# Patient Record
Sex: MALE | Race: WHITE | Employment: OTHER | URBAN - METROPOLITAN AREA
[De-identification: names, ages, dates, MRNs, and addresses within clinical notes are randomized per-mention and may not be internally consistent; named-entity substitution may affect disease eponyms.]

---

## 2019-04-12 NOTE — PROGRESS NOTES
[facility] / [facility]) [address] [facility], [address]    Acknowledgment of Informed Consent for Surgical or Medical Procedure and Sedation  I agree to allow doctor(s) Boris Moreau and his/her associates or assistants, including residents and/or other qualified medical practitioner to perform the following medical treatment or procedure and to administer or direct the administration of sedation as necessary:  Procedure(s): DECOMPRESSION LAMINECTOMY L3-4 POSSIBLY L5  My doctor has explained the following regarding the proposed procedure:   the explanation of the procedure   the benefits of the procedure   the potential problems that might occur during recuperation   the risks and side effects of the procedure which could include but are not limited to severe blood loss, infection, stroke or death   the benefits, risks and side effect of alternative procedures including the consequences of declining this procedure or any alternative procedures   the likelihood of achieving satisfactory results. I acknowledge no guarantee or assurance has been made to me regarding the results. I understand that during the course of this treatment/procedure, unforeseen conditions can occur which require an additional or different procedure. I agree to allow my physician or assistants to perform such extension of the original procedure as they may find necessary. I understand that sedation will often result in temporary impairment of memory and fine motor skills and that sedation can occasionally progress to a state of deep sedation or general anesthesia. I understand the risks of anesthesia for surgery include, but are not limited to, sore throat, hoarseness, injury to face, mouth, or teeth; nausea; headache; injury to blood vessels or nerves; death, brain damage, or paralysis.     I understand that if I have a Limitation of Treatment order in effect during my hospitalization, the order

## 2019-04-16 RX ORDER — HYDROCODONE BITARTRATE AND ACETAMINOPHEN 5; 325 MG/1; MG/1
1 TABLET ORAL EVERY 6 HOURS PRN
Status: ON HOLD | COMMUNITY
End: 2019-04-20 | Stop reason: HOSPADM

## 2019-04-16 RX ORDER — NAPROXEN 250 MG/1
250 TABLET ORAL 2 TIMES DAILY WITH MEALS
Status: ON HOLD | COMMUNITY
End: 2019-05-09 | Stop reason: CLARIF

## 2019-04-16 RX ORDER — CYCLOBENZAPRINE HCL 5 MG
5 TABLET ORAL 3 TIMES DAILY PRN
Status: ON HOLD | COMMUNITY
End: 2019-05-17 | Stop reason: HOSPADM

## 2019-04-16 RX ORDER — LISINOPRIL 20 MG/1
20 TABLET ORAL DAILY
Status: ON HOLD | COMMUNITY
End: 2019-05-28 | Stop reason: CLARIF

## 2019-04-16 RX ORDER — RANITIDINE 150 MG/1
150 TABLET ORAL 2 TIMES DAILY
Status: ON HOLD | COMMUNITY
End: 2019-05-09 | Stop reason: CLARIF

## 2019-04-16 RX ORDER — LANCETS
EACH MISCELLANEOUS
Status: ON HOLD | COMMUNITY
Start: 2017-08-13 | End: 2019-05-09 | Stop reason: CLARIF

## 2019-04-16 RX ORDER — GLIMEPIRIDE 4 MG/1
4 TABLET ORAL
COMMUNITY

## 2019-04-16 RX ORDER — ONDANSETRON 4 MG/1
4 TABLET, FILM COATED ORAL EVERY 8 HOURS PRN
Status: ON HOLD | COMMUNITY
End: 2019-05-09 | Stop reason: CLARIF

## 2019-04-16 RX ORDER — DOCUSATE SODIUM 100 MG/1
100 CAPSULE, LIQUID FILLED ORAL 2 TIMES DAILY
Status: ON HOLD | COMMUNITY
End: 2019-05-09 | Stop reason: CLARIF

## 2019-04-16 RX ORDER — TAMSULOSIN HYDROCHLORIDE 0.4 MG/1
0.4 CAPSULE ORAL DAILY
COMMUNITY

## 2019-04-16 RX ORDER — POLYETHYLENE GLYCOL 3350 17 G/17G
POWDER, FOR SOLUTION ORAL ONCE
Status: ON HOLD | COMMUNITY
Start: 2014-11-12 | End: 2019-05-09 | Stop reason: CLARIF

## 2019-04-16 RX ORDER — FENOFIBRATE 145 MG/1
145 TABLET, COATED ORAL DAILY
COMMUNITY

## 2019-04-16 RX ORDER — AMLODIPINE BESYLATE 5 MG/1
5 TABLET ORAL DAILY
Status: ON HOLD | COMMUNITY
End: 2019-05-09 | Stop reason: CLARIF

## 2019-04-16 RX ORDER — ACETAMINOPHEN 325 MG/1
650 TABLET ORAL 3 TIMES DAILY
Status: ON HOLD | COMMUNITY
End: 2019-05-09 | Stop reason: CLARIF

## 2019-04-16 RX ORDER — ASPIRIN 81 MG/1
81 TABLET ORAL DAILY
Status: ON HOLD | COMMUNITY
End: 2019-05-09 | Stop reason: CLARIF

## 2019-04-16 RX ORDER — ATORVASTATIN CALCIUM 20 MG/1
20 TABLET, FILM COATED ORAL NIGHTLY
COMMUNITY

## 2019-04-16 NOTE — PROGRESS NOTES
[address]                          Date of Procedure 4/19 Time of Procedure   1100    PRIOR TO PROCEDURE DATE:  1. Please follow any guidelines/instructions prior to your procedure as advised by your surgeon. 2. Arrange for someone to drive you home and be with you for the first 24 hours after discharge for your safety after your procedure for which you received sedation. Ensure it is someone we can share information with regarding your discharge. 3. You must contact your surgeon for instructions IF:   You are taking any blood thinners, aspirin, anti-inflammatory or vitamin E.   There is a change in your physical condition such as a cold, fever, rash, cuts, sores or any other infection, especially near your surgical site. 4. Do not drink alcohol the day before or day of your procedure. 5. A Pre-op History and Physical for surgery MUST be completed by your Physician or Urgent Care within 30 days of your procedure date. Please bring a copy with you on the day of your procedure and along with any other testing performed. THE DAY OF YOUR PROCEDURE:  1. Follow instructions for ARRIVAL TIME as DIRECTED BY YOUR SURGEON. If your surgeon does not give you a specific arrival time, please arrive at 0900.    2. Enter the MAIN entrance from [employer] and follow the signs to the free [email or web address] or [employer] parking (offered free of charge 6am-5pm). 3. Enter the Main Entrance of the hospital (do not enter from the lower level of the parking garage). Upon entrance, check in with the  at the main desk on your left. If no one is available at the desk, proceed into the [facility] Waiting Room and go through the door directly into the [facility]. There is a Check-in desk ACROSS from Room 5 (marked with a sign hanging from the ceiling). The phone number for the surgery center is [phone].     4. Please call [phone] option #2 option #2 if you have not been preregistered yet. On the day of your procedure bring your insurance card and photo ID. You will be registered at your bedside once brought back to your room. 5. DO NOT EAT ANYTHING eight hours prior to surgery. May have 8 ounces of water 4 hours prior to surgery. 6. MEDICATIONS    Take the following medications with a SMALL sip of water: AMLODIPINE PERCOCET VICODIN   Use your usual dose of inhalers the morning of surgery. BRING your rescue inhaler with you to hospital.    Anesthesia does NOT want you to take insulin the morning of surgery. They will control your blood sugar while you are at the hospital. Please contact your ordering physician for instructions regarding your insulin the night before your procedure. If you have an insulin pump, please keep it set on basal rate. 7. Do not swallow water when brushing teeth. No gum, candy, mints or ice chips. Refrain from smoking or at least decrease the amount. 8. Dress in loose, comfortable clothing appropriate for redressing after your procedure. Do not wear jewelry (including body piercings), make-up (especially NO eye make-up), fingernail polish (NO toenail polish if foot/leg surgery), lotion, powders or metal hairclips. 9. Dentures, glasses, or contacts will need to be removed before your procedure. Bring cases for your glasses, contacts, dentures, or hearing aids to protect them while you are in surgery. 10. If you use a CPAP, please bring it with you on the day of your procedure. 11. We recommend that valuable personal  belongings such as cash, cell phones, e-tablets or jewelry, be left at home during your stay. The hospital will not be responsible for valuables that are not secured in the hospital safe. However, if your insurance requires a co-pay, you may want to bring a method of payment, i.e. Check or credit card, if you wish to pay your co-pay the day of surgery.       12. If you are to stay overnight, you may bring a bag with personal items. Please have any large items you may need brought in by your family after your arrival to your hospital room. 15. If you have a Living Will or Durable Power of , please bring a copy on the day of your procedure. 15. With your permission, one family member may accompany you while you are being prepared for surgery. Once you are ready, additional family members may join you. HOW WE KEEP YOU SAFE and WORK TO PREVENT SURGICAL SITE INFECTIONS:  1. Health care workers should always check your ID bracelet to verify your name and birth date. You will be asked many times to state your name, date of birth, and allergies. 2. Health care workers should always clean their hands with soap or alcohol gel before providing care to you. It is okay to ask anyone if they cleaned their hands before they touch you. 3. You will be actively involved in verifying the type of procedure you are having and ensuring the correct surgical site. This will be confirmed multiple times prior to your procedure. Do NOT yosvany your surgery site UNLESS instructed to by your surgeon. 4. Do not shave or wax for 72 hours prior to procedure near your operative site. Shaving with a razor can irritate your skin and make it easier to develop an infection. On the day of your procedure, any hair that needs to be removed near the surgical site will be clipped by a healthcare worker using a special clippers designed to avoid skin irritation. 5. When you are in the operating room, your surgical site will be cleansed with a special soap, and in most cases, you will be given an antibiotic before the surgery begins. What to expect AFTER YOUR PROCEDURE:  1. Immediately following your procedure, your will be taken to the PACU for the first phase of your recovery.   Your nurse will help you recover from any potential side effects of anesthesia, such as extreme drowsiness, changes in your vital signs or breathing patterns. Nausea, headache, muscle aches, or sore throat may also occur after anesthesia. Your nurse will help you manage these potential side effects. 2. For comfort and safety, arrange to have someone at home with you for the first 24 hours after discharge. 3. You and your family will be given written instructions about your diet, activity, dressing care, medications, and return visits. 4. Once at home, should issues with nausea, pain, or bleeding occur, or should you notice any signs of infection, you should call your surgeon. 5. Always clean your hands before and after caring for your wound. Do not let your family touch your surgery site without cleaning their hands. 6. Narcotic pain medications can cause significant constipation. You may want to add a stool softener to your postoperative medication schedule or speak to your surgeon on how best to manage this SIDE EFFECT. SPECIAL INSTRUCTIONS     Thank you for allowing us to care for you. We strive to exceed your expectations in the delivery of care and service provided to you and your family. If you need to contact us for any reason, please call us at [phone]    Instructions reviewed with patient during preadmission testing phone interview. Tawny Martin. 4/16/2019 .11:39 AM      ADDITIONAL EDUCATIONAL INFORMATION REVIEWED PER PHONE WITH YOU AND/OR YOUR FAMILY:  No Bring a urine sample on day of surgery  Yes Pain Goal-Taking Control of Your Pain  Yes FAQs about Surgical Site Infections   NO Hibiclens® Bathing Instructions   Yes Antibacterial Soap  No Piero® Wipes Bathing Instructions (Obtained from: https://www.[employer].com/. pdf )  No Incentive Spirometer Education   Other

## 2019-04-18 ENCOUNTER — ANESTHESIA EVENT (OUTPATIENT)
Dept: OPERATING ROOM | Age: 69
End: 2019-04-18
Payer: MEDICARE

## 2019-04-19 ENCOUNTER — APPOINTMENT (OUTPATIENT)
Dept: GENERAL RADIOLOGY | Age: 69
End: 2019-04-19
Attending: NEUROLOGICAL SURGERY
Payer: MEDICARE

## 2019-04-19 ENCOUNTER — HOSPITAL ENCOUNTER (OUTPATIENT)
Age: 69
Setting detail: OBSERVATION
Discharge: HOME | End: 2019-04-20
Attending: NEUROLOGICAL SURGERY | Admitting: NEUROLOGICAL SURGERY
Payer: MEDICARE

## 2019-04-19 ENCOUNTER — ANESTHESIA (OUTPATIENT)
Dept: OPERATING ROOM | Age: 69
End: 2019-04-19
Payer: MEDICARE

## 2019-04-19 VITALS — SYSTOLIC BLOOD PRESSURE: 102 MMHG | TEMPERATURE: 97.9 F | DIASTOLIC BLOOD PRESSURE: 51 MMHG | OXYGEN SATURATION: 87 %

## 2019-04-19 DIAGNOSIS — M48.062 SPINAL STENOSIS OF LUMBAR REGION WITH NEUROGENIC CLAUDICATION: Primary | ICD-10-CM

## 2019-04-19 PROBLEM — M48.061 DEGENERATIVE LUMBAR SPINAL STENOSIS: Status: ACTIVE | Noted: 2019-04-19

## 2019-04-19 PROBLEM — M48.061 LUMBAR SPINAL STENOSIS: Status: ACTIVE | Noted: 2019-04-19

## 2019-04-19 LAB
ABO/RH: NORMAL
ANTIBODY SCREEN: NORMAL
CREAT SERPL-MCNC: 1.2 MG/DL (ref 0.8–1.3)
GFR AFRICAN AMERICAN: >60
GFR NON-AFRICAN AMERICAN: 60
GLUCOSE BLD-MCNC: 128 MG/DL (ref 70–99)
GLUCOSE BLD-MCNC: 198 MG/DL (ref 70–99)
GLUCOSE BLD-MCNC: 221 MG/DL (ref 70–99)
GLUCOSE BLD-MCNC: 277 MG/DL (ref 70–99)
PERFORMED ON: ABNORMAL

## 2019-04-19 PROCEDURE — 94761 N-INVAS EAR/PLS OXIMETRY MLT: CPT

## 2019-04-19 PROCEDURE — 2700000000 HC OXYGEN THERAPY PER DAY

## 2019-04-19 PROCEDURE — 3600000004 HC SURGERY LEVEL 4 BASE: Performed by: NEUROLOGICAL SURGERY

## 2019-04-19 PROCEDURE — 6360000002 HC RX W HCPCS: Performed by: PHYSICIAN ASSISTANT

## 2019-04-19 PROCEDURE — G0378 HOSPITAL OBSERVATION PER HR: HCPCS

## 2019-04-19 PROCEDURE — 6360000002 HC RX W HCPCS: Performed by: NEUROLOGICAL SURGERY

## 2019-04-19 PROCEDURE — 7100000001 HC PACU RECOVERY - ADDTL 15 MIN: Performed by: NEUROLOGICAL SURGERY

## 2019-04-19 PROCEDURE — 2580000003 HC RX 258: Performed by: PHYSICIAN ASSISTANT

## 2019-04-19 PROCEDURE — 6360000002 HC RX W HCPCS: Performed by: NURSE ANESTHETIST, CERTIFIED REGISTERED

## 2019-04-19 PROCEDURE — 2709999900 HC NON-CHARGEABLE SUPPLY: Performed by: NEUROLOGICAL SURGERY

## 2019-04-19 PROCEDURE — 36415 COLL VENOUS BLD VENIPUNCTURE: CPT

## 2019-04-19 PROCEDURE — 3700000001 HC ADD 15 MINUTES (ANESTHESIA): Performed by: NEUROLOGICAL SURGERY

## 2019-04-19 PROCEDURE — 6370000000 HC RX 637 (ALT 250 FOR IP): Performed by: NEUROLOGICAL SURGERY

## 2019-04-19 PROCEDURE — 72100 X-RAY EXAM L-S SPINE 2/3 VWS: CPT

## 2019-04-19 PROCEDURE — 2720000010 HC SURG SUPPLY STERILE: Performed by: NEUROLOGICAL SURGERY

## 2019-04-19 PROCEDURE — 94664 DEMO&/EVAL PT USE INHALER: CPT

## 2019-04-19 PROCEDURE — 86900 BLOOD TYPING SEROLOGIC ABO: CPT

## 2019-04-19 PROCEDURE — 83036 HEMOGLOBIN GLYCOSYLATED A1C: CPT

## 2019-04-19 PROCEDURE — 82565 ASSAY OF CREATININE: CPT

## 2019-04-19 PROCEDURE — 7100000000 HC PACU RECOVERY - FIRST 15 MIN: Performed by: NEUROLOGICAL SURGERY

## 2019-04-19 PROCEDURE — 2580000003 HC RX 258: Performed by: NURSE ANESTHETIST, CERTIFIED REGISTERED

## 2019-04-19 PROCEDURE — 3600000014 HC SURGERY LEVEL 4 ADDTL 15MIN: Performed by: NEUROLOGICAL SURGERY

## 2019-04-19 PROCEDURE — 2500000003 HC RX 250 WO HCPCS: Performed by: NURSE ANESTHETIST, CERTIFIED REGISTERED

## 2019-04-19 PROCEDURE — 2500000003 HC RX 250 WO HCPCS: Performed by: NEUROLOGICAL SURGERY

## 2019-04-19 PROCEDURE — 2580000003 HC RX 258: Performed by: NEUROLOGICAL SURGERY

## 2019-04-19 PROCEDURE — 2580000003 HC RX 258: Performed by: ANESTHESIOLOGY

## 2019-04-19 PROCEDURE — 6370000000 HC RX 637 (ALT 250 FOR IP): Performed by: PHYSICIAN ASSISTANT

## 2019-04-19 PROCEDURE — 3209999900 FLUORO FOR SURGICAL PROCEDURES

## 2019-04-19 PROCEDURE — 3700000000 HC ANESTHESIA ATTENDED CARE: Performed by: NEUROLOGICAL SURGERY

## 2019-04-19 PROCEDURE — 86901 BLOOD TYPING SEROLOGIC RH(D): CPT

## 2019-04-19 PROCEDURE — P9041 ALBUMIN (HUMAN),5%, 50ML: HCPCS | Performed by: NURSE ANESTHETIST, CERTIFIED REGISTERED

## 2019-04-19 PROCEDURE — 6370000000 HC RX 637 (ALT 250 FOR IP): Performed by: INTERNAL MEDICINE

## 2019-04-19 PROCEDURE — 86850 RBC ANTIBODY SCREEN: CPT

## 2019-04-19 RX ORDER — VASOPRESSIN 20 U/ML
INJECTION PARENTERAL PRN
Status: DISCONTINUED | OUTPATIENT
Start: 2019-04-19 | End: 2019-04-19 | Stop reason: SDUPTHER

## 2019-04-19 RX ORDER — LISINOPRIL 20 MG/1
20 TABLET ORAL DAILY
Status: DISCONTINUED | OUTPATIENT
Start: 2019-04-19 | End: 2019-04-20 | Stop reason: HOSPADM

## 2019-04-19 RX ORDER — TAMSULOSIN HYDROCHLORIDE 0.4 MG/1
0.4 CAPSULE ORAL DAILY
Status: DISCONTINUED | OUTPATIENT
Start: 2019-04-19 | End: 2019-04-20 | Stop reason: HOSPADM

## 2019-04-19 RX ORDER — BUPIVACAINE HYDROCHLORIDE AND EPINEPHRINE 5; 5 MG/ML; UG/ML
INJECTION, SOLUTION EPIDURAL; INTRACAUDAL; PERINEURAL PRN
Status: DISCONTINUED | OUTPATIENT
Start: 2019-04-19 | End: 2019-04-19 | Stop reason: ALTCHOICE

## 2019-04-19 RX ORDER — KETAMINE HYDROCHLORIDE 50 MG/ML
INJECTION, SOLUTION, CONCENTRATE INTRAMUSCULAR; INTRAVENOUS PRN
Status: DISCONTINUED | OUTPATIENT
Start: 2019-04-19 | End: 2019-04-19 | Stop reason: SDUPTHER

## 2019-04-19 RX ORDER — LIDOCAINE HYDROCHLORIDE 20 MG/ML
INJECTION, SOLUTION INTRAVENOUS PRN
Status: DISCONTINUED | OUTPATIENT
Start: 2019-04-19 | End: 2019-04-19 | Stop reason: SDUPTHER

## 2019-04-19 RX ORDER — DEXTROSE MONOHYDRATE 50 MG/ML
100 INJECTION, SOLUTION INTRAVENOUS PRN
Status: DISCONTINUED | OUTPATIENT
Start: 2019-04-19 | End: 2019-04-20 | Stop reason: HOSPADM

## 2019-04-19 RX ORDER — DOCUSATE SODIUM 100 MG/1
100 CAPSULE, LIQUID FILLED ORAL 2 TIMES DAILY
Status: DISCONTINUED | OUTPATIENT
Start: 2019-04-19 | End: 2019-04-20 | Stop reason: HOSPADM

## 2019-04-19 RX ORDER — ONDANSETRON 2 MG/ML
INJECTION INTRAMUSCULAR; INTRAVENOUS PRN
Status: DISCONTINUED | OUTPATIENT
Start: 2019-04-19 | End: 2019-04-19 | Stop reason: SDUPTHER

## 2019-04-19 RX ORDER — SODIUM CHLORIDE, SODIUM LACTATE, POTASSIUM CHLORIDE, CALCIUM CHLORIDE 600; 310; 30; 20 MG/100ML; MG/100ML; MG/100ML; MG/100ML
INJECTION, SOLUTION INTRAVENOUS CONTINUOUS
Status: DISCONTINUED | OUTPATIENT
Start: 2019-04-19 | End: 2019-04-19

## 2019-04-19 RX ORDER — EPHEDRINE SULFATE 50 MG/ML
INJECTION INTRAVENOUS PRN
Status: DISCONTINUED | OUTPATIENT
Start: 2019-04-19 | End: 2019-04-19 | Stop reason: SDUPTHER

## 2019-04-19 RX ORDER — GLIMEPIRIDE 2 MG/1
4 TABLET ORAL
Status: DISCONTINUED | OUTPATIENT
Start: 2019-04-20 | End: 2019-04-20 | Stop reason: HOSPADM

## 2019-04-19 RX ORDER — BACITRACIN ZINC AND POLYMYXIN B SULFATE 500; 1000 [USP'U]/G; [USP'U]/G
OINTMENT TOPICAL PRN
Status: DISCONTINUED | OUTPATIENT
Start: 2019-04-19 | End: 2019-04-19 | Stop reason: ALTCHOICE

## 2019-04-19 RX ORDER — SODIUM CHLORIDE 9 MG/ML
INJECTION, SOLUTION INTRAVENOUS CONTINUOUS
Status: DISCONTINUED | OUTPATIENT
Start: 2019-04-19 | End: 2019-04-20 | Stop reason: HOSPADM

## 2019-04-19 RX ORDER — SODIUM CHLORIDE 0.9 % (FLUSH) 0.9 %
10 SYRINGE (ML) INJECTION PRN
Status: DISCONTINUED | OUTPATIENT
Start: 2019-04-19 | End: 2019-04-19 | Stop reason: HOSPADM

## 2019-04-19 RX ORDER — FENOFIBRATE 145 MG/1
145 TABLET, COATED ORAL DAILY
Status: DISCONTINUED | OUTPATIENT
Start: 2019-04-19 | End: 2019-04-20 | Stop reason: HOSPADM

## 2019-04-19 RX ORDER — ROCURONIUM BROMIDE 10 MG/ML
INJECTION, SOLUTION INTRAVENOUS PRN
Status: DISCONTINUED | OUTPATIENT
Start: 2019-04-19 | End: 2019-04-19 | Stop reason: SDUPTHER

## 2019-04-19 RX ORDER — SODIUM CHLORIDE, SODIUM LACTATE, POTASSIUM CHLORIDE, CALCIUM CHLORIDE 600; 310; 30; 20 MG/100ML; MG/100ML; MG/100ML; MG/100ML
INJECTION, SOLUTION INTRAVENOUS CONTINUOUS
Status: DISCONTINUED | OUTPATIENT
Start: 2019-04-19 | End: 2019-04-20 | Stop reason: HOSPADM

## 2019-04-19 RX ORDER — OXYCODONE HYDROCHLORIDE AND ACETAMINOPHEN 5; 325 MG/1; MG/1
1 TABLET ORAL EVERY 4 HOURS PRN
Status: DISCONTINUED | OUTPATIENT
Start: 2019-04-19 | End: 2019-04-20 | Stop reason: HOSPADM

## 2019-04-19 RX ORDER — SODIUM CHLORIDE 0.9 % (FLUSH) 0.9 %
10 SYRINGE (ML) INJECTION EVERY 12 HOURS SCHEDULED
Status: DISCONTINUED | OUTPATIENT
Start: 2019-04-19 | End: 2019-04-19 | Stop reason: HOSPADM

## 2019-04-19 RX ORDER — ALBUMIN, HUMAN INJ 5% 5 %
SOLUTION INTRAVENOUS PRN
Status: DISCONTINUED | OUTPATIENT
Start: 2019-04-19 | End: 2019-04-19 | Stop reason: SDUPTHER

## 2019-04-19 RX ORDER — AMLODIPINE BESYLATE 5 MG/1
5 TABLET ORAL DAILY
Status: DISCONTINUED | OUTPATIENT
Start: 2019-04-19 | End: 2019-04-20 | Stop reason: HOSPADM

## 2019-04-19 RX ORDER — ATORVASTATIN CALCIUM 20 MG/1
20 TABLET, FILM COATED ORAL NIGHTLY
Status: DISCONTINUED | OUTPATIENT
Start: 2019-04-19 | End: 2019-04-20 | Stop reason: HOSPADM

## 2019-04-19 RX ORDER — SODIUM CHLORIDE 0.9 % (FLUSH) 0.9 %
10 SYRINGE (ML) INJECTION EVERY 12 HOURS SCHEDULED
Status: DISCONTINUED | OUTPATIENT
Start: 2019-04-19 | End: 2019-04-20 | Stop reason: HOSPADM

## 2019-04-19 RX ORDER — METHOCARBAMOL 750 MG/1
750 TABLET, FILM COATED ORAL EVERY 6 HOURS PRN
Status: DISCONTINUED | OUTPATIENT
Start: 2019-04-20 | End: 2019-04-20 | Stop reason: HOSPADM

## 2019-04-19 RX ORDER — RANITIDINE 150 MG/1
150 TABLET ORAL 2 TIMES DAILY
Status: DISCONTINUED | OUTPATIENT
Start: 2019-04-19 | End: 2019-04-19 | Stop reason: SDUPTHER

## 2019-04-19 RX ORDER — HYDROMORPHONE HCL 110MG/55ML
PATIENT CONTROLLED ANALGESIA SYRINGE INTRAVENOUS PRN
Status: DISCONTINUED | OUTPATIENT
Start: 2019-04-19 | End: 2019-04-19 | Stop reason: SDUPTHER

## 2019-04-19 RX ORDER — FAMOTIDINE 20 MG/1
20 TABLET, FILM COATED ORAL 2 TIMES DAILY
Status: DISCONTINUED | OUTPATIENT
Start: 2019-04-19 | End: 2019-04-20 | Stop reason: HOSPADM

## 2019-04-19 RX ORDER — GLYCOPYRROLATE 1 MG/5 ML
SYRINGE (ML) INTRAVENOUS PRN
Status: DISCONTINUED | OUTPATIENT
Start: 2019-04-19 | End: 2019-04-19 | Stop reason: SDUPTHER

## 2019-04-19 RX ORDER — SODIUM CHLORIDE 0.9 % (FLUSH) 0.9 %
10 SYRINGE (ML) INJECTION PRN
Status: DISCONTINUED | OUTPATIENT
Start: 2019-04-19 | End: 2019-04-20 | Stop reason: HOSPADM

## 2019-04-19 RX ORDER — DEXAMETHASONE SODIUM PHOSPHATE 4 MG/ML
INJECTION, SOLUTION INTRA-ARTICULAR; INTRALESIONAL; INTRAMUSCULAR; INTRAVENOUS; SOFT TISSUE PRN
Status: DISCONTINUED | OUTPATIENT
Start: 2019-04-19 | End: 2019-04-19 | Stop reason: SDUPTHER

## 2019-04-19 RX ORDER — ONDANSETRON 2 MG/ML
4 INJECTION INTRAMUSCULAR; INTRAVENOUS EVERY 6 HOURS PRN
Status: DISCONTINUED | OUTPATIENT
Start: 2019-04-19 | End: 2019-04-20 | Stop reason: HOSPADM

## 2019-04-19 RX ORDER — SODIUM CHLORIDE 9 MG/ML
INJECTION, SOLUTION INTRAVENOUS CONTINUOUS PRN
Status: DISCONTINUED | OUTPATIENT
Start: 2019-04-19 | End: 2019-04-19 | Stop reason: SDUPTHER

## 2019-04-19 RX ORDER — OXYCODONE HYDROCHLORIDE AND ACETAMINOPHEN 5; 325 MG/1; MG/1
2 TABLET ORAL EVERY 4 HOURS PRN
Status: DISCONTINUED | OUTPATIENT
Start: 2019-04-19 | End: 2019-04-20 | Stop reason: HOSPADM

## 2019-04-19 RX ORDER — NICOTINE POLACRILEX 4 MG
15 LOZENGE BUCCAL PRN
Status: DISCONTINUED | OUTPATIENT
Start: 2019-04-19 | End: 2019-04-20 | Stop reason: HOSPADM

## 2019-04-19 RX ORDER — LIDOCAINE HYDROCHLORIDE 10 MG/ML
1 INJECTION, SOLUTION EPIDURAL; INFILTRATION; INTRACAUDAL; PERINEURAL
Status: DISCONTINUED | OUTPATIENT
Start: 2019-04-19 | End: 2019-04-19 | Stop reason: HOSPADM

## 2019-04-19 RX ORDER — PROPOFOL 10 MG/ML
INJECTION, EMULSION INTRAVENOUS PRN
Status: DISCONTINUED | OUTPATIENT
Start: 2019-04-19 | End: 2019-04-19 | Stop reason: SDUPTHER

## 2019-04-19 RX ORDER — DEXTROSE MONOHYDRATE 25 G/50ML
12.5 INJECTION, SOLUTION INTRAVENOUS PRN
Status: DISCONTINUED | OUTPATIENT
Start: 2019-04-19 | End: 2019-04-20 | Stop reason: HOSPADM

## 2019-04-19 RX ORDER — SUCCINYLCHOLINE CHLORIDE 20 MG/ML
INJECTION INTRAMUSCULAR; INTRAVENOUS PRN
Status: DISCONTINUED | OUTPATIENT
Start: 2019-04-19 | End: 2019-04-19 | Stop reason: SDUPTHER

## 2019-04-19 RX ADMIN — Medication 3 G: at 11:28

## 2019-04-19 RX ADMIN — KETAMINE HYDROCHLORIDE 25 MG: 50 INJECTION, SOLUTION INTRAMUSCULAR; INTRAVENOUS at 11:30

## 2019-04-19 RX ADMIN — ROCURONIUM BROMIDE 40 MG: 10 INJECTION, SOLUTION INTRAVENOUS at 11:41

## 2019-04-19 RX ADMIN — HYDROMORPHONE HYDROCHLORIDE 1.6 MG: 2 INJECTION, SOLUTION INTRAMUSCULAR; INTRAVENOUS; SUBCUTANEOUS at 14:11

## 2019-04-19 RX ADMIN — ONDANSETRON 8 MG: 2 INJECTION INTRAMUSCULAR; INTRAVENOUS at 11:28

## 2019-04-19 RX ADMIN — ROCURONIUM BROMIDE 30 MG: 10 INJECTION, SOLUTION INTRAVENOUS at 13:47

## 2019-04-19 RX ADMIN — VASOPRESSIN 1 UNITS: 20 INJECTION INTRAVENOUS at 12:46

## 2019-04-19 RX ADMIN — VASOPRESSIN 2 UNITS: 20 INJECTION INTRAVENOUS at 11:59

## 2019-04-19 RX ADMIN — EPHEDRINE SULFATE 15 MG: 50 INJECTION INTRAVENOUS at 12:58

## 2019-04-19 RX ADMIN — ALBUMIN (HUMAN) 250 ML: 12.5 INJECTION, SOLUTION INTRAVENOUS at 13:55

## 2019-04-19 RX ADMIN — EPHEDRINE SULFATE 10 MG: 50 INJECTION INTRAVENOUS at 11:56

## 2019-04-19 RX ADMIN — Medication 10 ML: at 21:44

## 2019-04-19 RX ADMIN — ALBUMIN (HUMAN) 250 ML: 12.5 INJECTION, SOLUTION INTRAVENOUS at 14:04

## 2019-04-19 RX ADMIN — SODIUM CHLORIDE: 900 INJECTION, SOLUTION INTRAVENOUS at 12:39

## 2019-04-19 RX ADMIN — VASOPRESSIN 2 UNITS: 20 INJECTION INTRAVENOUS at 12:53

## 2019-04-19 RX ADMIN — FAMOTIDINE 20 MG: 10 INJECTION, SOLUTION INTRAVENOUS at 11:28

## 2019-04-19 RX ADMIN — LIDOCAINE HYDROCHLORIDE 100 MG: 20 INJECTION, SOLUTION INTRAVENOUS at 14:10

## 2019-04-19 RX ADMIN — ALBUMIN (HUMAN) 250 ML: 12.5 INJECTION, SOLUTION INTRAVENOUS at 11:52

## 2019-04-19 RX ADMIN — OXYCODONE HYDROCHLORIDE AND ACETAMINOPHEN 1 TABLET: 5; 325 TABLET ORAL at 18:16

## 2019-04-19 RX ADMIN — DEXAMETHASONE SODIUM PHOSPHATE 8 MG: 4 INJECTION, SOLUTION INTRAMUSCULAR; INTRAVENOUS at 11:31

## 2019-04-19 RX ADMIN — CEFAZOLIN SODIUM 3 G: 1 INJECTION, POWDER, FOR SOLUTION INTRAMUSCULAR; INTRAVENOUS at 18:16

## 2019-04-19 RX ADMIN — PHENYLEPHRINE HYDROCHLORIDE 80 MCG: 10 INJECTION, SOLUTION INTRAMUSCULAR; INTRAVENOUS; SUBCUTANEOUS at 11:36

## 2019-04-19 RX ADMIN — SUCCINYLCHOLINE CHLORIDE 200 MG: 20 INJECTION, SOLUTION INTRAMUSCULAR; INTRAVENOUS; PARENTERAL at 11:31

## 2019-04-19 RX ADMIN — PHENYLEPHRINE HYDROCHLORIDE 80 MCG: 10 INJECTION, SOLUTION INTRAMUSCULAR; INTRAVENOUS; SUBCUTANEOUS at 11:45

## 2019-04-19 RX ADMIN — SODIUM CHLORIDE, SODIUM LACTATE, POTASSIUM CHLORIDE, AND CALCIUM CHLORIDE: 600; 310; 30; 20 INJECTION, SOLUTION INTRAVENOUS at 09:40

## 2019-04-19 RX ADMIN — FAMOTIDINE 20 MG: 20 TABLET ORAL at 21:26

## 2019-04-19 RX ADMIN — TAMSULOSIN HYDROCHLORIDE 0.4 MG: 0.4 CAPSULE ORAL at 18:17

## 2019-04-19 RX ADMIN — SODIUM CHLORIDE, SODIUM LACTATE, POTASSIUM CHLORIDE, AND CALCIUM CHLORIDE: 600; 310; 30; 20 INJECTION, SOLUTION INTRAVENOUS at 14:16

## 2019-04-19 RX ADMIN — ATORVASTATIN CALCIUM 20 MG: 20 TABLET, FILM COATED ORAL at 21:26

## 2019-04-19 RX ADMIN — PHENYLEPHRINE HYDROCHLORIDE 80 MCG: 10 INJECTION, SOLUTION INTRAMUSCULAR; INTRAVENOUS; SUBCUTANEOUS at 11:40

## 2019-04-19 RX ADMIN — ROCURONIUM BROMIDE 10 MG: 10 INJECTION, SOLUTION INTRAVENOUS at 11:30

## 2019-04-19 RX ADMIN — SODIUM CHLORIDE, POTASSIUM CHLORIDE, SODIUM LACTATE AND CALCIUM CHLORIDE: 600; 310; 30; 20 INJECTION, SOLUTION INTRAVENOUS at 09:38

## 2019-04-19 RX ADMIN — SODIUM CHLORIDE: 900 INJECTION, SOLUTION INTRAVENOUS at 11:52

## 2019-04-19 RX ADMIN — LISINOPRIL 20 MG: 20 TABLET ORAL at 18:17

## 2019-04-19 RX ADMIN — VASOPRESSIN 1 UNITS: 20 INJECTION INTRAVENOUS at 12:25

## 2019-04-19 RX ADMIN — Medication 0.2 MG: at 12:53

## 2019-04-19 RX ADMIN — Medication 0.2 MG: at 11:33

## 2019-04-19 RX ADMIN — INSULIN LISPRO 1 UNITS: 100 INJECTION, SOLUTION INTRAVENOUS; SUBCUTANEOUS at 21:26

## 2019-04-19 RX ADMIN — SUGAMMADEX 400 MG: 100 INJECTION, SOLUTION INTRAVENOUS at 14:14

## 2019-04-19 RX ADMIN — AMLODIPINE BESYLATE 5 MG: 5 TABLET ORAL at 18:17

## 2019-04-19 RX ADMIN — HYDROMORPHONE HYDROCHLORIDE 0.4 MG: 2 INJECTION, SOLUTION INTRAMUSCULAR; INTRAVENOUS; SUBCUTANEOUS at 12:11

## 2019-04-19 RX ADMIN — SODIUM CHLORIDE: 9 INJECTION, SOLUTION INTRAVENOUS at 18:16

## 2019-04-19 RX ADMIN — PHENYLEPHRINE HYDROCHLORIDE 160 MCG: 10 INJECTION, SOLUTION INTRAMUSCULAR; INTRAVENOUS; SUBCUTANEOUS at 11:51

## 2019-04-19 RX ADMIN — PROPOFOL 50 MG: 10 INJECTION, EMULSION INTRAVENOUS at 14:14

## 2019-04-19 RX ADMIN — PROPOFOL 50 MG: 10 INJECTION, EMULSION INTRAVENOUS at 14:17

## 2019-04-19 RX ADMIN — LIDOCAINE HYDROCHLORIDE 200 MG: 20 INJECTION, SOLUTION INTRAVENOUS at 11:30

## 2019-04-19 RX ADMIN — PROPOFOL 200 MG: 10 INJECTION, EMULSION INTRAVENOUS at 11:30

## 2019-04-19 RX ADMIN — PHENYLEPHRINE HYDROCHLORIDE 40 MCG: 10 INJECTION, SOLUTION INTRAMUSCULAR; INTRAVENOUS; SUBCUTANEOUS at 14:00

## 2019-04-19 RX ADMIN — DOCUSATE SODIUM 100 MG: 100 CAPSULE, LIQUID FILLED ORAL at 21:26

## 2019-04-19 RX ADMIN — VASOPRESSIN 1 UNITS: 20 INJECTION INTRAVENOUS at 12:41

## 2019-04-19 RX ADMIN — VASOPRESSIN 2 UNITS: 20 INJECTION INTRAVENOUS at 12:35

## 2019-04-19 ASSESSMENT — PULMONARY FUNCTION TESTS
PIF_VALUE: 31
PIF_VALUE: 36
PIF_VALUE: 32
PIF_VALUE: 32
PIF_VALUE: 34
PIF_VALUE: 32
PIF_VALUE: 30
PIF_VALUE: 21
PIF_VALUE: 32
PIF_VALUE: 33
PIF_VALUE: 32
PIF_VALUE: 31
PIF_VALUE: 31
PIF_VALUE: 32
PIF_VALUE: 36
PIF_VALUE: 31
PIF_VALUE: 17
PIF_VALUE: 32
PIF_VALUE: 22
PIF_VALUE: 32
PIF_VALUE: 1
PIF_VALUE: 35
PIF_VALUE: 31
PIF_VALUE: 31
PIF_VALUE: 32
PIF_VALUE: 30
PIF_VALUE: 31
PIF_VALUE: 21
PIF_VALUE: 30
PIF_VALUE: 33
PIF_VALUE: 33
PIF_VALUE: 29
PIF_VALUE: 33
PIF_VALUE: 33
PIF_VALUE: 28
PIF_VALUE: 29
PIF_VALUE: 33
PIF_VALUE: 18
PIF_VALUE: 31
PIF_VALUE: 34
PIF_VALUE: 33
PIF_VALUE: 30
PIF_VALUE: 32
PIF_VALUE: 6
PIF_VALUE: 32
PIF_VALUE: 30
PIF_VALUE: 15
PIF_VALUE: 32
PIF_VALUE: 32
PIF_VALUE: 23
PIF_VALUE: 33
PIF_VALUE: 32
PIF_VALUE: 22
PIF_VALUE: 32
PIF_VALUE: 33
PIF_VALUE: 32
PIF_VALUE: 33
PIF_VALUE: 31
PIF_VALUE: 23
PIF_VALUE: 33
PIF_VALUE: 28
PIF_VALUE: 32
PIF_VALUE: 5
PIF_VALUE: 32
PIF_VALUE: 16
PIF_VALUE: 32
PIF_VALUE: 33
PIF_VALUE: 31
PIF_VALUE: 29
PIF_VALUE: 21
PIF_VALUE: 33
PIF_VALUE: 32
PIF_VALUE: 34
PIF_VALUE: 32
PIF_VALUE: 33
PIF_VALUE: 74
PIF_VALUE: 34
PIF_VALUE: 33
PIF_VALUE: 34
PIF_VALUE: 33
PIF_VALUE: 23
PIF_VALUE: 23
PIF_VALUE: 30
PIF_VALUE: 30
PIF_VALUE: 33
PIF_VALUE: 33
PIF_VALUE: 19
PIF_VALUE: 32
PIF_VALUE: 30
PIF_VALUE: 23
PIF_VALUE: 32
PIF_VALUE: 32
PIF_VALUE: 31
PIF_VALUE: 23
PIF_VALUE: 34
PIF_VALUE: 29
PIF_VALUE: 33
PIF_VALUE: 35
PIF_VALUE: 35
PIF_VALUE: 32
PIF_VALUE: 31
PIF_VALUE: 33
PIF_VALUE: 1
PIF_VALUE: 32
PIF_VALUE: 26
PIF_VALUE: 32
PIF_VALUE: 29
PIF_VALUE: 34
PIF_VALUE: 34
PIF_VALUE: 1
PIF_VALUE: 32
PIF_VALUE: 30
PIF_VALUE: 31
PIF_VALUE: 35
PIF_VALUE: 33
PIF_VALUE: 32
PIF_VALUE: 33
PIF_VALUE: 28
PIF_VALUE: 30
PIF_VALUE: 21
PIF_VALUE: 35
PIF_VALUE: 5
PIF_VALUE: 30
PIF_VALUE: 16
PIF_VALUE: 32
PIF_VALUE: 33
PIF_VALUE: 33
PIF_VALUE: 34
PIF_VALUE: 32
PIF_VALUE: 34
PIF_VALUE: 34
PIF_VALUE: 32
PIF_VALUE: 1
PIF_VALUE: 18
PIF_VALUE: 30
PIF_VALUE: 29
PIF_VALUE: 30
PIF_VALUE: 33
PIF_VALUE: 34
PIF_VALUE: 33
PIF_VALUE: 34
PIF_VALUE: 24
PIF_VALUE: 32
PIF_VALUE: 31
PIF_VALUE: 22
PIF_VALUE: 23
PIF_VALUE: 33
PIF_VALUE: 19
PIF_VALUE: 23
PIF_VALUE: 32
PIF_VALUE: 35
PIF_VALUE: 15
PIF_VALUE: 32
PIF_VALUE: 31
PIF_VALUE: 29
PIF_VALUE: 30
PIF_VALUE: 34
PIF_VALUE: 33
PIF_VALUE: 34
PIF_VALUE: 34
PIF_VALUE: 30
PIF_VALUE: 30
PIF_VALUE: 33
PIF_VALUE: 34
PIF_VALUE: 35
PIF_VALUE: 32
PIF_VALUE: 19
PIF_VALUE: 31
PIF_VALUE: 24
PIF_VALUE: 30
PIF_VALUE: 34
PIF_VALUE: 34
PIF_VALUE: 32
PIF_VALUE: 18
PIF_VALUE: 34
PIF_VALUE: 30
PIF_VALUE: 34
PIF_VALUE: 34
PIF_VALUE: 30
PIF_VALUE: 32
PIF_VALUE: 30
PIF_VALUE: 34
PIF_VALUE: 33
PIF_VALUE: 15
PIF_VALUE: 19
PIF_VALUE: 34
PIF_VALUE: 31
PIF_VALUE: 33
PIF_VALUE: 35
PIF_VALUE: 33
PIF_VALUE: 33

## 2019-04-19 ASSESSMENT — PAIN SCALES - GENERAL
PAINLEVEL_OUTOF10: 2
PAINLEVEL_OUTOF10: 0

## 2019-04-19 ASSESSMENT — PAIN DESCRIPTION - DESCRIPTORS: DESCRIPTORS: CONSTANT;ACHING;THROBBING

## 2019-04-19 ASSESSMENT — PAIN - FUNCTIONAL ASSESSMENT
PAIN_FUNCTIONAL_ASSESSMENT: 0-10
PAIN_FUNCTIONAL_ASSESSMENT: PREVENTS OR INTERFERES SOME ACTIVE ACTIVITIES AND ADLS

## 2019-04-19 NOTE — BRIEF OP NOTE
Brief Postoperative Note  ______________________________________________________________    Patient: Ambreen Adan  YOB: 1950  MRN: [id number]  Date of Procedure: 4/19/2019    Pre-Op Diagnosis: SPINAL STENOSIS LUMBAR WITH CLAUDICATION M48.062    Post-Op Diagnosis: Same       Procedure(s):  DECOMPRESSION LAMINECTOMY L3-4 POSSIBLY L5    Anesthesia: General    Surgeon(s):   Romana Curtis, MD    Assistant: Micheline ROJO    Estimated Blood Loss (mL): 287    Complications: None    Specimens:   none    Implants:  none      Drains: * No LDAs found *    Findings: severe stenosis    Romana Curtis, MD  Date: 4/19/2019  Time: 2:33 PM

## 2019-04-19 NOTE — H&P
per week: None     Minutes per session: None    Stress: None   Relationships    Social connections:     Talks on phone: None     Gets together: None     Attends Scientologist service: None     Active member of club or organization: None     Attends meetings of clubs or organizations: None     Relationship status: None    Intimate partner violence:     Fear of current or ex partner: None     Emotionally abused: None     Physically abused: None     Forced sexual activity: None   Other Topics Concern    None   Social History Narrative    None         Medications Prior to Admission:      Prior to Admission medications    Medication Sig Start Date End Date Taking? Authorizing Provider   aspirin 81 MG EC tablet Take 81 mg by mouth daily   Yes Historical Provider, MD   B COMPLEX VITAMINS PO Take by mouth   Yes Historical Provider, MD   blood glucose test strips (ASCENSIA AUTODISC VI;ONE TOUCH ULTRA TEST VI) strip Test blood glucose.  TEST THREE TIMES A DAY BEFORE MEALS 2/14/19  Yes Historical Provider, MD   Cetirizine HCl 10 MG CAPS Take 10 mg by mouth   Yes Historical Provider, MD   ONE TOUCH ULTRASOFT LANCETS [address] [facility] TEST THREE TIMES A DAY 8/13/17  Yes Historical Provider, MD   polyethylene glycol (GLYCOLAX) packet Take by mouth once  11/12/14  Yes Historical Provider, MD   amLODIPine (NORVASC) 5 MG tablet Take 5 mg by mouth daily   Yes Historical Provider, MD   atorvastatin (LIPITOR) 20 MG tablet Take 20 mg by mouth daily   Yes Historical Provider, MD   acetaminophen (TYLENOL) 325 MG tablet Take 650 mg by mouth three times daily   Yes Historical Provider, MD   cyclobenzaprine (FLEXERIL) 5 MG tablet Take 5 mg by mouth 3 times daily as needed for Muscle spasms   Yes Historical Provider, MD   docusate sodium (COLACE) 100 MG capsule Take 100 mg by mouth 2 times daily   Yes Historical Provider, MD   empagliflozin (JARDIANCE) 25 MG tablet Take 25 mg by mouth daily   Yes Historical Provider, MD   fenofibrate (TRICOR) 145 MG request of the provider.     KALEB Fields - FELIPA     4/19/2019

## 2019-04-19 NOTE — PLAN OF CARE
Problem: Falls - Risk of:  Goal: Will remain free from falls  Description  Patient in bed with alarm and nonskid socks on, 2/4 side rails up and bed locked in lowest position. Call light, belongings, and bedside table within reach. Patient educated on using call light and instructed to call out for assistance when getting out of bed, patient verbalized understanding. Patient calls out approprietly and remains free of falls.     Outcome: Ongoing

## 2019-04-19 NOTE — PROGRESS NOTES
4 Eyes Admission Assessment     I agree as the admission nurse that 2 RN's have performed a thorough Head to Toe Skin Assessment on the patient. ALL assessment sites listed below have been assessed on admission. Areas assessed by both nurses:   [x]   Head, Face, and Ears   [x]   Shoulders, Back, and Chest  [x]   Arms, Elbows, and Hands   [x]   Coccyx, Sacrum, and Ischum  [x]   Legs, Feet, and Heels        Does the Patient have Skin Breakdown?   No         Simon Prevention initiated:  No   Wound Care Orders initiated:  No      [facility] nurse consulted for Pressure Injury (Stage 3,4, Unstageable, DTI, NWPT, and Complex wounds):  No      Nurse 1 eSignature: Electronically signed by Dipti Hill RN on 4/19/19 at 5:33 PM    **SHARE this note so that the co-signing nurse is able to place an eSignature**    Nurse 2 eSignature: Electronically signed by Sully Agosto RN on 4/19/19 at 5:46 PM

## 2019-04-19 NOTE — PROGRESS NOTES
NEUROSURGERY HANDOFF    Patient Information  Name:SANNA Gaffney LJK:[id number]   :1950 Code Status:No Order   Allergies   Allergen Reactions    Morphine Other (See Comments)     Doesn't work  vicodan and percocet work    No Known Allergies     Extended Emergency Contact Information  Primary Emergency Contact: kishor teague  Home Phone: [phone]  Mobile Phone: [phone]  Relation: Spouse     Admission Information  Date of Admission:2019  9:13 AM Location:OR/NONE   Admission Diagnosis:SPINAL STENOSIS LUMBAR WITH CLAUDICATION M48.062 Attending Nadia Miller MD   Procedure(s) (LRB):  DECOMPRESSION LAMINECTOMY L3-4 POSSIBLY L5 (N/A) Carlos Wakefield MD     Patient Summary  Danielle Kauffman is a 68 y.o. male patient with SPINAL STENOSIS LUMBAR WITH CLAUDICATION M48.062 who under went a Procedure(s) (LRB):  DECOMPRESSION LAMINECTOMY L3-4 POSSIBLY L5 (N/A) today by Iain Nagel MD.    History of Present Illness:  Patient had pre-op complaints of chronic lumbar pain and left LE pain, numbness and tingling that was unresponsive to conservative treatments.      Vital Signs:  BP (!) 114/56   Pulse 79   Temp 98.2 °F (36.8 °C) (Temporal)   Resp 16   Ht 6' (1.829 m)   Wt (!) 320 lb (145.2 kg)   SpO2 94%   BMI 43.40 kg/m²     Situation Awareness  Contingency Planning  If the patient has:  · LOC  · Sudden change in neuro exam that includes:   · Numbness or tingling in extremities  · Weakness in extremities  · Incision begins to separate  · Copious amount of blood or fluid draining from the incision  · Signs of infection, such as:   · Temperature exceeds 101° F  · Increased pain, swelling, warmth, or redness around incision site  · Red streaks leading from the site  · Pus draining from the site    You MUST contact Iain Nagel MD at [phone]    You can also contact the [facility] Neurosurgery NP Monday-Friday 7am-7pm @ [phone]    Electronically signed by Germain Fabry KALEB Lin - CNP on 4/19/2019 at 4:54 PM

## 2019-04-19 NOTE — ANESTHESIA PRE PROCEDURE
Department of Anesthesiology  Preprocedure Note       Name:  Ambreen Adan   Age:  68 y.o.  :  1950                                          MRN:  [id number]         Date:  2019      Surgeon: Jeanne Morrow): Romana Curtis, MD    Procedure: DECOMPRESSION LAMINECTOMY L3-4 POSSIBLY L5 (N/A )    Medications prior to admission:   Prior to Admission medications    Medication Sig Start Date End Date Taking? Authorizing Provider   aspirin 81 MG EC tablet Take 81 mg by mouth daily   Yes Historical Provider, MD   B COMPLEX VITAMINS PO Take by mouth   Yes Historical Provider, MD   blood glucose test strips (ASCENSIA AUTODISC VI;ONE TOUCH ULTRA TEST VI) strip Test blood glucose. TEST THREE TIMES A DAY BEFORE MEALS 19  Yes Historical Provider, MD   ONE TOUCH ULTRASOFT LANCETS MISC TEST THREE TIMES A DAY 17  Yes Historical Provider, MD   polyethylene glycol ([address] [place] [address]) packet Take by mouth once  14  Yes Historical Provider, MD   amLODIPine (NORVASC) 5 MG tablet Take 5 mg by mouth daily   Yes Historical Provider, MD   atorvastatin (LIPITOR) 20 MG tablet Take 20 mg by mouth daily   Yes Historical Provider, MD   acetaminophen (TYLENOL) 325 MG tablet Take 650 mg by mouth three times daily   Yes Historical Provider, MD   cyclobenzaprine (FLEXERIL) 5 MG tablet Take 5 mg by mouth 3 times daily as needed for Muscle spasms   Yes Historical Provider, MD   empagliflozin (JARDIANCE) 25 MG tablet Take 25 mg by mouth daily   Yes Historical Provider, MD   fenofibrate (TRICOR) 145 MG tablet Take 145 mg by mouth daily   Yes Historical Provider, MD   Flaxseed Oil OIL by Does not apply route   Yes Historical Provider, MD   glimepiride (AMARYL) 4 MG tablet Take 4 mg by mouth every morning (before breakfast)   Yes Historical Provider, MD   HYDROcodone-acetaminophen (NORCO) 5-325 MG per tablet Take 1 tablet by mouth every 6 hours as needed for Pain.    Yes Historical Provider, MD   insulin NPH (HUMULIN N;NOVOLIN N) 100 UNIT/ML injection vial Inject 20 Units into the skin 2 times daily (before meals)   Yes Historical Provider, MD   lisinopril (PRINIVIL;ZESTRIL) 20 MG tablet Take 20 mg by mouth daily   Yes Historical Provider, MD   Magnesium Oxide (MAG- PO) Take by mouth   Yes Historical Provider, MD   metFORMIN (GLUCOPHAGE) 1000 MG tablet Take 1,000 mg by mouth 2 times daily (with meals)   Yes Historical Provider, MD   ondansetron (ZOFRAN) 4 MG tablet Take 4 mg by mouth every 8 hours as needed for Nausea or Vomiting   Yes Historical Provider, MD   naproxen (NAPROSYN) 250 MG tablet Take 250 mg by mouth 2 times daily (with meals)   Yes Historical Provider, MD   ranitidine (ZANTAC 150 MAXIMUM STRENGTH) 150 MG tablet Take 150 mg by mouth 2 times daily   Yes Historical Provider, MD   tamsulosin (FLOMAX) 0.4 MG capsule Take 0.4 mg by mouth daily   Yes Historical Provider, MD   Cetirizine HCl 10 MG CAPS Take 10 mg by mouth    Historical Provider, MD   docusate sodium (COLACE) 100 MG capsule Take 100 mg by mouth 2 times daily    Historical Provider, MD       Current medications:    Current Facility-Administered Medications   Medication Dose Route Frequency Provider Last Rate Last Dose    lactated ringers infusion   Intravenous Continuous Sena Parker  mL/hr at 04/19/19 7509      sodium chloride flush 0.9 % injection 10 mL  10 mL Intravenous 2 times per day Sena Parker MD        sodium chloride flush 0.9 % injection 10 mL  10 mL Intravenous PRN Sena Parker MD        lidocaine PF 1 % injection 1 mL  1 mL Intradermal Once PRN Sena Parker MD        lactated ringers infusion   Intravenous Continuous Marcellus Garay MD 50 mL/hr at 04/19/19 0940      ceFAZolin (ANCEF) 3 g in dextrose 5 % 100 mL IVPB  3 g Intravenous Once Danita Schulte MD           Allergies:     Allergies   Allergen Reactions    Morphine Other (See Comments)     Doesn't work  vicodan and percocet work    No Known Allergies        Problem List:  There is no problem list on file for this patient. Past Medical History:        Diagnosis Date    Arthritis      left knee    Back pain     DDD (degenerative disc disease), cervical     Diabetes mellitus (HCC)     Edema of both legs     GERD (gastroesophageal reflux disease)     Hyperlipidemia     Hypertension     Nausea in adult     Neuropathy     idopathtic     Vitamin B 12 deficiency     Walking troubles        Past Surgical History:        Procedure Laterality Date    BACK SURGERY      x 2    CHOLECYSTECTOMY      CYSTOSCOPY      EYE SURGERY      retina repair    HEMORRHOID SURGERY      KNEE SURGERY      PROSTATE SURGERY       retracted penis    SHOULDER SURGERY      TONSILLECTOMY         Social History:    Social History     Tobacco Use    Smoking status: Never Smoker    Smokeless tobacco: Never Used   Substance Use Topics    Alcohol use: Yes     Comment: Rarely about 1 beer in 6 months                                Counseling given: Not Answered      Vital Signs (Current):   Vitals:    04/16/19 1125 04/19/19 0920   BP:  (!) 148/81   Pulse:  68   Resp:  16   Temp:  98 °F (36.7 °C)   TempSrc:  Oral   SpO2:  95%   Weight: (!) 320 lb (145.2 kg) (!) 320 lb (145.2 kg)   Height: 6' (1.829 m) 6' (1.829 m)                                              BP Readings from Last 3 Encounters:   04/19/19 (!) 148/81       NPO Status: Time of last liquid consumption: 0800(sip of H2O)                        Time of last solid consumption: 2000                        Date of last liquid consumption: 04/19/19                        Date of last solid food consumption: 04/18/19    BMI:   Wt Readings from Last 3 Encounters:   04/19/19 (!) 320 lb (145.2 kg)     Body mass index is 43.4 kg/m².     CBC: No results found for: WBC, RBC, HGB, HCT, MCV, RDW, PLT    CMP: No results found for: NA, K, CL, CO2, BUN, CREATININE, GFRAA, AGRATIO, LABGLOM, GLUCOSE, PROT, CALCIUM, BILITOT, ALKPHOS, AST, ALT    POC Tests:   Recent Labs     04/19/19  0953   POCGLU 128*       Coags: No results found for: PROTIME, INR, APTT    HCG (If Applicable): No results found for: PREGTESTUR, PREGSERUM, HCG, HCGQUANT     ABGs: No results found for: PHART, PO2ART, URA3PXT, YPQ5ANQ, BEART, R8UOPZRT     Type & Screen (If Applicable):  No results found for: LABABO, [address]    Anesthesia Evaluation  Patient summary reviewed and Nursing notes reviewed  Airway: Mallampati: II  TM distance: <3 FB   Neck ROM: limited  Mouth opening: > = 3 FB Dental: normal exam         Pulmonary:Negative Pulmonary ROS and normal exam  breath sounds clear to auscultation                             Cardiovascular:          ECG reviewed  Rhythm: regular  Rate: normal    Stress test reviewed                Neuro/Psych:   Negative Neuro/Psych ROS              GI/Hepatic/Renal: Neg GI/Hepatic/Renal ROS           ROS comment: Chronic nausea. Endo/Other: Negative Endo/Other ROS   (+) Diabetesusing insulin, : arthritis:., .                 Abdominal:           Vascular: negative vascular ROS. Anesthesia Plan      general     ASA 3       Induction: intravenous and inhalational.    MIPS: Postoperative opioids intended and Prophylactic antiemetics administered. Anesthetic plan and risks discussed with patient.         Attending anesthesiologist reviewed and agrees with Santa Mccarthy MD   4/19/2019

## 2019-04-19 NOTE — PROGRESS NOTES
PACU Transfer Note    Vitals:    04/19/19 1609   BP: (!) 114/56   Pulse: 79   Resp: 16   Temp: 98.2 °F (36.8 °C)   SpO2: 94%       In: 330 [P.O.:150;  I.V.:180]  Out: 0     Pain assessment:  present - adequately treated  Pain Level: 3    Report given to Receiving unit RN.    4/19/2019 4:11 PM

## 2019-04-19 NOTE — PROGRESS NOTES
Patient arrived to room  from PACU. Patient A&Ox4, VSS. Surgical dressing clean, dry, and intact. Neuro checks at baseline with LLE numbness, pt reports the tingling has improved. Patient currently denies pain. Patient tolerating diet well, denies nausea. Patient and family oriented to room and instructed to call out for needs. Fall precautions in place.

## 2019-04-20 VITALS
TEMPERATURE: 98.3 F | SYSTOLIC BLOOD PRESSURE: 127 MMHG | BODY MASS INDEX: 42.66 KG/M2 | OXYGEN SATURATION: 92 % | HEART RATE: 72 BPM | RESPIRATION RATE: 18 BRPM | WEIGHT: 315 LBS | DIASTOLIC BLOOD PRESSURE: 73 MMHG | HEIGHT: 72 IN

## 2019-04-20 PROBLEM — M48.062 LUMBAR STENOSIS WITH NEUROGENIC CLAUDICATION: Status: ACTIVE | Noted: 2019-04-20

## 2019-04-20 LAB
ANION GAP SERPL CALCULATED.3IONS-SCNC: 14 MMOL/L (ref 3–16)
BUN BLDV-MCNC: 32 MG/DL (ref 7–20)
CALCIUM SERPL-MCNC: 8.7 MG/DL (ref 8.3–10.6)
CHLORIDE BLD-SCNC: 101 MMOL/L (ref 99–110)
CO2: 22 MMOL/L (ref 21–32)
CREAT SERPL-MCNC: 1.2 MG/DL (ref 0.8–1.3)
ESTIMATED AVERAGE GLUCOSE: 162.8 MG/DL
GFR AFRICAN AMERICAN: >60
GFR NON-AFRICAN AMERICAN: 60
GLUCOSE BLD-MCNC: 161 MG/DL (ref 70–99)
GLUCOSE BLD-MCNC: 185 MG/DL (ref 70–99)
GLUCOSE BLD-MCNC: 207 MG/DL (ref 70–99)
HBA1C MFR BLD: 7.3 %
HCT VFR BLD CALC: 35.2 % (ref 40.5–52.5)
HCT VFR BLD CALC: 36.5 % (ref 40.5–52.5)
HEMOGLOBIN: 11.7 G/DL (ref 13.5–17.5)
HEMOGLOBIN: 11.8 G/DL (ref 13.5–17.5)
MCH RBC QN AUTO: 29.3 PG (ref 26–34)
MCHC RBC AUTO-ENTMCNC: 33.3 G/DL (ref 31–36)
MCV RBC AUTO: 87.8 FL (ref 80–100)
PDW BLD-RTO: 14.2 % (ref 12.4–15.4)
PERFORMED ON: ABNORMAL
PERFORMED ON: ABNORMAL
PLATELET # BLD: 311 K/UL (ref 135–450)
PMV BLD AUTO: 7.3 FL (ref 5–10.5)
POTASSIUM SERPL-SCNC: 4.2 MMOL/L (ref 3.5–5.1)
RBC # BLD: 4.01 M/UL (ref 4.2–5.9)
SODIUM BLD-SCNC: 137 MMOL/L (ref 136–145)
WBC # BLD: 10.7 K/UL (ref 4–11)

## 2019-04-20 PROCEDURE — 80048 BASIC METABOLIC PNL TOTAL CA: CPT

## 2019-04-20 PROCEDURE — 6360000002 HC RX W HCPCS: Performed by: PHYSICIAN ASSISTANT

## 2019-04-20 PROCEDURE — G0378 HOSPITAL OBSERVATION PER HR: HCPCS

## 2019-04-20 PROCEDURE — 97116 GAIT TRAINING THERAPY: CPT

## 2019-04-20 PROCEDURE — 6370000000 HC RX 637 (ALT 250 FOR IP): Performed by: INTERNAL MEDICINE

## 2019-04-20 PROCEDURE — 85014 HEMATOCRIT: CPT

## 2019-04-20 PROCEDURE — 85027 COMPLETE CBC AUTOMATED: CPT

## 2019-04-20 PROCEDURE — 97530 THERAPEUTIC ACTIVITIES: CPT

## 2019-04-20 PROCEDURE — 97161 PT EVAL LOW COMPLEX 20 MIN: CPT

## 2019-04-20 PROCEDURE — 6370000000 HC RX 637 (ALT 250 FOR IP): Performed by: PHYSICIAN ASSISTANT

## 2019-04-20 PROCEDURE — 36415 COLL VENOUS BLD VENIPUNCTURE: CPT

## 2019-04-20 PROCEDURE — 96366 THER/PROPH/DIAG IV INF ADDON: CPT

## 2019-04-20 PROCEDURE — 2580000003 HC RX 258: Performed by: PHYSICIAN ASSISTANT

## 2019-04-20 PROCEDURE — 96365 THER/PROPH/DIAG IV INF INIT: CPT

## 2019-04-20 PROCEDURE — 96367 TX/PROPH/DG ADDL SEQ IV INF: CPT

## 2019-04-20 PROCEDURE — 85018 HEMOGLOBIN: CPT

## 2019-04-20 RX ORDER — OXYCODONE HYDROCHLORIDE AND ACETAMINOPHEN 5; 325 MG/1; MG/1
2 TABLET ORAL EVERY 4 HOURS PRN
Qty: 60 TABLET | Refills: 0 | Status: SHIPPED | OUTPATIENT
Start: 2019-04-20 | End: 2019-04-23

## 2019-04-20 RX ADMIN — OXYCODONE HYDROCHLORIDE AND ACETAMINOPHEN 2 TABLET: 5; 325 TABLET ORAL at 01:54

## 2019-04-20 RX ADMIN — INSULIN LISPRO 2 UNITS: 100 INJECTION, SOLUTION INTRAVENOUS; SUBCUTANEOUS at 12:34

## 2019-04-20 RX ADMIN — LISINOPRIL 20 MG: 20 TABLET ORAL at 09:27

## 2019-04-20 RX ADMIN — INSULIN LISPRO 2 UNITS: 100 INJECTION, SOLUTION INTRAVENOUS; SUBCUTANEOUS at 09:37

## 2019-04-20 RX ADMIN — CEFAZOLIN SODIUM 3 G: 1 INJECTION, POWDER, FOR SOLUTION INTRAMUSCULAR; INTRAVENOUS at 02:08

## 2019-04-20 RX ADMIN — METHOCARBAMOL 750 MG: 100 INJECTION, SOLUTION INTRAMUSCULAR; INTRAVENOUS at 03:18

## 2019-04-20 RX ADMIN — METHOCARBAMOL 750 MG: 100 INJECTION, SOLUTION INTRAMUSCULAR; INTRAVENOUS at 09:26

## 2019-04-20 RX ADMIN — GLIMEPIRIDE 4 MG: 2 TABLET ORAL at 06:15

## 2019-04-20 RX ADMIN — AMLODIPINE BESYLATE 5 MG: 5 TABLET ORAL at 09:27

## 2019-04-20 RX ADMIN — TAMSULOSIN HYDROCHLORIDE 0.4 MG: 0.4 CAPSULE ORAL at 09:27

## 2019-04-20 RX ADMIN — DOCUSATE SODIUM 100 MG: 100 CAPSULE, LIQUID FILLED ORAL at 09:27

## 2019-04-20 RX ADMIN — FAMOTIDINE 20 MG: 20 TABLET ORAL at 09:27

## 2019-04-20 ASSESSMENT — PAIN DESCRIPTION - LOCATION: LOCATION: BACK

## 2019-04-20 ASSESSMENT — PAIN DESCRIPTION - PROGRESSION: CLINICAL_PROGRESSION: NOT CHANGED

## 2019-04-20 ASSESSMENT — PAIN SCALES - GENERAL
PAINLEVEL_OUTOF10: 0
PAINLEVEL_OUTOF10: 7

## 2019-04-20 ASSESSMENT — PAIN DESCRIPTION - ORIENTATION: ORIENTATION: LOWER

## 2019-04-20 ASSESSMENT — PAIN DESCRIPTION - ONSET: ONSET: ON-GOING

## 2019-04-20 ASSESSMENT — PAIN DESCRIPTION - DESCRIPTORS: DESCRIPTORS: CONSTANT;ACHING

## 2019-04-20 ASSESSMENT — PAIN DESCRIPTION - PAIN TYPE: TYPE: SURGICAL PAIN

## 2019-04-20 ASSESSMENT — PAIN DESCRIPTION - FREQUENCY: FREQUENCY: CONTINUOUS

## 2019-04-20 NOTE — OP NOTE
[facility] [address], [address]                                OPERATIVE REPORT    PATIENT NAME: Antonette Day                         :        1950  MED REC NO:   [id number]                          ROOM:       [id number]  ACCOUNT NO:   [de-identified]                           ADMIT DATE: 2019  PROVIDER:     Meagan Todd MD    DATE OF PROCEDURE:  2019    PREOPERATIVE DIAGNOSIS:  Spinal stenosis severe L3-L4, L4-L5. POSTOPERATIVE DIAGNOSIS:  Spinal stenosis severe L3-L4, L4-L5. OPERATION PERFORMED:  Total laminectomy L3, total laminectomy L4,  partial bilateral medial facetectomy at L3-L4 and L4-L5, foraminotomies  over the L4 and L5 nerve roots bilaterally with microdissection. SURGEON:  Meagan Todd MD    ASSISTANT:  Chio Santiago PA-C    ANESTHESIA:  General.    ESTIMATED BLOOD LOSS:  400 mL. COMPLICATIONS:  Morbid obesity with the BMI of 43.5. HISTORY:  This patient is a 64-year-old male, he is progressively  incapacitated by neurogenic claudication. He also has diabetes, morbid  obesity, and peripheral neuropathy. The patient's symptoms of  neurogenic claudication were most convincing by his inability to stand  for any period of time having to get off this feet. He has severe  spinal stenosis at L3-L4 and L4-L5. Recommendation for the  above-mentioned surgical procedure was made. Informed consent of the  rationale, the risks, complications, and alternatives were outlined and  discussed in full detail and the patient consented operation. OPERATIVE PROCEDURE:  The patient was taken to surgery and he was placed  on the large open Ry table frame carefully.   The incision was  prepped and draped out in a routine fashion and a time-out was held and  then an incision was made in the mid lumbar area and opened down to the  lower lumbar area sharply through the skin, the layers of subcutaneous  tissue, and the fascia was incised and elevated off the lamina  bilaterally. The extra large deep retractors were placed in order to  provide adequate exposure. Once this was done, soft tissue was removed  as much as possible. The spinous process of L3 and L4 were identified,  these were resected with the large spine biter and then with  microdissection, a laminectomy was performed at L3 and all of L3 was  removed. Then, the L4 lamina was identified and all of the L4 lamina  was removed. This was drilled away with suction irrigation and the  Midas Burke drill and then the lateral gutters were decompressed. I  decompressed the medial facets bilaterally by completing partial medial  facetectomies at L3-L4 and L4-L5 bilaterally. Then, I performed  foraminotomies over the L4 and L5 nerve roots. There was excessive  overgrowth of ligament and bone in the lateral recesses and this was  exposed in gradual piecemeal fashion. The decompression was carried out  to the lateral gutters. There was marked amount of adhesive scarring  all over this dura, particularly on the left side at the L3-L4 level, I  left a lot of this scarred tissue and I did not try to dissect it away  from the dura because of the risk of CSF leak. Hemostasis was obtained  with FloSeal and bone putty and the wound was tested with a Valsalva to  40 and there was no spinal fluid leakage and hemostasis was perfect. The retractors were removed and bleeding points were cauterized and then  the layered closure was carried with 0 Vicryl for the muscle, 0 Vicryl  for the fascia, 3-0 for the subcu, and running 4-0 Monocryl for the  skin. In accordance with CMS guidelines, I performed the entire  procedure. Gerardo Hathaway MD    D: 04/19/2019 14:43:37       T: 04/19/2019 18:28:25     [email or web address]  Job#: [id number]     Doc#: [id number]    CC:   Rafael Kelly MD

## 2019-04-20 NOTE — PROGRESS NOTES
4/20/19    POD#1    Wound dry  Legs better  Voiding with no issue    Ready for DC today      Neelam Tamayo MD

## 2019-04-20 NOTE — PLAN OF CARE
Problem: Falls - Risk of:  Goal: Will remain free from falls  Description  Patient in bed with alarm and nonskid socks on, 2/4 side rails up and bed locked in lowest position. Call light, belongings, and bedside table within reach. Patient educated on using call light and instructed to call out for assistance when getting out of bed, patient verbalized understanding. Patient calls out approprietly and remains free of falls. 4/19/2019 2237 by Teresa Shahid RN  Outcome: Ongoing  4/19/2019 1847 by Megan Saucedo RN  Outcome: Ongoing   Patient remains free from falls during this shift. Patient is up x2 person assist with walker. Bed is in the lowest position and the bed and chair alarm is activated. Anti-slip socks are on. Call light is within reach. Will continue to monitor and reassess. Problem: Pain:  Goal: Pain level will decrease  Description  Pain level will decrease  Outcome: Ongoing   RN assesses pain using 0-10 scale. Patient understands how to rate pain using 0-10 scale. Pain is controled with medication per MAR. RN encourages patient to call out for breakthrough pain. Will continue to monitor and reassess. Problem: Activity:  Goal: Ability to avoid complications of mobility impairment will improve  Description  Ability to avoid complications of mobility impairment will improve  Outcome: Ongoing   Patient ambulated x2 with a walker and gait belt to the bathroom. Patient tolerated ambulation well. Patient states that he feels his legs are stronger now than they have been in a while. Will continue to monitor and reassess. Problem: Urinary Elimination:  Goal: Ability to achieve and maintain adequate urine output will improve  Description  Ability to achieve and maintain adequate urine output will improve  Outcome: Ongoing   Patient is able to void bladder without complications. Will continue to monitor and reassess.

## 2019-04-20 NOTE — PROGRESS NOTES
Physical Therapy    Facility/Department: [facility] 5T ORTHO/NEURO  Initial Assessment and Treatment    NAME: Annemarie Aviles  : 1950  MRN: [id number]    Date of Service: 2019    Discharge Recommendations:      PT Equipment Recommendations  Equipment Needed: No    Assessment   Assessment: Pt is 69 yo M with decreased functional mobility following back surgery, although pt states his mobility now is improved as compared to pre-surgery. Pt hopes to go home at d/c with assist.  No DME needs. Will follow. Treatment Diagnosis: Decreased functional mobility related to degenerative lumbar spinal stenosis  Prognosis: Good  Decision Making: Low Complexity  Patient Education: Role of PT- pt verbalized understanding  REQUIRES PT FOLLOW UP: Yes  Activity Tolerance  Activity Tolerance: Patient Tolerated treatment well       Patient Diagnosis(es): There were no encounter diagnoses. has a past medical history of Arthritis, Back pain, DDD (degenerative disc disease), cervical, Diabetes mellitus ([address] Edema of both legs, GERD (gastroesophageal reflux disease), Hyperlipidemia, Hypertension, Nausea in adult, Neuropathy, Vitamin B 12 deficiency, and Walking troubles. has a past surgical history that includes back surgery; Prostate surgery; shoulder surgery; Cystoscopy; knee surgery; Hemorrhoid surgery; eye surgery; Tonsillectomy; Cholecystectomy; and Lumbar spine surgery (N/A, 2019). Restrictions  Position Activity Restriction  Other position/activity restrictions: Ambulate pt     Vision/Hearing  Vision: Impaired  Vision Exceptions: Wears glasses for reading  Hearing: Within functional limits       Subjective  General  Chart Reviewed: Yes  Additional Pertinent Hx: Pt underwent lami decompression L3-L4, L5 for spinal stenosis. H/o OA, back pain, DDD cervical, DM, B LE edema, GERD, HTN, neuropathy, vit B12 def, back surgery x 2.   Family / Caregiver Present: No  Referring Practitioner: Vanda ROJO  Diagnosis: Degenerative lumbar spinal stenosis  Subjective  Subjective: Pt supine in bed and agreeable to PT. Pt states he has been up 2x. Pain Screening  Patient Currently in Pain: Denies     Orientation  Orientation  Overall Orientation Status: Within Normal Limits     Social/Functional History  Social/Functional History  Lives With: Spouse  Type of Home: House  Home Layout: One level  Home Access: Stairs to enter with rails  Entrance Stairs - Number of Steps: 2  Bathroom Shower/Tub: Walk-in shower  Bathroom Toilet: Handicap height  Bathroom Equipment: Shower chair, Grab bars in shower, Grab bars around toilet  Home Equipment: 4 wheeled walker, Cane(Walking stick)  ADL Assistance: Needs assistance(Wife bathes his back)  Homemaking Assistance: Needs assistance(Pt cooks and does yardwork)  Ambulation Assistance: Independent(With 4 wheeled walker)  Active : Yes  Occupation: On disability  Type of occupation:   Leisure & Hobbies: Enjoys fishing, gardening, spending time with grandkids    Objective  AROM RLE (degrees)  RLE AROM: WFL  AROM LLE (degrees)  LLE AROM : WFL     Strength B LE  Comment: B LE >3/5, NT formally due to back surgery     Bed mobility  Supine to Sit: Modified independent(Using rail and log roll)  Scooting: Independent     Transfers  Sit to Stand: Stand by assistance  Stand to sit: Stand by assistance  Comment: Slow and guarded     Ambulation  Ambulation?: Yes  Ambulation 1  Surface: level tile  Device: Rolling Walker  Assistance: Stand by assistance  Quality of Gait: Flexed trunk, slow and steady  Distance: 30 feet and 90 feet  Stairs/Curb  Stairs?: No(Pt declining, but states he feels he will do fine on stairs at home)     Exercises  Comments:  Instructed pt in proper body mechanics following surgery     Plan   Plan  Times per week: 7  Current Treatment Recommendations: Functional Mobility Training, Transfer Training, Gait Training, Stair training, Safety Education & Training  Safety Devices  Type of

## 2019-04-20 NOTE — PROGRESS NOTES
Discharge instructions reviewed with pt and spouse, all questions addressed at time, IV removed and dressing applied, copies of discharge instructions provided with follow up instructions, pt was transported to private vehicle by transport in a wheelchair to return to private residence.

## 2019-04-24 NOTE — DISCHARGE SUMMARY
Discharge Summary    Date of Admission: 4/19/2019  9:13 AM  Date of Discharge: 4/20/2019  Admission Diagnosis: Lumbar spinal stenosis  Patient Active Problem List   Diagnosis    Lumbar spinal stenosis    Degenerative lumbar spinal stenosis    Lumbar stenosis with neurogenic claudication     Discharge Diagnosis: Same   Condition on Discharge: good  Attending for Admission: Lonny Doing  Procedures: Lumbar decompression  Consults: None    Reason for Admission: Yoni Mcmillan is a 68 y.o. male patient who was admitted to the hospital for complaints of back pain. He underwent the procedure listed above on 4/19/2019. Hospital Course: After surgery, His pre-operative back pain was Absent . He complained of incisional. The pain was well-controlled on oral medications. His brace was in place. The dressing was clean, dry and intact. There was no erythema or edema around the surgical site. Prior to discharge He was eating well, urinating and ambulating with a steady gait with PT/OT.          Discharge Medications:      Medication List      CONTINUE taking these medications    amLODIPine 5 MG tablet  Commonly known as:  NORVASC     atorvastatin 20 MG tablet  Commonly known as:  LIPITOR     B COMPLEX VITAMINS PO     blood glucose test strips strip  Commonly known as:  ASCENSIA AUTODISC VI;ONE TOUCH ULTRA TEST VI     cyclobenzaprine 5 MG tablet  Commonly known as:  FLEXERIL     docusate sodium 100 MG capsule  Commonly known as:  COLACE     fenofibrate 145 MG tablet  Commonly known as:  TRICOR     Flaxseed Oil Oil     lisinopril 20 MG tablet  Commonly known as:  PRINIVIL;ZESTRIL     metFORMIN 1000 MG tablet  Commonly known as:  GLUCOPHAGE     ondansetron 4 MG tablet  Commonly known as:  ZOFRAN     ONE TOUCH ULTRASOFT LANCETS Misc     polyethylene glycol packet  Commonly known as:  GLYCOLAX     tamsulosin 0.4 MG capsule  Commonly known as:  FLOMAX     ZANTAC 150 MAXIMUM STRENGTH 150 MG tablet  Generic drug:  ranitidine STOP taking these medications    HYDROcodone-acetaminophen 5-325 MG per tablet  Commonly known as:  [address]        ASK your doctor about these medications    acetaminophen 325 MG tablet  Commonly known as:  TYLENOL     aspirin 81 MG EC tablet     Cetirizine HCl 10 MG Caps     empagliflozin 25 MG tablet  Commonly known as:  JARDIANCE     glimepiride 4 MG tablet  Commonly known as:  AMARYL     insulin  UNIT/ML injection vial  Commonly known as:  HUMULIN N;NOVOLIN N     MAG- PO     naproxen 250 MG tablet  Commonly known as:  NAPROSYN     oxyCODONE-acetaminophen 5-325 MG per tablet  Commonly known as:  PERCOCET  Take 2 tablets by mouth every 4 hours as needed (post op surgery) for up to 3 days. Ask about: Should I take this medication? Where to Get Your Medications      You can get these medications from any pharmacy    Bring a paper prescription for each of these medications  · oxyCODONE-acetaminophen 5-325 MG per tablet       Discharge Destination: The patient was discharged to Home. Follow-up: The patient is to follow-up with Torsten Azul in the office in 2 weeks without xrays. Discharge Instructions:  Verbal and written discharge instructions as well as dressing change instructions were given to the patient at the time of consent. No NSAIDS or anticoagulation for 2 weeks post-operatively. No driving or riding in a motor vehicle. No lifting or bending.       Sukhjinder Magana PA-C

## 2019-05-08 ENCOUNTER — HOSPITAL ENCOUNTER (INPATIENT)
Age: 69
LOS: 9 days | Discharge: NURSING, REHAB OR LONG-TERM CARE | DRG: 856 | End: 2019-05-17
Attending: FAMILY MEDICINE | Admitting: FAMILY MEDICINE
Payer: MEDICARE

## 2019-05-08 DIAGNOSIS — A41.9 SEPSIS, DUE TO UNSPECIFIED ORGANISM: Primary | ICD-10-CM

## 2019-05-08 DIAGNOSIS — L02.212 BACK ABSCESS: ICD-10-CM

## 2019-05-08 PROCEDURE — 87040 BLOOD CULTURE FOR BACTERIA: CPT

## 2019-05-08 PROCEDURE — 87186 SC STD MICRODIL/AGAR DIL: CPT

## 2019-05-08 PROCEDURE — 2580000003 HC RX 258: Performed by: STUDENT IN AN ORGANIZED HEALTH CARE EDUCATION/TRAINING PROGRAM

## 2019-05-08 PROCEDURE — 36415 COLL VENOUS BLD VENIPUNCTURE: CPT

## 2019-05-08 PROCEDURE — 2000000000 HC ICU R&B

## 2019-05-08 PROCEDURE — 1200000000 HC SEMI PRIVATE

## 2019-05-08 PROCEDURE — 6360000002 HC RX W HCPCS: Performed by: STUDENT IN AN ORGANIZED HEALTH CARE EDUCATION/TRAINING PROGRAM

## 2019-05-08 PROCEDURE — 2500000003 HC RX 250 WO HCPCS

## 2019-05-08 PROCEDURE — 87801 DETECT AGNT MULT DNA AMPLI: CPT

## 2019-05-08 RX ORDER — SODIUM CHLORIDE 9 MG/ML
INJECTION, SOLUTION INTRAVENOUS CONTINUOUS
Status: DISCONTINUED | OUTPATIENT
Start: 2019-05-08 | End: 2019-05-09

## 2019-05-08 RX ORDER — DEXTROSE MONOHYDRATE 50 MG/ML
INJECTION, SOLUTION INTRAVENOUS
Status: COMPLETED
Start: 2019-05-08 | End: 2019-05-09

## 2019-05-08 RX ORDER — ACETAMINOPHEN 325 MG/1
650 TABLET ORAL EVERY 4 HOURS PRN
Status: DISCONTINUED | OUTPATIENT
Start: 2019-05-08 | End: 2019-05-17 | Stop reason: HOSPADM

## 2019-05-08 RX ORDER — NOREPINEPHRINE BITARTRATE 1 MG/ML
INJECTION, SOLUTION INTRAVENOUS
Status: COMPLETED
Start: 2019-05-08 | End: 2019-05-08

## 2019-05-08 RX ORDER — SODIUM CHLORIDE 0.9 % (FLUSH) 0.9 %
10 SYRINGE (ML) INJECTION PRN
Status: DISCONTINUED | OUTPATIENT
Start: 2019-05-08 | End: 2019-05-12 | Stop reason: SDUPTHER

## 2019-05-08 RX ORDER — SODIUM CHLORIDE 0.9 % (FLUSH) 0.9 %
10 SYRINGE (ML) INJECTION EVERY 12 HOURS SCHEDULED
Status: DISCONTINUED | OUTPATIENT
Start: 2019-05-08 | End: 2019-05-12 | Stop reason: SDUPTHER

## 2019-05-08 RX ORDER — ONDANSETRON 2 MG/ML
4 INJECTION INTRAMUSCULAR; INTRAVENOUS EVERY 6 HOURS PRN
Status: DISCONTINUED | OUTPATIENT
Start: 2019-05-08 | End: 2019-05-12 | Stop reason: SDUPTHER

## 2019-05-08 RX ADMIN — PIPERACILLIN SODIUM,TAZOBACTAM SODIUM 3.38 G: 3; .375 INJECTION, POWDER, FOR SOLUTION INTRAVENOUS at 22:28

## 2019-05-08 RX ADMIN — NOREPINEPHRINE BITARTRATE 4000 MCG: 1 INJECTION INTRAVENOUS at 22:57

## 2019-05-08 RX ADMIN — Medication 10 ML: at 22:29

## 2019-05-08 RX ADMIN — SODIUM CHLORIDE: 9 INJECTION, SOLUTION INTRAVENOUS at 22:23

## 2019-05-08 ASSESSMENT — ENCOUNTER SYMPTOMS
BACK PAIN: 1
EYE ITCHING: 0
ABDOMINAL PAIN: 0
WHEEZING: 0
CONSTIPATION: 0
ANAL BLEEDING: 0
ABDOMINAL DISTENTION: 0
CHOKING: 0
SHORTNESS OF BREATH: 0
COLOR CHANGE: 0
CHEST TIGHTNESS: 0
STRIDOR: 0
EYE PAIN: 0
APNEA: 0
BLOOD IN STOOL: 0
DIARRHEA: 0
EYE DISCHARGE: 0
COUGH: 0
EYE REDNESS: 0
FACIAL SWELLING: 0

## 2019-05-08 ASSESSMENT — PAIN - FUNCTIONAL ASSESSMENT: PAIN_FUNCTIONAL_ASSESSMENT: PREVENTS OR INTERFERES WITH ALL ACTIVE AND SOME PASSIVE ACTIVITIES

## 2019-05-08 ASSESSMENT — PAIN SCALES - GENERAL: PAINLEVEL_OUTOF10: 8

## 2019-05-08 ASSESSMENT — PAIN DESCRIPTION - ORIENTATION: ORIENTATION: MID;LOWER

## 2019-05-08 ASSESSMENT — PAIN DESCRIPTION - FREQUENCY: FREQUENCY: CONTINUOUS

## 2019-05-08 ASSESSMENT — PAIN DESCRIPTION - LOCATION: LOCATION: BACK

## 2019-05-08 ASSESSMENT — PAIN DESCRIPTION - ONSET: ONSET: ON-GOING

## 2019-05-08 ASSESSMENT — PAIN DESCRIPTION - DESCRIPTORS: DESCRIPTORS: CONSTANT;SHARP

## 2019-05-08 ASSESSMENT — PAIN DESCRIPTION - PAIN TYPE: TYPE: ACUTE PAIN

## 2019-05-09 ENCOUNTER — APPOINTMENT (OUTPATIENT)
Dept: MRI IMAGING | Age: 69
DRG: 856 | End: 2019-05-09
Attending: FAMILY MEDICINE
Payer: MEDICARE

## 2019-05-09 ENCOUNTER — APPOINTMENT (OUTPATIENT)
Dept: GENERAL RADIOLOGY | Age: 69
DRG: 856 | End: 2019-05-09
Attending: FAMILY MEDICINE
Payer: MEDICARE

## 2019-05-09 LAB
A/G RATIO: 1 (ref 1.1–2.2)
ALBUMIN SERPL-MCNC: 3 G/DL (ref 3.4–5)
ALP BLD-CCNC: 53 U/L (ref 40–129)
ALT SERPL-CCNC: 40 U/L (ref 10–40)
ANION GAP SERPL CALCULATED.3IONS-SCNC: 12 MMOL/L (ref 3–16)
AST SERPL-CCNC: 48 U/L (ref 15–37)
BACTERIA: ABNORMAL /HPF
BASOPHILS ABSOLUTE: 0 K/UL (ref 0–0.2)
BASOPHILS RELATIVE PERCENT: 0.1 %
BILIRUB SERPL-MCNC: 0.9 MG/DL (ref 0–1)
BILIRUBIN URINE: NEGATIVE
BLOOD, URINE: ABNORMAL
BUN BLDV-MCNC: 25 MG/DL (ref 7–20)
CALCIUM SERPL-MCNC: 8.7 MG/DL (ref 8.3–10.6)
CHLORIDE BLD-SCNC: 100 MMOL/L (ref 99–110)
CLARITY: CLEAR
CO2: 23 MMOL/L (ref 21–32)
COLOR: YELLOW
CREAT SERPL-MCNC: 1.4 MG/DL (ref 0.8–1.3)
EOSINOPHILS ABSOLUTE: 0 K/UL (ref 0–0.6)
EOSINOPHILS RELATIVE PERCENT: 0 %
EPITHELIAL CELLS, UA: ABNORMAL /HPF
GFR AFRICAN AMERICAN: >60
GFR NON-AFRICAN AMERICAN: 50
GLOBULIN: 3 G/DL
GLUCOSE BLD-MCNC: 149 MG/DL (ref 70–99)
GLUCOSE BLD-MCNC: 161 MG/DL (ref 70–99)
GLUCOSE BLD-MCNC: 163 MG/DL (ref 70–99)
GLUCOSE BLD-MCNC: 170 MG/DL (ref 70–99)
GLUCOSE BLD-MCNC: 176 MG/DL (ref 70–99)
GLUCOSE URINE: >=1000 MG/DL
HCT VFR BLD CALC: 31.9 % (ref 40.5–52.5)
HEMOGLOBIN: 10.4 G/DL (ref 13.5–17.5)
INR BLD: 1.35 (ref 0.86–1.14)
KETONES, URINE: 15 MG/DL
LACTIC ACID: 1.2 MMOL/L (ref 0.4–2)
LACTIC ACID: 1.6 MMOL/L (ref 0.4–2)
LEUKOCYTE ESTERASE, URINE: NEGATIVE
LYMPHOCYTES ABSOLUTE: 0.2 K/UL (ref 1–5.1)
LYMPHOCYTES RELATIVE PERCENT: 1.5 %
MCH RBC QN AUTO: 28.5 PG (ref 26–34)
MCHC RBC AUTO-ENTMCNC: 32.6 G/DL (ref 31–36)
MCV RBC AUTO: 87.4 FL (ref 80–100)
MICROSCOPIC EXAMINATION: YES
MONOCYTES ABSOLUTE: 0.3 K/UL (ref 0–1.3)
MONOCYTES RELATIVE PERCENT: 2.5 %
NEUTROPHILS ABSOLUTE: 10.9 K/UL (ref 1.7–7.7)
NEUTROPHILS RELATIVE PERCENT: 95.9 %
NITRITE, URINE: NEGATIVE
PDW BLD-RTO: 14.2 % (ref 12.4–15.4)
PERFORMED ON: ABNORMAL
PH UA: 6 (ref 5–8)
PLATELET # BLD: 276 K/UL (ref 135–450)
PMV BLD AUTO: 7.5 FL (ref 5–10.5)
POTASSIUM REFLEX MAGNESIUM: 4.4 MMOL/L (ref 3.5–5.1)
PRO-BNP: 2919 PG/ML (ref 0–124)
PROTEIN UA: 30 MG/DL
PROTHROMBIN TIME: 15.4 SEC (ref 9.8–13)
RBC # BLD: 3.65 M/UL (ref 4.2–5.9)
RBC UA: ABNORMAL /HPF (ref 0–2)
SODIUM BLD-SCNC: 135 MMOL/L (ref 136–145)
SPECIFIC GRAVITY UA: 1.02 (ref 1–1.03)
TOTAL PROTEIN: 6 G/DL (ref 6.4–8.2)
URINE REFLEX TO CULTURE: ABNORMAL
URINE TYPE: ABNORMAL
UROBILINOGEN, URINE: 0.2 E.U./DL
WBC # BLD: 11.4 K/UL (ref 4–11)
WBC UA: ABNORMAL /HPF (ref 0–5)
YEAST: PRESENT /HPF

## 2019-05-09 PROCEDURE — 72158 MRI LUMBAR SPINE W/O & W/DYE: CPT

## 2019-05-09 PROCEDURE — 87205 SMEAR GRAM STAIN: CPT

## 2019-05-09 PROCEDURE — 2500000003 HC RX 250 WO HCPCS: Performed by: INTERNAL MEDICINE

## 2019-05-09 PROCEDURE — 6360000004 HC RX CONTRAST MEDICATION: Performed by: FAMILY MEDICINE

## 2019-05-09 PROCEDURE — 36415 COLL VENOUS BLD VENIPUNCTURE: CPT

## 2019-05-09 PROCEDURE — 6360000002 HC RX W HCPCS: Performed by: INTERNAL MEDICINE

## 2019-05-09 PROCEDURE — 71045 X-RAY EXAM CHEST 1 VIEW: CPT

## 2019-05-09 PROCEDURE — 80053 COMPREHEN METABOLIC PANEL: CPT

## 2019-05-09 PROCEDURE — 81001 URINALYSIS AUTO W/SCOPE: CPT

## 2019-05-09 PROCEDURE — 85610 PROTHROMBIN TIME: CPT

## 2019-05-09 PROCEDURE — 70030 X-RAY EYE FOR FOREIGN BODY: CPT

## 2019-05-09 PROCEDURE — 83605 ASSAY OF LACTIC ACID: CPT

## 2019-05-09 PROCEDURE — 6360000002 HC RX W HCPCS: Performed by: NURSE PRACTITIONER

## 2019-05-09 PROCEDURE — 2580000003 HC RX 258: Performed by: INTERNAL MEDICINE

## 2019-05-09 PROCEDURE — 2000000000 HC ICU R&B

## 2019-05-09 PROCEDURE — 36592 COLLECT BLOOD FROM PICC: CPT

## 2019-05-09 PROCEDURE — 87077 CULTURE AEROBIC IDENTIFY: CPT

## 2019-05-09 PROCEDURE — 87186 SC STD MICRODIL/AGAR DIL: CPT

## 2019-05-09 PROCEDURE — 86403 PARTICLE AGGLUT ANTBDY SCRN: CPT

## 2019-05-09 PROCEDURE — 82570 ASSAY OF URINE CREATININE: CPT

## 2019-05-09 PROCEDURE — 2580000003 HC RX 258: Performed by: STUDENT IN AN ORGANIZED HEALTH CARE EDUCATION/TRAINING PROGRAM

## 2019-05-09 PROCEDURE — 36556 INSERT NON-TUNNEL CV CATH: CPT

## 2019-05-09 PROCEDURE — 87070 CULTURE OTHR SPECIMN AEROBIC: CPT

## 2019-05-09 PROCEDURE — 83880 ASSAY OF NATRIURETIC PEPTIDE: CPT

## 2019-05-09 PROCEDURE — 2700000000 HC OXYGEN THERAPY PER DAY

## 2019-05-09 PROCEDURE — 85025 COMPLETE CBC W/AUTO DIFF WBC: CPT

## 2019-05-09 PROCEDURE — 02HV33Z INSERTION OF INFUSION DEVICE INTO SUPERIOR VENA CAVA, PERCUTANEOUS APPROACH: ICD-10-PCS | Performed by: INTERNAL MEDICINE

## 2019-05-09 PROCEDURE — 82043 UR ALBUMIN QUANTITATIVE: CPT

## 2019-05-09 PROCEDURE — 2580000003 HC RX 258: Performed by: NURSE PRACTITIONER

## 2019-05-09 PROCEDURE — 99291 CRITICAL CARE FIRST HOUR: CPT | Performed by: INTERNAL MEDICINE

## 2019-05-09 PROCEDURE — 6360000002 HC RX W HCPCS: Performed by: STUDENT IN AN ORGANIZED HEALTH CARE EDUCATION/TRAINING PROGRAM

## 2019-05-09 PROCEDURE — 94761 N-INVAS EAR/PLS OXIMETRY MLT: CPT

## 2019-05-09 PROCEDURE — 94664 DEMO&/EVAL PT USE INHALER: CPT

## 2019-05-09 PROCEDURE — 2580000003 HC RX 258

## 2019-05-09 PROCEDURE — A9579 GAD-BASE MR CONTRAST NOS,1ML: HCPCS | Performed by: FAMILY MEDICINE

## 2019-05-09 RX ORDER — NALOXONE HYDROCHLORIDE 0.4 MG/ML
0.4 INJECTION, SOLUTION INTRAMUSCULAR; INTRAVENOUS; SUBCUTANEOUS PRN
Status: DISCONTINUED | OUTPATIENT
Start: 2019-05-09 | End: 2019-05-17 | Stop reason: HOSPADM

## 2019-05-09 RX ORDER — LANOLIN ALCOHOL/MO/W.PET/CERES
1000 CREAM (GRAM) TOPICAL DAILY
Status: ON HOLD | COMMUNITY
End: 2019-05-17 | Stop reason: HOSPADM

## 2019-05-09 RX ORDER — HYDROCHLOROTHIAZIDE 12.5 MG/1
12.5 TABLET ORAL DAILY
Status: ON HOLD | COMMUNITY
End: 2019-05-13

## 2019-05-09 RX ORDER — NICOTINE POLACRILEX 4 MG
15 LOZENGE BUCCAL PRN
Status: DISCONTINUED | OUTPATIENT
Start: 2019-05-09 | End: 2019-05-17 | Stop reason: HOSPADM

## 2019-05-09 RX ORDER — AMLODIPINE BESYLATE 2.5 MG/1
2.5 TABLET ORAL DAILY
COMMUNITY

## 2019-05-09 RX ORDER — DEXTROSE MONOHYDRATE 25 G/50ML
12.5 INJECTION, SOLUTION INTRAVENOUS PRN
Status: DISCONTINUED | OUTPATIENT
Start: 2019-05-09 | End: 2019-05-17 | Stop reason: HOSPADM

## 2019-05-09 RX ORDER — DEXTROSE MONOHYDRATE 50 MG/ML
100 INJECTION, SOLUTION INTRAVENOUS PRN
Status: DISCONTINUED | OUTPATIENT
Start: 2019-05-09 | End: 2019-05-17 | Stop reason: HOSPADM

## 2019-05-09 RX ORDER — SODIUM CHLORIDE 9 MG/ML
INJECTION, SOLUTION INTRAVENOUS
Status: DISPENSED
Start: 2019-05-09 | End: 2019-05-10

## 2019-05-09 RX ADMIN — Medication 10 ML: at 19:33

## 2019-05-09 RX ADMIN — PIPERACILLIN SODIUM,TAZOBACTAM SODIUM 3.38 G: 3; .375 INJECTION, POWDER, FOR SOLUTION INTRAVENOUS at 06:37

## 2019-05-09 RX ADMIN — PIPERACILLIN SODIUM,TAZOBACTAM SODIUM 3.38 G: 3; .375 INJECTION, POWDER, FOR SOLUTION INTRAVENOUS at 13:53

## 2019-05-09 RX ADMIN — DEXTROSE MONOHYDRATE: 5 INJECTION, SOLUTION INTRAVENOUS at 00:31

## 2019-05-09 RX ADMIN — VANCOMYCIN HYDROCHLORIDE 1750 MG: 10 INJECTION, POWDER, LYOPHILIZED, FOR SOLUTION INTRAVENOUS at 15:05

## 2019-05-09 RX ADMIN — HYDROMORPHONE HYDROCHLORIDE 1 MG: 1 INJECTION, SOLUTION INTRAMUSCULAR; INTRAVENOUS; SUBCUTANEOUS at 15:48

## 2019-05-09 RX ADMIN — HYDROMORPHONE HYDROCHLORIDE 1 MG: 1 INJECTION, SOLUTION INTRAMUSCULAR; INTRAVENOUS; SUBCUTANEOUS at 08:57

## 2019-05-09 RX ADMIN — ENOXAPARIN SODIUM 40 MG: 40 INJECTION SUBCUTANEOUS at 08:48

## 2019-05-09 RX ADMIN — HYDROMORPHONE HYDROCHLORIDE 1 MG: 1 INJECTION, SOLUTION INTRAMUSCULAR; INTRAVENOUS; SUBCUTANEOUS at 00:30

## 2019-05-09 RX ADMIN — VANCOMYCIN HYDROCHLORIDE 1750 MG: 10 INJECTION, POWDER, LYOPHILIZED, FOR SOLUTION INTRAVENOUS at 03:08

## 2019-05-09 RX ADMIN — HYDROMORPHONE HYDROCHLORIDE 1 MG: 1 INJECTION, SOLUTION INTRAMUSCULAR; INTRAVENOUS; SUBCUTANEOUS at 18:16

## 2019-05-09 RX ADMIN — PIPERACILLIN SODIUM,TAZOBACTAM SODIUM 3.38 G: 3; .375 INJECTION, POWDER, FOR SOLUTION INTRAVENOUS at 22:37

## 2019-05-09 RX ADMIN — METHOCARBAMOL 1000 MG: 100 INJECTION, SOLUTION INTRAMUSCULAR; INTRAVENOUS at 18:25

## 2019-05-09 RX ADMIN — ONDANSETRON 4 MG: 2 INJECTION INTRAMUSCULAR; INTRAVENOUS at 18:21

## 2019-05-09 RX ADMIN — METHOCARBAMOL 1000 MG: 100 INJECTION, SOLUTION INTRAMUSCULAR; INTRAVENOUS at 12:28

## 2019-05-09 RX ADMIN — GADOTERIDOL 20 ML: 279.3 INJECTION, SOLUTION INTRAVENOUS at 18:04

## 2019-05-09 RX ADMIN — NOREPINEPHRINE BITARTRATE 2 MCG/MIN: 1 INJECTION INTRAVENOUS at 01:30

## 2019-05-09 RX ADMIN — HYDROMORPHONE HYDROCHLORIDE 1 MG: 1 INJECTION, SOLUTION INTRAMUSCULAR; INTRAVENOUS; SUBCUTANEOUS at 04:36

## 2019-05-09 RX ADMIN — HYDROMORPHONE HYDROCHLORIDE 1 MG: 1 INJECTION, SOLUTION INTRAMUSCULAR; INTRAVENOUS; SUBCUTANEOUS at 22:48

## 2019-05-09 RX ADMIN — HYDROMORPHONE HYDROCHLORIDE 1 MG: 1 INJECTION, SOLUTION INTRAMUSCULAR; INTRAVENOUS; SUBCUTANEOUS at 19:33

## 2019-05-09 RX ADMIN — ONDANSETRON 4 MG: 2 INJECTION INTRAMUSCULAR; INTRAVENOUS at 00:30

## 2019-05-09 ASSESSMENT — PAIN DESCRIPTION - DESCRIPTORS
DESCRIPTORS: SHARP;SHOOTING

## 2019-05-09 ASSESSMENT — PAIN SCALES - GENERAL
PAINLEVEL_OUTOF10: 2
PAINLEVEL_OUTOF10: 0
PAINLEVEL_OUTOF10: 9
PAINLEVEL_OUTOF10: 8
PAINLEVEL_OUTOF10: 9
PAINLEVEL_OUTOF10: 7
PAINLEVEL_OUTOF10: 4
PAINLEVEL_OUTOF10: 5
PAINLEVEL_OUTOF10: 5
PAINLEVEL_OUTOF10: 7
PAINLEVEL_OUTOF10: 7

## 2019-05-09 ASSESSMENT — PAIN DESCRIPTION - FREQUENCY
FREQUENCY: INTERMITTENT

## 2019-05-09 ASSESSMENT — PAIN DESCRIPTION - ONSET
ONSET: ON-GOING

## 2019-05-09 ASSESSMENT — PAIN DESCRIPTION - ORIENTATION
ORIENTATION: LOWER;POSTERIOR
ORIENTATION: LOWER;POSTERIOR
ORIENTATION: LOWER
ORIENTATION: LOWER;POSTERIOR
ORIENTATION: LOWER

## 2019-05-09 ASSESSMENT — PAIN DESCRIPTION - LOCATION
LOCATION: BACK

## 2019-05-09 ASSESSMENT — PAIN DESCRIPTION - PAIN TYPE
TYPE: ACUTE PAIN

## 2019-05-09 ASSESSMENT — PAIN DESCRIPTION - PROGRESSION
CLINICAL_PROGRESSION: NOT CHANGED

## 2019-05-09 ASSESSMENT — PAIN - FUNCTIONAL ASSESSMENT
PAIN_FUNCTIONAL_ASSESSMENT: PREVENTS OR INTERFERES SOME ACTIVE ACTIVITIES AND ADLS
PAIN_FUNCTIONAL_ASSESSMENT: PREVENTS OR INTERFERES SOME ACTIVE ACTIVITIES AND ADLS

## 2019-05-09 NOTE — CONSULTS
Critical Care Consult Note  PGY1    PCP: EMIGDIO MESSER PAGE    Code:Full Code  Admit Date: 5/8/2019  IV Access:  R IJ, PIV x2 Central Line: yes  Duration:  Day1   IV Fluids:  NS @ 100  Vasopressors:  Levophed  Antibiotics: Vanc/zosyn   Indication: Sepsis   Duration:  Day1    Diet: Diet NPO, After Midnight    CC: Back Pain    HISTORY OF PRESENT ILLNESS:    Patient is a 68 y.o. male with a history of chronic neurogenic claudication, peripheral neuropathy s/p laminectomy of L3 and L4 on 4/19/19 presenting with back pain for 2 days. Pain is in his lower back radiating to left hip with associated fevers/chills. Went to OSH where he was treated for severe sepsis. Found to have a lactate 3.0 at the time. CT lumbar spine showed air-fluid level in dorsal paraspinal soft tissues at laminectomy site. Was given 7L of fluids,  started on vanc and zosyn and transferred to [facility]. Upon arrival, BP was stable in 130s initially, on 4L NC with leukocytosis. His blood pressure decreased overnight, not responsive to fluids and was started on pressors. Following transfer, abscess opened and drained purulent fluid initially, now serosanguionus. Continued on broad-spectrum antibiotics and remains afebrile. Back pain is improved, but he has a new O2 requirement of 8L, briefly higher following central line placement. Not on oxygen at home. Denies shortness of breath. Chart review shows that he has a hx of diastolic heart failure and is managed with hydrochlorothiazide at home per cardiology note. CXR was limited but did not show overt signs of fluid overload. Patient also presented with DEMETRIO which is improving with fluids.      Past Medical / Surgical History:    Past Medical History:   Diagnosis Date    Arthritis      left knee    Back pain     DDD (degenerative disc disease), cervical     Diabetes mellitus (HCC)     Edema of both legs     GERD (gastroesophageal reflux disease)     Hyperlipidemia     Hypertension     Nausea in adult wife    Family History:       Problem Relation Age of Onset    Arthritis Mother     Stroke Father     High Blood Pressure Father     Heart Disease Father     Arthritis Father     Diabetes Father        ROS: Review of Systems - History obtained from the patient. All other systems reviewed and are negative. PHYSICAL EXAM:  /80   Pulse 94   Temp (!) 37.4 °F (3 °C)   Resp 21   Ht 6' 0.05\" (1.83 m)   Wt (!) 336 lb 13.8 oz (152.8 kg)   SpO2 94%   BMI 45.63 kg/m²   Recent Labs     05/09/19  0932   POCGLU 161*     · General appearance: Appears comfortable at rest, fully alert and orientated    · Head: Atraumatic, normocephalic  · Eyes: PERRL. EOMI bilaterally. No scleral icterus. · ENT: Oral mucosa is moist and free of lesions. Neck is large. No tracheal deviation. No JVD. · Respiratory: Normal respiratory effort. Sating low 90s on 8L NC. Good air flow in lung bases, mild bibasilar crackles. No wheezes. · Cardiovascular: Regular rate and rhythm with normal S1/S2. No murmers, rubs or gallops. · Abdomen: Normoactive bowel sounds. Abdomen soft, non-tender, non-distended. No guarding or rebound tenderness  · Musculoskeletal: Limited range of motion 2/2 pain in back. Tenderness to palpation over lumbar spinous process and paraspinal regions. No clubbing, cyanosis, no lower extremity edema, peripheral pulses present. 5/5 strength in upper and lower extremities bilaterally. · Skin: Skin overlying lumbar spinous processes is erythematous. Serosanguinous drainage noted from left L2 paraspinous region. · Neurologic: Alert and oriented x4. No focal motor or sensory deficits noted.  CN II-XII in tact bilaterally      LABS:  Recent Labs     05/09/19  0128   WBC 11.4*   HGB 10.4*   HCT 31.9*                                                                     Recent Labs     05/09/19  0128   *   K 4.4      CO2 23   BUN 25*   CREATININE 1.4*   GLUCOSE 170*     Recent Labs 05/09/19  0128   AST 48*   ALT 40   BILITOT 0.9   ALKPHOS 53     No results for input(s): TROPONINI in the last 72 hours. No results for input(s): BNP in the last 72 hours. No results found for: PHART, KDU0KLQ, PO2ART  No results for input(s): INR in the last 72 hours. Recent Labs     05/09/19  0522   COLORU Yellow   PHUR 6.0   WBCUA 3-5   RBCUA 0-2   YEAST Present*   BACTERIA 1+*   CLARITYU Clear   SPECGRAV 1.025   LEUKOCYTESUR Negative   UROBILINOGEN 0.2   BILIRUBINUR Negative   BLOODU SMALL*   GLUCOSEU >=1000*        IMAGING:  XR CHEST PORTABLE   Final Result       Right jugular central venous catheter appears to be in place. No pneumothorax. Airspace opacity over right upper lung zone, may represent pneumonia,    artifact versus underlying mass lesion. Followup to resolution is    recommended. Comparison with prior studies, especially recent chest CT    may help to clarify          MRI LUMBAR SPINE [address]    (Results Pending)   XR CHEST PORTABLE    (Results Pending)       Assessment & Plan:    Becky Kimball is a 68 y.o. male with a history of chronic neurogenic claudication, peripheral neuropathy s/p laminectomy 4/19/19 presenting with back pain for 2 days. Admitted for sepsis 2/2 abscess in lumbar spine    Septic Shock 2/2 lumbar spine paraspinal abscess in the setting of recent laminectomy  Laminectomy on 4/19/19. Developed back pain, fevers/chills, tachycardia, tachypnea and hypoxia. CT scan showed air-fluid level at dorsal paraspinal soft tissue at prior laminectomy site. Initially fluid responsive at OSH  and on presentation to [facility] (s/p 7L), but now requiring pressor support. Lactate 3.0. WBC 13.  - Neurosurgery following  - MRI today  - Wound cultures from paraspinous abscess  - Vanc/zosyn  - Levophed  - stopped fluids  - Blood cultures pending    Back pain 2/2 above  - Dilaudid prn while NPO  - holding home flexeril    DEMETRIO  Cr 1.7 at OSH (baseline 1.2). Improved with fluids. Likely pre-renal in the setting of septic shock, concern for underlying CKD. Does not have a nephrologist  - UA showed proteinuria; no evidence of infection  - Ordered urine microalbumin/Cr    Chronic Diastolic Heart Failure; possible acute exacerbation  Could not access prior echo results, but cardiology note from 3/2019 said EF 60% w/ LVH and diastolic dysfunction in 4143. Managed with hydrochlorothiazide. Received approx 7 L fluids at OSH and placed on maintenance here. Has new O2 requirement. CXR was of poor quality, but showed no overt signs of fluid overload. - Pro BNP 2900  - Ordered repeat CXR  - Stopped fluids  - Daily weights and strict I/Os  - NPO at this time    DM  Patient on glimepiride 4 mg + humulin 20 units BID  - Accuchecks QID  - Low-dose SSI  - Hypoglycemia protocol  - NPO    Possible RUL lung mass  CXR shows concern for RUL mass, but was limited study. Has no smoking history and no hx of cancer.  - Repeat CXR today    HTN   - holding home antihypertensives in the setting of hypotension and shock    HLD   - holding home lipitor    Code Status:  Full  FEN: NPO / NS @ 100 / levophed  PPX: lovenox    DISPO: Admitted to ICU for septic shock 2/2 paraspinal abscess. Neurosurgery consulted - MRI today, wound cultures. On levophed and broad spectrum antibiotics. New O2 requirement, possible CHF exacerbation in the setting of fluid resuscitation for sepsis. Have stopped fluids. Pro-BNP elevated. Repeat CXR today  -----------------------------  Hugo Ansari, PGY-1  [employer]  8/7/2593  09:07 AM     Pulmonary/Critical Care    Patient seen and examined. I agree with Dr. Brian Arellano history, physical, lab findings, assessment and plan. Assessment  1. Septic shock - s/p 7 L   2. Possible Lumbar abscess  3. Recent L3-L4 laminectomy April 19  4. Hypertension  5. DM  6. Acute hypoxemic respiratory failure - requiring 8 L high flow nasal cannula  7. DEMETRIO    Plan    1.   Follow blood cultures  2. Continue Zosyn and vancomycin  3. Continue Levophed and wean for goal MAP > 65  4. Stop IVF given increasing oxygen requirement and vigorous initial volume resuscitation  5. CXR  6. Neurosurgical consultation  7. Follow creatinine and urine output closely  8. Check INR  9. Lovenox for DVT prophylaxis  10. Full Code    Pt has a high probability of imminent or life-threatening deterioration requiring close monitoring, and highly complex decision-making and/or interventions of high intensity to assess, manipulate, and support his critical organ systems to prevent a likely inevitable decline which could occur if left untreated. A total critical care time 32 minutes was used. This includes but not limited to examining patient, collaborating with other physicians, monitoring vital signs, telemetry, continuous pulse oximetry, and clinical response to IV medications, documentation time, review and interpretation of laboratory and radiological data, review of nursing notes and old record review. This time excludes any time that may have been spent performing procedures for life threatening organ failure.     Ramirez Bustillo MD

## 2019-05-09 NOTE — PROGRESS NOTES
Patient arrived by EMS @ 2100. Fluids stopped during transfer per EMS report. 's over 80's upon arrival. Resident at bedside and gave okay to stop levo at this time. Patient sheets saturated with yellow tinged fluid appearing to be draining from surgical incision.

## 2019-05-09 NOTE — H&P
Internal Medicine PGY-1 Resident History & Physical      PCP: EMIGDIO HARRELL    Date of Admission: 5/8/2019    Date of Service: Pt seen/examined on 05/08/2019  and Admitted to Inpatient with expected LOS greaterthan two midnights due to medical therapy. Chief Complaint:  Back pain       History Of Present Illness:      68 y.o. male with PMHx of HTN, HLD, DM, Chronic neurogenic claudication, peripheral neuropathy, S/P laminectomy 04/19./2019 P/W Hx back pain of 2 days duration, patient had surgery to his back 04/19/2019 he was doing good till 2 days ago, when he started having lower back pain, constant, radiated to left hip, getting worse associated with Fever, sweating, chills, he went to the ED his blood pressure was low, tachycardia, his lactate was elevated to 3.00, IVF was given more than 6 litters, but blood pressure was low, Levophed started for few hours blood pressure was good, pt endorse the is fluid coming from his back.  ESR, CRP were elevated, Ct shwoed Air and fluid containing collection seen in the dorsal paraspinal soft tissues of the laminectomy site measuring approximately 12 cm in craniocaudal dimension, 9.6 cm in AP dimension and 5.6 cm in transverse dimension, Pt received Vanc and zosyn  NS called by ED and he recommend to tranfer [facility] to [facility]  In ICU patient was alert oriented, /87, HR 97, SPO2 92 on 4 litters oxygen    Past Medical History:          Diagnosis Date    Arthritis      left knee    Back pain     DDD (degenerative disc disease), cervical     Diabetes mellitus ([address]     Edema of both legs     GERD (gastroesophageal reflux disease)     Hyperlipidemia     Hypertension     Nausea in adult     Neuropathy     idopathtic     Vitamin B 12 deficiency     Walking troubles        Past Surgical History:          Procedure Laterality Date    BACK SURGERY      x 2    CHOLECYSTECTOMY      CYSTOSCOPY      EYE SURGERY      retina repair    HEMORRHOID SURGERY      KNEE SURGERY      LUMBAR SPINE SURGERY N/A 4/19/2019    DECOMPRESSION LAMINECTOMY L3-4 POSSIBLY L5 performed by Jagdish Hickey MD at [place] [address]       retracted penis    SHOULDER SURGERY      TONSILLECTOMY         Medications Prior to Admission:      Prior to Admission medications    Medication Sig Start Date End Date Taking? Authorizing Provider   aspirin 81 MG EC tablet Take 81 mg by mouth daily    Historical Provider, MD   B COMPLEX VITAMINS PO Take by mouth    Historical Provider, MD   blood glucose test strips (ASCENSIA AUTODISC VI;ONE TOUCH ULTRA TEST VI) strip Test blood glucose.  TEST THREE TIMES A DAY BEFORE MEALS 2/14/19   Historical Provider, MD   Cetirizine HCl 10 MG CAPS Take 10 mg by mouth    Historical Provider, MD   ONE TOUCH ULTRASOFT LANCETS MISC TEST THREE TIMES A DAY 8/13/17   Historical Provider, MD   polyethylene glycol ([employer]) packet Take by mouth once  11/12/14   Historical Provider, MD   amLODIPine (NORVASC) 5 MG tablet Take 5 mg by mouth daily    Historical Provider, MD   atorvastatin (LIPITOR) 20 MG tablet Take 20 mg by mouth daily    Historical Provider, MD   acetaminophen (TYLENOL) 325 MG tablet Take 650 mg by mouth three times daily    Historical Provider, MD   cyclobenzaprine (FLEXERIL) 5 MG tablet Take 5 mg by mouth 3 times daily as needed for Muscle spasms    Historical Provider, MD   docusate sodium (COLACE) 100 MG capsule Take 100 mg by mouth 2 times daily    Historical Provider, MD   empagliflozin (JARDIANCE) 25 MG tablet Take 25 mg by mouth daily    Historical Provider, MD   fenofibrate (TRICOR) 145 MG tablet Take 145 mg by mouth daily    Historical Provider, MD   Flaxseed Oil OIL by Does not apply route    Historical Provider, MD   glimepiride (AMARYL) 4 MG tablet Take 4 mg by mouth every morning (before breakfast)    Historical Provider, MD   insulin NPH (HUMULIN N;NOVOLIN N) 100 UNIT/ML injection vial Inject 20 Units into the skin 2 times daily (before meals)    Historical Provider, MD   lisinopril (PRINIVIL;ZESTRIL) 20 MG tablet Take 20 mg by mouth daily    Historical Provider, MD   Magnesium Oxide (MAG- PO) Take by mouth    Historical Provider, MD   metFORMIN (GLUCOPHAGE) 1000 MG tablet Take 1,000 mg by mouth 2 times daily (with meals)    Historical Provider, MD   ondansetron (ZOFRAN) 4 MG tablet Take 4 mg by mouth every 8 hours as needed for Nausea or Vomiting    Historical Provider, MD   naproxen (NAPROSYN) 250 MG tablet Take 250 mg by mouth 2 times daily (with meals)    Historical Provider, MD   ranitidine (ZANTAC 150 MAXIMUM STRENGTH) 150 MG tablet Take 150 mg by mouth 2 times daily    Historical Provider, MD   tamsulosin (FLOMAX) 0.4 MG capsule Take 0.4 mg by mouth daily    Historical Provider, MD       Allergies:  Morphine and No known allergies    Social History:      The patient currently lives home    TOBACCO:   reports that he has never smoked. He has never used smokeless tobacco.  ETOH:  reports that he drinks alcohol. Family History:   Reviewed in detail and negative for DM, CAD, Cancer, CVA. Positive as follows:        Problem Relation Age of Onset    Arthritis Mother     Stroke Father     High Blood Pressure Father     Heart Disease Father     Arthritis Father     Diabetes Father        REVIEW OF SYSTEMS: Pertinent positives as noted in the HPI. All other systems reviewed and negative. ROS: Review of Systems   Constitutional: Positive for activity change, chills and fever. Negative for appetite change, diaphoresis and fatigue. HENT: Negative for congestion, dental problem, drooling, ear discharge, ear pain, facial swelling, hearing loss, mouth sores, nosebleeds and postnasal drip. Eyes: Negative for pain, discharge, redness and itching. Respiratory: Negative for apnea, cough, choking, chest tightness, shortness of breath, wheezing and stridor. Cardiovascular: Negative for chest pain, palpitations and leg swelling. Gastrointestinal: Negative for abdominal distention, abdominal pain, anal bleeding, blood in stool, constipation and diarrhea. Endocrine: Negative for cold intolerance, heat intolerance, polydipsia, polyphagia and polyuria. Genitourinary: Negative for difficulty urinating, dysuria, enuresis, flank pain, frequency, genital sores and hematuria. Musculoskeletal: Positive for back pain and gait problem. Negative for arthralgias, joint swelling, myalgias, neck pain and neck stiffness. Skin: Negative for color change, pallor, rash and wound. Neurological: Negative for dizziness, seizures, syncope, facial asymmetry, speech difficulty, light-headedness, numbness and headaches. Psychiatric/Behavioral: Negative for agitation, behavioral problems, confusion, decreased concentration, dysphoric mood and hallucinations. The patient is not hyperactive. PHYSICAL EXAM PERFORMED:    /82   Pulse 101   Temp 98.3 °F (36.8 °C) (Oral)   Wt (!) 336 lb 13.8 oz (152.8 kg)   SpO2 93%   BMI 45.69 kg/m²     General appearance:  No apparent distress, appears stated age and cooperative. HEENT:  Normal cephalic,atraumatic without obvious deformity. Pupils equal, round, and reactive to light. Extra ocular muscles intact. Conjunctivae/corneas clear. Neck: Supple, with full range of motion. No jugular venous distention. Trachea midline. Respiratory:  Normal respiratory effort. Clear to auscultation, bilaterally without Rales/Wheezes/Rhonchi. Cardiovascular:  Regular rate and rhythm with normal S1/S2 without murmurs, rubs or gallops. Abdomen: Soft, non-tender, non-distended with normal bowel sounds. Musculoskeletal:  No clubbing, cyanosis oredema bilaterally. Full range of motion without deformity. Skin: Skin color, texture, turgor normal.  Lower back there is redness, pus drainage from lower back  Neurologic:  Neurovascularly intact without any focal sensory/motor deficits.  Cranialnerves: II-XII intact, grossly non-focal.  Psychiatric:  Alert and oriented, thought content appropriate,normal insight  Capillary Refill: Brisk,< 3 seconds   Peripheral Pulses: +2 palpable, equal bilaterally       Labs:     No results for input(s): WBC, HGB, HCT, PLT in the last 72 hours. No results for input(s): NA, K, CL, CO2, BUN, CREATININE, CALCIUM, PHOS in the last 72 hours. Invalid input(s): MAGNES  No results for input(s): AST, ALT, BILIDIR, BILITOT, ALKPHOS in the last 72 hours. No results for input(s): INR in the last 72 hours. No results for input(s): Terryann Haste in the last 72 hours.     Urinalysis:    No results found for: Caitlyn Hooper, BACTERIA, [address] [place] [address], [facility], [facility], [facility]    Radiology:     CXR: I have reviewed the CXR with the following interpretation:   EKG:  I have reviewed the EKG with the following interpretation:     No orders to display       ASSESSMENT & PLAN:  Yolette Starks is a 68 y.o. male w/ PMHx of HTN, HLD, DM, Chronic neurogenic claudication, peripheral neuropathy, S/P laminectomy 04/19./2019 P/W Hx back pain of 2 days duration, patient had surgery to his back 04/19/2019  Active Hospital Problems    Diagnosis Date Noted    Back abscess [L02.212] 05/08/2019    Sepsis ([facility] [address] [A41.9] 05/08/2019     Back pain possibly 2/2 spinal abscess  Pt had hx of spinal surgery 2 weeks ago, came with back pain, fever, sweating, pus from skin, Ct showed  Air and fluid containing collection seen in the dorsal paraspinal soft tissues of the laminectomy site measuring approximately 12 cm in craniocaudal dimension, 9.6 cm in AP dimension and 5.6 cm in transverse dimension showed   -Blood culture  -cbc, bmp  -Lactate trending  - cc/hr  -Vancomycin+zosyn  -NS consult  -NPO after midnight    Severe Sepsis possibly 2/2 spinal abscess:  S/P spinal surgery 04/19, came with back apin, fever, chills, pus from back, Pt needed LEVOPHED In ED, responded to IVF bolus, Hr 96, RR was high, elevated lactate  -BCx, Urine Cx  -IVF  -Vanc , Zosyn       Acute kidney injury possibly 2/2 Medication vs Hypotension  Cr 1.7, BUN 31,   -BMP trending  - IVF    Hx of HTN:  Will hold BP medicine due to soft blood pressure      DVT Prophylaxis: Lovenox  Diet: Diet NPO, After Midnight  Code Status: Full Code    Dispo - ICU  I will discuss the patient with the senior resident and Dr. Cody MD  Internal Medicine Resident,PGY-1

## 2019-05-09 NOTE — FLOWSHEET NOTE
05/09/19 1422   Encounter Summary   Services provided to: Patient and family together   Referral/Consult From: Family   Support System Family members   Continue Visiting   (Seen 5/9/19, GUSTAVO. )   Complexity of Encounter Moderate   Length of Encounter 15 minutes   Spiritual/Adventist   Type Spiritual support   Assessment Approachable   Intervention Active listening;Nurtured hope;Prayer   Outcome Expressed gratitude

## 2019-05-09 NOTE — PROGRESS NOTES
Pt back from MRI at 1800, pt pain is uncontrolled with left leg pain. Pt received 1 Mg dilaudid, zofran for nausea and hung IV robaxin. Spoke with Dr. Dax Manzo in the OR and informed him of the patient, stated to call Dr. Real Persons, office called and left message for Clarence Mitchell in regards to plan of care with this patient. Awaiting return call. Will continue to monitor.

## 2019-05-09 NOTE — PROGRESS NOTES
Clinical Pharmacy  Critical Care Progress Note    Interval update:   Remains on levo @ 10 mc/min, afebrile overnight. Awaiting neurosurg recs for possible OR    REASON FOR EVALUATION:  Pharmacy to dose vancomycin  REQUESTING PHYSICIAN/CNP: Dr. Mireille Gardner    ADMIT DATE:   5/8/2019   ICU DAY: 2  HPI:  68 YOM with PMHx of T2DM, HTN, peripheral neuropathy s/p laminectomy 4/2019 presents with a 2 day history of back pain. In the ED at OSH, patient was hypotensive despite multiple fluid boluses, thus started on levophed. CT showed air and fluid collection in dorsal paraspinal soft tissues near laminectomy site. CXR at [place] [address] revealed RUL opacity, either PNA vs mass lesion. Patient was started on empiric antibiotics and admitted to the ICU for pressor support for septic shock 2/2 possible spinal abscess.     ALLERGIES:  Morphine and No known allergies    PAST MEDICAL HISTORY  Past Medical History:   Diagnosis Date    Arthritis      left knee    Back pain     DDD (degenerative disc disease), cervical     Diabetes mellitus (HCC)     Edema of both legs     GERD (gastroesophageal reflux disease)     Hyperlipidemia     Hypertension     Nausea in adult     Neuropathy     idopathtic     Vitamin B 12 deficiency     Walking troubles         PAST SURGICAL HISTORY  Past Surgical History:   Procedure Laterality Date    BACK SURGERY      x 2    CHOLECYSTECTOMY      CYSTOSCOPY      EYE SURGERY      retina repair    HEMORRHOID SURGERY      KNEE SURGERY      LUMBAR SPINE SURGERY N/A 4/19/2019    DECOMPRESSION LAMINECTOMY L3-4 POSSIBLY L5 performed by Lio Williamson MD at [facility]       retracted penis    SHOULDER SURGERY      TONSILLECTOMY          VITALS  TEMPERATURE:  99.8 °F (37.7 °C)  HEIGHT:  6' 0.05\" (183 cm)  WEIGHT:  Weight: (!) 336 lb 13.8 oz (152.8 kg)  BLOOD PRESSURE:  100/80    PULSE:  94  RESPIRATION:  17    I/0  Intake/Output:      Intake/Output Summary (Last 24 hours) at 5/9/2019 Magnesium Oxide (MAG- PO) Take by mouth    Historical Provider, MD   metFORMIN (GLUCOPHAGE) 1000 MG tablet Take 1,000 mg by mouth 2 times daily (with meals)    Historical Provider, MD   ondansetron (ZOFRAN) 4 MG tablet Take 4 mg by mouth every 8 hours as needed for Nausea or Vomiting    Historical Provider, MD   naproxen (NAPROSYN) 250 MG tablet Take 250 mg by mouth 2 times daily (with meals)    Historical Provider, MD   ranitidine (ZANTAC 150 MAXIMUM STRENGTH) 150 MG tablet Take 150 mg by mouth 2 times daily    Historical Provider, MD   tamsulosin (FLOMAX) 0.4 MG capsule Take 0.4 mg by mouth daily    Historical Provider, MD       CURRENT INPATIENT MEDICATIONS:  Scheduled Meds:   piperacillin-tazobactam  3.375 g Intravenous Q8H    vancomycin  1,750 mg Intravenous Q12H    sodium chloride flush  10 mL Intravenous 2 times per day    enoxaparin  40 mg Subcutaneous Daily     Continuous Infusions:   dextrose      sodium chloride 100 mL/hr at 05/08/19 2223    norepinephrine 10 mcg/min (05/09/19 0643)     PRN Meds:HYDROmorphone, glucose, dextrose, glucagon (rDNA), dextrose, sodium chloride flush, magnesium hydroxide, ondansetron, acetaminophen    PERTINENT LABS:  CBC   Recent Labs     05/09/19 0128   WBC 11.4*   HGB 10.4*   HCT 31.9*   MCV 87.4        Renal   Recent Labs     05/09/19 0128   *   K 4.4      CO2 23   BUN 25*   CREATININE 1.4*     Hepatic   Recent Labs     05/09/19 0128   AST 48*   ALT 40   BILITOT 0.9   ALKPHOS 53       INR/ANTI-Xa  No results found for: INR  No results found for: ANTIXA    CALCULATED  Serum creatinine: 1.4 mg/dL (H) 05/09/19 0128  Estimated creatinine clearance: 74 mL/min (A)     DIET  Diet NPO, After Midnight     GLUCOSES/INSULIN REQUIREMENTS LAST 24 HOURS  170.  None    CULTURES/ANTIGENS  Date Culture/Site Gram Stain Prelim/Final ID Sensitivities   5/8 Blood x2 Sent                            ANTIBIOTICS  Antibiotic Date Started Date Stop Day(s) Therapy Vancomycin pharmacy to dose 5/9     Zosyn 3.375g IV Q8H 5/9             VANCOMYCIN LEVELS  Date Time Type of Level Result   5/10 14:00 Trough pending             ASSESSMENT AND PLAN: 68 YOM admitted to the ICU with septic shock 2/2 suspected spinal abscess who was started on vancomycin. Pharmacy is consulted to dose and manage vancomycin. His other medication-related problems include DEMETRIO, HTN, T2DM, ICU standard care (MRSA decolonization) and core measures (vaccinations and prophylaxis). (1) Septic shock 2/2 lumbar paraspinal abscess   Vancomycin - day #1  · Continue 1.75g IV Q12H. Patient does not have any evidence of historic vanc dosing - 1.75g is ~16 mg/kg based on adjBW of 107.7 kg.  · Patient has a slight DEMETRIO this AM at 1.4 (1.7 at OSH). Baseline appears to be 1.1-1.2  · If Scr does not improve, will consider changing to intermittent dosing based on random levels  · Trough ordered for 5/10 @ 14:00. Target trough 15-20 mcg/mL   · Renal function will be monitored closely and dosing adjusted as appropriate    Zosyn - day #1  · 3.375g IV Q8H appropriate. Will follow-up on cultures    (2) T2DM  · POC glucoses ordered Q6H  · Low dose SS ordered. Will continue to monitor BG    (3) Prophylaxis  · DVT:  Lovenox  · SUP:  Not indicated    (4) Vaccinations  · Pneumonia: None     · Influenza: Not indicated    (5) Medication Reconciliation   In process    Please call with any questions.     Savannah Collado, PharmD, MPH  PGY-1 Pharmacy Resident  Office: [phone]  Wireless: [phone]  5/9/2019 7:39 AM

## 2019-05-09 NOTE — CONSULTS
NEUROSURGERY CONSULT NOTE    Claudia Cox  [id number]   1950 5/9/2019    Requesting physician: Robibn Gregorio MD    Reason for consultation: Post-op Fluid Collection vs Infection    History of present illness: Patient is a 68 y.o. male with a history of chronic neurogenic claudication, peripheral neuropathy s/p laminectomy of L3 & L4 on 4/19/19 presenting with back pain for 2 days. Pain is in his lower back radiating to left hip with associated fevers/chills. Went to OSH where he was treated for severe sepsis. CT lumbar spine showed air-fluid level in dorsal paraspinal soft tissues at laminectomy site. Following transfer, abscess opened and drained purulent fluid initially, now serosanguionus. Continued on broad-spectrum antibiotics and remains afebrile. Back pain is improved, but he has a new O2 requirement of 8L, briefly higher following central line placement. Not on oxygen at home. Denies shortness of breath. Chart review shows that he has a hx of diastolic heart failure and is managed with hydrochlorothiazide at home per cardiology note. CXR was limited but did not show overt signs of fluid overload. Patient also presented with DEMETRIO which is improving with fluids. ROS:   GENERAL:  Denies fever or recent illness.  Denies weight changes   EYES:  Denies vision change or diplopia  EARS:  Denies hearing loss  CARDIAC:  Denies chest pain  RESPIRATORY:  Denies shortness of breath  SKIN:  Endorses incisional drainage since arriving, but Denies incisional drainage prior to arrival  HEM:  Denies excessive bruising  PSYCH:  Denies anxiety or depression  NEURO:  Endorses weakness in BLE 2/2 pain; Denies headache, numbness or tingling or lateralizing weakness elsewhere   :  Denies urinary difficulty  GI: Denies nausea, vomiting, diarrhea or constipation  MUSCULOSKELETAL:  Low back pain radiating to left hip    Allergies   Allergen Reactions    Morphine Other (See Comments)     Doesn't work  vicodan and percocet work    No Known Allergies        Past Medical History:   Diagnosis Date    Arthritis      left knee    Back pain     DDD (degenerative disc disease), cervical     Diabetes mellitus (HCC)     Edema of both legs     GERD (gastroesophageal reflux disease)     Hyperlipidemia     Hypertension     Nausea in adult     Neuropathy     idopathtic     Vitamin B 12 deficiency     Walking troubles         Past Surgical History:   Procedure Laterality Date    BACK SURGERY      x 2    CHOLECYSTECTOMY      CYSTOSCOPY      EYE SURGERY      retina repair    HEMORRHOID SURGERY      KNEE SURGERY      LUMBAR SPINE SURGERY N/A 4/19/2019    DECOMPRESSION LAMINECTOMY L3-4 POSSIBLY L5 performed by Lio Williamson MD at [facility]       retracted penis    SHOULDER SURGERY      TONSILLECTOMY         Social History     Occupational History    Not on file   Tobacco Use    Smoking status: Never Smoker    Smokeless tobacco: Never Used   Substance and Sexual Activity    Alcohol use: Yes     Comment: Rarely about 1 beer in 6 months    Drug use: Not Currently    Sexual activity: Not on file        Family History   Problem Relation Age of Onset    Arthritis Mother     Stroke Father     High Blood Pressure Father     Heart Disease Father     Arthritis Father     Diabetes Father         No outpatient medications have been marked as taking for the 5/8/19 encounter [facility] Encounter).         Current Facility-Administered Medications   Medication Dose Route Frequency Provider Last Rate Last Dose    HYDROmorphone (DILAUDID) injection 1 mg  1 mg Intravenous Q4H PRN Abimael Paredes MD   1 mg at 05/09/19 0857    piperacillin-tazobactam (ZOSYN) 3.375 g in dextrose 5 % 100 mL IVPB extended infusion (mini-bag)  3.375 g Intravenous Q8H Esther Parker MD 25 mL/hr at 05/09/19 0637 3.375 g at 05/09/19 0637    vancomycin (VANCOCIN) 1,750 mg in dextrose 5 % 250 mL IVPB  1,750 mg Intravenous Q12H infection. Plan:  1. No emergent neurosurgical intervention indicated  2. Neurologic exams frequency: Q4H  3. For change in exam MUST contact neurosurgery team along with critical care or primary team  4. Post-op Fluid Collection:  - Fluid leaking from both ends of incision  - CT Lumbar revealed large fluid collection in the dorsal paraspinal soft tissues  - MRI Lumbar to r/o dural involvement  - Sepsis managed by primary team  5. Muscle spasms: Robaxin  6. Pain control: Managed by medical team  7. PT/OT consulted, appreciate recs  8. Advance diet / activity per primary team  9. Thank you for consult. Will follow inpatient. Please call with any questions or decline in neurological status    DISPO: Remain inpatient from neurosurgery standpoint. Dispo timing to be determined by primary team once patient is medically stable for discharge. Patient was discussed with Dr. Loli Johnson who agrees with above assessment and plan.      Electronically signed by: ALYSIA Preston, 5/9/2019 10:02 AM  [phone]

## 2019-05-09 NOTE — PROGRESS NOTES
has some hypoxia related to the aggressive fluids  We'll continue with supplemental oxygen and wean that as tolerated  He is currently on pressors but that is being weaned as his blood pressure has stabilized  Cultures have been obtained  MRI today and possibly surgery    Diet: Diet NPO, After Midnight  Code:Full Code  DVT 19 Patito Haynes MD   5/9/2019 2:03 PM

## 2019-05-10 PROBLEM — T81.49XA SURGICAL SITE INFECTION: Status: ACTIVE | Noted: 2019-05-10

## 2019-05-10 PROBLEM — A49.01 STAPHYLOCOCCUS AUREUS INFECTION: Status: ACTIVE | Noted: 2019-05-10

## 2019-05-10 LAB
A/G RATIO: 0.8 (ref 1.1–2.2)
A/G RATIO: 0.9 (ref 1.1–2.2)
ALBUMIN SERPL-MCNC: 2.6 G/DL (ref 3.4–5)
ALBUMIN SERPL-MCNC: 2.8 G/DL (ref 3.4–5)
ALP BLD-CCNC: 75 U/L (ref 40–129)
ALP BLD-CCNC: 98 U/L (ref 40–129)
ALT SERPL-CCNC: 38 U/L (ref 10–40)
ALT SERPL-CCNC: 39 U/L (ref 10–40)
ANION GAP SERPL CALCULATED.3IONS-SCNC: 12 MMOL/L (ref 3–16)
ANION GAP SERPL CALCULATED.3IONS-SCNC: 15 MMOL/L (ref 3–16)
AST SERPL-CCNC: 39 U/L (ref 15–37)
AST SERPL-CCNC: 40 U/L (ref 15–37)
BASOPHILS ABSOLUTE: 0 K/UL (ref 0–0.2)
BASOPHILS ABSOLUTE: 0 K/UL (ref 0–0.2)
BASOPHILS RELATIVE PERCENT: 0.1 %
BASOPHILS RELATIVE PERCENT: 0.1 %
BILIRUB SERPL-MCNC: 0.7 MG/DL (ref 0–1)
BILIRUB SERPL-MCNC: 0.7 MG/DL (ref 0–1)
BUN BLDV-MCNC: 27 MG/DL (ref 7–20)
BUN BLDV-MCNC: 28 MG/DL (ref 7–20)
C-REACTIVE PROTEIN: 308.7 MG/L (ref 0–5.1)
CALCIUM SERPL-MCNC: 8.4 MG/DL (ref 8.3–10.6)
CALCIUM SERPL-MCNC: 8.5 MG/DL (ref 8.3–10.6)
CHLORIDE BLD-SCNC: 97 MMOL/L (ref 99–110)
CHLORIDE BLD-SCNC: 99 MMOL/L (ref 99–110)
CO2: 20 MMOL/L (ref 21–32)
CO2: 22 MMOL/L (ref 21–32)
CREAT SERPL-MCNC: 0.9 MG/DL (ref 0.8–1.3)
CREAT SERPL-MCNC: 1.2 MG/DL (ref 0.8–1.3)
CREATININE URINE: 114.8 MG/DL (ref 39–259)
EOSINOPHILS ABSOLUTE: 0 K/UL (ref 0–0.6)
EOSINOPHILS ABSOLUTE: 0 K/UL (ref 0–0.6)
EOSINOPHILS RELATIVE PERCENT: 0.2 %
EOSINOPHILS RELATIVE PERCENT: 0.3 %
GFR AFRICAN AMERICAN: >60
GFR AFRICAN AMERICAN: >60
GFR NON-AFRICAN AMERICAN: 60
GFR NON-AFRICAN AMERICAN: >60
GLOBULIN: 3 G/DL
GLOBULIN: 3.4 G/DL
GLUCOSE BLD-MCNC: 154 MG/DL (ref 70–99)
GLUCOSE BLD-MCNC: 173 MG/DL (ref 70–99)
GLUCOSE BLD-MCNC: 183 MG/DL (ref 70–99)
GLUCOSE BLD-MCNC: 195 MG/DL (ref 70–99)
GLUCOSE BLD-MCNC: 219 MG/DL (ref 70–99)
HCT VFR BLD CALC: 32 % (ref 40.5–52.5)
HCT VFR BLD CALC: 33.5 % (ref 40.5–52.5)
HEMOGLOBIN: 10.6 G/DL (ref 13.5–17.5)
HEMOGLOBIN: 11 G/DL (ref 13.5–17.5)
LYMPHOCYTES ABSOLUTE: 0.5 K/UL (ref 1–5.1)
LYMPHOCYTES ABSOLUTE: 0.6 K/UL (ref 1–5.1)
LYMPHOCYTES RELATIVE PERCENT: 6 %
LYMPHOCYTES RELATIVE PERCENT: 6.1 %
MCH RBC QN AUTO: 28.5 PG (ref 26–34)
MCH RBC QN AUTO: 28.9 PG (ref 26–34)
MCHC RBC AUTO-ENTMCNC: 32.8 G/DL (ref 31–36)
MCHC RBC AUTO-ENTMCNC: 33 G/DL (ref 31–36)
MCV RBC AUTO: 87 FL (ref 80–100)
MCV RBC AUTO: 87.3 FL (ref 80–100)
MICROALBUMIN UR-MCNC: 5 MG/DL
MICROALBUMIN/CREAT UR-RTO: 43.6 MG/G (ref 0–30)
MONOCYTES ABSOLUTE: 0.7 K/UL (ref 0–1.3)
MONOCYTES ABSOLUTE: 0.9 K/UL (ref 0–1.3)
MONOCYTES RELATIVE PERCENT: 8.1 %
MONOCYTES RELATIVE PERCENT: 9.6 %
NEUTROPHILS ABSOLUTE: 7.3 K/UL (ref 1.7–7.7)
NEUTROPHILS ABSOLUTE: 8.1 K/UL (ref 1.7–7.7)
NEUTROPHILS RELATIVE PERCENT: 84.1 %
NEUTROPHILS RELATIVE PERCENT: 85.4 %
PDW BLD-RTO: 14.8 % (ref 12.4–15.4)
PDW BLD-RTO: 14.8 % (ref 12.4–15.4)
PERFORMED ON: ABNORMAL
PLATELET # BLD: 249 K/UL (ref 135–450)
PLATELET # BLD: 260 K/UL (ref 135–450)
PMV BLD AUTO: 7.5 FL (ref 5–10.5)
PMV BLD AUTO: 7.7 FL (ref 5–10.5)
POTASSIUM REFLEX MAGNESIUM: 4.1 MMOL/L (ref 3.5–5.1)
POTASSIUM REFLEX MAGNESIUM: 4.3 MMOL/L (ref 3.5–5.1)
RBC # BLD: 3.66 M/UL (ref 4.2–5.9)
RBC # BLD: 3.85 M/UL (ref 4.2–5.9)
SEDIMENTATION RATE, ERYTHROCYTE: 111 MM/HR (ref 0–20)
SODIUM BLD-SCNC: 132 MMOL/L (ref 136–145)
SODIUM BLD-SCNC: 133 MMOL/L (ref 136–145)
TOTAL PROTEIN: 5.8 G/DL (ref 6.4–8.2)
TOTAL PROTEIN: 6 G/DL (ref 6.4–8.2)
VANCOMYCIN TROUGH: 30.4 UG/ML (ref 10–20)
WBC # BLD: 8.6 K/UL (ref 4–11)
WBC # BLD: 9.6 K/UL (ref 4–11)

## 2019-05-10 PROCEDURE — 6370000000 HC RX 637 (ALT 250 FOR IP): Performed by: STUDENT IN AN ORGANIZED HEALTH CARE EDUCATION/TRAINING PROGRAM

## 2019-05-10 PROCEDURE — 2580000003 HC RX 258: Performed by: STUDENT IN AN ORGANIZED HEALTH CARE EDUCATION/TRAINING PROGRAM

## 2019-05-10 PROCEDURE — 80202 ASSAY OF VANCOMYCIN: CPT

## 2019-05-10 PROCEDURE — 6360000002 HC RX W HCPCS: Performed by: STUDENT IN AN ORGANIZED HEALTH CARE EDUCATION/TRAINING PROGRAM

## 2019-05-10 PROCEDURE — 99223 1ST HOSP IP/OBS HIGH 75: CPT | Performed by: INTERNAL MEDICINE

## 2019-05-10 PROCEDURE — 1200000000 HC SEMI PRIVATE

## 2019-05-10 PROCEDURE — 86140 C-REACTIVE PROTEIN: CPT

## 2019-05-10 PROCEDURE — 36415 COLL VENOUS BLD VENIPUNCTURE: CPT

## 2019-05-10 PROCEDURE — 85652 RBC SED RATE AUTOMATED: CPT

## 2019-05-10 PROCEDURE — 36592 COLLECT BLOOD FROM PICC: CPT

## 2019-05-10 PROCEDURE — 6360000002 HC RX W HCPCS: Performed by: NURSE PRACTITIONER

## 2019-05-10 PROCEDURE — 6360000002 HC RX W HCPCS: Performed by: INTERNAL MEDICINE

## 2019-05-10 PROCEDURE — 80053 COMPREHEN METABOLIC PANEL: CPT

## 2019-05-10 PROCEDURE — 6370000000 HC RX 637 (ALT 250 FOR IP): Performed by: NURSE PRACTITIONER

## 2019-05-10 PROCEDURE — 2580000003 HC RX 258: Performed by: NURSE PRACTITIONER

## 2019-05-10 PROCEDURE — 85025 COMPLETE CBC W/AUTO DIFF WBC: CPT

## 2019-05-10 PROCEDURE — 99232 SBSQ HOSP IP/OBS MODERATE 35: CPT | Performed by: INTERNAL MEDICINE

## 2019-05-10 RX ORDER — CALCIUM CARBONATE 200(500)MG
500 TABLET,CHEWABLE ORAL 3 TIMES DAILY PRN
Status: DISCONTINUED | OUTPATIENT
Start: 2019-05-10 | End: 2019-05-17 | Stop reason: HOSPADM

## 2019-05-10 RX ORDER — FAMOTIDINE 20 MG/1
20 TABLET, FILM COATED ORAL 2 TIMES DAILY
Status: DISCONTINUED | OUTPATIENT
Start: 2019-05-10 | End: 2019-05-17 | Stop reason: HOSPADM

## 2019-05-10 RX ORDER — ATORVASTATIN CALCIUM 20 MG/1
20 TABLET, FILM COATED ORAL NIGHTLY
Status: DISCONTINUED | OUTPATIENT
Start: 2019-05-10 | End: 2019-05-17 | Stop reason: HOSPADM

## 2019-05-10 RX ORDER — FENOFIBRATE 160 MG/1
160 TABLET ORAL DAILY
Status: DISCONTINUED | OUTPATIENT
Start: 2019-05-10 | End: 2019-05-17 | Stop reason: HOSPADM

## 2019-05-10 RX ORDER — TAMSULOSIN HYDROCHLORIDE 0.4 MG/1
0.4 CAPSULE ORAL DAILY
Status: DISCONTINUED | OUTPATIENT
Start: 2019-05-10 | End: 2019-05-17 | Stop reason: HOSPADM

## 2019-05-10 RX ORDER — OXYCODONE HYDROCHLORIDE AND ACETAMINOPHEN 5; 325 MG/1; MG/1
1 TABLET ORAL EVERY 4 HOURS PRN
Status: DISCONTINUED | OUTPATIENT
Start: 2019-05-10 | End: 2019-05-17 | Stop reason: HOSPADM

## 2019-05-10 RX ORDER — METHOCARBAMOL 500 MG/1
1000 TABLET, FILM COATED ORAL 4 TIMES DAILY
Status: DISCONTINUED | OUTPATIENT
Start: 2019-05-10 | End: 2019-05-12

## 2019-05-10 RX ORDER — OXYCODONE HYDROCHLORIDE AND ACETAMINOPHEN 5; 325 MG/1; MG/1
2 TABLET ORAL EVERY 4 HOURS PRN
Status: DISCONTINUED | OUTPATIENT
Start: 2019-05-10 | End: 2019-05-17 | Stop reason: HOSPADM

## 2019-05-10 RX ADMIN — INSULIN LISPRO 1 UNITS: 100 INJECTION, SOLUTION INTRAVENOUS; SUBCUTANEOUS at 21:26

## 2019-05-10 RX ADMIN — Medication 10 ML: at 21:24

## 2019-05-10 RX ADMIN — OXYCODONE HYDROCHLORIDE AND ACETAMINOPHEN 2 TABLET: 5; 325 TABLET ORAL at 15:53

## 2019-05-10 RX ADMIN — HYDROMORPHONE HYDROCHLORIDE 1 MG: 1 INJECTION, SOLUTION INTRAMUSCULAR; INTRAVENOUS; SUBCUTANEOUS at 09:22

## 2019-05-10 RX ADMIN — CALCIUM CARBONATE 500 MG: 500 TABLET, CHEWABLE ORAL at 17:11

## 2019-05-10 RX ADMIN — HYDROMORPHONE HYDROCHLORIDE 1 MG: 1 INJECTION, SOLUTION INTRAMUSCULAR; INTRAVENOUS; SUBCUTANEOUS at 13:25

## 2019-05-10 RX ADMIN — INSULIN LISPRO 2 UNITS: 100 INJECTION, SOLUTION INTRAVENOUS; SUBCUTANEOUS at 12:59

## 2019-05-10 RX ADMIN — ONDANSETRON 4 MG: 2 INJECTION INTRAMUSCULAR; INTRAVENOUS at 13:26

## 2019-05-10 RX ADMIN — FENOFIBRATE 160 MG: 160 TABLET ORAL at 12:59

## 2019-05-10 RX ADMIN — HYDROMORPHONE HYDROCHLORIDE 1 MG: 1 INJECTION, SOLUTION INTRAMUSCULAR; INTRAVENOUS; SUBCUTANEOUS at 04:25

## 2019-05-10 RX ADMIN — PIPERACILLIN SODIUM,TAZOBACTAM SODIUM 3.38 G: 3; .375 INJECTION, POWDER, FOR SOLUTION INTRAVENOUS at 23:33

## 2019-05-10 RX ADMIN — CALCIUM CARBONATE 500 MG: 500 TABLET, CHEWABLE ORAL at 21:24

## 2019-05-10 RX ADMIN — ATORVASTATIN CALCIUM 20 MG: 20 TABLET, FILM COATED ORAL at 21:24

## 2019-05-10 RX ADMIN — METHOCARBAMOL TABLETS 1000 MG: 500 TABLET, COATED ORAL at 18:42

## 2019-05-10 RX ADMIN — PIPERACILLIN SODIUM,TAZOBACTAM SODIUM 3.38 G: 3; .375 INJECTION, POWDER, FOR SOLUTION INTRAVENOUS at 13:26

## 2019-05-10 RX ADMIN — METHOCARBAMOL TABLETS 1000 MG: 500 TABLET, COATED ORAL at 21:24

## 2019-05-10 RX ADMIN — OXYCODONE HYDROCHLORIDE AND ACETAMINOPHEN 2 TABLET: 5; 325 TABLET ORAL at 23:33

## 2019-05-10 RX ADMIN — TAMSULOSIN HYDROCHLORIDE 0.4 MG: 0.4 CAPSULE ORAL at 09:22

## 2019-05-10 RX ADMIN — PIPERACILLIN SODIUM,TAZOBACTAM SODIUM 3.38 G: 3; .375 INJECTION, POWDER, FOR SOLUTION INTRAVENOUS at 06:41

## 2019-05-10 RX ADMIN — VANCOMYCIN HYDROCHLORIDE 1750 MG: 10 INJECTION, POWDER, LYOPHILIZED, FOR SOLUTION INTRAVENOUS at 15:02

## 2019-05-10 RX ADMIN — ENOXAPARIN SODIUM 40 MG: 40 INJECTION SUBCUTANEOUS at 09:22

## 2019-05-10 RX ADMIN — Medication 10 ML: at 10:26

## 2019-05-10 RX ADMIN — VANCOMYCIN HYDROCHLORIDE 1750 MG: 10 INJECTION, POWDER, LYOPHILIZED, FOR SOLUTION INTRAVENOUS at 02:23

## 2019-05-10 RX ADMIN — FAMOTIDINE 20 MG: 20 TABLET ORAL at 21:24

## 2019-05-10 RX ADMIN — METHOCARBAMOL 1000 MG: 100 INJECTION, SOLUTION INTRAMUSCULAR; INTRAVENOUS at 02:24

## 2019-05-10 ASSESSMENT — PAIN DESCRIPTION - ONSET
ONSET: ON-GOING

## 2019-05-10 ASSESSMENT — PAIN DESCRIPTION - ORIENTATION
ORIENTATION: LOWER;POSTERIOR

## 2019-05-10 ASSESSMENT — PAIN DESCRIPTION - DESCRIPTORS
DESCRIPTORS: SHARP;SHOOTING
DESCRIPTORS: SHOOTING;SHARP
DESCRIPTORS: SHARP;SHOOTING
DESCRIPTORS: SHOOTING;SHARP
DESCRIPTORS: SHOOTING;SHARP

## 2019-05-10 ASSESSMENT — PAIN SCALES - GENERAL
PAINLEVEL_OUTOF10: 6
PAINLEVEL_OUTOF10: 7
PAINLEVEL_OUTOF10: 7
PAINLEVEL_OUTOF10: 8
PAINLEVEL_OUTOF10: 0
PAINLEVEL_OUTOF10: 7
PAINLEVEL_OUTOF10: 9
PAINLEVEL_OUTOF10: 7

## 2019-05-10 ASSESSMENT — PAIN DESCRIPTION - FREQUENCY
FREQUENCY: CONTINUOUS

## 2019-05-10 ASSESSMENT — PAIN DESCRIPTION - PROGRESSION
CLINICAL_PROGRESSION: GRADUALLY WORSENING
CLINICAL_PROGRESSION: NOT CHANGED

## 2019-05-10 ASSESSMENT — PAIN DESCRIPTION - PAIN TYPE
TYPE: ACUTE PAIN

## 2019-05-10 ASSESSMENT — PAIN DESCRIPTION - LOCATION
LOCATION: BACK

## 2019-05-10 NOTE — PROGRESS NOTES
ICU Progress Note  PGY-1    Admit Date: 5/8/2019  Hospital Day: 3    ICU DAY 3  Code:Full Code  PCP: EMIGDIO HARRELL       SUBJECTIVE:   Interval Hx: Patient doing well this morning. No acute events overnight. Still endorses back pain requiring prn dilaudid. Has been weaned off levophed, pressures stable in the 120s. Still having significant drainage from back wound. Labs stable. Afebrile, no leukocytosis. O2 weaned to 5L NC      Access:   -Central Access Day #: 2                                  -Peripheral Access Day#:2                                MEDICATIONS:   Scheduled Meds:   tamsulosin  0.4 mg Oral Daily    piperacillin-tazobactam  3.375 g Intravenous Q8H    vancomycin  1,750 mg Intravenous Q12H    insulin lispro  0-3 Units Subcutaneous Nightly    insulin lispro  0-6 Units Subcutaneous TID WC    sodium chloride flush  10 mL Intravenous 2 times per day    enoxaparin  40 mg Subcutaneous Daily      Continuous Infusions:   dextrose      norepinephrine 3 mcg/min (05/09/19 1227)     PRN Meds:HYDROmorphone, glucose, dextrose, glucagon (rDNA), dextrose, naloxone, sodium chloride flush, magnesium hydroxide, ondansetron, acetaminophen  Allergies: Allergies   Allergen Reactions    Morphine Other (See Comments)     Doesn't work  vicodan and percocet work    No Known Allergies        PHYSICAL EXAM:       Vitals: /65   Pulse 88   Temp 98.6 °F (37 °C) (Oral)   Resp 23   Ht 6' 1\" (1.854 m)   Wt (!) 338 lb 10 oz (153.6 kg)   SpO2 95%   BMI 44.68 kg/m²   I/O:      Intake/Output Summary (Last 24 hours) at 5/10/2019 0702  Last data filed at 5/10/2019 0415  Gross per 24 hour   Intake 250 ml   Output 1730 ml   Net -1480 ml     No intake/output data recorded. I/O last 3 completed shifts: In: 250 [I.V.:250]  Out: 1730 [Urine:1730]    Physical Examination:   · General appearance: Appears comfortable at rest, fully alert and orientated    · Head: Atraumatic, normocephalic  · Eyes: PERRL.  EOMI bilaterally. No scleral icterus. · ENT: Limited movement to neck extension, flexion and rotation, but overall improved. Oral mucosa is moist and free of lesions. Neck is large. No tracheal deviation. No JVD. · Respiratory: Normal respiratory effort. Sating low 90s on 8L NC. Good air flow in lung bases, mild bibasilar crackles. No wheezes. · Cardiovascular: Regular rate and rhythm with normal S1/S2. No murmers, rubs or gallops. · Abdomen: Normoactive bowel sounds. Abdomen soft, non-tender, non-distended. No guarding or rebound tenderness  · Musculoskeletal: Limited range of motion 2/2 pain in back; but improved. No clubbing, cyanosis, trace pedal edema, peripheral pulses present. 5/5 strength in upper and lower extremities bilaterally. · Skin: Skin overlying lumbar spinous processes is erythematous. Serosanguinous drainage noted from left L2 paraspinous region; unchanged. · Neurologic: Alert and oriented x4. Weakness in lower extremities 2/2 pain, but no sensory deficits noted. CN II-XII in tact bilaterally      DATA:       Labs:  CBC:   Recent Labs     05/09/19  0128 05/10/19  0416   WBC 11.4* 8.6   HGB 10.4* 10.6*   HCT 31.9* 32.0*    260       BMP:   Recent Labs     05/09/19  0128 05/10/19  0416   * 133*   K 4.4 4.1    99   CO2 23 22   BUN 25* 28*   CREATININE 1.4* 1.2   GLUCOSE 170* 173*     ABGs: No results for input(s): PHART, BNH4VXK, PO2ART in the last 72 hours. Rad:   EKG:     ASSESSMENT AND PLAN:   En Roca is a 68 y.o. male with a history of chronic neurogenic claudication, peripheral neuropathy s/p laminectomy 4/19/19 presenting with back pain for 2 days. Admitted for sepsis 2/2 abscess in lumbar spine     Septic Shock 2/2 lumbar spine paraspinal abscess in the setting of recent laminectomy; resolved  Laminectomy on 4/19/19. Developed back pain, fevers/chills, tachycardia, tachypnea and hypoxia.  CT scan showed air-fluid level at dorsal paraspinal soft tissue at prior laminectomy site. Initially fluid responsive at OSH  and on presentation to [facility] (s/p 7L) with pressor requirement. Lactate 3.0. WBC 13.  Weaned off pressors, no longer in shock.  - No leukocytosis today  - Neurosurgery following   - MRI showed paraspinal fluid and air collection with circumferential epidural enhancement without discrete epidural collection extending throughout the laminectomy site - possibly reactive vs infection   - Discussed with NSGY: ok with transfer. ID consulted and Dr. Real Persons will see tomorrow regarding continued medical management vs surgical washout  - ID consulted  - Wound cultures from paraspinous abscess  - Vanc/zosyn  - stopped fluids  - Blood cultures pending     Back pain 2/2 above  - Worse after MRI, better now  - Robaxin 1g x3 given  - Dilaudid prn     DEMETRIO; resolved  Cr 1.7 at OSH (baseline 1.2). Improved with fluids. Likely pre-renal in the setting of septic shock, concern for underlying CKD. Does not have a nephrologist  - Cr now at baseline  - UA showed proteinuria; no evidence of infection  - Ordered urine microalbumin/Cr     Chronic Diastolic Heart Failure; possible acute exacerbation  Could not access prior echo results, but cardiology note from 3/2019 said EF 60% w/ LVH and diastolic dysfunction in 6086. Managed with hydrochlorothiazide. Received approx 7 L fluids at OSH and placed on maintenance here. Has new O2 requirement. CXR was of poor quality, but showed no overt signs of fluid overload. - Pro BNP 2900  - Repeat CXR showed no acute abnormalities; no evidence of fluid overload  - Off fluids  - Daily weights and strict I/Os     DM  Patient on glimepiride 4 mg + humulin 20 units BID  - Accuchecks QID  - Low-dose SSI  - Hypoglycemia protocol  - Carb controlled diet     Concern for RUL lung mass  CXR shows concern for RUL mass, but was limited study.  Has no smoking history and no hx of cancer.  - Repeat CXR showed no acute abnormalities; improved aeration with resolution of area in question     HTN   - holding home antihypertensives in the setting of hypotension and shock     HLD   - restarted home lipitor 20 and fenofibrate 145     Code Status:  Full   FEN: Carb controlled diet / off fluids  PPX: lovenox    DISPO: Transfer to [address] [place] [address]    Discussed with attending   -----------------------------  Ricky Parkinson M.D.   PGY-1  Perfect Serve  5/10/2019 7:02 AM     Pulmonary/Critical Care     Patient seen and examined. I agree with Dr. Annemarie Leon history, physical, lab findings, assessment and plan.     Assessment  1. Septic shock - resolved. Blood cultures no growth. There is moderate growth of Staphylococcus aureus on wound culture  2. Possible Lumbar abscess  3. Recent L3-L4 laminectomy April 19  4. Hypertension  5. DM  6. Acute hypoxemic respiratory failure -  improving. Oxygen requirement now 4 L down from high of 8 L yesterday  7. DEMETRIO - mild improvement. Creatinine 1.2 down from 1.4 yesterday. Urine output is 1.7 L     Plan     1. Follow cultures for sensitivities of staph aureus  2. Continue Zosyn and vancomycin  3. Start diet  4. Neurosurgery following. Dr. Erendira Coronado will assess him tomorrow for need of operative washout of surgical site  5.   Okay to transfer to [address] [place] [address]  Kelly Buitrago MD

## 2019-05-10 NOTE — PLAN OF CARE
Problem: Pain:  Goal: Pain level will decrease  Description  Pain level will decrease  5/10/2019 0326 by Nicanor Medina RN  Note:   Decreasing pts pain Robaxin and PRN Dilaudid (see Mar). Will continue to monitor. 5/9/2019 1600 by Kurtis Subramanian RN  Note:   No complaints of pain t/o shift. Instructed to call for discomfort. Will cont to monitor comfort level. Problem: Risk for Impaired Skin Integrity  Goal: Tissue integrity - skin and mucous membranes  Description  Structural intactness and normal physiological function of skin and  mucous membranes. 5/10/2019 0326 by Nicanor Medina RN  Note:   Pt is absent of new skin breakdown. Pt has areas of skin breakdown and redness (see flowsheet). Sacral heart on buttocks. Pt is getting repositioned with pillow support q2hrs and heels are elevated off the bed with pillows. Monitoring for incontinences and soiled lines. Changing linens when needed so they remain clean and dry. Educated pt on importance of repositioning. Pt verbalized understanding. Will continue to monitor hourly. 5/9/2019 1600 by Kurtis Subramanian RN  Note:   No skin breakdown noted to buttocks. Incision BERTIN, draining serosanguinous drainage. Pt on low air loss pressure alternating mattress. Will cont to monitor skin integrity. Problem: Falls - Risk of:  Goal: Will remain free from falls  Description  Will remain free from falls  5/10/2019 0326 by Nicanor Medina RN  Note:   Bed locked in lowest position, alarm on, and 3/4 rails up. Nonskid socks on. Call light and bedside table within reach. No falls this shift. Will continue to monitor with hourly rounding. 5/9/2019 1600 by Kurtis Subramanian RN  Note:   No attempts to get out of bed without assistance. Nonskid socks on. Bed locked and in lowest position. Side rails up x2. Exit alarm in use. Call light in reach. Remains free from injury. Will cont to monitor safety.

## 2019-05-10 NOTE — PROGRESS NOTES
Patient arrived on unit from ICU. Report taken from [facility]. Patient alert and oriented x4, VSS, laminectomy incision Open to air and draining serosanginous drainage. Neurochecks WNL. Patient on 6L high flow nasal canula. Patient complained of right leg numbness, heat applied and repositioned. Will continue to monitor.

## 2019-05-10 NOTE — CONSULTS
Infectious Diseases Inpatient Consult Note    Medical Student note - reviewed and modified, see Attending addendum at bottom    Reason for Consult:   Lumbar Surgical Site Infection  Requesting Physician:   Dr. Jamin Ang MD  Primary Care Physician:  EMIGDIO HARRELL  History Obtained From:   Pt, EPIC    Admit Date: 5/8/2019  Hospital Day: 3    CHIEF COMPLAINT:     Lumbar infection    HISTORY OF PRESENT ILLNESS:      69 yo w/ DM, HTN, HLD, GERD and DDD .     4/19/2019 - pt underwent lumbar decompression w/ Dr. Hanna Cardenas that he tolerated well without any surgical site issues. 5/6/2019 - pt started to experience lower back pain; had some chills    5/8/2019 - pt went to [facility] with hypotension, tachycardia, lactate of 3.00 - requested more than 6 L of fluids; started on Levophed. WBC was 8, ESR was 61, CRP was 190. CT showed air/fluid containing collection in the dorsal paraspinal soft tissues of the laminectomy site measuring approximately 12 cm in the craniocaudal dimension, 9.6 cm in AP dimension and 5.6 cm in transverse dimension. He was started on Vancomycin and Zosyn and was transferred to [facility] to the ICU. BC X 2 were collected. 5/9/2019 - pt became unstable overnight, with decreasing blood pressure, not responsive to fluids and he was transferred to the ICU. Abscess opened and drained purulent fluids then serosanguionus. He remained afebrile, WBC 11.4. Vancomycin and Zosyn were continued. MRI showed posterior fluid collected of unclear significane with complex layering signal w/ gas bubbles and peripheral enhancement - seroma vs. Hematoma vs. infection vs. Psuedomeningocele. Wound Cultures were collected. Today, pt is stable, off pressors. He denies fevers at home but reports chills and reports that he started to experience lower back pain on 5/6 initially.     Past Medical History:    Past Medical History:   Diagnosis Date    Arthritis      left knee    Back pain     DDD (degenerative disc disease), EXAM:      Vitals:    BP (!) 141/79   Pulse 94   Temp 98.4 °F (36.9 °C) (Oral)   Resp 17   Ht 6' 1\" (1.854 m)   Wt (!) 338 lb 10 oz (153.6 kg)   SpO2 91%   BMI 44.68 kg/m²     GENERAL: No apparent distress. Obese. HEENT: Membranes moist, no oral lesion  NECK:  Supple  LUNGS: Clear b/l, no rales, no dullness  CARDIAC: RRR, no murmur appreciated  ABD:  + BS, soft / NT  EXT:  No rash, no edema, no lesions  Wound:           surgical site with mild erythema,serous drainage and wound dehiscence, no Purulent drainage    NEURO: moving all extremities, however pain in lower back with left lower extremity flexion, difficulty doing a full lower extremity exam due to pt having a ligamental tear on his right knee and body habitus  PSYCH: Orientation, sensorium, mood normal  LINES:  Peripheral iv    DATA:    Lab Results   Component Value Date    WBC 8.6 05/10/2019    HGB 10.6 (L) 05/10/2019    HCT 32.0 (L) 05/10/2019    MCV 87.3 05/10/2019     05/10/2019     Lab Results   Component Value Date    CREATININE 1.2 05/10/2019    BUN 28 (H) 05/10/2019     (L) 05/10/2019    K 4.1 05/10/2019    CL 99 05/10/2019    CO2 22 05/10/2019     Hepatic Function Panel:   Lab Results   Component Value Date    ALKPHOS 75 05/10/2019    ALT 38 05/10/2019    AST 40 05/10/2019    PROT 6.0 05/10/2019    BILITOT 0.7 05/10/2019    LABALBU 2.6 05/10/2019     Micro:  5/8/2019 - BC X 2 - NGTD    5/9/2019 - Wound Culture - 3+ GPC on GS; moderate Staph aureus growth    Imaging:   CT Lumbar (5/8/2019)  FINDINGS:    CT LUMBAR SPINE:     Normal lumbar lordosis. Vertebral body heights are maintained. Multilevel intervertebral disc height loss with subchondral sclerosis and vacuum gas in the lumbar spine most prominent at L2-L3. Anterolisthesis of L5 on S1 by 4 mm. Laminectomy defects are seen throughout the lumbar spine. Multilevel severe narrowing of the central canal and neural foramina.  Air and fluid containing collection seen in the dorsal paraspinal soft tissues of the laminectomy site measuring approximately 12 cm in craniocaudal dimension, 9.6 cm in AP dimension and 5.6 cm in transverse dimension. Subtle rim enhancement seen. There is associated fat stranding and subcutaneous edema.        MRI Lumbar (5/9/2019)  Impression   1. Posterior fluid collection along the laminectomy bed of unclear significance. The fluid collection exerts complex layering signal with gas bubbles and peripheral enhancement. This may or present a seroma or postoperative hematoma. An infected fluid    collection be difficult to exclude. A pseudomeningocele is not excluded. The fluid collection extensively laminectomy bed without significant mass effect. 2. Multifactorial multilevel mild canal narrowing as described in part related to extensive facet arthropathy resulting in a narrowing of the canal from a lateral dimension. 3. Tortuous bunched nerve roots in the upper cauda equina surrounding the conus presumably related to chronic stenosis. 4. Multifactorial multilevel foraminal narrowing with moderate or severe foraminal narrowing from L1-L2 through L5-S1.   5. Diffuse circumferential epidural enhancement without discrete epidural collection extending throughout the laminectomy site which may be reactive in nature. Infection would be difficult to exclude. 6. No convincing evidence of osteomyelitis or discitis. 7. Large posterior central disc protrusion at L5-S1 resulting in a moderate canal narrowing and mild/moderate impingement upon the exiting S1 nerve roots bilaterally. IMPRESSION:      67 yo w/ DM, HTN, HLD, GERD and DDD. Pt underwent lumbar decompression w/ Dr. Bailey Patience that he tolerated well without any surgical site issues on 4/10/2019. He presented with septic shock on 5/8/2019, requiring resuscitation with fluids and levophed for blood pressure control. He has remained afebrile, with leukocytosis of 8.6 today. ESR of 61 and CRP of 190.  CT showed fluid collection that self-drained with purulent to now serous drainage at the surgical site with wound dishesence. MRI shows fluid collection. NSY is following. He is on Vancomycin and Zosyn. Wound Cultures are growing staph aureus. RECOMMENDATIONS:    - NSY to decide about washout tomorrow  - continue vancomycin and zosyn  - follow wound cultures and blood cultures  - will review imaging with radiology    Discussed with Dr. Miguel Oneal MD.  Mikki Gamez     Addendum to Medical Student Consult note:  Pt seen,examined and evaluated. I have independently performed history, physical exam, lab and data review. I have determined assessment and plan as documented by student Juan Thomas). 69 yo man with obesity, DM, HTN, lumbar stenosis    4/19/19 - L 3 & 4 lumbar laminectomy, L3/4 & L4/5bilateral facetectomy , foraminotomies over L4 &5 nerve roots. Pt had fall and injured R knee   Pt developed LBP over few days, increased over time. Associated chills, nausea. Presented to [facility] ED on 5/8 - afebrile, low BP, [place] 3. CT with air and fluid in ST / laminectomy site  ESR 61, Cr 1.7    Transferred and admitted to [facility] 5/8 - afebrile, WBC 11.4. Started on vancomycin and zosyn. MRI with fluid collection with gas and peripheral enhancement, see report  Seen by Neurosurg, may take to OR 5/11    Wound dehiscence on 5/9.   Wound cult with 3+GPC on GS and mod growth S aureus  Wound with purulent drainage    IMP/  Lumbar SSI    REC/  Empiric vancomycin + cefepime (prefer over vanco / zosyn due to nephrotoxicity)  Will check BC, wound cult results   OR debridement per Neurosurg    Discussed with pt, wife, daughter-in-law, Danielito Castrejon MD

## 2019-05-10 NOTE — PROGRESS NOTES
abscess  Acute kidney injury  Hypertension--currently been hypotensive  Morbid obesity    Patient continues on IV vancomycin and IV Zosyn. He has been able to be weaned off the Levophed. MRI shows fluid collection at the laminectomy site. There is also some gas bubbles. This is concerning for infection. Awaiting neurosurgery's final plans.   Family is somewhat upset that they still have not seen Dr. Bonnie Garcia: DIET CARB CONTROL; Carb Control: 4 carb choices (60 gms)/meal  Code:Full Code  DVT PPXlovenox    Rubén Ibarra MD   5/10/2019 1:53 PM

## 2019-05-10 NOTE — PROGRESS NOTES
NEUROSURGERY PROGRESS NOTE    5/10/2019 11:10 AM                               Dom Wu                      LOS: 2 days     Subjective:  No acute events overnight. Patient has no specific complaints this am.         Physical Exam:  Patient seen and examined    Vitals:    05/10/19 1000   BP: 123/61   Pulse: 78   Resp: 17   Temp:    SpO2: 94%     GCS:  4 - Opens eyes on own  5 - Alert and oriented  6 - Follows simple motor commands  General: Morbidly obese gentleman. Alert and cooperative in no acute distress.     HENT: atraumatic, neck supple  Eyes: Optic discs: Not tested  Pulmonary: unlabored respiratory effort  Cardiovascular:  Warm well perfused. No peripheral edema  Gastrointestinal: abdomen soft, NT, ND     Neurological:  Mental Status: Awake, alert, oriented x 4, speech clear and appropriate  Attention: Intact  Language: No aphasia or dysarthria noted  Sensation: Intact to all extremities to light touch  Coordination: Intact     Musculoskeletal:   Gait: Not tested   Assist devices: None   Tone: Normal  Motor strength: Hip flexor limited 2/2 pain    Right  Left      Right  Left    Deltoid  5 5   Hip Flex  4 4   Biceps  5 5   Knee Extensors  5 5   Triceps  5 5   Knee Flexors  5 5   Wrist Ext  5 5   Ankle Dorsiflex. 5 5   Wrist Flex  5 5   Ankle Plantarflex. 5 5   Handgrip  5 5   Ext Nixon Longus  5 5   Thumb Ext  5 5             Incision: Small areas of breakdown 2/2 serosanguinous drainage continuing to drain from known fluid collection    Radiological Findings:  CT Lumbar w contrast  Result Date: 5/8/2019  1.  Laminectomy defects are seen throughout the lumbar spine. 2. Air and fluid containing collection seen in the dorsal paraspinal soft tissues of the laminectomy site measuring approximately 12 cm in craniocaudal dimension, 9.6 cm in AP dimension and 5.6 cm in transverse dimension. Subtle rim enhancement seen worrisome for an abscess.   3. There is associated fat stranding and subcutaneous edema. Labs:  Recent Labs     05/10/19  0416   WBC 8.6   HGB 10.6*   HCT 32.0*          Recent Labs     05/10/19  0416   *   K 4.1   CL 99   CO2 22   BUN 28*   CREATININE 1.2   GLUCOSE 173*   CALCIUM 8.5       Recent Labs     05/09/19  1251   PROTIME 15.4*   INR 1.35*       Patient Active Problem List    Diagnosis Date Noted    Back abscess 05/08/2019    Sepsis ([address] 05/08/2019    Lumbar stenosis with neurogenic claudication 04/20/2019    Lumbar spinal stenosis 04/19/2019    Degenerative lumbar spinal stenosis 04/19/2019     Assessment:  Patient is a 68 y.o. male w/large post-op fluid collection in paraspinal soft tissue showing s/s of infection.     Plan:  1. Neurologically stable  2. Neurologic exams frequency: Q4H  3. For change in exam MUST contact neurosurgery team along with critical care or primary team  4. Post-op Fluid Collection:  - Fluid leaking from both ends of incision  - CT Lumbar revealed large fluid collection in the dorsal paraspinal soft tissues  - MRI Lumbar revealed decreased size in fluid collection, but cannot rule out infection  - Sepsis managed by primary team  5. Muscle spasms: Robaxin  6. Pain control: Managed by medical team  7. PT/OT consulted, appreciate recs  8. Advance diet / activity per primary team  9. Dr. Deena Cortes will talk with patient tomorrow regarding any surgical interventions  10. Will follow inpatient.  Please call with any questions or decline in neurological status     DISPO: OK to transfer to , but to remain inpatient from neurosurgery standpoint. Dispo timing to be determined by primary team once patient is medically stable for discharge.     Patient was discussed with Dr. Deena Cortes who agrees with above assessment and plan.      Electronically signed by: ALYSIA Ascencio, 5/10/2019 11:10 AM  [phone]

## 2019-05-10 NOTE — PROGRESS NOTES
Clinical Pharmacy  Critical Care Progress Note    Interval update:   Weaned off levophed overnight. MRI lumbar yesterday revealed decreased fluid collection, but could not r/o infection. Plan to transfer to  today    REASON FOR EVALUATION:  Pharmacy to dose vancomycin  REQUESTING PHYSICIAN/CNP: Dr. Magdiel Aceves    ADMIT DATE:   5/8/2019   ICU DAY: 3  HPI:  68 YOM with PMHx of T2DM, HTN, peripheral neuropathy s/p laminectomy 4/2019 presents with a 2 day history of back pain. In the ED at OSH, patient was hypotensive despite multiple fluid boluses, thus started on levophed. CT showed air and fluid collection in dorsal paraspinal soft tissues near laminectomy site. CXR at [facility] revealed RUL opacity, either PNA vs mass lesion. Patient was started on empiric antibiotics and admitted to the ICU for pressor support for septic shock 2/2 possible spinal abscess.     ALLERGIES:  Morphine and No known allergies    PAST MEDICAL HISTORY  Past Medical History:   Diagnosis Date    Arthritis      left knee    Back pain     DDD (degenerative disc disease), cervical     Diabetes mellitus (HCC)     Edema of both legs     GERD (gastroesophageal reflux disease)     Hyperlipidemia     Hypertension     Nausea in adult     Neuropathy     idopathtic     Vitamin B 12 deficiency     Walking troubles         PAST SURGICAL HISTORY  Past Surgical History:   Procedure Laterality Date    BACK SURGERY      x 2    CHOLECYSTECTOMY      CYSTOSCOPY      EYE SURGERY      retina repair    HEMORRHOID SURGERY      KNEE SURGERY      LUMBAR SPINE SURGERY N/A 4/19/2019    DECOMPRESSION LAMINECTOMY L3-4 POSSIBLY L5 performed by Rasheed Lange MD at \[facility]\"" 34       retracted penis    SHOULDER SURGERY      TONSILLECTOMY          VITALS  TEMPERATURE:  98.4 °F (36.9 °C)  HEIGHT:  6' 1\" (185.4 cm)  WEIGHT:  Weight: (!) 338 lb 10 oz (153.6 kg)  BLOOD PRESSURE:  BP: (!) 147/86    PULSE:  95  RESPIRATION:  20    I/0  Intake/Output: Intake/Output Summary (Last 24 hours) at 5/10/2019 1304  Last data filed at 5/10/2019 1023  Gross per 24 hour   Intake 250 ml   Output 1525 ml   Net -1275 ml       MEDICATIONS PRIOR TO ADMISSION  Prior to Admission medications    Medication Sig Start Date End Date Taking?  Authorizing Provider   vitamin B-12 (CYANOCOBALAMIN) 1000 MCG tablet Take 1,000 mcg by mouth daily   Yes Historical Provider, MD   hydrochlorothiazide (HYDRODIURIL) 12.5 MG tablet Take 12.5 mg by mouth daily   Yes Historical Provider, MD   amLODIPine (NORVASC) 2.5 MG tablet Take 2.5 mg by mouth daily   Yes Historical Provider, MD   atorvastatin (LIPITOR) 20 MG tablet Take 20 mg by mouth daily   Yes Historical Provider, MD   cyclobenzaprine (FLEXERIL) 5 MG tablet Take 5 mg by mouth 3 times daily as needed for Muscle spasms   Yes Historical Provider, MD   empagliflozin (JARDIANCE) 25 MG tablet Take 12.5 mg by mouth daily    Yes Historical Provider, MD   fenofibrate (TRICOR) 145 MG tablet Take 145 mg by mouth daily   Yes Historical Provider, MD   glimepiride (AMARYL) 4 MG tablet Take 8 mg by mouth every morning (before breakfast)    Yes Historical Provider, MD   insulin NPH (HUMULIN N;NOVOLIN N) 100 UNIT/ML injection vial Inject 20 Units into the skin 2 times daily (before meals)   Yes Historical Provider, MD   lisinopril (PRINIVIL;ZESTRIL) 20 MG tablet Take 20 mg by mouth daily   Yes Historical Provider, MD   metFORMIN (GLUCOPHAGE) 1000 MG tablet Take 1,000 mg by mouth 2 times daily (with meals)   Yes Historical Provider, MD   tamsulosin (FLOMAX) 0.4 MG capsule Take 0.4 mg by mouth daily   Yes Historical Provider, MD       CURRENT INPATIENT MEDICATIONS:  Scheduled Meds:   tamsulosin  0.4 mg Oral Daily    atorvastatin  20 mg Oral Nightly    fenofibrate  160 mg Oral Daily    piperacillin-tazobactam  3.375 g Intravenous Q8H    vancomycin  1,750 mg Intravenous Q12H    insulin lispro  0-3 Units Subcutaneous Nightly    insulin lispro  0-6 Units Subcutaneous TID WC    sodium chloride flush  10 mL Intravenous 2 times per day    enoxaparin  40 mg Subcutaneous Daily     Continuous Infusions:   dextrose      norepinephrine 3 mcg/min (05/09/19 1227)     PRN Meds:HYDROmorphone, glucose, dextrose, glucagon (rDNA), dextrose, naloxone, sodium chloride flush, magnesium hydroxide, ondansetron, acetaminophen    PERTINENT LABS:  CBC   Recent Labs     05/09/19 0128 05/10/19  0416   WBC 11.4* 8.6   HGB 10.4* 10.6*   HCT 31.9* 32.0*   MCV 87.4 87.3    260     Renal   Recent Labs     05/09/19 0128 05/10/19  0416   * 133*   K 4.4 4.1    99   CO2 23 22   BUN 25* 28*   CREATININE 1.4* 1.2     Hepatic   Recent Labs     05/09/19  0128 05/10/19  0416   AST 48* 40*   ALT 40 38   BILITOT 0.9 0.7   ALKPHOS 53 75       INR/ANTI-Xa  Lab Results   Component Value Date    INR 1.35 (H) 05/09/2019     No results found for: ANTIXA    CALCULATED  Serum creatinine: 1.2 mg/dL 05/10/19 0416  Estimated creatinine clearance: 87 mL/min     DIET  DIET CARB CONTROL; Carb Control: 4 carb choices (60 gms)/meal     GLUCOSES/INSULIN REQUIREMENTS LAST 24 HOURS  163 - 149 - 176 - 173 - 154 - 219. 2U SS    CULTURES/ANTIGENS  Date Culture/Site Gram Stain Prelim/Final ID Sensitivities   5/8 Blood x2 NGTD     5/9 Wound (abscess) 3+ GPC                     ANTIBIOTICS  Antibiotic Date Started Date Stop Day(s) Therapy   Vancomycin pharmacy to dose 5/9     Zosyn 3.375g IV Q8H 5/9             VANCOMYCIN LEVELS  Date Time Type of Level Result   5/10 14:00 Trough Not drawn   5/11 02:00 Trough pending       ASSESSMENT AND PLAN: 68 YOM admitted to the ICU with septic shock 2/2 suspected spinal abscess who was started on vancomycin. Pharmacy is consulted to dose and manage vancomycin. His other medication-related problems include DEMETRIO, HTN, T2DM, ICU standard care (MRSA decolonization) and core measures (vaccinations and prophylaxis).      (1) Lumbar spine paraspinal abscess   Vancomycin - day

## 2019-05-10 NOTE — PROGRESS NOTES
Joe from Neuro surgery here and talking with patient and family. Questions answered. Will continue to monitor.

## 2019-05-11 LAB
A/G RATIO: 0.9 (ref 1.1–2.2)
ALBUMIN SERPL-MCNC: 3.1 G/DL (ref 3.4–5)
ALP BLD-CCNC: 108 U/L (ref 40–129)
ALT SERPL-CCNC: 36 U/L (ref 10–40)
ANION GAP SERPL CALCULATED.3IONS-SCNC: 13 MMOL/L (ref 3–16)
AST SERPL-CCNC: 34 U/L (ref 15–37)
BASOPHILS ABSOLUTE: 0 K/UL (ref 0–0.2)
BASOPHILS RELATIVE PERCENT: 0.4 %
BILIRUB SERPL-MCNC: 0.8 MG/DL (ref 0–1)
BUN BLDV-MCNC: 26 MG/DL (ref 7–20)
CALCIUM SERPL-MCNC: 8.9 MG/DL (ref 8.3–10.6)
CHLORIDE BLD-SCNC: 97 MMOL/L (ref 99–110)
CO2: 24 MMOL/L (ref 21–32)
CREAT SERPL-MCNC: 0.8 MG/DL (ref 0.8–1.3)
EOSINOPHILS ABSOLUTE: 0 K/UL (ref 0–0.6)
EOSINOPHILS RELATIVE PERCENT: 0.4 %
GFR AFRICAN AMERICAN: >60
GFR NON-AFRICAN AMERICAN: >60
GLOBULIN: 3.4 G/DL
GLUCOSE BLD-MCNC: 151 MG/DL (ref 70–99)
GLUCOSE BLD-MCNC: 153 MG/DL (ref 70–99)
GLUCOSE BLD-MCNC: 160 MG/DL (ref 70–99)
GLUCOSE BLD-MCNC: 200 MG/DL (ref 70–99)
GLUCOSE BLD-MCNC: 212 MG/DL (ref 70–99)
HCT VFR BLD CALC: 33.3 % (ref 40.5–52.5)
HEMOGLOBIN: 11.1 G/DL (ref 13.5–17.5)
LYMPHOCYTES ABSOLUTE: 0.9 K/UL (ref 1–5.1)
LYMPHOCYTES RELATIVE PERCENT: 10.3 %
MCH RBC QN AUTO: 29 PG (ref 26–34)
MCHC RBC AUTO-ENTMCNC: 33.3 G/DL (ref 31–36)
MCV RBC AUTO: 87.1 FL (ref 80–100)
MONOCYTES ABSOLUTE: 0.9 K/UL (ref 0–1.3)
MONOCYTES RELATIVE PERCENT: 10.4 %
NEUTROPHILS ABSOLUTE: 6.9 K/UL (ref 1.7–7.7)
NEUTROPHILS RELATIVE PERCENT: 78.5 %
PDW BLD-RTO: 14.2 % (ref 12.4–15.4)
PERFORMED ON: ABNORMAL
PLATELET # BLD: 238 K/UL (ref 135–450)
PMV BLD AUTO: 7.7 FL (ref 5–10.5)
POTASSIUM REFLEX MAGNESIUM: 4.1 MMOL/L (ref 3.5–5.1)
RBC # BLD: 3.82 M/UL (ref 4.2–5.9)
REPORT: NORMAL
SODIUM BLD-SCNC: 134 MMOL/L (ref 136–145)
TOTAL PROTEIN: 6.5 G/DL (ref 6.4–8.2)
VANCOMYCIN TROUGH: 16 UG/ML (ref 10–20)
WBC # BLD: 8.8 K/UL (ref 4–11)

## 2019-05-11 PROCEDURE — 6370000000 HC RX 637 (ALT 250 FOR IP): Performed by: NURSE PRACTITIONER

## 2019-05-11 PROCEDURE — 6370000000 HC RX 637 (ALT 250 FOR IP): Performed by: STUDENT IN AN ORGANIZED HEALTH CARE EDUCATION/TRAINING PROGRAM

## 2019-05-11 PROCEDURE — 80202 ASSAY OF VANCOMYCIN: CPT

## 2019-05-11 PROCEDURE — 1200000000 HC SEMI PRIVATE

## 2019-05-11 PROCEDURE — 36415 COLL VENOUS BLD VENIPUNCTURE: CPT

## 2019-05-11 PROCEDURE — 85025 COMPLETE CBC W/AUTO DIFF WBC: CPT

## 2019-05-11 PROCEDURE — 6360000002 HC RX W HCPCS: Performed by: NURSE PRACTITIONER

## 2019-05-11 PROCEDURE — 6360000002 HC RX W HCPCS: Performed by: STUDENT IN AN ORGANIZED HEALTH CARE EDUCATION/TRAINING PROGRAM

## 2019-05-11 PROCEDURE — 80053 COMPREHEN METABOLIC PANEL: CPT

## 2019-05-11 PROCEDURE — 2580000003 HC RX 258: Performed by: STUDENT IN AN ORGANIZED HEALTH CARE EDUCATION/TRAINING PROGRAM

## 2019-05-11 RX ADMIN — VANCOMYCIN HYDROCHLORIDE 1750 MG: 10 INJECTION, POWDER, LYOPHILIZED, FOR SOLUTION INTRAVENOUS at 14:36

## 2019-05-11 RX ADMIN — FAMOTIDINE 20 MG: 20 TABLET ORAL at 20:58

## 2019-05-11 RX ADMIN — FENOFIBRATE 160 MG: 160 TABLET ORAL at 08:44

## 2019-05-11 RX ADMIN — VANCOMYCIN HYDROCHLORIDE 1750 MG: 10 INJECTION, POWDER, LYOPHILIZED, FOR SOLUTION INTRAVENOUS at 03:24

## 2019-05-11 RX ADMIN — METHOCARBAMOL TABLETS 1000 MG: 500 TABLET, COATED ORAL at 20:58

## 2019-05-11 RX ADMIN — Medication 10 ML: at 20:59

## 2019-05-11 RX ADMIN — INSULIN LISPRO 1 UNITS: 100 INJECTION, SOLUTION INTRAVENOUS; SUBCUTANEOUS at 20:59

## 2019-05-11 RX ADMIN — INSULIN LISPRO 2 UNITS: 100 INJECTION, SOLUTION INTRAVENOUS; SUBCUTANEOUS at 16:56

## 2019-05-11 RX ADMIN — INSULIN LISPRO 1 UNITS: 100 INJECTION, SOLUTION INTRAVENOUS; SUBCUTANEOUS at 11:51

## 2019-05-11 RX ADMIN — OXYCODONE HYDROCHLORIDE AND ACETAMINOPHEN 2 TABLET: 5; 325 TABLET ORAL at 11:55

## 2019-05-11 RX ADMIN — Medication 10 ML: at 08:45

## 2019-05-11 RX ADMIN — METHOCARBAMOL TABLETS 1000 MG: 500 TABLET, COATED ORAL at 08:44

## 2019-05-11 RX ADMIN — ATORVASTATIN CALCIUM 20 MG: 20 TABLET, FILM COATED ORAL at 20:58

## 2019-05-11 RX ADMIN — FAMOTIDINE 20 MG: 20 TABLET ORAL at 08:45

## 2019-05-11 RX ADMIN — OXYCODONE HYDROCHLORIDE AND ACETAMINOPHEN 2 TABLET: 5; 325 TABLET ORAL at 16:19

## 2019-05-11 RX ADMIN — TAMSULOSIN HYDROCHLORIDE 0.4 MG: 0.4 CAPSULE ORAL at 08:44

## 2019-05-11 RX ADMIN — PIPERACILLIN SODIUM,TAZOBACTAM SODIUM 3.38 G: 3; .375 INJECTION, POWDER, FOR SOLUTION INTRAVENOUS at 05:40

## 2019-05-11 RX ADMIN — METHOCARBAMOL TABLETS 1000 MG: 500 TABLET, COATED ORAL at 16:19

## 2019-05-11 RX ADMIN — HYDROMORPHONE HYDROCHLORIDE 1 MG: 1 INJECTION, SOLUTION INTRAMUSCULAR; INTRAVENOUS; SUBCUTANEOUS at 00:29

## 2019-05-11 RX ADMIN — INSULIN LISPRO 1 UNITS: 100 INJECTION, SOLUTION INTRAVENOUS; SUBCUTANEOUS at 08:46

## 2019-05-11 RX ADMIN — OXYCODONE HYDROCHLORIDE AND ACETAMINOPHEN 2 TABLET: 5; 325 TABLET ORAL at 20:58

## 2019-05-11 RX ADMIN — METHOCARBAMOL TABLETS 1000 MG: 500 TABLET, COATED ORAL at 13:39

## 2019-05-11 RX ADMIN — PIPERACILLIN SODIUM,TAZOBACTAM SODIUM 3.38 G: 3; .375 INJECTION, POWDER, FOR SOLUTION INTRAVENOUS at 14:49

## 2019-05-11 RX ADMIN — ENOXAPARIN SODIUM 40 MG: 40 INJECTION SUBCUTANEOUS at 08:45

## 2019-05-11 RX ADMIN — OXYCODONE HYDROCHLORIDE AND ACETAMINOPHEN 2 TABLET: 5; 325 TABLET ORAL at 05:40

## 2019-05-11 RX ADMIN — PIPERACILLIN SODIUM,TAZOBACTAM SODIUM 3.38 G: 3; .375 INJECTION, POWDER, FOR SOLUTION INTRAVENOUS at 21:59

## 2019-05-11 ASSESSMENT — PAIN DESCRIPTION - DESCRIPTORS
DESCRIPTORS: SHARP;SHOOTING
DESCRIPTORS: SHOOTING;SHARP
DESCRIPTORS: SHARP;SHOOTING
DESCRIPTORS: SHOOTING;SHARP

## 2019-05-11 ASSESSMENT — PAIN SCALES - GENERAL
PAINLEVEL_OUTOF10: 7
PAINLEVEL_OUTOF10: 7
PAINLEVEL_OUTOF10: 4
PAINLEVEL_OUTOF10: 3
PAINLEVEL_OUTOF10: 8
PAINLEVEL_OUTOF10: 0
PAINLEVEL_OUTOF10: 7
PAINLEVEL_OUTOF10: 10
PAINLEVEL_OUTOF10: 8
PAINLEVEL_OUTOF10: 4
PAINLEVEL_OUTOF10: 0

## 2019-05-11 ASSESSMENT — PAIN DESCRIPTION - ONSET
ONSET: ON-GOING

## 2019-05-11 ASSESSMENT — PAIN - FUNCTIONAL ASSESSMENT
PAIN_FUNCTIONAL_ASSESSMENT: PREVENTS OR INTERFERES SOME ACTIVE ACTIVITIES AND ADLS

## 2019-05-11 ASSESSMENT — PAIN DESCRIPTION - PROGRESSION
CLINICAL_PROGRESSION: NOT CHANGED
CLINICAL_PROGRESSION: GRADUALLY WORSENING
CLINICAL_PROGRESSION: NOT CHANGED
CLINICAL_PROGRESSION: NOT CHANGED
CLINICAL_PROGRESSION: GRADUALLY WORSENING

## 2019-05-11 ASSESSMENT — PAIN DESCRIPTION - FREQUENCY
FREQUENCY: CONTINUOUS
FREQUENCY: INTERMITTENT

## 2019-05-11 ASSESSMENT — PAIN DESCRIPTION - PAIN TYPE
TYPE: ACUTE PAIN

## 2019-05-11 ASSESSMENT — PAIN DESCRIPTION - ORIENTATION
ORIENTATION: RIGHT;LOWER
ORIENTATION: POSTERIOR;LOWER
ORIENTATION: LOWER;RIGHT;LEFT
ORIENTATION: LOWER;LEFT;RIGHT

## 2019-05-11 ASSESSMENT — PAIN DESCRIPTION - LOCATION
LOCATION: BACK;GROIN
LOCATION: BACK

## 2019-05-11 NOTE — PROGRESS NOTES
#3  · Currently on 1.75g IV q12h. · Renal function stable this visit. · Level drawn last evening = 30.4 mcg/mL. Please note this was drawn only ~3 hrs posts-dose, so it is inaccurate. · Trough drawn correctly this AM = 16 mcg/mL. Drawn ~11 hrs post-dose. Within goal range of 15-20 mcg/mL for paraspinal abscess. Will continue this same dose for now. · Will continue to monitor renal function closely. With the KASSY = 2 to vancomycin - recommend to change to alternate therapy. As the patient also has staph in blood culture, would recommend Daptomycn. (2) Prophylaxis  · DVT:  Lovenox  · SUP:  Not indicated      Thank you. Please call with any questions.   Breanna SuazoD., [facility]   5/11/2019 8:44 AM  Wireless: [phone]

## 2019-05-11 NOTE — PROGRESS NOTES
Patient alert and oriented. VSS, on 6L high flow. Incision covered with dry dressing by Dr. Mono Pearl prior to this shift, dressing remains CDI. Neuro assessment remains stable. Pain controlled per MAR. Vanco through drawn prior to 0230 dose, lab WDL and vanco given per [facility]. Voiding well. Repositioning patient and speciality mattress ordered. Will continue to monitor.

## 2019-05-11 NOTE — PROGRESS NOTES
Assessment complete, see flow sheet. Pt's back dressing in intact, and is draining yellowish tan drainage that's small to moderate in amount. Will continue to follow throughout the day, will come back later to re-dress. The pt does better with two staff members to turn him.   Sher Carranza

## 2019-05-11 NOTE — PLAN OF CARE
Patient alert and oriented x4, fall precautions in place, bed in lowest position and locked, bed alarm on for safety, call light and belongings with in reach. Will continue to monitor. Vanessa Mancia

## 2019-05-11 NOTE — PLAN OF CARE
Problem: Pain:  Goal: Pain level will decrease  Description  Pain level will decrease  5/11/2019 1105 by Yue Izquierdo RN  Outcome: Ongoing  5/11/2019 0422 by Sher Ames RN  Outcome: Ongoing  Note:   Patient's pain controlled with prn medications per [facility]. Patient verbalizes a decrease in pain level upon reassessment of medications. Will continue to monitor. Goal: Control of acute pain  Description  Control of acute pain  Outcome: Ongoing  Goal: Control of chronic pain  Description  Control of chronic pain  Outcome: Ongoing     Problem: Risk for Impaired Skin Integrity  Goal: Tissue integrity - skin and mucous membranes  Description  Structural intactness and normal physiological function of skin and  mucous membranes. Outcome: Ongoing     Problem: Falls - Risk of:  Goal: Will remain free from falls  Description  Will remain free from falls  5/11/2019 1105 by Yue Izquierdo RN  Outcome: Ongoing  5/11/2019 0422 by Sher Ames RN  Outcome: Ongoing  Note:   Patient free from falls. Using call light appropriately when in need of assistance, making no attempts to get out of bed. In bed with alarm on and call light within reach.    Goal: Absence of physical injury  Description  Absence of physical injury  Outcome: Ongoing

## 2019-05-11 NOTE — PROGRESS NOTES
Pt states he is uncomfortable, offered to get him some pain medication but at this time the pt just wanted repositioned. Throughout the shift the pt mainly wanted to lay flat or only slightly up, and on his back, pt pt has been refusing to turn and lay side to side,  This nurse stated that he may feel better if he lays on one of his sides so the pt agreed to try is and was repositioned on his left side with a pillow, pt states that feels good for now, call light was given to the pt and remains in reach. The pt has no other needs at this time.   Milena Durán

## 2019-05-11 NOTE — PROGRESS NOTES
[facility]    5/11/2019 3:28 PM        Name: Vasu Talley . Admitted: 5/8/2019  Primary Care Provider: [place] (Tel: [phone])      Subjective:      Patient is resting in bed. Patient will probably go to the OR tomorrow for washout of the wound. Patient reports he has some pain. No other new complaints    Reviewed interval ancillary notes    Objective:  BP (!) 159/74   Pulse 92   Temp 99.2 °F (37.3 °C) (Oral)   Resp 17   Ht 6' 1\" (1.854 m)   Wt (!) 350 lb 1.5 oz (158.8 kg)   SpO2 91%   BMI 46.19 kg/m²     Intake/Output Summary (Last 24 hours) at 5/11/2019 1528  Last data filed at 5/11/2019 1438  Gross per 24 hour   Intake 850 ml   Output 2050 ml   Net -1200 ml      Wt Readings from Last 3 Encounters:   05/11/19 (!) 350 lb 1.5 oz (158.8 kg)   04/19/19 (!) 320 lb (145.2 kg)       General appearance:  Appears comfortable, no distress  Head:  Atraumatic normocephalic  Neck: supple  Cardiovascular: Regular rhythm, normal S1, S2. No murmur. Trace edema in lower extremities  Respiratory: Not using accessory muscles. Good inspiratory effort. Clear to auscultation bilaterally, no wheeze or crackles. GI: Abdomen soft, no tenderness, not distended, normal bowel sounds; morbidly obese  Musculoskeletal: strength symmetric  Neurology: awake and alert, cooperative      Labs and Tests:  CBC:   Recent Labs     05/10/19  0416 05/10/19  1743 05/11/19  0551   WBC 8.6 9.6 8.8   HGB 10.6* 11.0* 11.1*    249 238     BMP:    Recent Labs     05/10/19  0416 05/10/19  1742 05/11/19  0551   * 132* 134*   K 4.1 4.3 4.1   CL 99 97* 97*   CO2 22 20* 24   BUN 28* 27* 26*   CREATININE 1.2 0.9 0.8   GLUCOSE 173* 195* 151*     Hepatic:   Recent Labs     05/10/19  0416 05/10/19  1742 05/11/19  0551   AST 40* 39* 34   ALT 38 39 36   BILITOT 0.7 0.7 0.8   ALKPHOS 75 98 108       Assessment & Plan:    Active Problems:  Septic shock secondary to lumbar abscess  Lumbar abscess  Acute kidney injury  Hypertension--currently been hypotensive  Morbid obesity    Patient continues on IV vancomycin and IV Zosyn. He has been able to be weaned off the Levophed. MRI shows fluid collection at the laminectomy site. There is also some gas bubbles. This is concerning for infection. Wound culture has grown MRSA. Patient will continue with vancomycin and Zosyn pending final culture data. Patient is scheduled to go to the OR tomorrow.     Diet: DIET CARB CONTROL; Carb Control: 4 carb choices (60 gms)/meal  Diet NPO, After Midnight  Code:Full Code  DVT PPXlovenox    Adam Crabtree MD   5/11/2019 3:28 PM

## 2019-05-11 NOTE — PROGRESS NOTES
5/11/19    4 weeks post op draining wound. Culture pos for Staph    MRI rviewed    Rec Wound washout           Scheduling for Sunday at noon.     Tunde Perez MD

## 2019-05-11 NOTE — PLAN OF CARE
Problem: Pain:  Goal: Pain level will decrease  Description  Pain level will decrease  Outcome: Ongoing  Note:   Patient's pain controlled with prn medications per MAR. Patient verbalizes a decrease in pain level upon reassessment of medications. Will continue to monitor. Problem: Falls - Risk of:  Goal: Will remain free from falls  Description  Will remain free from falls  5/11/2019 0422 by Joo Reyes RN  Outcome: Ongoing  Note:   Patient free from falls. Using call light appropriately when in need of assistance, making no attempts to get out of bed. In bed with alarm on and call light within reach.

## 2019-05-11 NOTE — PROGRESS NOTES
Dressing change done on the pt's back at this time, dressing had moderate to large amount of yellowsih tan drainage. New bed pad changed as well and pt repositioned up in bed, pt has no other needs at this time, call light inr each, bed alarm on.   Natalia Priest

## 2019-05-12 ENCOUNTER — ANESTHESIA (OUTPATIENT)
Dept: OPERATING ROOM | Age: 69
DRG: 856 | End: 2019-05-12
Payer: MEDICARE

## 2019-05-12 ENCOUNTER — ANESTHESIA EVENT (OUTPATIENT)
Dept: OPERATING ROOM | Age: 69
DRG: 856 | End: 2019-05-12
Payer: MEDICARE

## 2019-05-12 VITALS — SYSTOLIC BLOOD PRESSURE: 105 MMHG | TEMPERATURE: 99.3 F | OXYGEN SATURATION: 94 % | DIASTOLIC BLOOD PRESSURE: 60 MMHG

## 2019-05-12 LAB
A/G RATIO: 0.8 (ref 1.1–2.2)
ALBUMIN SERPL-MCNC: 2.7 G/DL (ref 3.4–5)
ALP BLD-CCNC: 121 U/L (ref 40–129)
ALT SERPL-CCNC: 34 U/L (ref 10–40)
ANION GAP SERPL CALCULATED.3IONS-SCNC: 12 MMOL/L (ref 3–16)
AST SERPL-CCNC: 30 U/L (ref 15–37)
BASOPHILS ABSOLUTE: 0 K/UL (ref 0–0.2)
BASOPHILS RELATIVE PERCENT: 0 %
BILIRUB SERPL-MCNC: 0.7 MG/DL (ref 0–1)
BUN BLDV-MCNC: 22 MG/DL (ref 7–20)
CALCIUM SERPL-MCNC: 8.4 MG/DL (ref 8.3–10.6)
CHLORIDE BLD-SCNC: 95 MMOL/L (ref 99–110)
CO2: 26 MMOL/L (ref 21–32)
CREAT SERPL-MCNC: 0.8 MG/DL (ref 0.8–1.3)
EOSINOPHILS ABSOLUTE: 0.1 K/UL (ref 0–0.6)
EOSINOPHILS RELATIVE PERCENT: 1 %
GFR AFRICAN AMERICAN: >60
GFR NON-AFRICAN AMERICAN: >60
GLOBULIN: 3.5 G/DL
GLUCOSE BLD-MCNC: 153 MG/DL (ref 70–99)
GLUCOSE BLD-MCNC: 160 MG/DL (ref 70–99)
GLUCOSE BLD-MCNC: 171 MG/DL (ref 70–99)
GLUCOSE BLD-MCNC: 189 MG/DL (ref 70–99)
GLUCOSE BLD-MCNC: 216 MG/DL (ref 70–99)
HCT VFR BLD CALC: 32.8 % (ref 40.5–52.5)
HEMOGLOBIN: 10.7 G/DL (ref 13.5–17.5)
LYMPHOCYTES ABSOLUTE: 0.7 K/UL (ref 1–5.1)
LYMPHOCYTES RELATIVE PERCENT: 6 %
MCH RBC QN AUTO: 28.3 PG (ref 26–34)
MCHC RBC AUTO-ENTMCNC: 32.5 G/DL (ref 31–36)
MCV RBC AUTO: 87.1 FL (ref 80–100)
MONOCYTES ABSOLUTE: 1.1 K/UL (ref 0–1.3)
MONOCYTES RELATIVE PERCENT: 10 %
NEUTROPHILS ABSOLUTE: 9.5 K/UL (ref 1.7–7.7)
NEUTROPHILS RELATIVE PERCENT: 83 %
PDW BLD-RTO: 14.6 % (ref 12.4–15.4)
PERFORMED ON: ABNORMAL
PLATELET # BLD: 217 K/UL (ref 135–450)
PLATELET SLIDE REVIEW: ADEQUATE
PMV BLD AUTO: 7.9 FL (ref 5–10.5)
POTASSIUM REFLEX MAGNESIUM: 4.1 MMOL/L (ref 3.5–5.1)
RBC # BLD: 3.76 M/UL (ref 4.2–5.9)
RBC # BLD: NORMAL 10*6/UL
SLIDE REVIEW: ABNORMAL
SODIUM BLD-SCNC: 133 MMOL/L (ref 136–145)
TOTAL PROTEIN: 6.2 G/DL (ref 6.4–8.2)
WBC # BLD: 11.4 K/UL (ref 4–11)

## 2019-05-12 PROCEDURE — 87015 SPECIMEN INFECT AGNT CONCNTJ: CPT

## 2019-05-12 PROCEDURE — 3700000000 HC ANESTHESIA ATTENDED CARE: Performed by: NEUROLOGICAL SURGERY

## 2019-05-12 PROCEDURE — 7100000000 HC PACU RECOVERY - FIRST 15 MIN: Performed by: NEUROLOGICAL SURGERY

## 2019-05-12 PROCEDURE — 2500000003 HC RX 250 WO HCPCS: Performed by: ANESTHESIOLOGY

## 2019-05-12 PROCEDURE — 94761 N-INVAS EAR/PLS OXIMETRY MLT: CPT

## 2019-05-12 PROCEDURE — 87102 FUNGUS ISOLATION CULTURE: CPT

## 2019-05-12 PROCEDURE — 2580000003 HC RX 258: Performed by: NEUROLOGICAL SURGERY

## 2019-05-12 PROCEDURE — 6360000002 HC RX W HCPCS: Performed by: NEUROLOGICAL SURGERY

## 2019-05-12 PROCEDURE — 1200000000 HC SEMI PRIVATE

## 2019-05-12 PROCEDURE — 87205 SMEAR GRAM STAIN: CPT

## 2019-05-12 PROCEDURE — 6360000002 HC RX W HCPCS: Performed by: STUDENT IN AN ORGANIZED HEALTH CARE EDUCATION/TRAINING PROGRAM

## 2019-05-12 PROCEDURE — 00B20ZZ EXCISION OF DURA MATER, OPEN APPROACH: ICD-10-PCS | Performed by: NEUROLOGICAL SURGERY

## 2019-05-12 PROCEDURE — 2580000003 HC RX 258: Performed by: ANESTHESIOLOGY

## 2019-05-12 PROCEDURE — 87075 CULTR BACTERIA EXCEPT BLOOD: CPT

## 2019-05-12 PROCEDURE — 80053 COMPREHEN METABOLIC PANEL: CPT

## 2019-05-12 PROCEDURE — 2700000000 HC OXYGEN THERAPY PER DAY

## 2019-05-12 PROCEDURE — 87070 CULTURE OTHR SPECIMN AEROBIC: CPT

## 2019-05-12 PROCEDURE — 2580000003 HC RX 258: Performed by: INTERNAL MEDICINE

## 2019-05-12 PROCEDURE — 2709999900 HC NON-CHARGEABLE SUPPLY: Performed by: NEUROLOGICAL SURGERY

## 2019-05-12 PROCEDURE — 85025 COMPLETE CBC W/AUTO DIFF WBC: CPT

## 2019-05-12 PROCEDURE — 87077 CULTURE AEROBIC IDENTIFY: CPT

## 2019-05-12 PROCEDURE — 6370000000 HC RX 637 (ALT 250 FOR IP)

## 2019-05-12 PROCEDURE — 87040 BLOOD CULTURE FOR BACTERIA: CPT

## 2019-05-12 PROCEDURE — 6360000002 HC RX W HCPCS: Performed by: ANESTHESIOLOGY

## 2019-05-12 PROCEDURE — 86403 PARTICLE AGGLUT ANTBDY SCRN: CPT

## 2019-05-12 PROCEDURE — 6360000002 HC RX W HCPCS: Performed by: INTERNAL MEDICINE

## 2019-05-12 PROCEDURE — 009300Z DRAINAGE OF INTRACRANIAL EPIDURAL SPACE WITH DRAINAGE DEVICE, OPEN APPROACH: ICD-10-PCS | Performed by: NEUROLOGICAL SURGERY

## 2019-05-12 PROCEDURE — 6370000000 HC RX 637 (ALT 250 FOR IP): Performed by: NEUROLOGICAL SURGERY

## 2019-05-12 PROCEDURE — 2580000003 HC RX 258: Performed by: STUDENT IN AN ORGANIZED HEALTH CARE EDUCATION/TRAINING PROGRAM

## 2019-05-12 PROCEDURE — 6360000002 HC RX W HCPCS: Performed by: NURSE PRACTITIONER

## 2019-05-12 PROCEDURE — 7100000001 HC PACU RECOVERY - ADDTL 15 MIN: Performed by: NEUROLOGICAL SURGERY

## 2019-05-12 PROCEDURE — 6370000000 HC RX 637 (ALT 250 FOR IP): Performed by: ANESTHESIOLOGY

## 2019-05-12 PROCEDURE — 3700000001 HC ADD 15 MINUTES (ANESTHESIA): Performed by: NEUROLOGICAL SURGERY

## 2019-05-12 PROCEDURE — 87206 SMEAR FLUORESCENT/ACID STAI: CPT

## 2019-05-12 PROCEDURE — 6370000000 HC RX 637 (ALT 250 FOR IP): Performed by: NURSE PRACTITIONER

## 2019-05-12 PROCEDURE — 6370000000 HC RX 637 (ALT 250 FOR IP): Performed by: STUDENT IN AN ORGANIZED HEALTH CARE EDUCATION/TRAINING PROGRAM

## 2019-05-12 PROCEDURE — 3600000004 HC SURGERY LEVEL 4 BASE: Performed by: NEUROLOGICAL SURGERY

## 2019-05-12 PROCEDURE — 3600000014 HC SURGERY LEVEL 4 ADDTL 15MIN: Performed by: NEUROLOGICAL SURGERY

## 2019-05-12 PROCEDURE — 87116 MYCOBACTERIA CULTURE: CPT

## 2019-05-12 PROCEDURE — 87186 SC STD MICRODIL/AGAR DIL: CPT

## 2019-05-12 RX ORDER — DOCUSATE SODIUM 100 MG/1
100 CAPSULE, LIQUID FILLED ORAL 2 TIMES DAILY
Status: DISCONTINUED | OUTPATIENT
Start: 2019-05-12 | End: 2019-05-15

## 2019-05-12 RX ORDER — PROPOFOL 10 MG/ML
INJECTION, EMULSION INTRAVENOUS PRN
Status: DISCONTINUED | OUTPATIENT
Start: 2019-05-12 | End: 2019-05-12 | Stop reason: SDUPTHER

## 2019-05-12 RX ORDER — SUCCINYLCHOLINE/SOD CL,ISO/PF 200MG/10ML
SYRINGE (ML) INTRAVENOUS PRN
Status: DISCONTINUED | OUTPATIENT
Start: 2019-05-12 | End: 2019-05-12 | Stop reason: SDUPTHER

## 2019-05-12 RX ORDER — OXYCODONE HYDROCHLORIDE AND ACETAMINOPHEN 5; 325 MG/1; MG/1
2 TABLET ORAL PRN
Status: DISCONTINUED | OUTPATIENT
Start: 2019-05-12 | End: 2019-05-12 | Stop reason: HOSPADM

## 2019-05-12 RX ORDER — SODIUM CHLORIDE 0.9 % (FLUSH) 0.9 %
10 SYRINGE (ML) INJECTION PRN
Status: DISCONTINUED | OUTPATIENT
Start: 2019-05-12 | End: 2019-05-15 | Stop reason: SDUPTHER

## 2019-05-12 RX ORDER — SODIUM CHLORIDE 0.9 % (FLUSH) 0.9 %
10 SYRINGE (ML) INJECTION EVERY 12 HOURS SCHEDULED
Status: DISCONTINUED | OUTPATIENT
Start: 2019-05-12 | End: 2019-05-15 | Stop reason: SDUPTHER

## 2019-05-12 RX ORDER — FENTANYL CITRATE 50 UG/ML
25 INJECTION, SOLUTION INTRAMUSCULAR; INTRAVENOUS EVERY 5 MIN PRN
Status: DISCONTINUED | OUTPATIENT
Start: 2019-05-12 | End: 2019-05-12 | Stop reason: HOSPADM

## 2019-05-12 RX ORDER — SODIUM CHLORIDE 9 MG/ML
INJECTION, SOLUTION INTRAVENOUS CONTINUOUS PRN
Status: DISCONTINUED | OUTPATIENT
Start: 2019-05-12 | End: 2019-05-12 | Stop reason: SDUPTHER

## 2019-05-12 RX ORDER — MAGNESIUM HYDROXIDE 1200 MG/15ML
LIQUID ORAL CONTINUOUS PRN
Status: COMPLETED | OUTPATIENT
Start: 2019-05-12 | End: 2019-05-12

## 2019-05-12 RX ORDER — FENTANYL CITRATE 50 UG/ML
50 INJECTION, SOLUTION INTRAMUSCULAR; INTRAVENOUS EVERY 5 MIN PRN
Status: DISCONTINUED | OUTPATIENT
Start: 2019-05-12 | End: 2019-05-12 | Stop reason: HOSPADM

## 2019-05-12 RX ORDER — NEOSTIGMINE METHYLSULFATE 5 MG/5 ML
SYRINGE (ML) INTRAVENOUS PRN
Status: DISCONTINUED | OUTPATIENT
Start: 2019-05-12 | End: 2019-05-12 | Stop reason: SDUPTHER

## 2019-05-12 RX ORDER — ROCURONIUM BROMIDE 10 MG/ML
INJECTION, SOLUTION INTRAVENOUS PRN
Status: DISCONTINUED | OUTPATIENT
Start: 2019-05-12 | End: 2019-05-12 | Stop reason: SDUPTHER

## 2019-05-12 RX ORDER — ONDANSETRON 2 MG/ML
4 INJECTION INTRAMUSCULAR; INTRAVENOUS EVERY 6 HOURS PRN
Status: DISCONTINUED | OUTPATIENT
Start: 2019-05-12 | End: 2019-05-17 | Stop reason: HOSPADM

## 2019-05-12 RX ORDER — LIDOCAINE HYDROCHLORIDE 20 MG/ML
INJECTION, SOLUTION INTRAVENOUS PRN
Status: DISCONTINUED | OUTPATIENT
Start: 2019-05-12 | End: 2019-05-12 | Stop reason: SDUPTHER

## 2019-05-12 RX ORDER — SODIUM CHLORIDE, SODIUM LACTATE, POTASSIUM CHLORIDE, CALCIUM CHLORIDE 600; 310; 30; 20 MG/100ML; MG/100ML; MG/100ML; MG/100ML
INJECTION, SOLUTION INTRAVENOUS CONTINUOUS
Status: DISCONTINUED | OUTPATIENT
Start: 2019-05-12 | End: 2019-05-13

## 2019-05-12 RX ORDER — HYDRALAZINE HYDROCHLORIDE 20 MG/ML
5 INJECTION INTRAMUSCULAR; INTRAVENOUS EVERY 10 MIN PRN
Status: DISCONTINUED | OUTPATIENT
Start: 2019-05-12 | End: 2019-05-12 | Stop reason: HOSPADM

## 2019-05-12 RX ORDER — METHOCARBAMOL 500 MG/1
1000 TABLET, FILM COATED ORAL 4 TIMES DAILY
Status: DISCONTINUED | OUTPATIENT
Start: 2019-05-13 | End: 2019-05-14

## 2019-05-12 RX ORDER — FENTANYL CITRATE 50 UG/ML
INJECTION, SOLUTION INTRAMUSCULAR; INTRAVENOUS PRN
Status: DISCONTINUED | OUTPATIENT
Start: 2019-05-12 | End: 2019-05-12 | Stop reason: SDUPTHER

## 2019-05-12 RX ORDER — ALBUTEROL SULFATE 90 UG/1
AEROSOL, METERED RESPIRATORY (INHALATION) PRN
Status: DISCONTINUED | OUTPATIENT
Start: 2019-05-12 | End: 2019-05-12 | Stop reason: SDUPTHER

## 2019-05-12 RX ORDER — ONDANSETRON 2 MG/ML
INJECTION INTRAMUSCULAR; INTRAVENOUS PRN
Status: DISCONTINUED | OUTPATIENT
Start: 2019-05-12 | End: 2019-05-12 | Stop reason: SDUPTHER

## 2019-05-12 RX ORDER — GLYCOPYRROLATE 1 MG/5 ML
SYRINGE (ML) INTRAVENOUS PRN
Status: DISCONTINUED | OUTPATIENT
Start: 2019-05-12 | End: 2019-05-12 | Stop reason: SDUPTHER

## 2019-05-12 RX ORDER — ONDANSETRON 2 MG/ML
4 INJECTION INTRAMUSCULAR; INTRAVENOUS
Status: DISCONTINUED | OUTPATIENT
Start: 2019-05-12 | End: 2019-05-12 | Stop reason: HOSPADM

## 2019-05-12 RX ORDER — LABETALOL HYDROCHLORIDE 5 MG/ML
5 INJECTION, SOLUTION INTRAVENOUS EVERY 10 MIN PRN
Status: DISCONTINUED | OUTPATIENT
Start: 2019-05-12 | End: 2019-05-12 | Stop reason: HOSPADM

## 2019-05-12 RX ORDER — OXYCODONE HYDROCHLORIDE AND ACETAMINOPHEN 5; 325 MG/1; MG/1
1 TABLET ORAL PRN
Status: DISCONTINUED | OUTPATIENT
Start: 2019-05-12 | End: 2019-05-12 | Stop reason: HOSPADM

## 2019-05-12 RX ADMIN — CEFEPIME 2 G: 2 INJECTION, POWDER, FOR SOLUTION INTRAMUSCULAR; INTRAVENOUS at 12:19

## 2019-05-12 RX ADMIN — METHOCARBAMOL 1000 MG: 100 INJECTION, SOLUTION INTRAMUSCULAR; INTRAVENOUS at 23:05

## 2019-05-12 RX ADMIN — Medication 1 MG: at 14:07

## 2019-05-12 RX ADMIN — VANCOMYCIN HYDROCHLORIDE 1750 MG: 10 INJECTION, POWDER, LYOPHILIZED, FOR SOLUTION INTRAVENOUS at 02:20

## 2019-05-12 RX ADMIN — SODIUM CHLORIDE: 900 INJECTION, SOLUTION INTRAVENOUS at 13:13

## 2019-05-12 RX ADMIN — Medication 5 MG: at 14:07

## 2019-05-12 RX ADMIN — OXYCODONE HYDROCHLORIDE AND ACETAMINOPHEN 2 TABLET: 5; 325 TABLET ORAL at 17:28

## 2019-05-12 RX ADMIN — METHOCARBAMOL 1000 MG: 100 INJECTION, SOLUTION INTRAMUSCULAR; INTRAVENOUS at 15:27

## 2019-05-12 RX ADMIN — Medication 2 PUFF: at 13:40

## 2019-05-12 RX ADMIN — ATORVASTATIN CALCIUM 20 MG: 20 TABLET, FILM COATED ORAL at 20:13

## 2019-05-12 RX ADMIN — PIPERACILLIN SODIUM,TAZOBACTAM SODIUM 3.38 G: 3; .375 INJECTION, POWDER, FOR SOLUTION INTRAVENOUS at 05:54

## 2019-05-12 RX ADMIN — HYDROMORPHONE HYDROCHLORIDE 0.5 MG: 1 INJECTION, SOLUTION INTRAMUSCULAR; INTRAVENOUS; SUBCUTANEOUS at 15:07

## 2019-05-12 RX ADMIN — HYDROMORPHONE HYDROCHLORIDE 0.5 MG: 1 INJECTION, SOLUTION INTRAMUSCULAR; INTRAVENOUS; SUBCUTANEOUS at 14:51

## 2019-05-12 RX ADMIN — VANCOMYCIN HYDROCHLORIDE 1750 MG: 10 INJECTION, POWDER, LYOPHILIZED, FOR SOLUTION INTRAVENOUS at 15:00

## 2019-05-12 RX ADMIN — DOCUSATE SODIUM 100 MG: 100 CAPSULE, LIQUID FILLED ORAL at 20:13

## 2019-05-12 RX ADMIN — LIDOCAINE HYDROCHLORIDE 50 MG: 20 INJECTION, SOLUTION INTRAVENOUS at 13:20

## 2019-05-12 RX ADMIN — OXYCODONE HYDROCHLORIDE AND ACETAMINOPHEN 2 TABLET: 5; 325 TABLET ORAL at 01:24

## 2019-05-12 RX ADMIN — FENTANYL CITRATE 150 MCG: 50 INJECTION INTRAMUSCULAR; INTRAVENOUS at 13:20

## 2019-05-12 RX ADMIN — Medication 10 ML: at 10:25

## 2019-05-12 RX ADMIN — ONDANSETRON 4 MG: 2 INJECTION INTRAMUSCULAR; INTRAVENOUS at 14:08

## 2019-05-12 RX ADMIN — Medication 2 PUFF: at 13:41

## 2019-05-12 RX ADMIN — Medication 10 ML: at 20:13

## 2019-05-12 RX ADMIN — Medication 120 MG: at 13:21

## 2019-05-12 RX ADMIN — INSULIN LISPRO 1 UNITS: 100 INJECTION, SOLUTION INTRAVENOUS; SUBCUTANEOUS at 20:15

## 2019-05-12 RX ADMIN — Medication 2 PUFF: at 13:43

## 2019-05-12 RX ADMIN — FAMOTIDINE 20 MG: 20 TABLET ORAL at 20:13

## 2019-05-12 RX ADMIN — INSULIN LISPRO 2 UNITS: 100 INJECTION, SOLUTION INTRAVENOUS; SUBCUTANEOUS at 17:19

## 2019-05-12 RX ADMIN — HYDROMORPHONE HYDROCHLORIDE 1 MG: 1 INJECTION, SOLUTION INTRAMUSCULAR; INTRAVENOUS; SUBCUTANEOUS at 20:18

## 2019-05-12 RX ADMIN — PROPOFOL 200 MG: 10 INJECTION, EMULSION INTRAVENOUS at 13:20

## 2019-05-12 RX ADMIN — OXYCODONE HYDROCHLORIDE AND ACETAMINOPHEN 1 TABLET: 5; 325 TABLET ORAL at 05:53

## 2019-05-12 RX ADMIN — Medication 2 PUFF: at 13:42

## 2019-05-12 RX ADMIN — Medication 2 PUFF: at 13:44

## 2019-05-12 RX ADMIN — OXYCODONE HYDROCHLORIDE AND ACETAMINOPHEN 2 TABLET: 5; 325 TABLET ORAL at 23:06

## 2019-05-12 RX ADMIN — TAMSULOSIN HYDROCHLORIDE 0.4 MG: 0.4 CAPSULE ORAL at 20:19

## 2019-05-12 RX ADMIN — HYDROMORPHONE HYDROCHLORIDE 1 MG: 1 INJECTION, SOLUTION INTRAMUSCULAR; INTRAVENOUS; SUBCUTANEOUS at 10:23

## 2019-05-12 RX ADMIN — ROCURONIUM BROMIDE 30 MG: 10 INJECTION, SOLUTION INTRAVENOUS at 13:36

## 2019-05-12 RX ADMIN — HYDROMORPHONE HYDROCHLORIDE 0.5 MG: 1 INJECTION, SOLUTION INTRAMUSCULAR; INTRAVENOUS; SUBCUTANEOUS at 14:45

## 2019-05-12 RX ADMIN — Medication 0.6 MG: at 13:44

## 2019-05-12 RX ADMIN — CEFEPIME 2 G: 2 INJECTION, POWDER, FOR SOLUTION INTRAMUSCULAR; INTRAVENOUS at 23:05

## 2019-05-12 ASSESSMENT — PULMONARY FUNCTION TESTS
PIF_VALUE: 40
PIF_VALUE: 33
PIF_VALUE: 35
PIF_VALUE: 35
PIF_VALUE: 33
PIF_VALUE: 35
PIF_VALUE: 27
PIF_VALUE: 34
PIF_VALUE: 34
PIF_VALUE: 6
PIF_VALUE: 40
PIF_VALUE: 2
PIF_VALUE: 33
PIF_VALUE: 35
PIF_VALUE: 41
PIF_VALUE: 1
PIF_VALUE: 36
PIF_VALUE: 32
PIF_VALUE: 42
PIF_VALUE: 35
PIF_VALUE: 34
PIF_VALUE: 42
PIF_VALUE: 32
PIF_VALUE: 2
PIF_VALUE: 2
PIF_VALUE: 1
PIF_VALUE: 37
PIF_VALUE: 34
PIF_VALUE: 33
PIF_VALUE: 1
PIF_VALUE: 34
PIF_VALUE: 36
PIF_VALUE: 38
PIF_VALUE: 30
PIF_VALUE: 34
PIF_VALUE: 1
PIF_VALUE: 34
PIF_VALUE: 34
PIF_VALUE: 8
PIF_VALUE: 33
PIF_VALUE: 41
PIF_VALUE: 35
PIF_VALUE: 34
PIF_VALUE: 34
PIF_VALUE: 39
PIF_VALUE: 40
PIF_VALUE: 36
PIF_VALUE: 34
PIF_VALUE: 49
PIF_VALUE: 34
PIF_VALUE: 36
PIF_VALUE: 37
PIF_VALUE: 34
PIF_VALUE: 35
PIF_VALUE: 1
PIF_VALUE: 35
PIF_VALUE: 34
PIF_VALUE: 42
PIF_VALUE: 39
PIF_VALUE: 35
PIF_VALUE: 30
PIF_VALUE: 34
PIF_VALUE: 17
PIF_VALUE: 34
PIF_VALUE: 39
PIF_VALUE: 34
PIF_VALUE: 34

## 2019-05-12 ASSESSMENT — PAIN - FUNCTIONAL ASSESSMENT
PAIN_FUNCTIONAL_ASSESSMENT: PREVENTS OR INTERFERES SOME ACTIVE ACTIVITIES AND ADLS
PAIN_FUNCTIONAL_ASSESSMENT: PREVENTS OR INTERFERES WITH MANY ACTIVE NOT PASSIVE ACTIVITIES
PAIN_FUNCTIONAL_ASSESSMENT: PREVENTS OR INTERFERES SOME ACTIVE ACTIVITIES AND ADLS
PAIN_FUNCTIONAL_ASSESSMENT: PREVENTS OR INTERFERES WITH MANY ACTIVE NOT PASSIVE ACTIVITIES

## 2019-05-12 ASSESSMENT — PAIN SCALES - GENERAL
PAINLEVEL_OUTOF10: 0
PAINLEVEL_OUTOF10: 8
PAINLEVEL_OUTOF10: 0
PAINLEVEL_OUTOF10: 8
PAINLEVEL_OUTOF10: 7
PAINLEVEL_OUTOF10: 10
PAINLEVEL_OUTOF10: 7
PAINLEVEL_OUTOF10: 3
PAINLEVEL_OUTOF10: 0
PAINLEVEL_OUTOF10: 4
PAINLEVEL_OUTOF10: 0
PAINLEVEL_OUTOF10: 7
PAINLEVEL_OUTOF10: 3

## 2019-05-12 ASSESSMENT — PAIN DESCRIPTION - ONSET
ONSET: ON-GOING

## 2019-05-12 ASSESSMENT — PAIN DESCRIPTION - PROGRESSION
CLINICAL_PROGRESSION: OTHER (COMMENT)
CLINICAL_PROGRESSION: GRADUALLY WORSENING
CLINICAL_PROGRESSION: GRADUALLY IMPROVING
CLINICAL_PROGRESSION: GRADUALLY WORSENING
CLINICAL_PROGRESSION: NOT CHANGED
CLINICAL_PROGRESSION: GRADUALLY IMPROVING
CLINICAL_PROGRESSION: NOT CHANGED
CLINICAL_PROGRESSION: GRADUALLY IMPROVING

## 2019-05-12 ASSESSMENT — PAIN DESCRIPTION - DESCRIPTORS
DESCRIPTORS: ACHING
DESCRIPTORS: PATIENT UNABLE TO DESCRIBE
DESCRIPTORS: ACHING;SHARP
DESCRIPTORS: SHOOTING;SHARP
DESCRIPTORS: ACHING;SHARP
DESCRIPTORS: SHOOTING;SHARP

## 2019-05-12 ASSESSMENT — PAIN DESCRIPTION - PAIN TYPE
TYPE: SURGICAL PAIN
TYPE: SURGICAL PAIN
TYPE: ACUTE PAIN
TYPE: SURGICAL PAIN
TYPE: ACUTE PAIN

## 2019-05-12 ASSESSMENT — PAIN DESCRIPTION - FREQUENCY
FREQUENCY: CONTINUOUS

## 2019-05-12 ASSESSMENT — PAIN DESCRIPTION - LOCATION
LOCATION: BACK
LOCATION: BACK;GROIN

## 2019-05-12 ASSESSMENT — PAIN DESCRIPTION - ORIENTATION
ORIENTATION: MID
ORIENTATION: LOWER;LEFT;RIGHT
ORIENTATION: MID
ORIENTATION: LOWER
ORIENTATION: MID

## 2019-05-12 NOTE — PROGRESS NOTES
Pt is going down to OR at this time, ok to travel without nurse and off tele per MD, pharmacy called to have the pt's 230 pm Vanco dose sent down to OR so the pt isn't late on his antibiotic.   Eduardo Horton

## 2019-05-12 NOTE — PROGRESS NOTES
Patient's daughter now in to visit at bedside. Update given to her about patient's present condition. She also verbalized her understanding. Patient seems calmer when family in at bedside. Now talking in a normal tone and appears to be much more oriented. Now placed on High flow oxygen cannula as he was using on nursing unit prior to admission to PACU.

## 2019-05-12 NOTE — PROGRESS NOTES
Warm pacs placed on bilateral groins and cool wash cloth applied to forehead pt expressed  Some relief

## 2019-05-12 NOTE — PROGRESS NOTES
Blood cultures done on the pt's TLC, cap was changed and alcohol caps all changed. Tubes were labled at the bedside, TLC dressing also changed at this time using sterile technique.   Wilson Nettles

## 2019-05-12 NOTE — BRIEF OP NOTE
Brief Postoperative Note  ______________________________________________________________    Patient: Rafi Holloway  YOB: 1950  MRN: [id number]  Date of Procedure: 5/12/2019    Pre-Op Diagnosis: EPIDURAL ABSCESS    Post-Op Diagnosis: Same       Procedure(s):  LUMBAR WOUND WASHOUT    Anesthesia: General    Surgeon(s):   Danita Schulte MD    Assistant: \[facility]\""    Estimated Blood Loss (mL): 50    Complications: None    Specimens:   * No specimens in log *    Implants:  * No implants in log *      Drains:   Closed/Suction Drain Left;Posterior;Medial Back Accordion [address] [place] (Active)       Findings: abscess    Danita Schulte MD  Date: 5/12/2019  Time: 2:15 PM

## 2019-05-12 NOTE — ANESTHESIA POSTPROCEDURE EVALUATION
Department of Anesthesiology  Postprocedure Note    Patient: Kassandra Livingston  MRN: [id number]  YOB: 1950  Date of evaluation: 5/12/2019  Time:  3:06 PM     Procedure Summary     Date:  05/12/19 Room / Location:  [facility] OR [address] [place] [address] OR    Anesthesia Start:  9376 Anesthesia Stop:  0601    Procedure:  LUMBAR WOUND WASHOUT (N/A Spine Lumbar) Diagnosis:  (EPIDURAL ABSCESS)    Surgeon:  Jagdish Hickey MD Responsible Provider:  Stephanie Doran MD    Anesthesia Type:  general ASA Status:  3          Anesthesia Type: general    Clary Phase I: Clary Score: 7    Clary Phase II:      Last vitals: Reviewed and per EMR flowsheets.        Anesthesia Post Evaluation    Patient location during evaluation: PACU  Level of consciousness: awake and alert  Airway patency: patent  Nausea & Vomiting: no vomiting  Complications: no  Cardiovascular status: blood pressure returned to baseline  Respiratory status: acceptable  Hydration status: euvolemic

## 2019-05-12 NOTE — PROGRESS NOTES
Patient alert and oriented with periods of confusion. VSS, on 6L high flow. Incision covered with dry dressing, remains CDI. Neuro assessment remains stable. Pain controlled per MAR. Voiding well per urinal. Repositioning patient and on speciality mattress. NPO since midnight. Will continue to monitor.

## 2019-05-12 NOTE — PROGRESS NOTES
[facility]    5/12/2019 1:24 PM        Name: Janell Robb . Admitted: 5/8/2019  Primary Care Provider: [place] (Tel: [phone])      Subjective:      Patient is resting in bed. Patient will probably go to the OR tomorrow for washout of the wound. Patient reports he has some pain. No other new complaints    Reviewed interval ancillary notes    Objective:  /73   Pulse 105   Temp 98.4 °F (36.9 °C) (Oral)   Resp 15   Ht 6' 1\" (1.854 m)   Wt (!) 350 lb 1.5 oz (158.8 kg)   SpO2 92%   BMI 46.19 kg/m²     Intake/Output Summary (Last 24 hours) at 5/12/2019 1324  Last data filed at 5/12/2019 1214  Gross per 24 hour   Intake 850 ml   Output 2400 ml   Net -1550 ml      Wt Readings from Last 3 Encounters:   05/11/19 (!) 350 lb 1.5 oz (158.8 kg)   04/19/19 (!) 320 lb (145.2 kg)       General appearance:  Appears comfortable, no distress  Head:  Atraumatic normocephalic  Neck: supple  Cardiovascular: Regular rhythm, normal S1, S2. No murmur. Trace edema in lower extremities  Respiratory: Not using accessory muscles. Good inspiratory effort. Clear to auscultation bilaterally, no wheeze or crackles. GI: Abdomen soft, no tenderness, not distended, normal bowel sounds; morbidly obese  Musculoskeletal: strength symmetric  Neurology: awake and alert, cooperative      Labs and Tests:  CBC:   Recent Labs     05/10/19  1743 05/11/19  0551 05/12/19  0604   WBC 9.6 8.8 11.4*   HGB 11.0* 11.1* 10.7*    238 217     BMP:    Recent Labs     05/10/19  1742 05/11/19  0551 05/12/19  0604   * 134* 133*   K 4.3 4.1 4.1   CL 97* 97* 95*   CO2 20* 24 26   BUN 27* 26* 22*   CREATININE 0.9 0.8 0.8   GLUCOSE 195* 151* 171*     Hepatic:   Recent Labs     05/10/19  1742 05/11/19  0551 05/12/19  0604   AST 39* 34 30   ALT 39 36 34   BILITOT 0.7 0.8 0.7   ALKPHOS 98 108 121       Assessment & Plan:    Active

## 2019-05-12 NOTE — PROGRESS NOTES
Patient's wife in at bedside at this time. Update given to her about patient's present condition. She verbalized her understanding. Stated that she was waiting to see physician but never connected with physician after patient's surgery.

## 2019-05-12 NOTE — PROGRESS NOTES
Lab will need to draw the pt's other blood culture set, called lab and sent the labels down.   Maricruz Shows

## 2019-05-12 NOTE — ANESTHESIA PRE PROCEDURE
Department of Anesthesiology  Preprocedure Note       Name:  Anne Luis   Age:  68 y.o.  :  1950                                          MRN:  [id number]         Date:  2019      Surgeon: Beto Elizabeth): Tanja Welsh MD    Procedure: LUMBAR WOUND WASHOUT (N/A )    Medications prior to admission:   Prior to Admission medications    Medication Sig Start Date End Date Taking? Authorizing Provider   vitamin B-12 (CYANOCOBALAMIN) 1000 MCG tablet Take 1,000 mcg by mouth daily    Historical Provider, MD   hydrochlorothiazide (HYDRODIURIL) 12.5 MG tablet Take 12.5 mg by mouth daily    Historical Provider, MD   amLODIPine (NORVASC) 2.5 MG tablet Take 2.5 mg by mouth daily    Historical Provider, MD   atorvastatin (LIPITOR) 20 MG tablet Take 20 mg by mouth daily    Historical Provider, MD   cyclobenzaprine (FLEXERIL) 5 MG tablet Take 5 mg by mouth 3 times daily as needed for Muscle spasms    Historical Provider, MD   empagliflozin (JARDIANCE) 25 MG tablet Take 12.5 mg by mouth daily     Historical Provider, MD   fenofibrate (TRICOR) 145 MG tablet Take 145 mg by mouth daily    Historical Provider, MD   glimepiride (AMARYL) 4 MG tablet Take 8 mg by mouth every morning (before breakfast)     Historical Provider, MD   insulin NPH (HUMULIN N;NOVOLIN N) 100 UNIT/ML injection vial Inject 20 Units into the skin 2 times daily (before meals)    Historical Provider, MD   lisinopril (PRINIVIL;ZESTRIL) 20 MG tablet Take 20 mg by mouth daily    Historical Provider, MD   metFORMIN (GLUCOPHAGE) 1000 MG tablet Take 1,000 mg by mouth 2 times daily (with meals)    Historical Provider, MD   tamsulosin (FLOMAX) 0.4 MG capsule Take 0.4 mg by mouth daily    Historical Provider, MD       Current medications:    No current facility-administered medications for this visit. No current outpatient medications on file.      Facility-Administered Medications Ordered in Other Visits   Medication Dose Route Frequency Provider Last Rate Subcutaneous Nightly Juan Scott MD   1 Units at 05/11/19 2059    insulin lispro (HUMALOG) injection pen 0-6 Units  0-6 Units Subcutaneous TID WC Juan Scott MD   2 Units at 05/11/19 1656    naloxone (NARCAN) injection 0.4 mg  0.4 mg Intravenous PRN Radha Jacobson MD        sodium chloride flush 0.9 % injection 10 mL  10 mL Intravenous 2 times per day Esther Parker MD   10 mL at 05/11/19 2059    sodium chloride flush 0.9 % injection 10 mL  10 mL Intravenous PRN Esther Ricci MD        magnesium hydroxide (MILK OF MAGNESIA) 400 MG/5ML suspension 30 mL  30 mL Oral Daily PRN Esther Parker MD        ondansetron (ZOFRAN) injection 4 mg  4 mg Intravenous Q6H PRN Esther Parker MD   4 mg at 05/10/19 1326    enoxaparin (LOVENOX) injection 40 mg  40 mg Subcutaneous Daily Esther Alvarado MD   Stopped at 05/12/19 0749    acetaminophen (TYLENOL) tablet 650 mg  650 mg Oral Q4H PRN Esther Alvarado MD           Allergies:     Allergies   Allergen Reactions    Morphine Other (See Comments)     Doesn't work  vicodan and percocet work    No Known Allergies        Problem List:    Patient Active Problem List   Diagnosis Code    Lumbar spinal stenosis M48.061    Degenerative lumbar spinal stenosis M48.061    Lumbar stenosis with neurogenic claudication M48.062    Back abscess L02.212    Sepsis ([address] A41.9    Surgical site infection T81.49XA    Staphylococcus aureus infection A49.01       Past Medical History:        Diagnosis Date    Arthritis      left knee    Back pain     DDD (degenerative disc disease), cervical     Diabetes mellitus ([address]     Edema of both legs     GERD (gastroesophageal reflux disease)     Hyperlipidemia     Hypertension     Nausea in adult     Neuropathy     idopathtic     Vitamin B 12 deficiency     Walking troubles        Past Surgical History:        Procedure Laterality Date    BACK SURGERY      x 2    CHOLECYSTECTOMY      CYSTOSCOPY      EYE SURGERY      retina repair    HEMORRHOID SURGERY      KNEE SURGERY      LUMBAR SPINE SURGERY N/A 4/19/2019    DECOMPRESSION LAMINECTOMY L3-4 POSSIBLY L5 performed by Sydnie Jack MD at [facility]       retracted penis    SHOULDER SURGERY      TONSILLECTOMY         Social History:    Social History     Tobacco Use    Smoking status: Never Smoker    Smokeless tobacco: Never Used   Substance Use Topics    Alcohol use: Yes     Comment: Rarely about 1 beer in 6 months                                Counseling given: Not Answered      Vital Signs (Current): There were no vitals filed for this visit.                                            BP Readings from Last 3 Encounters:   05/12/19 137/68   04/20/19 127/73   04/19/19 (!) 102/51       NPO Status:                                                                                 BMI:   Wt Readings from Last 3 Encounters:   05/11/19 (!) 350 lb 1.5 oz (158.8 kg)   04/19/19 (!) 320 lb (145.2 kg)     There is no height or weight on file to calculate BMI.    CBC:   Lab Results   Component Value Date    WBC 11.4 05/12/2019    RBC 3.76 05/12/2019    HGB 10.7 05/12/2019    HCT 32.8 05/12/2019    MCV 87.1 05/12/2019    RDW 14.6 05/12/2019     05/12/2019       CMP:   Lab Results   Component Value Date     05/12/2019    K 4.1 05/12/2019    CL 95 05/12/2019    CO2 26 05/12/2019    BUN 22 05/12/2019    CREATININE 0.8 05/12/2019    GFRAA >60 05/12/2019    AGRATIO 0.8 05/12/2019    LABGLOM >60 05/12/2019    GLUCOSE 171 05/12/2019    PROT 6.2 05/12/2019    CALCIUM 8.4 05/12/2019    BILITOT 0.7 05/12/2019    ALKPHOS 121 05/12/2019    AST 30 05/12/2019    ALT 34 05/12/2019       POC Tests:   Recent Labs     05/11/19 2029   POCGLU 212*       Coags:   Lab Results   Component Value Date    PROTIME 15.4 05/09/2019    INR 1.35 05/09/2019       HCG (If Applicable): No results found for: PREGTESTUR, PREGSERUM, HCG, HCGQUANT     ABGs: No results found for: PHART, PO2ART, OYK4HDZ, PPM6NBN, BEART, I3XZUVCI     Type & Screen (If Applicable):  No results found for: LABABO, [address]    Anesthesia Evaluation  Patient summary reviewed and Nursing notes reviewed  Airway: Mallampati: II  TM distance: <3 FB   Neck ROM: limited  Mouth opening: > = 3 FB Dental: normal exam         Pulmonary:Negative Pulmonary ROS and normal exam  breath sounds clear to auscultation                             Cardiovascular:    (+) hypertension:, hyperlipidemia      ECG reviewed  Rhythm: regular  Rate: normal           Beta Blocker:  Not on Beta Blocker         Neuro/Psych:   Negative Neuro/Psych ROS              GI/Hepatic/Renal:   (+) GERD:, morbid obesity         ROS comment: Chronic nausea. Endo/Other:    (+) DiabetesType II DM, using insulin, : arthritis:., .                 Abdominal:           Vascular: negative vascular ROS. Anesthesia Plan      general     ASA 3       Induction: intravenous and inhalational.    MIPS: Postoperative opioids intended and Prophylactic antiemetics administered. Anesthetic plan and risks discussed with patient.                       Courtney Whalen MD   5/12/2019

## 2019-05-13 PROBLEM — R78.81 BACTEREMIA DUE TO METHICILLIN SUSCEPTIBLE STAPHYLOCOCCUS AUREUS (MSSA): Status: ACTIVE | Noted: 2019-05-13

## 2019-05-13 PROBLEM — B95.61 BACTEREMIA DUE TO METHICILLIN SUSCEPTIBLE STAPHYLOCOCCUS AUREUS (MSSA): Status: ACTIVE | Noted: 2019-05-13

## 2019-05-13 LAB
A/G RATIO: 0.8 (ref 1.1–2.2)
A/G RATIO: 0.9 (ref 1.1–2.2)
ALBUMIN SERPL-MCNC: 2.8 G/DL (ref 3.4–5)
ALBUMIN SERPL-MCNC: 3 G/DL (ref 3.4–5)
ALP BLD-CCNC: 113 U/L (ref 40–129)
ALP BLD-CCNC: 165 U/L (ref 40–129)
ALT SERPL-CCNC: 26 U/L (ref 10–40)
ALT SERPL-CCNC: 29 U/L (ref 10–40)
ANION GAP SERPL CALCULATED.3IONS-SCNC: 13 MMOL/L (ref 3–16)
ANION GAP SERPL CALCULATED.3IONS-SCNC: 15 MMOL/L (ref 3–16)
AST SERPL-CCNC: 22 U/L (ref 15–37)
AST SERPL-CCNC: 25 U/L (ref 15–37)
BANDED NEUTROPHILS RELATIVE PERCENT: 2 % (ref 0–7)
BANDED NEUTROPHILS RELATIVE PERCENT: 2 % (ref 0–7)
BASOPHILS ABSOLUTE: 0 K/UL (ref 0–0.2)
BASOPHILS ABSOLUTE: 0 K/UL (ref 0–0.2)
BASOPHILS RELATIVE PERCENT: 0 %
BASOPHILS RELATIVE PERCENT: 0 %
BILIRUB SERPL-MCNC: 0.7 MG/DL (ref 0–1)
BILIRUB SERPL-MCNC: 0.7 MG/DL (ref 0–1)
BLOOD CULTURE, ROUTINE: ABNORMAL
BLOOD CULTURE, ROUTINE: ABNORMAL
BUN BLDV-MCNC: 21 MG/DL (ref 7–20)
BUN BLDV-MCNC: 22 MG/DL (ref 7–20)
CALCIUM SERPL-MCNC: 8.6 MG/DL (ref 8.3–10.6)
CALCIUM SERPL-MCNC: 8.6 MG/DL (ref 8.3–10.6)
CHLORIDE BLD-SCNC: 95 MMOL/L (ref 99–110)
CHLORIDE BLD-SCNC: 97 MMOL/L (ref 99–110)
CO2: 23 MMOL/L (ref 21–32)
CO2: 25 MMOL/L (ref 21–32)
CREAT SERPL-MCNC: 0.7 MG/DL (ref 0.8–1.3)
CREAT SERPL-MCNC: 0.7 MG/DL (ref 0.8–1.3)
CULTURE, BLOOD 2: ABNORMAL
EOSINOPHILS ABSOLUTE: 0.2 K/UL (ref 0–0.6)
EOSINOPHILS ABSOLUTE: 0.4 K/UL (ref 0–0.6)
EOSINOPHILS RELATIVE PERCENT: 2 %
EOSINOPHILS RELATIVE PERCENT: 3 %
GFR AFRICAN AMERICAN: >60
GFR AFRICAN AMERICAN: >60
GFR NON-AFRICAN AMERICAN: >60
GFR NON-AFRICAN AMERICAN: >60
GLOBULIN: 3.3 G/DL
GLOBULIN: 3.5 G/DL
GLUCOSE BLD-MCNC: 162 MG/DL (ref 70–99)
GLUCOSE BLD-MCNC: 165 MG/DL (ref 70–99)
GLUCOSE BLD-MCNC: 167 MG/DL (ref 70–99)
GLUCOSE BLD-MCNC: 182 MG/DL (ref 70–99)
GLUCOSE BLD-MCNC: 210 MG/DL (ref 70–99)
GLUCOSE BLD-MCNC: 226 MG/DL (ref 70–99)
HCT VFR BLD CALC: 31 % (ref 40.5–52.5)
HCT VFR BLD CALC: 32.4 % (ref 40.5–52.5)
HEMOGLOBIN: 10 G/DL (ref 13.5–17.5)
HEMOGLOBIN: 10.5 G/DL (ref 13.5–17.5)
LYMPHOCYTES ABSOLUTE: 1.2 K/UL (ref 1–5.1)
LYMPHOCYTES ABSOLUTE: 1.5 K/UL (ref 1–5.1)
LYMPHOCYTES RELATIVE PERCENT: 10 %
LYMPHOCYTES RELATIVE PERCENT: 11 %
MCH RBC QN AUTO: 28.1 PG (ref 26–34)
MCH RBC QN AUTO: 28.3 PG (ref 26–34)
MCHC RBC AUTO-ENTMCNC: 32.3 G/DL (ref 31–36)
MCHC RBC AUTO-ENTMCNC: 32.5 G/DL (ref 31–36)
MCV RBC AUTO: 86.9 FL (ref 80–100)
MCV RBC AUTO: 87.2 FL (ref 80–100)
METAMYELOCYTES RELATIVE PERCENT: 1 %
METAMYELOCYTES RELATIVE PERCENT: 3 %
MONOCYTES ABSOLUTE: 1.2 K/UL (ref 0–1.3)
MONOCYTES ABSOLUTE: 1.2 K/UL (ref 0–1.3)
MONOCYTES RELATIVE PERCENT: 10 %
MONOCYTES RELATIVE PERCENT: 9 %
MYELOCYTE PERCENT: 2 %
MYELOCYTE PERCENT: 2 %
NEUTROPHILS ABSOLUTE: 10.2 K/UL (ref 1.7–7.7)
NEUTROPHILS ABSOLUTE: 9.5 K/UL (ref 1.7–7.7)
NEUTROPHILS RELATIVE PERCENT: 70 %
NEUTROPHILS RELATIVE PERCENT: 73 %
ORGANISM: ABNORMAL
OVALOCYTES: ABNORMAL
PDW BLD-RTO: 14.5 % (ref 12.4–15.4)
PDW BLD-RTO: 14.6 % (ref 12.4–15.4)
PERFORMED ON: ABNORMAL
PLATELET # BLD: 247 K/UL (ref 135–450)
PLATELET # BLD: 273 K/UL (ref 135–450)
PMV BLD AUTO: 7.6 FL (ref 5–10.5)
PMV BLD AUTO: 7.8 FL (ref 5–10.5)
POTASSIUM REFLEX MAGNESIUM: 4.2 MMOL/L (ref 3.5–5.1)
POTASSIUM SERPL-SCNC: 4 MMOL/L (ref 3.5–5.1)
RBC # BLD: 3.57 M/UL (ref 4.2–5.9)
RBC # BLD: 3.72 M/UL (ref 4.2–5.9)
SODIUM BLD-SCNC: 133 MMOL/L (ref 136–145)
SODIUM BLD-SCNC: 135 MMOL/L (ref 136–145)
TOTAL PROTEIN: 6.1 G/DL (ref 6.4–8.2)
TOTAL PROTEIN: 6.5 G/DL (ref 6.4–8.2)
VANCOMYCIN TROUGH: 19.4 UG/ML (ref 10–20)
WBC # BLD: 12.2 K/UL (ref 4–11)
WBC # BLD: 13.2 K/UL (ref 4–11)

## 2019-05-13 PROCEDURE — 2580000003 HC RX 258: Performed by: STUDENT IN AN ORGANIZED HEALTH CARE EDUCATION/TRAINING PROGRAM

## 2019-05-13 PROCEDURE — 6360000002 HC RX W HCPCS: Performed by: STUDENT IN AN ORGANIZED HEALTH CARE EDUCATION/TRAINING PROGRAM

## 2019-05-13 PROCEDURE — 2580000003 HC RX 258: Performed by: INTERNAL MEDICINE

## 2019-05-13 PROCEDURE — 6370000000 HC RX 637 (ALT 250 FOR IP): Performed by: STUDENT IN AN ORGANIZED HEALTH CARE EDUCATION/TRAINING PROGRAM

## 2019-05-13 PROCEDURE — 97530 THERAPEUTIC ACTIVITIES: CPT

## 2019-05-13 PROCEDURE — 36415 COLL VENOUS BLD VENIPUNCTURE: CPT

## 2019-05-13 PROCEDURE — 85025 COMPLETE CBC W/AUTO DIFF WBC: CPT

## 2019-05-13 PROCEDURE — 97535 SELF CARE MNGMENT TRAINING: CPT

## 2019-05-13 PROCEDURE — 2580000003 HC RX 258: Performed by: NEUROLOGICAL SURGERY

## 2019-05-13 PROCEDURE — 6370000000 HC RX 637 (ALT 250 FOR IP): Performed by: NEUROLOGICAL SURGERY

## 2019-05-13 PROCEDURE — 80202 ASSAY OF VANCOMYCIN: CPT

## 2019-05-13 PROCEDURE — 6360000002 HC RX W HCPCS: Performed by: INTERNAL MEDICINE

## 2019-05-13 PROCEDURE — 6370000000 HC RX 637 (ALT 250 FOR IP): Performed by: NURSE PRACTITIONER

## 2019-05-13 PROCEDURE — 99233 SBSQ HOSP IP/OBS HIGH 50: CPT | Performed by: INTERNAL MEDICINE

## 2019-05-13 PROCEDURE — 6360000002 HC RX W HCPCS: Performed by: NURSE PRACTITIONER

## 2019-05-13 PROCEDURE — 97166 OT EVAL MOD COMPLEX 45 MIN: CPT

## 2019-05-13 PROCEDURE — 80053 COMPREHEN METABOLIC PANEL: CPT

## 2019-05-13 PROCEDURE — 1200000000 HC SEMI PRIVATE

## 2019-05-13 PROCEDURE — 97162 PT EVAL MOD COMPLEX 30 MIN: CPT

## 2019-05-13 PROCEDURE — 6360000002 HC RX W HCPCS: Performed by: NEUROLOGICAL SURGERY

## 2019-05-13 PROCEDURE — 6370000000 HC RX 637 (ALT 250 FOR IP): Performed by: INTERNAL MEDICINE

## 2019-05-13 RX ORDER — AMLODIPINE BESYLATE 2.5 MG/1
2.5 TABLET ORAL DAILY
Status: DISCONTINUED | OUTPATIENT
Start: 2019-05-13 | End: 2019-05-17 | Stop reason: HOSPADM

## 2019-05-13 RX ORDER — THIAMINE MONONITRATE (VIT B1) 100 MG
100 TABLET ORAL DAILY
Status: DISCONTINUED | OUTPATIENT
Start: 2019-05-13 | End: 2019-05-17 | Stop reason: HOSPADM

## 2019-05-13 RX ORDER — SODIUM CHLORIDE 9 MG/ML
INJECTION, SOLUTION INTRAVENOUS CONTINUOUS
Status: DISCONTINUED | OUTPATIENT
Start: 2019-05-13 | End: 2019-05-13

## 2019-05-13 RX ORDER — LISINOPRIL 20 MG/1
20 TABLET ORAL DAILY
Status: DISCONTINUED | OUTPATIENT
Start: 2019-05-13 | End: 2019-05-17 | Stop reason: HOSPADM

## 2019-05-13 RX ADMIN — LISINOPRIL 20 MG: 20 TABLET ORAL at 17:00

## 2019-05-13 RX ADMIN — CALCIUM CARBONATE 500 MG: 500 TABLET, CHEWABLE ORAL at 23:45

## 2019-05-13 RX ADMIN — INSULIN LISPRO 1 UNITS: 100 INJECTION, SOLUTION INTRAVENOUS; SUBCUTANEOUS at 12:38

## 2019-05-13 RX ADMIN — ATORVASTATIN CALCIUM 20 MG: 20 TABLET, FILM COATED ORAL at 20:49

## 2019-05-13 RX ADMIN — CEFTRIAXONE SODIUM 2 G: 2 INJECTION, POWDER, FOR SOLUTION INTRAMUSCULAR; INTRAVENOUS at 17:37

## 2019-05-13 RX ADMIN — FAMOTIDINE 20 MG: 20 TABLET ORAL at 20:50

## 2019-05-13 RX ADMIN — FAMOTIDINE 20 MG: 20 TABLET ORAL at 07:54

## 2019-05-13 RX ADMIN — VANCOMYCIN HYDROCHLORIDE 1750 MG: 10 INJECTION, POWDER, LYOPHILIZED, FOR SOLUTION INTRAVENOUS at 14:38

## 2019-05-13 RX ADMIN — METHOCARBAMOL 1000 MG: 500 TABLET ORAL at 20:50

## 2019-05-13 RX ADMIN — ENOXAPARIN SODIUM 40 MG: 40 INJECTION SUBCUTANEOUS at 07:59

## 2019-05-13 RX ADMIN — CALCIUM CARBONATE 500 MG: 500 TABLET, CHEWABLE ORAL at 00:24

## 2019-05-13 RX ADMIN — AMLODIPINE BESYLATE 2.5 MG: 2.5 TABLET ORAL at 16:59

## 2019-05-13 RX ADMIN — TAMSULOSIN HYDROCHLORIDE 0.4 MG: 0.4 CAPSULE ORAL at 07:58

## 2019-05-13 RX ADMIN — FENOFIBRATE 160 MG: 160 TABLET ORAL at 07:59

## 2019-05-13 RX ADMIN — CALCIUM CARBONATE 500 MG: 500 TABLET, CHEWABLE ORAL at 06:05

## 2019-05-13 RX ADMIN — OXYCODONE HYDROCHLORIDE AND ACETAMINOPHEN 2 TABLET: 5; 325 TABLET ORAL at 06:05

## 2019-05-13 RX ADMIN — DOCUSATE SODIUM 100 MG: 100 CAPSULE, LIQUID FILLED ORAL at 20:49

## 2019-05-13 RX ADMIN — INSULIN LISPRO 1 UNITS: 100 INJECTION, SOLUTION INTRAVENOUS; SUBCUTANEOUS at 07:47

## 2019-05-13 RX ADMIN — Medication 100 MG: at 14:50

## 2019-05-13 RX ADMIN — HYDROMORPHONE HYDROCHLORIDE 1 MG: 1 INJECTION, SOLUTION INTRAMUSCULAR; INTRAVENOUS; SUBCUTANEOUS at 11:52

## 2019-05-13 RX ADMIN — METHOCARBAMOL 1000 MG: 100 INJECTION, SOLUTION INTRAMUSCULAR; INTRAVENOUS at 06:05

## 2019-05-13 RX ADMIN — Medication 10 ML: at 09:00

## 2019-05-13 RX ADMIN — VANCOMYCIN HYDROCHLORIDE 1750 MG: 10 INJECTION, POWDER, LYOPHILIZED, FOR SOLUTION INTRAVENOUS at 02:29

## 2019-05-13 RX ADMIN — Medication 10 ML: at 20:51

## 2019-05-13 RX ADMIN — HYDROMORPHONE HYDROCHLORIDE 1 MG: 1 INJECTION, SOLUTION INTRAMUSCULAR; INTRAVENOUS; SUBCUTANEOUS at 02:01

## 2019-05-13 RX ADMIN — OXYCODONE HYDROCHLORIDE AND ACETAMINOPHEN 2 TABLET: 5; 325 TABLET ORAL at 23:45

## 2019-05-13 RX ADMIN — DOCUSATE SODIUM 100 MG: 100 CAPSULE, LIQUID FILLED ORAL at 07:59

## 2019-05-13 RX ADMIN — ACETAMINOPHEN 650 MG: 325 TABLET ORAL at 17:38

## 2019-05-13 RX ADMIN — INSULIN LISPRO 1 UNITS: 100 INJECTION, SOLUTION INTRAVENOUS; SUBCUTANEOUS at 23:45

## 2019-05-13 RX ADMIN — ONDANSETRON 4 MG: 2 INJECTION INTRAMUSCULAR; INTRAVENOUS at 20:51

## 2019-05-13 RX ADMIN — CEFEPIME 2 G: 2 INJECTION, POWDER, FOR SOLUTION INTRAMUSCULAR; INTRAVENOUS at 11:05

## 2019-05-13 RX ADMIN — INSULIN LISPRO 2 UNITS: 100 INJECTION, SOLUTION INTRAVENOUS; SUBCUTANEOUS at 17:38

## 2019-05-13 RX ADMIN — ONDANSETRON 4 MG: 2 INJECTION INTRAMUSCULAR; INTRAVENOUS at 07:54

## 2019-05-13 ASSESSMENT — PAIN DESCRIPTION - PAIN TYPE
TYPE: SURGICAL PAIN
TYPE: ACUTE PAIN

## 2019-05-13 ASSESSMENT — PAIN DESCRIPTION - LOCATION
LOCATION: BACK
LOCATION: BACK;LEG
LOCATION: BACK
LOCATION: GENERALIZED

## 2019-05-13 ASSESSMENT — PAIN SCALES - GENERAL
PAINLEVEL_OUTOF10: 8
PAINLEVEL_OUTOF10: 0
PAINLEVEL_OUTOF10: 8
PAINLEVEL_OUTOF10: 0
PAINLEVEL_OUTOF10: 7
PAINLEVEL_OUTOF10: 9
PAINLEVEL_OUTOF10: 8
PAINLEVEL_OUTOF10: 5

## 2019-05-13 ASSESSMENT — PAIN DESCRIPTION - FREQUENCY
FREQUENCY: CONTINUOUS

## 2019-05-13 ASSESSMENT — PAIN DESCRIPTION - ONSET
ONSET: ON-GOING

## 2019-05-13 ASSESSMENT — PAIN DESCRIPTION - PROGRESSION
CLINICAL_PROGRESSION: NOT CHANGED
CLINICAL_PROGRESSION: NOT CHANGED
CLINICAL_PROGRESSION: GRADUALLY WORSENING
CLINICAL_PROGRESSION: GRADUALLY WORSENING

## 2019-05-13 ASSESSMENT — PAIN DESCRIPTION - ORIENTATION
ORIENTATION: MID
ORIENTATION: MID;RIGHT
ORIENTATION: MID
ORIENTATION: OTHER (COMMENT)

## 2019-05-13 ASSESSMENT — PAIN DESCRIPTION - DESCRIPTORS
DESCRIPTORS: SHARP;BURNING
DESCRIPTORS: ACHING
DESCRIPTORS: ACHING;SHARP
DESCRIPTORS: ACHING;CONSTANT

## 2019-05-13 ASSESSMENT — PAIN - FUNCTIONAL ASSESSMENT
PAIN_FUNCTIONAL_ASSESSMENT: PREVENTS OR INTERFERES WITH MANY ACTIVE NOT PASSIVE ACTIVITIES
PAIN_FUNCTIONAL_ASSESSMENT: PREVENTS OR INTERFERES WITH MANY ACTIVE NOT PASSIVE ACTIVITIES
PAIN_FUNCTIONAL_ASSESSMENT: PREVENTS OR INTERFERES SOME ACTIVE ACTIVITIES AND ADLS
PAIN_FUNCTIONAL_ASSESSMENT: PREVENTS OR INTERFERES SOME ACTIVE ACTIVITIES AND ADLS

## 2019-05-13 NOTE — PROGRESS NOTES
NEUROSURGERY PROGRESS NOTE    5/13/2019 11:21 AM                               Dom Wu                      LOS: 5 days     Subjective:  Patient continues to c/o incisional pain and pain in RLE (thigh)        Physical Exam:  Patient seen and examined    Vitals:    05/13/19 1056   BP: (!) 167/98   Pulse: 89   Resp: 17   Temp: 98 °F (36.7 °C)   SpO2: 94%     GCS:  4 - Opens eyes on own  5 - Alert and oriented  6 - Follows simple motor commands  General: Morbidly obese gentleman. Alert and cooperative in no acute distress.     HENT: atraumatic, neck supple  Eyes: Optic discs: Not tested  Pulmonary: unlabored respiratory effort  Cardiovascular:  Warm well perfused. No peripheral edema  Gastrointestinal: abdomen soft, NT, ND     Neurological:  Mental Status: Awake, alert, oriented x 4, speech clear and appropriate  Attention: Intact  Language: No aphasia or dysarthria noted  Sensation: Intact to all extremities to light touch  Coordination: Intact     Musculoskeletal:   Gait: Not tested   Assist devices: None   Tone: Normal  Motor strength: Hip flexor limited 2/2 pain    Right  Left      Right  Left    Deltoid  5 5   Hip Flex  4 4   Biceps  5 5   Knee Extensors  5 5   Triceps  5 5   Knee Flexors  5 5   Wrist Ext  5 5   Ankle Dorsiflex. 5 5   Wrist Flex  5 5   Ankle Plantarflex. 5 5   Handgrip  5 5   Ext Nixon Longus  5 5   Thumb Ext  5 5             Incision: Covered with dressing, which is CDI    Drain: 150/50= 200 mL    Radiological Findings:  CT Lumbar w contrast  Result Date: 5/8/2019  1.  Laminectomy defects are seen throughout the lumbar spine. 2. Air and fluid containing collection seen in the dorsal paraspinal soft tissues of the laminectomy site measuring approximately 12 cm in craniocaudal dimension, 9.6 cm in AP dimension and 5.6 cm in transverse dimension. Subtle rim enhancement seen worrisome for an abscess. 3. There is associated fat stranding and subcutaneous edema.     Labs:  Recent Labs 05/13/19  0550   WBC 13.2*   HGB 10.5*   HCT 32.4*          Recent Labs     05/13/19  0550   *   K 4.2   CL 97*   CO2 25   BUN 22*   CREATININE 0.7*   GLUCOSE 167*   CALCIUM 8.6       No results for input(s): PROTIME, INR, APTT in the last 72 hours. Patient Active Problem List    Diagnosis Date Noted    Surgical site infection 05/10/2019    Staphylococcus aureus infection 05/10/2019    Back abscess 05/08/2019    Sepsis ([address] 05/08/2019    Lumbar stenosis with neurogenic claudication 04/20/2019    Lumbar spinal stenosis 04/19/2019    Degenerative lumbar spinal stenosis 04/19/2019     Assessment:  Patient is a 68 y.o. male w/large post-op fluid collection in paraspinal soft tissue showing s/s of infection.     Plan:  1. Neurologically stable  2. Neurologic exams frequency: Q4H  3. For change in exam MUST contact neurosurgery team along with critical care or primary team  4. Post-op Fluid Collection:  - Fluid leaking from both ends of incision  - CT Lumbar revealed large fluid collection in the dorsal paraspinal soft tissues  - MRI Lumbar revealed decreased size in fluid collection, but cannot rule out infection  - Sepsis managed by primary team  - s/p washout 5/12/2019  - Drain remains in place  5. Muscle spasms: Robaxin  6. Pain control: PRN Dilaudid and Percocet  7. PT/OT consulted, appreciate recs  8. Advance diet / activity per primary team  9. Will follow inpatient.  Please call with any questions or decline in neurological status     DISPO: Remain inpatient from neurosurgery standpoint. Dispo timing to be determined by primary team once patient is medically stable for discharge.     Patient was discussed with Dr. Nery Ansari who agrees with above assessment and plan.      Electronically signed by: ALYSIA Griffin, 5/13/2019 11:21 AM  [phone]

## 2019-05-13 NOTE — PROGRESS NOTES
Patient alert and oriented. VSS, on 5L high flow. Dressing to mid back changed per order, remains CDI. Hemovac with bloody drainage. Tolerating diet, denies n/v. Complaints of heartburn, medicated per [facility] with some relief. Pain controlled with medication per MAR. Denies numbness or tingling. Moving legs while in bed, improved from preop. In bed with alarm on and call light within reach.

## 2019-05-13 NOTE — PROGRESS NOTES
ID Follow-up NOTE  Medical Student note - reviewed and modified, see Attending addendum at bottom    CC:   Lumbar SSI / infection, S aureus bacteremia  Antibiotics: vancomycin & cefepime    Admit Date: 5/8/2019  Hospital Day: 6    Subjective:     POD#1 I&D    Patient denies fevers and chills. However, reports he is nauseous and is not eating as much. He also reports of numbness/tingingling in his right LE. Objective:     Patient Vitals for the past 8 hrs:   BP Temp Temp src Pulse Resp SpO2   05/13/19 1056 (!) 167/98 98 °F (36.7 °C) Oral 89 17 94 %     I/O last 3 completed shifts: In: 336 [P.O.:120; I.V.:876]  Out: 1350 [Urine:1100; Drains:200; Blood:50]  I/O this shift:  In: 240 [P.O.:240]  Out: 300 [Urine:300]    EXAM:  GENERAL:      No apparent distress. Obese.    HEENT:           Membranes moist, no oral lesion  NECK:             Supple  LUNGS:           Clear b/l, no rales, no dullness  CARDIAC:       RRR, no murmur appreciated  ABD:                + BS, soft / NT  EXT:                No rash, no edema, no lesions  Wound:           dressing in C/D/I with bloody serous drainage in the drain  NEURO:          moving all extremities without difficulty  PSYCH:           Orientation, sensorium, mood normal  LINES:             Peripheral iv      Data Review:  Lab Results   Component Value Date    WBC 13.2 (H) 05/13/2019    HGB 10.5 (L) 05/13/2019    HCT 32.4 (L) 05/13/2019    MCV 87.2 05/13/2019     05/13/2019     Lab Results   Component Value Date    CREATININE 0.7 (L) 05/13/2019    BUN 22 (H) 05/13/2019     (L) 05/13/2019    K 4.2 05/13/2019    CL 97 (L) 05/13/2019    CO2 25 05/13/2019       Hepatic Function Panel:   Lab Results   Component Value Date    ALKPHOS 113 05/13/2019    ALT 29 05/13/2019    AST 22 05/13/2019    PROT 6.5 05/13/2019    BILITOT 0.7 05/13/2019    LABALBU 3.0 05/13/2019       MICRO:  5/8/2019 - BC X 2 - PCR +Staph aureus, Staph Aureus    Staphylococcus aureus (2)     Antibiotic Interpretation KASSY Status    ceFAZolin Sensitive <=2 mcg/mL     clindamycin Resistant <=0.5 mcg/mL     erythromycin Resistant >4 mcg/mL     oxacillin Sensitive 0.5 mcg/mL     tetracycline Sensitive <=0.5 mcg/mL     trimethoprim-sulfamethoxazole Sensitive <=0.5/9.5 mcg/mL           5/9/2019 - Wound Culture - 3+ GPC on GS; moderate MRSA growth    Staph aureus mrsa (1)     Antibiotic Interpretation KASSY Status    ceFAZolin Resistant <=4 mcg/mL     ciprofloxacin Resistant >2 mcg/mL     clindamycin Resistant <=0.5 mcg/mL     erythromycin Resistant >4 mcg/mL     oxacillin Resistant >2 mcg/mL     tetracycline Resistant >8 mcg/mL     trimethoprim-sulfamethoxazole Sensitive <=0.5/9.5 mcg/mL     vancomycin Sensitive 2 mcg/mL       5/12/19 - Surgical Cultures: 1+ WBC, 1+ GPC on GS   5/12/19 - BC X2: NGTD    IMAGING:    CT Lumbar (5/8/2019)  FINDINGS:    CT LUMBAR SPINE:     Normal lumbar lordosis. Vertebral body heights are maintained. Multilevel intervertebral disc height loss with subchondral sclerosis and vacuum gas in the lumbar spine most prominent at L2-L3. Anterolisthesis of L5 on S1 by 4 mm. Laminectomy defects are seen throughout the lumbar spine. Multilevel severe narrowing of the central canal and neural foramina. Air and fluid containing collection seen in the dorsal paraspinal soft tissues of the laminectomy site measuring approximately 12 cm in craniocaudal dimension, 9.6 cm in AP dimension and 5.6 cm in transverse dimension. Subtle rim enhancement seen. There is associated fat stranding and subcutaneous edema.        MRI Lumbar (5/9/2019)  Impression   1. Posterior fluid collection along the laminectomy bed of unclear significance. The fluid collection exerts complex layering signal with gas bubbles and peripheral enhancement. This may or present a seroma or postoperative hematoma. An infected fluid    collection be difficult to exclude. A pseudomeningocele is not excluded.  The fluid collection extensively laminectomy bed without significant mass effect. 2. Multifactorial multilevel mild canal narrowing as described in part related to extensive facet arthropathy resulting in a narrowing of the canal from a lateral dimension. 3. Tortuous bunched nerve roots in the upper cauda equina surrounding the conus presumably related to chronic stenosis. 4. Multifactorial multilevel foraminal narrowing with moderate or severe foraminal narrowing from L1-L2 through L5-S1.   5. Diffuse circumferential epidural enhancement without discrete epidural collection extending throughout the laminectomy site which may be reactive in nature. Infection would be difficult to exclude. 6. No convincing evidence of osteomyelitis or discitis. 7. Large posterior central disc protrusion at L5-S1 resulting in a moderate canal narrowing and mild/moderate impingement upon the exiting S1 nerve roots bilaterally. Scheduled Meds:   cefepime  2 g Intravenous Q12H    sodium chloride flush  10 mL Intravenous 2 times per day    docusate sodium  100 mg Oral BID    methocarbamol  1,000 mg Oral 4x Daily    tamsulosin  0.4 mg Oral Daily    atorvastatin  20 mg Oral Nightly    fenofibrate  160 mg Oral Daily    famotidine  20 mg Oral BID    vancomycin  1,750 mg Intravenous Q12H    insulin lispro  0-3 Units Subcutaneous Nightly    insulin lispro  0-6 Units Subcutaneous TID WC    enoxaparin  40 mg Subcutaneous Daily       Continuous Infusions:   dextrose         PRN Meds:  sodium chloride flush, ondansetron, oxyCODONE-acetaminophen **OR** oxyCODONE-acetaminophen, HYDROmorphone **OR** HYDROmorphone, calcium carbonate, glucose, dextrose, glucagon (rDNA), dextrose, naloxone, magnesium hydroxide, acetaminophen      Assessment:   69 yo w/ DM, HTN, HLD, GERD and DDD. Pt underwent lumbar decompression w/ Dr. Nancy Brown that he tolerated well without any surgical site issues on 4/10/2019.  He presented with septic shock on 5/8/2019, requiring resuscitation with fluids and levophed for blood pressure control. He has remained afebrile, with leukocytosis of 11.4 today. ESR of 61 and CRP of 190 on admission. CT showed fluid collection that self-drained with purulent to now serous drainage at the surgical site with wound dishesence. MRI shows fluid collection. He is on Vancomycin and Cefepime. Wound Cultures/Blood Cultures are growing MRSA and MSSA. Patient is s/p wound washout w/ drain by NSY with purulent drainage per OP note. Plan:     - post surgical care per NSY  - continue vancomycin, cefepime  - continue to follow wound cultures and blood cultures  - will review imaging with radiology     Discussed with Dr. Alex Noriega MD.  Jayjay Pickard     Addendum to Medical Student Progress note:  Pt seen,examined and evaluated. I have independently performed history, physical exam, lab and data review. I have determined assessment and plan as documented by student Cynthia Wood).    69 yo man with obesity, DM, HTN, lumbar stenosis     4/19/19 - L 3 & 4 lumbar laminectomy, L3/4 & L4/5bilateral facetectomy , foraminotomies over L4 &5 nerve roots.       Pt had fall and injured R knee   Pt developed LBP over few days, increased over time. Associated chills, nausea. Presented to [facility] ED on 5/8 - afebrile, low BP, LA 3. CT with air and fluid in ST / laminectomy site  ESR 61, Cr 1.7     Transferred and admitted to [facility] 5/8 - afebrile, WBC 11.4. Started on vancomycin and zosyn. MRI with fluid collection with gas and peripheral enhancement, see report  Seen by Neurosurg, may take to OR 5/11     Wound dehiscence on 5/9. Wound cult with 3+GPC on GS and mod growth S aureus / MRSA  Wound with purulent drainage    OR 5/12 - epidural abscess. Per OR note, \"large amount of liquid pus', extended to deep surg space, epidural space.   GS 1+GPC.       IMP/  Lumbar SSI - POD#1 I&D to deep space, cult MRSA  S aureus / MSSA bacteremia     REC/  Cont vancomycin  Start ceftriaxone   D/c cefepime   Will check BC, wound cult results -  called Micro to review cultures (BC MSSA / wound MRSA)   Postop care per Neurosurg     Discussed with pt, family  Yen Koenig MD

## 2019-05-13 NOTE — PROGRESS NOTES
Clinical Pharmacy Progress Note    Interval update:  S/p evacuation of epidural abscess with debridement and washout (completed 5/12). Cultures with MRSA. REASON FOR EVALUATION:  Pharmacy to dose vancomycin  REQUESTING PHYSICIAN/CNP: Dr. Giovana Rodriguez    ADMIT DATE:   5/8/2019     HPI:  68 YOM with PMHx of T2DM, HTN, peripheral neuropathy s/p laminectomy 4/2019 presents with a 2 day history of back pain. In the ED at OSH, patient was hypotensive despite multiple fluid boluses, thus started on levophed. CT showed air and fluid collection in dorsal paraspinal soft tissues near laminectomy site. CXR at [facility] revealed RUL opacity, either PNA vs mass lesion. Patient was started on empiric antibiotics and admitted to the ICU for pressor support for septic shock 2/2 possible spinal abscess.     Pertinent medications:    (5/9-5/12)  Cefepime 2g IV q12h -- day #2 (5/12-current)  Vancomycin -- pharmacy to dose -- day #5   1.75g IV q12h (5/9-now)    PERTINENT LABS:  CBC   Recent Labs     05/11/19  0551 05/12/19  0604 05/13/19  0550   WBC 8.8 11.4* 13.2*   HGB 11.1* 10.7* 10.5*   HCT 33.3* 32.8* 32.4*   MCV 87.1 87.1 87.2    217 247     Renal   Recent Labs     05/11/19  0551 05/12/19  0604 05/13/19  0550   * 133* 135*   K 4.1 4.1 4.2   CL 97* 95* 97*   CO2 24 26 25   BUN 26* 22* 22*   CREATININE 0.8 0.8 0.7*       CALCULATED  Serum creatinine: 0.7 mg/dL (L) 05/13/19 0550  Estimated creatinine clearance: 153 mL/min (A)       CULTURES/ANTIGENS  Date Culture/Site Prelim/Final ID Sensitivities   5/8 Blood #1 MSSA    5/8 Blood #2 MSSA    5/9 Wound -back incision Staph aureus   Moderate growth MRSA (S = vanc with KASSY =2)   5/12 Blood #1 Sent    5/12 Blood #2 Sent    5/12 Surgical culture (back) 1+ GPC        VANCOMYCIN LEVELS  Date Time Type of Level Result   5/10 17:42 Trough 30.4 (drawn inappropriately, only ~3 hrs post-dose)   5/11 01:42 Trough 16 mcg/mL (drawn appropriately on 1.75g IV q12h)       ASSESSMENT AND PLAN: (1) Lumbar spine paraspinal abscess   Vancomycin - day #5  · Currently on 1.75g IV q12h with therapeutic trough of 16 mcg/mL on 5/11. · Will continue this same regimen for now as renal function is stable. Will plan to repeat trough tomorrow prior to dose due @ 14:30 to ensure vancomycin levels remain therapeutic. · Will continue to monitor renal function closely. As MRSA KASSY = 2 to vancomycin, may consider alternate therapy (ie. Daptomycin), as there is a risk of treatment failure with vancomycin. ID is following -- would defer antibiotic decisions to them. (2) Prophylaxis  · DVT:  Lovenox  · SUP:  Famotidine     Thank you. Please call with any questions.   Albina Rangel PharmD, BCPS  5/13/2019 11:22 AM  Wireless: [id number]

## 2019-05-13 NOTE — PROGRESS NOTES
lift   Vision - Basic Assessment  Prior Vision: Wears glasses all the time(pt kept eyes closed > 50% ' I cant even kept my eyes open \")  Cognition  Overall Cognitive Status: Exceptions  Arousal/Alertness: Delayed responses to stimuli  Following Commands: Follows one step commands with repetition; Follows one step commands with increased time  Attention Span: Attends with cues to redirect  Memory: Decreased recall of recent events;Decreased short term memory  Insights: Decreased awareness of deficits  Initiation: Requires cues for all  Sequencing: Requires cues for all  Cognition Comment: delayed processing -- pt becomes very upset with ongoing encouragement to increase activity / try to perform task. Type of ROM/Therapeutic Exercise  Comment: pt edu / encouraged to perform freq BUE exercises while in bed. Pt reports \" I can't - I'm too stiff \"       LUE AROM (degrees)  LUE AROM : Exceptions  LUE General AROM: limited shoulder flexion/ abduction to  0-100 2/2 RTC issues. Pt [facility] with elbow/ wrist and hands   Left Hand AROM (degrees)  Left Hand AROM: WFL  RUE AROM (degrees)  RUE AROM : WFL  Right Hand AROM (degrees)  Right Hand AROM: WFL        Hand Dominance  Hand Dominance: Right     Plan  This note will serve as a discharge summary if patient is discharged from hospital before next treatment session.       Plan  Times per week: 2-5x  Times per day: Daily  Current Treatment Recommendations: Functional Mobility Training, Endurance Training, Equipment Evaluation, Education, & procurement, Safety Education & Training, Patient/Caregiver Education & Training, Self-Care / ADL    AM-PAC Score  AM-[facility] Inpatient Daily Activity Raw Score: 12  AM-PAC Inpatient ADL T-Scale Score : 30.6  ADL Inpatient CMS 0-100% Score: 66.57  ADL Inpatient CMS G-Code Modifier : CL    Goals  Short term goals  Time Frame for Short term goals: at [employer]   Short term goal 1: Sit at EOB x 7 mins with Supervision for ADLs   Short term goal 2: Grooming with setup   Short term goal 3: Sit to stand with Mod A x 2 in prep for functional tranfers.     Short term goal 4: Oriented to environment 3/4 attempts    Patient Goals   Patient goals : Go home      Therapy Time   Individual Concurrent Group Co-treatment   Time In 1127         Time Out 1207         Minutes 40              Timed Code Treatment Minutes:  23 mins     Total Treatment Minutes:  40 mins       Gris Gordon, OT

## 2019-05-13 NOTE — PROGRESS NOTES
Hospitalist Progress Note      PCP: EMIGDIO MESSER PAGE    Date of Admission: 5/8/2019    Chief Complaint: Back pain    Hospital Course: Had surgery yesterday for lumbar epidural abscess. Pain is controlled, feeling slightly better today. Blood culture growing MSSA. Hemoglobin is stable. Vitals are also stable. Subjective: Confused from time to time. No chest pain, no shortness of breath, no nausea, no vomiting. Medications:  Reviewed    Infusion Medications    dextrose       Scheduled Medications    cefepime  2 g Intravenous Q12H    sodium chloride flush  10 mL Intravenous 2 times per day    docusate sodium  100 mg Oral BID    methocarbamol  1,000 mg Oral 4x Daily    tamsulosin  0.4 mg Oral Daily    atorvastatin  20 mg Oral Nightly    fenofibrate  160 mg Oral Daily    famotidine  20 mg Oral BID    vancomycin  1,750 mg Intravenous Q12H    insulin lispro  0-3 Units Subcutaneous Nightly    insulin lispro  0-6 Units Subcutaneous TID WC    enoxaparin  40 mg Subcutaneous Daily     PRN Meds: sodium chloride flush, ondansetron, oxyCODONE-acetaminophen **OR** oxyCODONE-acetaminophen, HYDROmorphone **OR** HYDROmorphone, calcium carbonate, glucose, dextrose, glucagon (rDNA), dextrose, naloxone, magnesium hydroxide, acetaminophen      Intake/Output Summary (Last 24 hours) at 5/13/2019 1321  Last data filed at 5/13/2019 1036  Gross per 24 hour   Intake 1226 ml   Output 1350 ml   Net -124 ml       Physical Exam Performed:    BP (!) 167/98   Pulse 89   Temp 98 °F (36.7 °C) (Oral)   Resp 17   Ht 6' 1\" (1.854 m)   Wt (!) 350 lb 1.5 oz (158.8 kg)   SpO2 94%   BMI 46.19 kg/m²     General appearance: No apparent distress, appears stated age and cooperative. HEENT: Pupils equal, round, and reactive to light. Conjunctivae/corneas clear. Neck: Supple, with full range of motion. No jugular venous distention. Trachea midline. Respiratory:  Normal respiratory effort.  Clear to auscultation, bilaterally without Rales/Wheezes/Rhonchi. Cardiovascular: Regular rate and rhythm with normal S1/S2 without murmurs, rubs or gallops. Abdomen: Soft, non-tender, non-distended with normal bowel sounds. Musculoskeletal: No clubbing, cyanosis or edema bilaterally. Legs are slightly weak, but no focal changes  Skin: Skin color, texture, turgor normal.  No rashes or lesions. Neurologic:  Neurovascularly intact without any focal sensory/motor deficits. Cranial nerves: II-XII intact, grossly non-focal.  Psychiatric: Alert and oriented, thought content appropriate, normal insight  Capillary Refill: Brisk,< 3 seconds   Peripheral Pulses: +2 palpable, equal bilaterally       Labs:   Recent Labs     05/11/19  0551 05/12/19  0604 05/13/19  0550   WBC 8.8 11.4* 13.2*   HGB 11.1* 10.7* 10.5*   HCT 33.3* 32.8* 32.4*    217 247     Recent Labs     05/11/19 0551 05/12/19  0604 05/13/19  0550   * 133* 135*   K 4.1 4.1 4.2   CL 97* 95* 97*   CO2 24 26 25   BUN 26* 22* 22*   CREATININE 0.8 0.8 0.7*   CALCIUM 8.9 8.4 8.6     Recent Labs     05/11/19  0551 05/12/19  0604 05/13/19  0550   AST 34 30 22   ALT 36 34 29   BILITOT 0.8 0.7 0.7   ALKPHOS 108 121 113     No results for input(s): INR in the last 72 hours. No results for input(s): [place] in the last 72 hours. Urinalysis:      Lab Results   Component Value Date    NITRU Negative 05/09/2019    WBCUA 3-5 05/09/2019    BACTERIA 1+ 05/09/2019    RBCUA 0-2 05/09/2019    BLOODU SMALL 05/09/2019    SPECGRAV 1.025 05/09/2019    GLUCOSEU >=1000 05/09/2019       Radiology:  MRI LUMBAR SPINE W WO CONTRAST   Final Result   1. Posterior fluid collection along the laminectomy bed of unclear significance. The fluid collection exerts complex layering signal with gas bubbles and peripheral enhancement. This may or present a seroma or postoperative hematoma. An infected fluid    collection be difficult to exclude. A pseudomeningocele is not excluded.  The fluid collection extensively laminectomy bed without significant mass effect. 2. Multifactorial multilevel mild canal narrowing as described in part related to extensive facet arthropathy resulting in a narrowing of the canal from a lateral dimension. 3. Tortuous bunched nerve roots in the upper cauda equina surrounding the conus presumably related to chronic stenosis. 4. Multifactorial multilevel foraminal narrowing with moderate or severe foraminal narrowing from L1-L2 through L5-S1.   5. Diffuse circumferential epidural enhancement without discrete epidural collection extending throughout the laminectomy site which may be reactive in nature. Infection would be difficult to exclude. 6. No convincing evidence of osteomyelitis or discitis. 7. Large posterior central disc protrusion at L5-S1 resulting in a moderate canal narrowing and mild/moderate impingement upon the exiting S1 nerve roots bilaterally. XR Eye Foreign Body   Final Result      No foreign body large or opaque enough to be identified radiographically. XR CHEST PORTABLE   Final Result   Impression: No acute abnormality or significant change. XR CHEST PORTABLE   Final Result       Right jugular central venous catheter appears to be in place. No pneumothorax. Airspace opacity over right upper lung zone, may represent pneumonia,    artifact versus underlying mass lesion. Followup to resolution is    recommended.   Comparison with prior studies, especially recent chest CT    may help to clarify                  Assessment/Plan:    Active Hospital Problems    Diagnosis Date Noted    Bacteremia due to methicillin susceptible Staphylococcus aureus (MSSA) [R78.81] 05/13/2019    Surgical site infection [T81.49XA] 05/10/2019    Staphylococcus aureus infection [A49.01] 05/10/2019    Back abscess [L02.212] 05/08/2019    Sepsis ([address] [A41.9] 05/08/2019     PLAN:    MSSA bacteremia  Wound and blood cultures are discordant  Continue IV antibiotics  We are waiting for infectious diseases input    Lumbar epidural abscess  Had surgical intervention yesterday. Doing fine. Postoperative care per neurosurgery    Diabetes mellitus type II  Acceptable blood sugars and sliding scale insulin and carb controlled diet    Anemia  Chronic illness most likely  Monitor and transfuse as indicated  This stable and does not require transfusion    Sepsis present on arrival  This issue is resolved. Patient has stabilized with antibiotic treatment and surgical intervention    Hyponatremia  Mild. Continue monitoring basic metabolic panel  Was on hydrochlorothiazide at home. This medication should be discontinued. Discussed with patient's son at bedside in the patient's presence. Questions answered in detail    DVT Prophylaxis: Lovenox  Diet: DIET CARB CONTROL; Carb Control: 4 carb choices (60 gms)/meal  Code Status: Full Code    PT/OT Eval Status: Pending    Dispo - inpatient for now. Most likely skilled nursing facility when stable enough to be discharged.     Tanja Borges MD

## 2019-05-13 NOTE — PLAN OF CARE
Problem: Pain:  Goal: Pain level will decrease  Description  Pain level will decrease  Outcome: Ongoing  Note:    Pain well controlled with oral pain medication per MAR. Verbalizes a decrease in pain level upon reassessment. Will continue to monitor. Problem: Risk for Impaired Skin Integrity  Goal: Tissue integrity - skin and mucous membranes  Description  Structural intactness and normal physiological function of skin and  mucous membranes. Outcome: Ongoing     Problem: Falls - Risk of:  Goal: Will remain free from falls  Description  Will remain free from falls  Outcome: Ongoing  Note:   Patient remains free from falls this shift. In bed with alarm on, call light within reach. Will continue to monitor.

## 2019-05-13 NOTE — PLAN OF CARE
Goal: Pain level will decrease  Description  Pain level will decrease  Outcome: Ongoing  Note:    Pain well controlled with oral pain medication per MAR. Verbalizes a decrease in pain level upon reassessment. Will continue to monitor. Problem: Falls - Risk of:  Goal: Will remain free from falls  Description  Will remain free from falls  Outcome: Ongoing  Note:   Patient remains free from falls this shift. In bed with alarm on, call light within reach. Will continue to monitor.

## 2019-05-13 NOTE — PROGRESS NOTES
4/19/2019); and REPAIR DURAL / CSF LEAK (N/A, 5/12/2019). Restrictions  Position Activity Restriction  Other position/activity restrictions: activity as tolerated  Vision/Hearing  Vision: Impaired  Vision Exceptions: Wears glasses at all times  Hearing: Within functional limits     Subjective  General  Chart Reviewed: Yes  Additional Pertinent Hx: Admit 5/8 with worsening LBP with fever and chills; admit to ICU with severe sepsis; to OR 5/12 for lumbar wound washout; PMHx: arthritis, back pain, DDD, DM, HLD, HTN, walking trouble, back surgery x 2, knee surgery, lumbar laminectomy 4/19/19, shoulder surgery  Referring Practitioner: Rabia Ewing MD  Diagnosis: back abscess  Subjective  Subjective: Pt found supine in bed. Agreeable to PT. \"My mind is just not working right. \"  \"My leg is numb. \"    Pain Screening  Patient Currently in Pain: Yes(back pain with activity, pt was premedicated per RN)       Orientation  Orientation  Overall Orientation Status: Within Functional Limits(oriented to self, situation, place, month and year; states \"my mind is just not right\")  Social/Functional History  Social/Functional History  Lives With: Spouse  Type of Home: House  Home Layout: One level  Home Access: Stairs to enter with rails(2 [place])  Bathroom Shower/Tub: Walk-in shower  Bathroom Toilet: Handicap height  Bathroom Equipment: Shower chair, Grab bars in shower, Grab bars around toilet  Home Equipment: 4 wheeled walker(walking stick)  ADL Assistance: Needs assistance(assist from wife for bathing)  [address]: (shares with wife; pt cooks and does yard work)  Ambulation Assistance: Independent(with rollator)  Transfer Assistance: Independent  Active : Yes  Type of occupation: on disability -   Leisure & Hobbies: gardening, fishing, spending time with grandchildren  Cognition        Objective          PROM RLE (degrees)  RLE PROM: WFL  PROM LLE (degrees)  LLE PROM: WFL  Strength RLE  Comment: grossly decreased throughout  Strength LLE  Comment: grossly decreased throughout  Strength Other  Other: Pt demonstrates difficulty following commands and staying on task for formal LE MMT. LEs grossly weak per observation during functional mobility        Bed mobility  Rolling to Left: Dependent/Total(mod to max A x 2)  Rolling to Right: Dependent/Total(mod to max A x 2)  Supine to Sit: Dependent/Total(max A x 2)  Sit to Supine: Dependent/Total(max A x 2)  Scooting: Dependent/Total(max A x 2 to scoot towards HOB; pt able to reposition self in bed with min A and cues but needs assist x 2 for completion of task)  Comment: log performed during bed mobility  Transfers  Comment: attempted sit <--> stand from EOB and raised EOB with max A x 2; pt unable to lift hips off bed        Balance  Sitting - Static: Fair  Sitting - Dynamic: Fair  Comments: pt sat EOB x 12 min total with mostly SBA, CGA at times; pt fatigues quickly and requests to lean UEs on walker for trunk support  Exercises  Comments: attempted to educate pt on gluteal sets, pt states \"I tried it 30 years ago, couldnt do it. I'm not doing it now. \"   Treatment included bed mobility, balance training, pt education.   Plan   Plan  Times per week: 2-5  Current Treatment Recommendations: Strengthening, Gait Training, Patient/Caregiver Education & Training, Functional Mobility Training, Balance Training, Transfer Training, Equipment Evaluation, Education, & procurement, Home Exercise Program  Safety Devices  Type of devices: Nurse notified, Left in bed, Gait belt, Bed alarm in place, Call light within reach    G-Code       OutComes Score                                                  AM-PAC Score  AM-PAC Inpatient Mobility Raw Score : 6  AM-PAC Inpatient T-Scale Score : 23.55  Mobility Inpatient CMS 0-100% Score: 100  Mobility Inpatient CMS G-Code Modifier : CN          Goals  Short term goals  Time Frame for Short term goals: discharge  Short term goal 1: Pt will transfer supine <--> sit with mod A x 2  Short term goal 2: Pt will participate in sit <--> stand transfer and achieve full or near full stance  Short term goal 3: Pt will participate in LE HEP  Patient Goals   Patient goals : eventually return home       Therapy Time   Individual Concurrent Group Co-treatment   Time In 1125         Time Out 1205         Minutes 40                 Timed Code Treatment Minutes:  25    Total Treatment Minutes:  40    If patient is discharged prior to next treatment, this note will serve as the discharge summary.   Blank Crawford, PT, DPT  [id number]

## 2019-05-14 LAB
A/G RATIO: 0.9 (ref 1.1–2.2)
ALBUMIN SERPL-MCNC: 2.8 G/DL (ref 3.4–5)
ALP BLD-CCNC: 110 U/L (ref 40–129)
ALT SERPL-CCNC: 23 U/L (ref 10–40)
ANION GAP SERPL CALCULATED.3IONS-SCNC: 13 MMOL/L (ref 3–16)
AST SERPL-CCNC: 22 U/L (ref 15–37)
BANDED NEUTROPHILS RELATIVE PERCENT: 3 % (ref 0–7)
BASOPHILS ABSOLUTE: 0 K/UL (ref 0–0.2)
BASOPHILS RELATIVE PERCENT: 0 %
BILIRUB SERPL-MCNC: 0.5 MG/DL (ref 0–1)
BUN BLDV-MCNC: 22 MG/DL (ref 7–20)
CALCIUM SERPL-MCNC: 8.7 MG/DL (ref 8.3–10.6)
CHLORIDE BLD-SCNC: 95 MMOL/L (ref 99–110)
CO2: 26 MMOL/L (ref 21–32)
CREAT SERPL-MCNC: 0.6 MG/DL (ref 0.8–1.3)
EOSINOPHILS ABSOLUTE: 0.1 K/UL (ref 0–0.6)
EOSINOPHILS RELATIVE PERCENT: 1 %
GFR AFRICAN AMERICAN: >60
GFR NON-AFRICAN AMERICAN: >60
GLOBULIN: 3.2 G/DL
GLUCOSE BLD-MCNC: 194 MG/DL (ref 70–99)
GLUCOSE BLD-MCNC: 205 MG/DL (ref 70–99)
GLUCOSE BLD-MCNC: 210 MG/DL (ref 70–99)
GLUCOSE BLD-MCNC: 227 MG/DL (ref 70–99)
GLUCOSE BLD-MCNC: 246 MG/DL (ref 70–99)
HCT VFR BLD CALC: 30.3 % (ref 40.5–52.5)
HEMOGLOBIN: 9.7 G/DL (ref 13.5–17.5)
LYMPHOCYTES ABSOLUTE: 1.5 K/UL (ref 1–5.1)
LYMPHOCYTES RELATIVE PERCENT: 11 %
MCH RBC QN AUTO: 27.9 PG (ref 26–34)
MCHC RBC AUTO-ENTMCNC: 32.2 G/DL (ref 31–36)
MCV RBC AUTO: 86.7 FL (ref 80–100)
METAMYELOCYTES RELATIVE PERCENT: 6 %
MONOCYTES ABSOLUTE: 0.9 K/UL (ref 0–1.3)
MONOCYTES RELATIVE PERCENT: 7 %
MYELOCYTE PERCENT: 2 %
NEUTROPHILS ABSOLUTE: 10.7 K/UL (ref 1.7–7.7)
NEUTROPHILS RELATIVE PERCENT: 70 %
PDW BLD-RTO: 14.5 % (ref 12.4–15.4)
PERFORMED ON: ABNORMAL
PLATELET # BLD: 301 K/UL (ref 135–450)
PMV BLD AUTO: 7.7 FL (ref 5–10.5)
POTASSIUM REFLEX MAGNESIUM: 3.9 MMOL/L (ref 3.5–5.1)
RBC # BLD: 3.49 M/UL (ref 4.2–5.9)
SMUDGE CELLS: PRESENT
SODIUM BLD-SCNC: 134 MMOL/L (ref 136–145)
TOTAL PROTEIN: 6 G/DL (ref 6.4–8.2)
VANCOMYCIN TROUGH: 13.1 UG/ML (ref 10–20)
WBC # BLD: 13.2 K/UL (ref 4–11)

## 2019-05-14 PROCEDURE — 6370000000 HC RX 637 (ALT 250 FOR IP): Performed by: NEUROLOGICAL SURGERY

## 2019-05-14 PROCEDURE — 6370000000 HC RX 637 (ALT 250 FOR IP): Performed by: STUDENT IN AN ORGANIZED HEALTH CARE EDUCATION/TRAINING PROGRAM

## 2019-05-14 PROCEDURE — 94761 N-INVAS EAR/PLS OXIMETRY MLT: CPT

## 2019-05-14 PROCEDURE — 80053 COMPREHEN METABOLIC PANEL: CPT

## 2019-05-14 PROCEDURE — 85025 COMPLETE CBC W/AUTO DIFF WBC: CPT

## 2019-05-14 PROCEDURE — 1200000000 HC SEMI PRIVATE

## 2019-05-14 PROCEDURE — 2700000000 HC OXYGEN THERAPY PER DAY

## 2019-05-14 PROCEDURE — 2580000003 HC RX 258: Performed by: STUDENT IN AN ORGANIZED HEALTH CARE EDUCATION/TRAINING PROGRAM

## 2019-05-14 PROCEDURE — 6360000002 HC RX W HCPCS: Performed by: INTERNAL MEDICINE

## 2019-05-14 PROCEDURE — 2580000003 HC RX 258: Performed by: NEUROLOGICAL SURGERY

## 2019-05-14 PROCEDURE — 97535 SELF CARE MNGMENT TRAINING: CPT

## 2019-05-14 PROCEDURE — 6360000002 HC RX W HCPCS: Performed by: NURSE PRACTITIONER

## 2019-05-14 PROCEDURE — 97530 THERAPEUTIC ACTIVITIES: CPT

## 2019-05-14 PROCEDURE — 6370000000 HC RX 637 (ALT 250 FOR IP): Performed by: NURSE PRACTITIONER

## 2019-05-14 PROCEDURE — 2580000003 HC RX 258: Performed by: INTERNAL MEDICINE

## 2019-05-14 PROCEDURE — 99232 SBSQ HOSP IP/OBS MODERATE 35: CPT | Performed by: INTERNAL MEDICINE

## 2019-05-14 PROCEDURE — 6370000000 HC RX 637 (ALT 250 FOR IP): Performed by: INTERNAL MEDICINE

## 2019-05-14 PROCEDURE — 80202 ASSAY OF VANCOMYCIN: CPT

## 2019-05-14 PROCEDURE — 6360000002 HC RX W HCPCS: Performed by: STUDENT IN AN ORGANIZED HEALTH CARE EDUCATION/TRAINING PROGRAM

## 2019-05-14 RX ORDER — METHOCARBAMOL 750 MG/1
1500 TABLET, FILM COATED ORAL 3 TIMES DAILY
Status: DISCONTINUED | OUTPATIENT
Start: 2019-05-14 | End: 2019-05-17 | Stop reason: HOSPADM

## 2019-05-14 RX ORDER — DIAZEPAM 5 MG/1
5 TABLET ORAL EVERY 6 HOURS PRN
Status: DISCONTINUED | OUTPATIENT
Start: 2019-05-14 | End: 2019-05-17 | Stop reason: HOSPADM

## 2019-05-14 RX ADMIN — DIAZEPAM 5 MG: 5 TABLET ORAL at 23:13

## 2019-05-14 RX ADMIN — INSULIN LISPRO 1 UNITS: 100 INJECTION, SOLUTION INTRAVENOUS; SUBCUTANEOUS at 21:24

## 2019-05-14 RX ADMIN — AMLODIPINE BESYLATE 2.5 MG: 2.5 TABLET ORAL at 09:16

## 2019-05-14 RX ADMIN — HYDROMORPHONE HYDROCHLORIDE 0.5 MG: 1 INJECTION, SOLUTION INTRAMUSCULAR; INTRAVENOUS; SUBCUTANEOUS at 02:11

## 2019-05-14 RX ADMIN — ENOXAPARIN SODIUM 40 MG: 40 INJECTION SUBCUTANEOUS at 09:15

## 2019-05-14 RX ADMIN — METHOCARBAMOL TABLETS 1500 MG: 750 TABLET, COATED ORAL at 21:23

## 2019-05-14 RX ADMIN — CEFTRIAXONE SODIUM 2 G: 2 INJECTION, POWDER, FOR SOLUTION INTRAMUSCULAR; INTRAVENOUS at 05:25

## 2019-05-14 RX ADMIN — INSULIN LISPRO 2 UNITS: 100 INJECTION, SOLUTION INTRAVENOUS; SUBCUTANEOUS at 13:40

## 2019-05-14 RX ADMIN — INSULIN LISPRO 2 UNITS: 100 INJECTION, SOLUTION INTRAVENOUS; SUBCUTANEOUS at 17:13

## 2019-05-14 RX ADMIN — INSULIN LISPRO 2 UNITS: 100 INJECTION, SOLUTION INTRAVENOUS; SUBCUTANEOUS at 09:17

## 2019-05-14 RX ADMIN — OXYCODONE HYDROCHLORIDE AND ACETAMINOPHEN 2 TABLET: 5; 325 TABLET ORAL at 05:26

## 2019-05-14 RX ADMIN — VANCOMYCIN HYDROCHLORIDE 1750 MG: 10 INJECTION, POWDER, LYOPHILIZED, FOR SOLUTION INTRAVENOUS at 14:33

## 2019-05-14 RX ADMIN — OXYCODONE HYDROCHLORIDE AND ACETAMINOPHEN 2 TABLET: 5; 325 TABLET ORAL at 17:58

## 2019-05-14 RX ADMIN — ATORVASTATIN CALCIUM 20 MG: 20 TABLET, FILM COATED ORAL at 21:23

## 2019-05-14 RX ADMIN — HYDROMORPHONE HYDROCHLORIDE 1 MG: 1 INJECTION, SOLUTION INTRAMUSCULAR; INTRAVENOUS; SUBCUTANEOUS at 23:14

## 2019-05-14 RX ADMIN — FAMOTIDINE 20 MG: 20 TABLET ORAL at 09:16

## 2019-05-14 RX ADMIN — METHOCARBAMOL TABLETS 1500 MG: 750 TABLET, COATED ORAL at 13:39

## 2019-05-14 RX ADMIN — Medication 10 ML: at 09:21

## 2019-05-14 RX ADMIN — LISINOPRIL 20 MG: 20 TABLET ORAL at 09:16

## 2019-05-14 RX ADMIN — Medication 10 ML: at 21:23

## 2019-05-14 RX ADMIN — Medication 100 MG: at 09:16

## 2019-05-14 RX ADMIN — DOCUSATE SODIUM 100 MG: 100 CAPSULE, LIQUID FILLED ORAL at 09:16

## 2019-05-14 RX ADMIN — METHOCARBAMOL TABLETS 1500 MG: 750 TABLET, COATED ORAL at 09:15

## 2019-05-14 RX ADMIN — TAMSULOSIN HYDROCHLORIDE 0.4 MG: 0.4 CAPSULE ORAL at 09:16

## 2019-05-14 RX ADMIN — FENOFIBRATE 160 MG: 160 TABLET ORAL at 09:15

## 2019-05-14 RX ADMIN — OXYCODONE HYDROCHLORIDE AND ACETAMINOPHEN 2 TABLET: 5; 325 TABLET ORAL at 09:21

## 2019-05-14 RX ADMIN — VANCOMYCIN HYDROCHLORIDE 1750 MG: 10 INJECTION, POWDER, LYOPHILIZED, FOR SOLUTION INTRAVENOUS at 03:02

## 2019-05-14 RX ADMIN — CEFTRIAXONE SODIUM 2 G: 2 INJECTION, POWDER, FOR SOLUTION INTRAMUSCULAR; INTRAVENOUS at 16:40

## 2019-05-14 RX ADMIN — FAMOTIDINE 20 MG: 20 TABLET ORAL at 21:23

## 2019-05-14 RX ADMIN — OXYCODONE HYDROCHLORIDE AND ACETAMINOPHEN 2 TABLET: 5; 325 TABLET ORAL at 13:39

## 2019-05-14 RX ADMIN — DOCUSATE SODIUM 100 MG: 100 CAPSULE, LIQUID FILLED ORAL at 21:23

## 2019-05-14 ASSESSMENT — PAIN SCALES - GENERAL
PAINLEVEL_OUTOF10: 0
PAINLEVEL_OUTOF10: 7
PAINLEVEL_OUTOF10: 0
PAINLEVEL_OUTOF10: 7
PAINLEVEL_OUTOF10: 6
PAINLEVEL_OUTOF10: 0
PAINLEVEL_OUTOF10: 0
PAINLEVEL_OUTOF10: 8
PAINLEVEL_OUTOF10: 10
PAINLEVEL_OUTOF10: 0
PAINLEVEL_OUTOF10: 7
PAINLEVEL_OUTOF10: 0
PAINLEVEL_OUTOF10: 0

## 2019-05-14 ASSESSMENT — PAIN DESCRIPTION - DIRECTION: RADIATING_TOWARDS: LEG

## 2019-05-14 ASSESSMENT — PAIN DESCRIPTION - PAIN TYPE
TYPE: SURGICAL PAIN
TYPE: ACUTE PAIN;SURGICAL PAIN
TYPE: SURGICAL PAIN
TYPE: ACUTE PAIN

## 2019-05-14 ASSESSMENT — PAIN DESCRIPTION - FREQUENCY
FREQUENCY: CONTINUOUS

## 2019-05-14 ASSESSMENT — PAIN DESCRIPTION - LOCATION
LOCATION: BACK;LEG
LOCATION: BACK
LOCATION: BACK
LOCATION: GENERALIZED

## 2019-05-14 ASSESSMENT — PAIN DESCRIPTION - PROGRESSION
CLINICAL_PROGRESSION: NOT CHANGED
CLINICAL_PROGRESSION: GRADUALLY WORSENING

## 2019-05-14 ASSESSMENT — PAIN DESCRIPTION - ORIENTATION
ORIENTATION: OTHER (COMMENT)
ORIENTATION: LOWER
ORIENTATION: RIGHT
ORIENTATION: LOWER

## 2019-05-14 ASSESSMENT — PAIN DESCRIPTION - ONSET
ONSET: ON-GOING

## 2019-05-14 ASSESSMENT — PAIN DESCRIPTION - DESCRIPTORS
DESCRIPTORS: ACHING
DESCRIPTORS: ACHING;CONSTANT
DESCRIPTORS: ACHING;CONSTANT;RADIATING;THROBBING;TINGLING
DESCRIPTORS: ACHING

## 2019-05-14 ASSESSMENT — PAIN - FUNCTIONAL ASSESSMENT
PAIN_FUNCTIONAL_ASSESSMENT: PREVENTS OR INTERFERES SOME ACTIVE ACTIVITIES AND ADLS

## 2019-05-14 NOTE — PROGRESS NOTES
NEUROSURGERY PROGRESS NOTE    5/14/2019 10:26 AM                               Dom Nguyen                      LOS: 6 days   POD#2 s/p Washout    Subjective:  Patient continues to c/o incisional pain and pain in RLE (thigh)        Physical Exam:  Patient seen and examined    Vitals:    05/14/19 0745   BP: 135/66   Pulse: 83   Resp: 16   Temp: 99 °F (37.2 °C)   SpO2: 92%     GCS:  4 - Opens eyes on own  5 - Alert and oriented  6 - Follows simple motor commands  General: Morbidly obese gentleman. Alert and cooperative in no acute distress.     HENT: atraumatic, neck supple  Eyes: Optic discs: Not tested  Pulmonary: unlabored respiratory effort  Cardiovascular:  Warm well perfused. No peripheral edema  Gastrointestinal: abdomen soft, NT, ND     Neurological:  Mental Status: Awake, alert, oriented x 4, speech clear and appropriate  Attention: Intact  Language: No aphasia or dysarthria noted  Sensation: Intact to all extremities to light touch  Coordination: Intact     Musculoskeletal:   Gait: Not tested   Assist devices: None   Tone: Normal  Motor strength: Hip flexor limited 2/2 pain    Right  Left      Right  Left    Deltoid  5 5   Hip Flex  4 4   Biceps  5 5   Knee Extensors  5 5   Triceps  5 5   Knee Flexors  5 5   Wrist Ext  5 5   Ankle Dorsiflex. 5 5   Wrist Flex  5 5   Ankle Plantarflex. 5 5   Handgrip  5 5   Ext Nixon Longus  5 5   Thumb Ext  5 5             Incision: Covered with dressing, which is CDI    Drain: 150/50= 200 mL    Radiological Findings:  CT Lumbar w contrast  Result Date: 5/8/2019  1.  Laminectomy defects are seen throughout the lumbar spine. 2. Air and fluid containing collection seen in the dorsal paraspinal soft tissues of the laminectomy site measuring approximately 12 cm in craniocaudal dimension, 9.6 cm in AP dimension and 5.6 cm in transverse dimension. Subtle rim enhancement seen worrisome for an abscess. 3. There is associated fat stranding and subcutaneous edema.     Labs:  Recent AM  [phone]

## 2019-05-14 NOTE — PLAN OF CARE
Problem: Pain:  Goal: Pain level will decrease  Description  Pain level will decrease  5/14/2019 0958 by Yasmin Rios RN  Outcome: Ongoing  Note:   Patient complaining of back pain. Patient medicated per MAR. Patient resting with RR greater than 10 at reassessment. Will continue to monitor. Problem: Falls - Risk of:  Goal: Will remain free from falls  Description  Will remain free from falls  5/14/2019 0958 by Yasmin Rios RN  Outcome: Ongoing  Note:   Patient has remained free from falls. Bed is low and locked, bed alarm on, side rails up 2/4, non skid socks on patient. Call light and bedside table are within reach. Patient calls out appropriately. Will continue to monitor.

## 2019-05-14 NOTE — PROGRESS NOTES
Patient alert and oriented, VSS. Neuro WNL with exception to some pain and numbness to the RLE. Dry dressing taken off back, incision cleaned and left open to air. Hemovac intact with small amount of output. Patient up to chair. Patient tolerating diet and voiding adequately. Will continue to monitor.

## 2019-05-14 NOTE — PROGRESS NOTES
Patient VSS, neuro at patient's baseline. Patient complained of pain and discomfort all night and was medicated per the [facility]. Patient also is unable to hold his urinal to void. Will continue to monitor.

## 2019-05-14 NOTE — PROGRESS NOTES
Hospitalist Progress Note      PCP: EMIGDIO MESSER PAGE    Date of Admission: 5/8/2019    Chief Complaint: Back pain    Hospital Course: Had surgery 5/12 for lumbar epidural abscess. Pain is uncontrolled. Now with RLE pain althea knee-had a rcent fall and had a negative knee xray. Wife a former RN at bedside      Medications:  Reviewed    Infusion Medications    dextrose       Scheduled Medications    methocarbamol  1,500 mg Oral TID    thiamine  100 mg Oral Daily    amLODIPine  2.5 mg Oral Daily    lisinopril  20 mg Oral Daily    cefTRIAXone (ROCEPHIN) IV  2 g Intravenous Q12H    sodium chloride flush  10 mL Intravenous 2 times per day    docusate sodium  100 mg Oral BID    tamsulosin  0.4 mg Oral Daily    atorvastatin  20 mg Oral Nightly    fenofibrate  160 mg Oral Daily    famotidine  20 mg Oral BID    vancomycin  1,750 mg Intravenous Q12H    insulin lispro  0-3 Units Subcutaneous Nightly    insulin lispro  0-6 Units Subcutaneous TID WC    enoxaparin  40 mg Subcutaneous Daily     PRN Meds: diazepam, sodium chloride flush, ondansetron, oxyCODONE-acetaminophen **OR** oxyCODONE-acetaminophen, HYDROmorphone **OR** HYDROmorphone, calcium carbonate, glucose, dextrose, glucagon (rDNA), dextrose, naloxone, magnesium hydroxide, acetaminophen      Intake/Output Summary (Last 24 hours) at 5/14/2019 1241  Last data filed at 5/14/2019 1142  Gross per 24 hour   Intake 900 ml   Output 1790 ml   Net -890 ml       Physical Exam Performed:    /71   Pulse 88   Temp 98.5 °F (36.9 °C) (Oral)   Resp 16   Ht 6' 1\" (1.854 m)   Wt (!) 350 lb 1.5 oz (158.8 kg)   SpO2 92%   BMI 46.19 kg/m²     General appearance: No apparent distress, appears stated age and cooperative. HEENT: Pupils equal, round, and reactive to light. Conjunctivae/corneas clear. Neck: Supple, with full range of motion. No jugular venous distention. Trachea midline. Respiratory:  Normal respiratory effort.  Clear to auscultation, bilaterally without Rales/Wheezes/Rhonchi. Cardiovascular: Regular rate and rhythm with normal S1/S2 without murmurs, rubs or gallops. Abdomen: Soft, non-tender, non-distended with normal bowel sounds. Musculoskeletal: No clubbing, cyanosis or edema bilaterally. Legs are slightly weak, but no focal changes  Skin: Skin color, texture, turgor normal.  No rashes or lesions. Neurologic:  Neurovascularly intact without any focal sensory/motor deficits. Cranial nerves: II-XII intact, grossly non-focal.  Psychiatric: Alert and oriented, thought content appropriate, normal insight  Capillary Refill: Brisk,< 3 seconds   Peripheral Pulses: +2 palpable, equal bilaterally       Labs:   Recent Labs     05/13/19  0550 05/13/19  1334 05/14/19  0635   WBC 13.2* 12.2* 13.2*   HGB 10.5* 10.0* 9.7*   HCT 32.4* 31.0* 30.3*    273 301     Recent Labs     05/13/19  0550 05/13/19  1400 05/14/19  0635   * 133* 134*   K 4.2 4.0 3.9   CL 97* 95* 95*   CO2 25 23 26   BUN 22* 21* 22*   CREATININE 0.7* 0.7* 0.6*   CALCIUM 8.6 8.6 8.7     Recent Labs     05/13/19  0550 05/13/19  1400 05/14/19  0635   AST 22 25 22   ALT 29 26 23   BILITOT 0.7 0.7 0.5   ALKPHOS 113 165* 110      Lab Results   Component Value Date    NITRU Negative 05/09/2019    WBCUA 3-5 05/09/2019    BACTERIA 1+ 05/09/2019    RBCUA 0-2 05/09/2019    BLOODU SMALL 05/09/2019    SPECGRAV 1.025 05/09/2019    GLUCOSEU >=1000 05/09/2019       Radiology:          Assessment/Plan:    MSSA bacteremia-mild elev in WBC today  Wound cx with MRSA and blood cultures with MSSA  ID eval appreciated-awaiting final cx result  Cont vancomycin +ceftriaxone   Follow up  [facility] - 5/12 with NGTD.   Plan on  PICC line in am if cx still neg per ID    Lumbar epidural abscess   Postoperative care per neurosurgery    Diabetes mellitus type II  sliding scale insulin and carb controlled diet    Anemia  Chronic illness most likely  Monitor and transfuse as indicated      Sepsis present on

## 2019-05-14 NOTE — PROGRESS NOTES
cues)     Transfers  Sit to stand: Dependent/Total(max A x2 with Chantelle Astudillo from elevated bed on 3rd attempt; max A x2 to stand from [address] as well; unable to come to full erect stand and only able to come to partial stand for ~4s prior to fatigue)  Stand to sit: Dependent/Total(max A x2)                          Cognition  Overall Cognitive Status: Exceptions  Arousal/Alertness: Delayed responses to stimuli  Following Commands: Follows one step commands with repetition; Follows one step commands with increased time  Attention Span: Attends with cues to redirect; Difficulty dividing attention  Memory: Decreased recall of recent events;Decreased short term memory  Safety Judgement: Decreased awareness of need for assistance;Decreased awareness of need for safety  Problem Solving: Decreased awareness of errors;Assistance required to generate solutions;Assistance required to implement solutions;Assistance required to identify errors made;Assistance required to correct errors made  Insights: Decreased awareness of deficits  Initiation: Requires cues for all  Sequencing: Requires cues for all                                         Education: Role of OT, safe t/f training, safe use of DME, awareness of deficits, discharge planning, ADL as therapeutic exercise, importance of OOB, cognitive orientation     Plan   Plan  Times per week: 2-5x  Times per day: Daily  Current Treatment Recommendations: Functional Mobility Training, Endurance Training, Equipment Evaluation, Education, & procurement, Safety Education & Training, Patient/Caregiver Education & Training, Self-Care / ADL    AM-PAC Score        AM-PAC Inpatient Daily Activity Raw Score: 12  AM-PAC Inpatient ADL T-Scale Score : 30.6  ADL Inpatient CMS 0-100% Score: 66.57  ADL Inpatient CMS G-Code Modifier : CL    Goals  Short term goals  Time Frame for Short term goals: at [facility]   Short term goal 1: Sit at EOB x 7 mins with Supervision for ADLs   Short term goal 2: Grooming with setup   Short term goal 3: Sit to stand with Mod A x 2 in prep for functional tranfers. Short term goal 4: Oriented to environment 3/4 attempts    Patient Goals   Patient goals : Go home, return to PLOF       Therapy Time   Individual Concurrent Group Co-treatment   Time In 1400         Time Out 1427         Minutes 27         Timed Code Treatment Minutes: 23 Minutes       If patient is discharged prior to next treatment session, this note will serve as the discharge summary.   Jacquelyn Chan, OTR/L #[id number]

## 2019-05-14 NOTE — PROGRESS NOTES
Physical Therapy  Facility/Department: [facility] 5T ORTHO/NEURO  Daily Treatment Note  NAME: Crystal Mcintosh  : 1950  MRN: [id number]    Date of Service: 2019    Discharge Recommendations: Crystal Mcintosh scored a 8/24 on the AM-PAC short mobility form. Current research shows that an AM-PAC score of 17 or less is typically not associated with a discharge to the patient's home setting. Based on the patients AM-PAC score and their current functional mobility deficits, it is recommended that the patient have 3-5 sessions per week of Physical Therapy at d/c to increase the patients independence. PT Equipment Recommendations  Equipment Needed: No  Other: defer to next level of care    Patient Diagnosis(es): The encounter diagnosis was Sepsis, due to unspecified organism [facility]). has a past medical history of Arthritis, Back pain, DDD (degenerative disc disease), cervical, Diabetes mellitus ([address] Edema of both legs, GERD (gastroesophageal reflux disease), Hyperlipidemia, Hypertension, Nausea in adult, Neuropathy, Vitamin B 12 deficiency, and Walking troubles. has a past surgical history that includes back surgery; Prostate surgery; shoulder surgery; Cystoscopy; knee surgery; Hemorrhoid surgery; eye surgery; Tonsillectomy; Cholecystectomy; Lumbar spine surgery (N/A, 2019); and REPAIR DURAL / CSF LEAK (N/A, 2019). Restrictions  Position Activity Restriction  Other position/activity restrictions: activity as tolerated  Subjective   General  Chart Reviewed: Yes  Additional Pertinent Hx: Admit  with worsening LBP with fever and chills; admit to ICU with severe sepsis; to OR  for lumbar wound washout; PMHx: arthritis, back pain, DDD, DM, HLD, HTN, walking trouble, back surgery x 2, knee surgery, lumbar laminectomy 19, shoulder surgery  Referring Practitioner: Caron Saldana MD  Subjective  Subjective: Pt found sitting EOB. Agreeable to PT.   Pt does not recall working with therapy yesterday. Pain Screening  Patient Currently in Pain: Yes(back pain and R knee pain, not rated, RN and MD aware)       Orientation  Orientation  Overall Orientation Status: (oriented to self and situation; min confusion per conversation)  Cognition      Objective   Bed mobility  Supine to Sit: Moderate assistance(HOB up 30 deg with use of rail)  Sit to Supine: Moderate assistance  Scooting: Moderate assistance  Transfers  Sit to Stand: Dependent/Total(max A x 2 from raised EOB and cielo stedy seat x 2 trials)  Stand to sit: Dependent/Total(max A x 2)  Bed to Chair: Dependent/Total(via cielo stedy)        Balance  Sitting - Static: Fair  Sitting - Dynamic: Fair  Standing - Static: Poor  Comments: sat EOB x 10 min total with SBA to CGA; stood at cielo stedy <5 sec with max A x 2 and poor posture                           Assessment   Body structures, Functions, Activity limitations: Decreased functional mobility   Assessment: Mobility remains limited by weakness and decreased motor planning for transfers. Able to complete sit <--> stand transfer today with max A x 2. Rec continued IP PT to maximize independence with mobility. Treatment Diagnosis: impaired functional mobility  Patient Education: role of PT, use of call light, d/c planning; pt demonstrates good understanding.   REQUIRES PT FOLLOW UP: Yes     G-Code     OutComes Score                                                  AM-PAC Score  AM-PAC Inpatient Mobility Raw Score : 8  AM-PAC Inpatient T-Scale Score : 28.52  Mobility Inpatient CMS 0-100% Score: 86.62  Mobility Inpatient CMS G-Code Modifier : CM          Goals  Short term goals  Time Frame for Short term goals: discharge  Short term goal 1: Pt will transfer supine <--> sit with mod A x 2  MET 5/14  New goal: Pt will transfer supine <--> sit with CGA  Short term goal 2: Pt will participate in sit <--> stand transfer and achieve full or near full stance  MET 5/14  New goal: Pt will transfer sit <--> stand with mod A x 2  Short term goal 3: Pt will participate in LE HEP  Short term goal 4: Pt will transfer bed <--> chair with mod A x 2  Patient Goals   Patient goals : eventually return home    Plan    Plan  Times per week: 2-5  Current Treatment Recommendations: Strengthening, Gait Training, Patient/Caregiver Education & Training, Functional Mobility Training, Balance Training, Transfer Training, Equipment Evaluation, Education, & procurement, Home Exercise Program  Safety Devices  Type of devices: Nurse notified, Gait belt, Call light within reach, Left in chair, Chair alarm in place     Therapy Time   Individual Concurrent Group Co-treatment   Time In 1400         Time Out 1438         Minutes 38                 Timed Code Treatment Minutes:  38    Total Treatment Minutes:  38    If patient is discharged prior to next treatment, this note will serve as the discharge summary.   Chetna Mcdonough, PT, DPT  [phone]

## 2019-05-14 NOTE — PROGRESS NOTES
Clinical Pharmacy Progress Note    Interval update:  S/p evacuation of epidural abscess with debridement and washout (completed 5/12). Wound culture with MRSA; surgical culture growing S. Aureus (sensitivity pending). Plan for PICC line placement on 5/15 if blood cultures from 5/12 remain negative. REASON FOR EVALUATION:  Pharmacy to dose vancomycin  REQUESTING PHYSICIAN/CNP: Dr. She Luis    ADMIT DATE:   5/8/2019     HPI:  68 YOM with PMHx of T2DM, HTN, peripheral neuropathy s/p laminectomy 4/2019 presents with a 2 day history of back pain. In the ED at OSH, patient was hypotensive despite multiple fluid boluses, thus started on levophed. CT showed air and fluid collection in dorsal paraspinal soft tissues near laminectomy site. CXR at [place] [id number] revealed RUL opacity, either PNA vs mass lesion. Patient was started on empiric antibiotics and admitted to the ICU for pressor support for septic shock 2/2 possible spinal abscess. Pertinent medications:    (5/9-5/12)   -- (5/12-5/13)  Ceftriaxone 2g IV q12h (5/13 - current)   Vancomycin -- pharmacy to dose -- day #6   1.75g IV q12h (5/9-now)    PERTINENT LABS:  CBC   Recent Labs     05/13/19  0550 05/13/19  1334 05/14/19  0635   WBC 13.2* 12.2* 13.2*   HGB 10.5* 10.0* 9.7*   HCT 32.4* 31.0* 30.3*   MCV 87.2 86.9 86.7    273 301     Renal   Recent Labs     05/13/19  0550 05/13/19  1400 05/14/19  0635   * 133* 134*   K 4.2 4.0 3.9   CL 97* 95* 95*   CO2 25 23 26   BUN 22* 21* 22*   CREATININE 0.7* 0.7* 0.6*       CALCULATED  Serum creatinine: 0.6 mg/dL (L) 05/14/19 0635  Estimated creatinine clearance: 178 mL/min (A)     CULTURES/ANTIGENS  Date Culture/Site Prelim/Final ID Sensitivities   5/8 Blood #1 MSSA    5/8 Blood #2 MSSA    5/9 Wound -back incision Staph aureus   Moderate growth MRSA (S = vanc with KASSY =2)   5/12 Blood #1 NGTD    5/12 Blood #2 NGTD    5/12 Surgical culture (back) S.  Aureus   Heavy growth Pending       VANCOMYCIN LEVELS  Date Time Type of Level Result   5/10 17:42 Trough 30.4 (drawn inappropriately, only ~3 hrs post-dose)   5/11 01:42 Trough 16 mcg/mL (drawn appropriately on 1.75g IV q12h)   5/14 13:52 Trough 13.1 mcg/mL (drawn appropriately on 1.75g IV q12h)     ASSESSMENT AND PLAN:   (1) Lumbar spine paraspinal abscess   Vancomycin - day #6  · On 1.75g IV q12h with therapeutic trough of 16 mcg/mL on 5/11. Repeat trough this afternoon was subtherapeutic at 13.1 mcg/mL. · As trough level slightly subtherapeutic and wound culture is growing MRSA with vancomycin KASSY of 2, will increase dose to 2g IV q12h to target trough level of 15-20 mcg/mL. · Repeat trough level will be ordered as appropriate. · Renal function is stable. Will continue to monitor closely. As MRSA KASSY = 2 to vancomycin, may consider alternate therapy (ie. Daptomycin), as there is a risk of treatment failure with vancomycin. ID is following -- would defer antibiotic decisions to them. (2) Prophylaxis  · DVT:  Lovenox  · SUP:  Famotidine     Thank you. Please call with any questions.   Lakeisha RosalesD, BCPS  5/14/2019 11:24 AM  Wireless: [id number]

## 2019-05-15 LAB
A/G RATIO: 0.9 (ref 1.1–2.2)
ALBUMIN SERPL-MCNC: 2.9 G/DL (ref 3.4–5)
ALP BLD-CCNC: 100 U/L (ref 40–129)
ALT SERPL-CCNC: 20 U/L (ref 10–40)
ANION GAP SERPL CALCULATED.3IONS-SCNC: 10 MMOL/L (ref 3–16)
AST SERPL-CCNC: 18 U/L (ref 15–37)
BANDED NEUTROPHILS RELATIVE PERCENT: 3 % (ref 0–7)
BASOPHILS ABSOLUTE: 0 K/UL (ref 0–0.2)
BASOPHILS RELATIVE PERCENT: 0 %
BILIRUB SERPL-MCNC: 0.5 MG/DL (ref 0–1)
BUN BLDV-MCNC: 20 MG/DL (ref 7–20)
CALCIUM SERPL-MCNC: 8.4 MG/DL (ref 8.3–10.6)
CHLORIDE BLD-SCNC: 94 MMOL/L (ref 99–110)
CO2: 28 MMOL/L (ref 21–32)
CREAT SERPL-MCNC: 0.6 MG/DL (ref 0.8–1.3)
EOSINOPHILS ABSOLUTE: 0.8 K/UL (ref 0–0.6)
EOSINOPHILS RELATIVE PERCENT: 6 %
GFR AFRICAN AMERICAN: >60
GFR NON-AFRICAN AMERICAN: >60
GLOBULIN: 3.1 G/DL
GLUCOSE BLD-MCNC: 207 MG/DL (ref 70–99)
GLUCOSE BLD-MCNC: 215 MG/DL (ref 70–99)
GLUCOSE BLD-MCNC: 221 MG/DL (ref 70–99)
GLUCOSE BLD-MCNC: 228 MG/DL (ref 70–99)
GLUCOSE BLD-MCNC: 235 MG/DL (ref 70–99)
HCT VFR BLD CALC: 30.5 % (ref 40.5–52.5)
HEMOGLOBIN: 10 G/DL (ref 13.5–17.5)
LYMPHOCYTES ABSOLUTE: 0.8 K/UL (ref 1–5.1)
LYMPHOCYTES RELATIVE PERCENT: 6 %
MCH RBC QN AUTO: 28.6 PG (ref 26–34)
MCHC RBC AUTO-ENTMCNC: 32.8 G/DL (ref 31–36)
MCV RBC AUTO: 87.1 FL (ref 80–100)
METAMYELOCYTES RELATIVE PERCENT: 2 %
MONOCYTES ABSOLUTE: 0.8 K/UL (ref 0–1.3)
MONOCYTES RELATIVE PERCENT: 6 %
NEUTROPHILS ABSOLUTE: 10.5 K/UL (ref 1.7–7.7)
NEUTROPHILS RELATIVE PERCENT: 77 %
PDW BLD-RTO: 14.6 % (ref 12.4–15.4)
PERFORMED ON: ABNORMAL
PLATELET # BLD: 339 K/UL (ref 135–450)
PMV BLD AUTO: 7.7 FL (ref 5–10.5)
POLYCHROMASIA: ABNORMAL
POTASSIUM REFLEX MAGNESIUM: 3.9 MMOL/L (ref 3.5–5.1)
RBC # BLD: 3.5 M/UL (ref 4.2–5.9)
SODIUM BLD-SCNC: 132 MMOL/L (ref 136–145)
TOTAL PROTEIN: 6 G/DL (ref 6.4–8.2)
WBC # BLD: 12.8 K/UL (ref 4–11)

## 2019-05-15 PROCEDURE — 2580000003 HC RX 258: Performed by: INTERNAL MEDICINE

## 2019-05-15 PROCEDURE — 1200000000 HC SEMI PRIVATE

## 2019-05-15 PROCEDURE — 6370000000 HC RX 637 (ALT 250 FOR IP): Performed by: STUDENT IN AN ORGANIZED HEALTH CARE EDUCATION/TRAINING PROGRAM

## 2019-05-15 PROCEDURE — 36569 INSJ PICC 5 YR+ W/O IMAGING: CPT

## 2019-05-15 PROCEDURE — 80053 COMPREHEN METABOLIC PANEL: CPT

## 2019-05-15 PROCEDURE — 02HV33Z INSERTION OF INFUSION DEVICE INTO SUPERIOR VENA CAVA, PERCUTANEOUS APPROACH: ICD-10-PCS | Performed by: INTERNAL MEDICINE

## 2019-05-15 PROCEDURE — 85025 COMPLETE CBC W/AUTO DIFF WBC: CPT

## 2019-05-15 PROCEDURE — C1751 CATH, INF, PER/CENT/MIDLINE: HCPCS

## 2019-05-15 PROCEDURE — 6370000000 HC RX 637 (ALT 250 FOR IP): Performed by: NURSE PRACTITIONER

## 2019-05-15 PROCEDURE — 6360000002 HC RX W HCPCS: Performed by: INTERNAL MEDICINE

## 2019-05-15 PROCEDURE — 2580000003 HC RX 258: Performed by: NEUROLOGICAL SURGERY

## 2019-05-15 PROCEDURE — 6360000002 HC RX W HCPCS: Performed by: STUDENT IN AN ORGANIZED HEALTH CARE EDUCATION/TRAINING PROGRAM

## 2019-05-15 PROCEDURE — 6360000002 HC RX W HCPCS: Performed by: NURSE PRACTITIONER

## 2019-05-15 PROCEDURE — 6360000002 HC RX W HCPCS: Performed by: NEUROLOGICAL SURGERY

## 2019-05-15 PROCEDURE — 6370000000 HC RX 637 (ALT 250 FOR IP): Performed by: NEUROLOGICAL SURGERY

## 2019-05-15 PROCEDURE — 2500000003 HC RX 250 WO HCPCS: Performed by: INTERNAL MEDICINE

## 2019-05-15 PROCEDURE — 6370000000 HC RX 637 (ALT 250 FOR IP): Performed by: INTERNAL MEDICINE

## 2019-05-15 RX ORDER — BISACODYL 10 MG
10 SUPPOSITORY, RECTAL RECTAL DAILY PRN
Status: DISCONTINUED | OUTPATIENT
Start: 2019-05-15 | End: 2019-05-17 | Stop reason: HOSPADM

## 2019-05-15 RX ORDER — SENNA AND DOCUSATE SODIUM 50; 8.6 MG/1; MG/1
2 TABLET, FILM COATED ORAL 2 TIMES DAILY
Status: DISCONTINUED | OUTPATIENT
Start: 2019-05-15 | End: 2019-05-17 | Stop reason: HOSPADM

## 2019-05-15 RX ORDER — SODIUM CHLORIDE 0.9 % (FLUSH) 0.9 %
10 SYRINGE (ML) INJECTION PRN
Status: DISCONTINUED | OUTPATIENT
Start: 2019-05-15 | End: 2019-05-15 | Stop reason: SDUPTHER

## 2019-05-15 RX ORDER — SODIUM CHLORIDE 0.9 % (FLUSH) 0.9 %
10 SYRINGE (ML) INJECTION EVERY 12 HOURS SCHEDULED
Status: DISCONTINUED | OUTPATIENT
Start: 2019-05-15 | End: 2019-05-17 | Stop reason: HOSPADM

## 2019-05-15 RX ORDER — LIDOCAINE HYDROCHLORIDE 10 MG/ML
5 INJECTION, SOLUTION EPIDURAL; INFILTRATION; INTRACAUDAL; PERINEURAL ONCE
Status: COMPLETED | OUTPATIENT
Start: 2019-05-15 | End: 2019-05-15

## 2019-05-15 RX ORDER — POLYETHYLENE GLYCOL 3350 17 G/17G
17 POWDER, FOR SOLUTION ORAL DAILY
Status: DISCONTINUED | OUTPATIENT
Start: 2019-05-15 | End: 2019-05-17 | Stop reason: HOSPADM

## 2019-05-15 RX ORDER — SODIUM CHLORIDE 0.9 % (FLUSH) 0.9 %
10 SYRINGE (ML) INJECTION PRN
Status: DISCONTINUED | OUTPATIENT
Start: 2019-05-15 | End: 2019-05-17 | Stop reason: HOSPADM

## 2019-05-15 RX ORDER — LIDOCAINE HYDROCHLORIDE 10 MG/ML
5 INJECTION, SOLUTION EPIDURAL; INFILTRATION; INTRACAUDAL; PERINEURAL ONCE
Status: DISCONTINUED | OUTPATIENT
Start: 2019-05-15 | End: 2019-05-17 | Stop reason: HOSPADM

## 2019-05-15 RX ORDER — SODIUM CHLORIDE 0.9 % (FLUSH) 0.9 %
10 SYRINGE (ML) INJECTION EVERY 12 HOURS SCHEDULED
Status: DISCONTINUED | OUTPATIENT
Start: 2019-05-15 | End: 2019-05-15 | Stop reason: SDUPTHER

## 2019-05-15 RX ADMIN — ENOXAPARIN SODIUM 40 MG: 40 INJECTION SUBCUTANEOUS at 08:14

## 2019-05-15 RX ADMIN — FENOFIBRATE 160 MG: 160 TABLET ORAL at 08:15

## 2019-05-15 RX ADMIN — METHOCARBAMOL TABLETS 1500 MG: 750 TABLET, COATED ORAL at 08:15

## 2019-05-15 RX ADMIN — ONDANSETRON 4 MG: 2 INJECTION INTRAMUSCULAR; INTRAVENOUS at 20:10

## 2019-05-15 RX ADMIN — HYDROMORPHONE HYDROCHLORIDE 1 MG: 1 INJECTION, SOLUTION INTRAMUSCULAR; INTRAVENOUS; SUBCUTANEOUS at 21:55

## 2019-05-15 RX ADMIN — METHOCARBAMOL TABLETS 1500 MG: 750 TABLET, COATED ORAL at 14:45

## 2019-05-15 RX ADMIN — CEFTRIAXONE SODIUM 2 G: 2 INJECTION, POWDER, FOR SOLUTION INTRAMUSCULAR; INTRAVENOUS at 16:53

## 2019-05-15 RX ADMIN — OXYCODONE HYDROCHLORIDE AND ACETAMINOPHEN 2 TABLET: 5; 325 TABLET ORAL at 14:45

## 2019-05-15 RX ADMIN — INSULIN LISPRO 2 UNITS: 100 INJECTION, SOLUTION INTRAVENOUS; SUBCUTANEOUS at 12:02

## 2019-05-15 RX ADMIN — TAMSULOSIN HYDROCHLORIDE 0.4 MG: 0.4 CAPSULE ORAL at 08:15

## 2019-05-15 RX ADMIN — OXYCODONE HYDROCHLORIDE AND ACETAMINOPHEN 2 TABLET: 5; 325 TABLET ORAL at 02:01

## 2019-05-15 RX ADMIN — SENNOSIDES, DOCUSATE SODIUM 2 TABLET: 50; 8.6 TABLET, FILM COATED ORAL at 21:55

## 2019-05-15 RX ADMIN — DIAZEPAM 5 MG: 5 TABLET ORAL at 06:48

## 2019-05-15 RX ADMIN — OXYCODONE HYDROCHLORIDE AND ACETAMINOPHEN 2 TABLET: 5; 325 TABLET ORAL at 23:28

## 2019-05-15 RX ADMIN — CEFTRIAXONE SODIUM 2 G: 2 INJECTION, POWDER, FOR SOLUTION INTRAMUSCULAR; INTRAVENOUS at 05:44

## 2019-05-15 RX ADMIN — POLYETHYLENE GLYCOL (3350) 17 G: 17 POWDER, FOR SOLUTION ORAL at 11:18

## 2019-05-15 RX ADMIN — Medication 100 MG: at 08:15

## 2019-05-15 RX ADMIN — SENNOSIDES, DOCUSATE SODIUM 2 TABLET: 50; 8.6 TABLET, FILM COATED ORAL at 11:18

## 2019-05-15 RX ADMIN — INSULIN LISPRO 2 UNITS: 100 INJECTION, SOLUTION INTRAVENOUS; SUBCUTANEOUS at 08:14

## 2019-05-15 RX ADMIN — LIDOCAINE HYDROCHLORIDE 5 ML: 10 INJECTION, SOLUTION EPIDURAL; INFILTRATION; INTRACAUDAL; PERINEURAL at 15:20

## 2019-05-15 RX ADMIN — FAMOTIDINE 20 MG: 20 TABLET ORAL at 08:15

## 2019-05-15 RX ADMIN — OXYCODONE HYDROCHLORIDE AND ACETAMINOPHEN 2 TABLET: 5; 325 TABLET ORAL at 19:28

## 2019-05-15 RX ADMIN — LISINOPRIL 20 MG: 20 TABLET ORAL at 08:15

## 2019-05-15 RX ADMIN — OXYCODONE HYDROCHLORIDE AND ACETAMINOPHEN 2 TABLET: 5; 325 TABLET ORAL at 10:26

## 2019-05-15 RX ADMIN — INSULIN LISPRO 2 UNITS: 100 INJECTION, SOLUTION INTRAVENOUS; SUBCUTANEOUS at 17:17

## 2019-05-15 RX ADMIN — Medication 10 ML: at 08:15

## 2019-05-15 RX ADMIN — FAMOTIDINE 20 MG: 20 TABLET ORAL at 21:55

## 2019-05-15 RX ADMIN — DOCUSATE SODIUM 100 MG: 100 CAPSULE, LIQUID FILLED ORAL at 08:15

## 2019-05-15 RX ADMIN — VANCOMYCIN HYDROCHLORIDE 2000 MG: 10 INJECTION, POWDER, LYOPHILIZED, FOR SOLUTION INTRAVENOUS at 02:01

## 2019-05-15 RX ADMIN — AMLODIPINE BESYLATE 2.5 MG: 2.5 TABLET ORAL at 08:15

## 2019-05-15 RX ADMIN — DIAZEPAM 5 MG: 5 TABLET ORAL at 16:57

## 2019-05-15 RX ADMIN — METHOCARBAMOL TABLETS 1500 MG: 750 TABLET, COATED ORAL at 21:55

## 2019-05-15 RX ADMIN — OXYCODONE HYDROCHLORIDE AND ACETAMINOPHEN 2 TABLET: 5; 325 TABLET ORAL at 06:48

## 2019-05-15 RX ADMIN — ATORVASTATIN CALCIUM 20 MG: 20 TABLET, FILM COATED ORAL at 21:55

## 2019-05-15 ASSESSMENT — PAIN DESCRIPTION - PAIN TYPE
TYPE: SURGICAL PAIN
TYPE: ACUTE PAIN;SURGICAL PAIN
TYPE: SURGICAL PAIN
TYPE: ACUTE PAIN;SURGICAL PAIN
TYPE: SURGICAL PAIN

## 2019-05-15 ASSESSMENT — PAIN DESCRIPTION - DESCRIPTORS
DESCRIPTORS: ACHING;CONSTANT;RADIATING;THROBBING;TINGLING
DESCRIPTORS: ACHING;CONSTANT
DESCRIPTORS: ACHING

## 2019-05-15 ASSESSMENT — PAIN DESCRIPTION - LOCATION
LOCATION: BACK

## 2019-05-15 ASSESSMENT — PAIN SCALES - GENERAL
PAINLEVEL_OUTOF10: 7
PAINLEVEL_OUTOF10: 10
PAINLEVEL_OUTOF10: 7
PAINLEVEL_OUTOF10: 10
PAINLEVEL_OUTOF10: 8
PAINLEVEL_OUTOF10: 6
PAINLEVEL_OUTOF10: 0
PAINLEVEL_OUTOF10: 9
PAINLEVEL_OUTOF10: 9
PAINLEVEL_OUTOF10: 10
PAINLEVEL_OUTOF10: 0
PAINLEVEL_OUTOF10: 7
PAINLEVEL_OUTOF10: 8

## 2019-05-15 ASSESSMENT — PAIN DESCRIPTION - ONSET
ONSET: ON-GOING

## 2019-05-15 ASSESSMENT — PAIN DESCRIPTION - FREQUENCY
FREQUENCY: CONTINUOUS

## 2019-05-15 ASSESSMENT — PAIN DESCRIPTION - PROGRESSION
CLINICAL_PROGRESSION: GRADUALLY WORSENING
CLINICAL_PROGRESSION: NOT CHANGED
CLINICAL_PROGRESSION: GRADUALLY WORSENING
CLINICAL_PROGRESSION: NOT CHANGED
CLINICAL_PROGRESSION: GRADUALLY WORSENING

## 2019-05-15 ASSESSMENT — PAIN DESCRIPTION - DIRECTION
RADIATING_TOWARDS: LEG
RADIATING_TOWARDS: LEG

## 2019-05-15 ASSESSMENT — PAIN DESCRIPTION - ORIENTATION
ORIENTATION: LOWER

## 2019-05-15 NOTE — PROGRESS NOTES
Patient up to chair with cielo steady and x2 assist. Patient tolerated well. Patient complaining of pain in lower back where incision is, patient medicate dper MAR. Patient ate 50% of breakfast. Patient passing large amounts of gas today. Will continue to monitor.

## 2019-05-15 NOTE — CONSULTS
PM&R    Consult received; noted plans for [place]. Will attempt to facilitate admission to their rehab if possible. Shantanu Delacruz MD 5/15/2019, 3:08 PM

## 2019-05-15 NOTE — PROGRESS NOTES
Clinical Pharmacy Progress Note    Interval update:  S/p evacuation of epidural abscess with debridement and washout (completed 5/12). Wound culture with MRSA; surgical culture growing S. Aureus. Plan for PICC line placement today if blood cultures from 5/12 remain negative. REASON FOR EVALUATION:  Pharmacy to dose vancomycin  REQUESTING PHYSICIAN/CNP: Dr. Radha Thomason    ADMIT DATE:   5/8/2019     HPI:  68 YOM with PMHx of T2DM, HTN, peripheral neuropathy s/p laminectomy 4/2019 presents with a 2 day history of back pain. In the ED at OSH, patient was hypotensive despite multiple fluid boluses, thus started on levophed. CT showed air and fluid collection in dorsal paraspinal soft tissues near laminectomy site. CXR at [facility] revealed RUL opacity, either PNA vs mass lesion. Patient was started on empiric antibiotics and admitted to the ICU for pressor support for septic shock 2/2 possible spinal abscess. Pertinent medications:    (5/9-5/12)   -- (5/12-5/13)  Ceftriaxone 2g IV q12h (5/13 - current)   Vancomycin -- pharmacy to dose -- day #6   1.75g IV q12h (5/9-current)    PERTINENT LABS:  CBC   Recent Labs     05/13/19  1334 05/14/19  0635 05/15/19  0545   WBC 12.2* 13.2* 12.8*   HGB 10.0* 9.7* 10.0*   HCT 31.0* 30.3* 30.5*   MCV 86.9 86.7 87.1    301 339     Renal   Recent Labs     05/13/19  1400 05/14/19  0635 05/15/19  0545   * 134* 132*   K 4.0 3.9 3.9   CL 95* 95* 94*   CO2 23 26 28   BUN 21* 22* 20   CREATININE 0.7* 0.6* 0.6*       CALCULATED  Serum creatinine: 0.6 mg/dL (L) 05/15/19 0545  Estimated creatinine clearance: 178 mL/min (A)     CULTURES/ANTIGENS  Date Culture/Site Prelim/Final ID Sensitivities   5/8 Blood #1 MSSA Sensitive to cefazolin, oxacillin, tetracycline, Bactrim   5/8 Blood #2 MSSA    5/9 Wound -back incision Staph aureus   Moderate growth MRSA (S = vanc with KASSY =2)   5/12 Blood #1 NGTD    5/12 Blood #2 NGTD    5/12 Surgical culture (back) S.  Aureus   Heavy growth Sensitive to ciprofloxacin, oxacillin, tetracycline, Bactrim       Wound Culture:    Staph aureus mrsa     BACTERIAL SUSCEPTIBILITY PANEL BY KASSY     ceFAZolin <=4 mcg/mL Resistant     ciprofloxacin >2 mcg/mL Resistant     clindamycin <=0.5 mcg/mL Resistant     erythromycin >4 mcg/mL Resistant     oxacillin >2 mcg/mL Resistant     tetracycline >8 mcg/mL Resistant     trimethoprim-sulfamethoxazole <=0.5/9.5 m. .. Sensitive     vancomycin 2 mcg/mL Sensitive          Blood Culture:   Staphylococcus aureus     BACTERIAL SUSCEPTIBILITY PANEL BY KASYS     ceFAZolin <=2 mcg/mL Sensitive     clindamycin <=0.5 mcg/mL Resistant     erythromycin >4 mcg/mL Resistant     oxacillin 0.5 mcg/mL Sensitive     tetracycline <=0.5 mcg/mL Sensitive     trimethoprim-sulfamethoxazole <=0.5/9.5 m. .. Sensitive          Surgical Culture:    Staphylococcus aureus     BACTERIAL SUSCEPTIBILITY PANEL BY KASSY     benzylpenicillin >=0.5 mcg/mL Resistant     ciprofloxacin <=0.5 mcg/mL Sensitive     clindamycin  Resistant     erythromycin  Resistant     oxacillin 0.5 mcg/mL Sensitive     tetracycline <=1 mcg/mL Sensitive     trimethoprim-sulfamethoxazole <=10 mcg/mL Sensitive        VANCOMYCIN LEVELS  Date Time Type of Level Result   5/10 17:42 Trough 30.4 (drawn inappropriately, only ~3 hrs post-dose)   5/11 01:42 Trough 16 mcg/mL (drawn appropriately on 1.75g IV q12h)   5/14 13:52 Trough 13.1 mcg/mL (drawn appropriately on 1.75g IV q12h)     ASSESSMENT AND PLAN:   (1) Lumbar spine paraspinal abscess   Vancomycin - day #7  · Trough yesterday was subtherapeutic at 13.1 mcg/mL while on 1.75g IV q12h. Due to wound culture growing MRSA with vancomycin KASSY of 2, dose was increased to 2g IV q12h to target trough level of 15-20 mcg/mL. · Plan for repeat trough tomorrow at 1400 prior to 4th dose on this regimen. · Renal function is stable. Will continue to monitor closely. As MRSA KASSY = 2 to vancomycin, may consider alternate therapy (ie.  Daptomycin), as there is a risk of treatment failure with vancomycin. ID is following -- would defer antibiotic decisions to them. (2) Prophylaxis  · DVT:  Lovenox  · SUP:  Famotidine     Thank you. Please call with any questions.   Edna Fountain, PharmD  [phone]  5/15/2019 11:26 AM

## 2019-05-15 NOTE — PROGRESS NOTES
Nutrition Assessment (Low Risk)    Type and Reason for Visit: Initial    Nutrition Recommendations: Continue current diet. Nutrition Assessment:  Nutr LOS chart review indicating no nutr risks at this time, adequate po recorded. Will monitor per [facility].      Malnutrition Assessment:  · Malnutrition Status: No malnutrition    Nutrition Risk Level   Risk Level: Low    Nutrition Diagnosis:   · Problem: No nutrition diagnosis at this time    Nutrition Intervention:  Food and/or Delivery: Continue current diet  Nutrition Education/Counseling/Coordination of Care:  Continued Inpatient Monitoring      Electronically signed by Rose Duenas RD, LD on 5/15/19 at 3:35 PM    Contact Number: [phone]

## 2019-05-15 NOTE — PLAN OF CARE
Problem: Pain:  Goal: Pain level will decrease  Description  Pain level will decrease  5/14/2019 0958 by Yuliya Cabrera RN  Outcome: Ongoing  Note:   Patient complaining of back pain. Patient medicated per MAR. Patient resting with RR greater than 10 at reassessment. Will continue to monitor. Goal: Control of acute pain  Description  Control of acute pain  Outcome: Ongoing     Problem: Risk for Impaired Skin Integrity  Goal: Tissue integrity - skin and mucous membranes  Description  Structural intactness and normal physiological function of skin and  mucous membranes. Outcome: Ongoing   Specialty mattress in place. No signs of new skin breakdown. Will continue to monitor. Problem: Falls - Risk of:  Goal: Will remain free from falls  Description  Will remain free from falls  5/14/2019 2227 by Yuniel Oliveira RN  Outcome: Ongoing  Note:   Calls out appropriately. Non-skid socks on. Bed locked in lowest position. Bed alarm on. Call light/belongings within reach. Will continue to monitor.

## 2019-05-15 NOTE — PROGRESS NOTES
Hospitalist Progress Note      PCP: EMIGDIO MESSER PAGE    Date of Admission: 5/8/2019    Chief Complaint: Back pain    Hospital Course: Had surgery 5/12 for lumbar epidural abscess. Pain is uncontrolled. Persisting RLE pain althea knee-had a rcent fall and had a negative knee xray.   Wife a former RN at bedside  Tells me he feels confused-mentation better than a day prior-likely narcotic related      Medications:  Reviewed    Infusion Medications    dextrose       Scheduled Medications    sennosides-docusate sodium  2 tablet Oral BID    polyethylene glycol  17 g Oral Daily    lidocaine 1 % injection  5 mL Intradermal Once    sodium chloride flush  10 mL Intravenous 2 times per day    lidocaine 1 % injection  5 mL Intradermal Once    sodium chloride flush  10 mL Intravenous 2 times per day    methocarbamol  1,500 mg Oral TID    vancomycin  2,000 mg Intravenous Q12H    thiamine  100 mg Oral Daily    amLODIPine  2.5 mg Oral Daily    lisinopril  20 mg Oral Daily    cefTRIAXone (ROCEPHIN) IV  2 g Intravenous Q12H    tamsulosin  0.4 mg Oral Daily    atorvastatin  20 mg Oral Nightly    fenofibrate  160 mg Oral Daily    famotidine  20 mg Oral BID    insulin lispro  0-3 Units Subcutaneous Nightly    insulin lispro  0-6 Units Subcutaneous TID WC    enoxaparin  40 mg Subcutaneous Daily     PRN Meds: bisacodyl, sodium chloride flush, sodium chloride flush, diazepam, ondansetron, oxyCODONE-acetaminophen **OR** oxyCODONE-acetaminophen, HYDROmorphone **OR** HYDROmorphone, calcium carbonate, glucose, dextrose, glucagon (rDNA), dextrose, naloxone, magnesium hydroxide, acetaminophen      Intake/Output Summary (Last 24 hours) at 5/15/2019 1353  Last data filed at 5/15/2019 1325  Gross per 24 hour   Intake 580 ml   Output 2375 ml   Net -1795 ml       Physical Exam Performed:    /66   Pulse 88   Temp 98.2 °F (36.8 °C) (Oral)   Resp 16   Ht 6' 1\" (1.854 m)   Wt (!) 350 lb 1.5 oz (158.8 kg)   SpO2 92% BMI 46.19 kg/m²     General appearance: No apparent distress, appears stated age and cooperative. HEENT: Pupils equal, round, and reactive to light. Conjunctivae/corneas clear. Neck: Supple,  Respiratory:  Normal respiratory effort. Clear to auscultation  Cardiovascular: Regular rate and rhythm with normal S1/S2   Abdomen: Soft, non-tender, non-distended with normal bowel sounds. Musculoskeletal:   Legs are slightly weak, but no focal changes  Skin: Skin color, texture, turgor normal.  No rashes or lesions. Neurologic:  Neurovascularly intact/  non-focal.  Psychiatric: Alert and oriented, thought content appropriate, normal insight  Capillary Refill: Brisk,< 3 seconds   Peripheral Pulses: +2 palpable, equal bilaterally       Labs:   Recent Labs     05/13/19  1334 05/14/19  0635 05/15/19  0545   WBC 12.2* 13.2* 12.8*   HGB 10.0* 9.7* 10.0*   HCT 31.0* 30.3* 30.5*    301 339     Recent Labs     05/13/19  1400 05/14/19  0635 05/15/19  0545   * 134* 132*   K 4.0 3.9 3.9   CL 95* 95* 94*   CO2 23 26 28   BUN 21* 22* 20   CREATININE 0.7* 0.6* 0.6*   CALCIUM 8.6 8.7 8.4     Recent Labs     05/13/19  1400 05/14/19  0635 05/15/19  0545   AST 25 22 18   ALT 26 23 20   BILITOT 0.7 0.5 0.5   ALKPHOS 165* 110 100      Lab Results   Component Value Date    NITRU Negative 05/09/2019    WBCUA 3-5 05/09/2019    BACTERIA 1+ 05/09/2019    RBCUA 0-2 05/09/2019    BLOODU SMALL 05/09/2019    SPECGRAV 1.025 05/09/2019    GLUCOSEU >=1000 05/09/2019       Radiology:          Asessment/Plan:    MSSA bacteremia-mild elev in WBC today  Wound cx with MRSA and blood cultures with MSSA  ID eval appreciated-awaiting final cx result  Cont vancomycin +ceftriaxone   Follow up  [facility] - 5/12 with NGTD.   Plan on  PICC line today      Lumbar epidural abscess   Postoperative care per neurosurgery    Diabetes mellitus type II  sliding scale insulin and carb controlled diet    Anemia  Chronic illness most likely  Monitor and transfuse as indicated      Sepsis present on arrival   resolved. -on antibiotic treatment and s/p surgical intervention    Hyponatremia  Mild. Continue monitoring basic metabolic panel  Was on hydrochlorothiazide at home-held      Knee pain. Rt sided,s/p prior TKA  PT/OT  Consider MRI knee if pain persists and pt unable to bear weight    DVT Prophylaxis: Lovenox  Diet: DIET CARB CONTROL; Carb Control: 4 carb choices (60 gms)/meal  Code Status: Full Code    PT/OT Eval Status: Pending    Dispo - inpatient for now.   -skilled nursing facility when stable enough to be discharged.   ? In am    Himanshu Banerjee MD

## 2019-05-15 NOTE — PROCEDURES
PICC   PROCEDURE   NOTE      Chart reviewed for allergies, diagnosis, labs, known contraindications, reason for line placement and planned length of treatment. Insertion procedure discussed with patient/family member. Informed consent signed, noted  and on chart. Three patient identifiers - Patient name,   and MRN -  completed &  confirmed verbally. Time out performed with myself and KAMILLA Mercado. Hat, mask and eye shield donned. PICC site scrubbed with Chloraprep  One-Step applicator  for 30 seconds x 1. Hand Hygiene  performed with 3% Chlorhexidine surgical scrub x1 min prior to  Sterile gloves,   sterile gown being donned. Patient draped using maximal sterile barrier technique ( head to toe ). PICC site scrubbed a 2nd time with Chloraprep One-Step applicator x 30 sec. Modified Seldinger technique/ultrasound assisted and tip locating system utilized for insertion. 1% Lidocaine 5 ml injected interdermally pre-insertion. PICC tip location in the SVC confirmed by ECG technology Sherlock 3CG. Positive brisk blood return obtained from all lumens  and each lumen flushed with 10 mls  0.9% Sterile Sodium Chloride. All lumens flush easily with no resistance. Positive pressure valve placed on all lumens followed by Alcohol Swab Caps on end of each. Bio-patch in place. Catheter secured with non-sutured locking device per hospital protocol. Sterile Tegaderm applied over PICC site. Sterile field maintained during procedure. Guide wire and positioning wire accounted for post procedure? YES  Pt. Response to procedure tolerated well. Appearance of site: Clean dry and intact without bleeding or edema. All edges of Tegaderm occlusive. Site marked with date and initials of RN placing line. Teaching brochures given to pt/family. Bed placed in low position post-procedure, Top 2 side rails in up position, call button in reach, RN notified of all of the above.
sterile barriers used - cap, mask, sterile gown, sterile gloves, and large sterile sheet  Hand hygiene: hand hygiene performed prior to central venous catheter insertion  Location details: right internal jugular  Patient position: reverse Trendelenburg  Catheter type: triple lumen  Catheter size: 7 Fr  Pre-procedure: landmarks identified  Ultrasound guidance: yes  Sterile ultrasound techniques: sterile gel and sterile probe covers were used  Number of attempts: 1  Successful placement: yes  Post-procedure: line sutured and dressing applied  Assessment: blood return through all ports,  free fluid flow,  placement verified by x-ray and no pneumothorax on x-ray  Patient tolerance: Patient tolerated the procedure well with no immediate complications          Esther Almodovar MD  5/9/2019

## 2019-05-15 NOTE — PROGRESS NOTES
NEUROSURGERY PROGRESS NOTE    5/15/2019 5:46 PM                               Dom Wu                      LOS: 7 days   POD#3 s/p Washout    Subjective:  Patient continues to c/o incisional pain and pain in RLE (thigh), but sitting up in the chair for the first time since admission. Physical Exam:  Patient seen and examined    Vitals:    05/15/19 1515   BP: (!) 155/77   Pulse: 80   Resp: 16   Temp: 99.3 °F (37.4 °C)   SpO2: 93%     GCS:  4 - Opens eyes on own  5 - Alert and oriented  6 - Follows simple motor commands  General: Morbidly obese gentleman. Alert and cooperative in no acute distress.     HENT: atraumatic, neck supple  Eyes: Optic discs: Not tested  Pulmonary: unlabored respiratory effort  Cardiovascular:  Warm well perfused. No peripheral edema  Gastrointestinal: abdomen soft, NT, ND     Neurological:  Mental Status: Awake, alert, oriented x 4, speech clear and appropriate  Attention: Intact  Language: No aphasia or dysarthria noted  Sensation: Intact to all extremities to light touch  Coordination: Intact     Musculoskeletal:   Gait: Not tested   Assist devices: None   Tone: Normal  Motor strength: Hip flexor limited 2/2 pain    Right  Left      Right  Left    Deltoid  5 5   Hip Flex  4 4   Biceps  5 5   Knee Extensors  5 5   Triceps  5 5   Knee Flexors  5 5   Wrist Ext  5 5   Ankle Dorsiflex. 5 5   Wrist Flex  5 5   Ankle Plantarflex. 5 5   Handgrip  5 5   Ext Nixon Longus  5 5   Thumb Ext  5 5             Incision: Covered with dressing, which is CDI    Drain: 20/0/360= 380 mL (Possible documentation error on last entry)    Radiological Findings:  CT Lumbar w contrast  Result Date: 5/8/2019  1.  Laminectomy defects are seen throughout the lumbar spine. 2. Air and fluid containing collection seen in the dorsal paraspinal soft tissues of the laminectomy site measuring approximately 12 cm in craniocaudal dimension, 9.6 cm in AP dimension and 5.6 cm in transverse dimension.  Subtle rim

## 2019-05-15 NOTE — PROGRESS NOTES
Patient a/o x4. Vitals stable. Pain treated with oral and IV medication. hemovac in place. Voiding adequately. Will continue to monitor.

## 2019-05-15 NOTE — PROGRESS NOTES
ID Follow-up NOTE  Medical Student note - reviewed and modified, see Attending addendum at bottom    CC:   Lumbar SSI / infection, S aureus bacteremia  Antibiotics: vancomycin & ceftriaxone    Admit Date: 5/8/2019  Hospital Day: 8    Subjective:     POD#2 I&D    Patient complains of pain in his lower back and leg. He reports some chills but denies f/n/v.    Objective:     Patient Vitals for the past 8 hrs:   BP Temp Temp src Pulse Resp SpO2   05/15/19 1115 132/66 98.2 °F (36.8 °C) Oral 88 16 92 %   05/15/19 0715 (!) 151/69 98.4 °F (36.9 °C) Oral 80 16 97 %     I/O last 3 completed shifts: In: 840 [P.O.:840]  Out: 2105 [Urine:1725; Drains:380]  I/O this shift:  In: -   Out: 800 [Urine:800]    EXAM:  GENERAL:      No apparent distress. Obese.    HEENT:           Membranes moist, no oral lesion  NECK:             Supple  LUNGS:           Clear b/l, no rales, no dullness  CARDIAC:       RRR, no murmur appreciated  ABD:                + BS, soft / NT  EXT:                No rash, no edema, no lesions  Wound:           dressing in C/D/I with bloody serous drainage in the drain  NEURO:          moving all extremities without difficulty  PSYCH:           Orientation, sensorium, mood normal  LINES:             Peripheral iv      Data Review:  Lab Results   Component Value Date    WBC 12.8 (H) 05/15/2019    HGB 10.0 (L) 05/15/2019    HCT 30.5 (L) 05/15/2019    MCV 87.1 05/15/2019     05/15/2019     Lab Results   Component Value Date    CREATININE 0.6 (L) 05/15/2019    BUN 20 05/15/2019     (L) 05/15/2019    K 3.9 05/15/2019    CL 94 (L) 05/15/2019    CO2 28 05/15/2019       Hepatic Function Panel:   Lab Results   Component Value Date    ALKPHOS 100 05/15/2019    ALT 20 05/15/2019    AST 18 05/15/2019    PROT 6.0 05/15/2019    BILITOT 0.5 05/15/2019    LABALBU 2.9 05/15/2019       MICRO:  5/8/2019 - BC X 2 - PCR +Staph aureus, Staph Aureus    Staphylococcus aureus (2)   Antibiotic Interpretation KASSY Status ceFAZolin Sensitive <=2 mcg/mL     clindamycin Resistant <=0.5 mcg/mL     erythromycin Resistant >4 mcg/mL     oxacillin Sensitive 0.5 mcg/mL     tetracycline Sensitive <=0.5 mcg/mL     trimethoprim-sulfamethoxazole Sensitive <=0.5/9.5 mcg/mL           5/9/2019 - Wound Culture - 3+ GPC on GS; moderate MRSA growth  Staph aureus mrsa (1)     Antibiotic Interpretation KASSY Status    ceFAZolin Resistant <=4 mcg/mL     ciprofloxacin Resistant >2 mcg/mL     clindamycin Resistant <=0.5 mcg/mL     erythromycin Resistant >4 mcg/mL     oxacillin Resistant >2 mcg/mL     tetracycline Resistant >8 mcg/mL     trimethoprim-sulfamethoxazole Sensitive <=0.5/9.5 mcg/mL     vancomycin Sensitive 2 mcg/mL       5/12/19 - Surgical Cultures: 1+ WBC, 1+ GPC on GS; heavy staph aureus growth  Staphylococcus aureus (1)     Antibiotic Interpretation KASSY Status    benzylpenicillin Resistant >=0.5 mcg/mL     ciprofloxacin Sensitive <=0.5 mcg/mL     clindamycin Resistant       erythromycin Resistant       oxacillin Sensitive 0.5 mcg/mL     tetracycline Sensitive <=1 mcg/mL     trimethoprim-sulfamethoxazole Sensitive <=10 mcg/mL         5/12/19 - BC X2: NGTD    IMAGING:    CT Lumbar (5/8/2019)  FINDINGS:    CT LUMBAR SPINE:     Normal lumbar lordosis. Vertebral body heights are maintained. Multilevel intervertebral disc height loss with subchondral sclerosis and vacuum gas in the lumbar spine most prominent at L2-L3. Anterolisthesis of L5 on S1 by 4 mm. Laminectomy defects are seen throughout the lumbar spine. Multilevel severe narrowing of the central canal and neural foramina. Air and fluid containing collection seen in the dorsal paraspinal soft tissues of the laminectomy site measuring approximately 12 cm in craniocaudal dimension, 9.6 cm in AP dimension and 5.6 cm in transverse dimension. Subtle rim enhancement seen. There is associated fat stranding and subcutaneous edema.        MRI Lumbar (5/9/2019)  Impression   1.  Posterior fluid collection along the laminectomy bed of unclear significance. The fluid collection exerts complex layering signal with gas bubbles and peripheral enhancement. This may or present a seroma or postoperative hematoma. An infected fluid    collection be difficult to exclude. A pseudomeningocele is not excluded. The fluid collection extensively laminectomy bed without significant mass effect. 2. Multifactorial multilevel mild canal narrowing as described in part related to extensive facet arthropathy resulting in a narrowing of the canal from a lateral dimension. 3. Tortuous bunched nerve roots in the upper cauda equina surrounding the conus presumably related to chronic stenosis. 4. Multifactorial multilevel foraminal narrowing with moderate or severe foraminal narrowing from L1-L2 through L5-S1.   5. Diffuse circumferential epidural enhancement without discrete epidural collection extending throughout the laminectomy site which may be reactive in nature. Infection would be difficult to exclude. 6. No convincing evidence of osteomyelitis or discitis. 7. Large posterior central disc protrusion at L5-S1 resulting in a moderate canal narrowing and mild/moderate impingement upon the exiting S1 nerve roots bilaterally.        Scheduled Meds:   sennosides-docusate sodium  2 tablet Oral BID    polyethylene glycol  17 g Oral Daily    lidocaine 1 % injection  5 mL Intradermal Once    sodium chloride flush  10 mL Intravenous 2 times per day    methocarbamol  1,500 mg Oral TID    vancomycin  2,000 mg Intravenous Q12H    thiamine  100 mg Oral Daily    amLODIPine  2.5 mg Oral Daily    lisinopril  20 mg Oral Daily    cefTRIAXone (ROCEPHIN) IV  2 g Intravenous Q12H    tamsulosin  0.4 mg Oral Daily    atorvastatin  20 mg Oral Nightly    fenofibrate  160 mg Oral Daily    famotidine  20 mg Oral BID    insulin lispro  0-3 Units Subcutaneous Nightly    insulin lispro  0-6 Units Subcutaneous TID     enoxaparin 40 mg Subcutaneous Daily       Continuous Infusions:   dextrose         PRN Meds:  bisacodyl, sodium chloride flush, diazepam, ondansetron, oxyCODONE-acetaminophen **OR** oxyCODONE-acetaminophen, HYDROmorphone **OR** HYDROmorphone, calcium carbonate, glucose, dextrose, glucagon (rDNA), dextrose, naloxone, magnesium hydroxide, acetaminophen      Assessment:   67 yo w/ DM, HTN, HLD, GERD and DDD. Pt underwent lumbar decompression w/ Dr. Vesta Hart that he tolerated well without any surgical site issues on 4/10/2019. He presented with septic shock on 5/8/2019, requiring resuscitation with fluids and levophed for blood pressure control. He has remained afebrile, with leukocytosis of 12.8 today. ESR of 61 and CRP of 190 on admission. CT showed fluid collection that self-drained with purulent to now serous drainage at the surgical site with wound dishesence. MRI shows fluid collection. He is on Vancomycin and Ceftriaxone. Wound Cultures/Blood Cultures are growing MRSA and MSSA. Patient is s/p wound washout w/ drain by NSY with purulent drainage per OP note. Plan:     - post surgical care per NSY  - continue vancomycin, ceftriaxone  - continue to follow wound cultures and blood cultures  - PICC line placement, no growth in repeat BC from 5/12 (last updated 5/13)      Discussed with Dr. Jennifer Miner MD.  Chiqui Bey     Addendum to Medical Student Progress note:  Pt seen,examined and evaluated. I have independently performed history, physical exam, lab and data review.  I have determined assessment and plan as documented by student (SARA Dawn).    67 yo man with obesity, DM, HTN, lumbar stenosis     4/19/19 - L 3 & 4 lumbar laminectomy, L3/4 & L4/5bilateral facetectomy , foraminotomies over L4 &5 nerve roots.       Pt had fall and injured R knee   Pt developed LBP over few days, increased over time.  Associated chills, nausea.   Presented to [facility] ED on 5/8 - afebrile, low BP, LA 3.  CT with air and fluid in ST / laminectomy site  ESR 61, Cr 1.7     Transferred and admitted to [facility] 5/8 - afebrile, WBC 11.4.  Started on vancomycin and zosyn.    MRI with fluid collection with gas and peripheral enhancement, see report  Seen by Neurosurg, may take to OR 5/11     Wound dehiscence on 5/9.  Wound cult with mod growth S aureus (MRSA - Micro will rerun)  Wound with purulent drainage     OR 5/12 - epidural abscess.  Per OR note, \"large amount of liquid pus', extended to deep surg space, epidural space. GS 1+GPC.  Surg cult + MSSA    PICC placed 5/15     IMP/  Lumbar SSI   OR 5/12  I&D to deep space / epidural abscess, surg cult MSSA (not MRSA)  S aureus / MSSA bacteremia     REC/  Cont ceftriaxone   D/c vancomycin    Will check results f/u BC - 5/12, no growth to date     Postop care per Neurosurg  See VALERI     Discussed with pt, RN  Mendel Alamin, MD     INFUSION ORDERS:  Ceftriaxone 2 gm iv q 12 through 6/26/19  - First dose given in hospital  - Disposition / date discharge - [facility] [phone]  - Check CBC w diff, CMP, ESR, CRP every Mon or [address] result to [phone]  - Call with antibiotic / infusion issues, [address] - any change in status, transfer out of facility or to hospital - call [phone]  - No f/u in outpatient ID office necessary.   F/u Neurosurg, Dr Real Persons

## 2019-05-16 ENCOUNTER — APPOINTMENT (OUTPATIENT)
Dept: VASCULAR LAB | Age: 69
DRG: 856 | End: 2019-05-16
Attending: FAMILY MEDICINE
Payer: MEDICARE

## 2019-05-16 ENCOUNTER — APPOINTMENT (OUTPATIENT)
Dept: GENERAL RADIOLOGY | Age: 69
DRG: 856 | End: 2019-05-16
Attending: FAMILY MEDICINE
Payer: MEDICARE

## 2019-05-16 LAB
A/G RATIO: 0.9 (ref 1.1–2.2)
ALBUMIN SERPL-MCNC: 3.1 G/DL (ref 3.4–5)
ALP BLD-CCNC: 110 U/L (ref 40–129)
ALT SERPL-CCNC: 21 U/L (ref 10–40)
ANION GAP SERPL CALCULATED.3IONS-SCNC: 14 MMOL/L (ref 3–16)
AST SERPL-CCNC: 21 U/L (ref 15–37)
BANDED NEUTROPHILS RELATIVE PERCENT: 1 % (ref 0–7)
BASOPHILS ABSOLUTE: 0 K/UL (ref 0–0.2)
BASOPHILS RELATIVE PERCENT: 0 %
BILIRUB SERPL-MCNC: 0.5 MG/DL (ref 0–1)
BUN BLDV-MCNC: 13 MG/DL (ref 7–20)
CALCIUM SERPL-MCNC: 8.6 MG/DL (ref 8.3–10.6)
CHLORIDE BLD-SCNC: 89 MMOL/L (ref 99–110)
CO2: 25 MMOL/L (ref 21–32)
CREAT SERPL-MCNC: 0.6 MG/DL (ref 0.8–1.3)
EOSINOPHILS ABSOLUTE: 0.2 K/UL (ref 0–0.6)
EOSINOPHILS RELATIVE PERCENT: 1 %
GFR AFRICAN AMERICAN: >60
GFR NON-AFRICAN AMERICAN: >60
GLOBULIN: 3.6 G/DL
GLUCOSE BLD-MCNC: 177 MG/DL (ref 70–99)
GLUCOSE BLD-MCNC: 182 MG/DL (ref 70–99)
GLUCOSE BLD-MCNC: 214 MG/DL (ref 70–99)
GLUCOSE BLD-MCNC: 230 MG/DL (ref 70–99)
GLUCOSE BLD-MCNC: 230 MG/DL (ref 70–99)
GRAM STAIN RESULT: ABNORMAL
HCT VFR BLD CALC: 33.2 % (ref 40.5–52.5)
HEMOGLOBIN: 10.7 G/DL (ref 13.5–17.5)
LYMPHOCYTES ABSOLUTE: 1.3 K/UL (ref 1–5.1)
LYMPHOCYTES RELATIVE PERCENT: 7 %
MCH RBC QN AUTO: 27.5 PG (ref 26–34)
MCHC RBC AUTO-ENTMCNC: 32.1 G/DL (ref 31–36)
MCV RBC AUTO: 85.6 FL (ref 80–100)
METAMYELOCYTES RELATIVE PERCENT: 2 %
MONOCYTES ABSOLUTE: 1.3 K/UL (ref 0–1.3)
MONOCYTES RELATIVE PERCENT: 7 %
NEUTROPHILS ABSOLUTE: 15.2 K/UL (ref 1.7–7.7)
NEUTROPHILS RELATIVE PERCENT: 82 %
ORGANISM: ABNORMAL
PDW BLD-RTO: 14.5 % (ref 12.4–15.4)
PERFORMED ON: ABNORMAL
PLATELET # BLD: 447 K/UL (ref 135–450)
PMV BLD AUTO: 8 FL (ref 5–10.5)
POLYCHROMASIA: ABNORMAL
POTASSIUM REFLEX MAGNESIUM: 4.1 MMOL/L (ref 3.5–5.1)
RBC # BLD: 3.88 M/UL (ref 4.2–5.9)
SODIUM BLD-SCNC: 128 MMOL/L (ref 136–145)
TOTAL PROTEIN: 6.7 G/DL (ref 6.4–8.2)
WBC # BLD: 17.9 K/UL (ref 4–11)
WOUND/ABSCESS: ABNORMAL
WOUND/ABSCESS: ABNORMAL

## 2019-05-16 PROCEDURE — 6360000002 HC RX W HCPCS: Performed by: NEUROLOGICAL SURGERY

## 2019-05-16 PROCEDURE — 74018 RADEX ABDOMEN 1 VIEW: CPT

## 2019-05-16 PROCEDURE — 2580000003 HC RX 258: Performed by: NURSE PRACTITIONER

## 2019-05-16 PROCEDURE — 80053 COMPREHEN METABOLIC PANEL: CPT

## 2019-05-16 PROCEDURE — 6370000000 HC RX 637 (ALT 250 FOR IP): Performed by: STUDENT IN AN ORGANIZED HEALTH CARE EDUCATION/TRAINING PROGRAM

## 2019-05-16 PROCEDURE — 97116 GAIT TRAINING THERAPY: CPT

## 2019-05-16 PROCEDURE — 2580000003 HC RX 258: Performed by: INTERNAL MEDICINE

## 2019-05-16 PROCEDURE — 6360000002 HC RX W HCPCS: Performed by: INTERNAL MEDICINE

## 2019-05-16 PROCEDURE — 93971 EXTREMITY STUDY: CPT

## 2019-05-16 PROCEDURE — 99233 SBSQ HOSP IP/OBS HIGH 50: CPT | Performed by: INTERNAL MEDICINE

## 2019-05-16 PROCEDURE — 6360000002 HC RX W HCPCS: Performed by: NURSE PRACTITIONER

## 2019-05-16 PROCEDURE — 97530 THERAPEUTIC ACTIVITIES: CPT

## 2019-05-16 PROCEDURE — 73502 X-RAY EXAM HIP UNI 2-3 VIEWS: CPT

## 2019-05-16 PROCEDURE — 6370000000 HC RX 637 (ALT 250 FOR IP): Performed by: INTERNAL MEDICINE

## 2019-05-16 PROCEDURE — 85025 COMPLETE CBC W/AUTO DIFF WBC: CPT

## 2019-05-16 PROCEDURE — 6360000002 HC RX W HCPCS: Performed by: STUDENT IN AN ORGANIZED HEALTH CARE EDUCATION/TRAINING PROGRAM

## 2019-05-16 PROCEDURE — 1200000000 HC SEMI PRIVATE

## 2019-05-16 PROCEDURE — 73560 X-RAY EXAM OF KNEE 1 OR 2: CPT

## 2019-05-16 PROCEDURE — 6370000000 HC RX 637 (ALT 250 FOR IP): Performed by: NURSE PRACTITIONER

## 2019-05-16 RX ADMIN — OXYCODONE HYDROCHLORIDE AND ACETAMINOPHEN 2 TABLET: 5; 325 TABLET ORAL at 21:02

## 2019-05-16 RX ADMIN — LISINOPRIL 20 MG: 20 TABLET ORAL at 08:25

## 2019-05-16 RX ADMIN — CEFTRIAXONE SODIUM 2 G: 2 INJECTION, POWDER, FOR SOLUTION INTRAMUSCULAR; INTRAVENOUS at 17:58

## 2019-05-16 RX ADMIN — Medication 100 MG: at 08:25

## 2019-05-16 RX ADMIN — ONDANSETRON 4 MG: 2 INJECTION INTRAMUSCULAR; INTRAVENOUS at 02:38

## 2019-05-16 RX ADMIN — FAMOTIDINE 20 MG: 20 TABLET ORAL at 08:25

## 2019-05-16 RX ADMIN — INSULIN LISPRO 1 UNITS: 100 INJECTION, SOLUTION INTRAVENOUS; SUBCUTANEOUS at 12:49

## 2019-05-16 RX ADMIN — ENOXAPARIN SODIUM 40 MG: 40 INJECTION SUBCUTANEOUS at 08:24

## 2019-05-16 RX ADMIN — INSULIN LISPRO 1 UNITS: 100 INJECTION, SOLUTION INTRAVENOUS; SUBCUTANEOUS at 22:09

## 2019-05-16 RX ADMIN — ATORVASTATIN CALCIUM 20 MG: 20 TABLET, FILM COATED ORAL at 21:03

## 2019-05-16 RX ADMIN — CEFTRIAXONE SODIUM 2 G: 2 INJECTION, POWDER, FOR SOLUTION INTRAMUSCULAR; INTRAVENOUS at 05:17

## 2019-05-16 RX ADMIN — OXYCODONE HYDROCHLORIDE AND ACETAMINOPHEN 2 TABLET: 5; 325 TABLET ORAL at 05:33

## 2019-05-16 RX ADMIN — POLYETHYLENE GLYCOL (3350) 17 G: 17 POWDER, FOR SOLUTION ORAL at 08:36

## 2019-05-16 RX ADMIN — INSULIN LISPRO 2 UNITS: 100 INJECTION, SOLUTION INTRAVENOUS; SUBCUTANEOUS at 17:59

## 2019-05-16 RX ADMIN — SENNOSIDES, DOCUSATE SODIUM 2 TABLET: 50; 8.6 TABLET, FILM COATED ORAL at 08:36

## 2019-05-16 RX ADMIN — TAMSULOSIN HYDROCHLORIDE 0.4 MG: 0.4 CAPSULE ORAL at 08:25

## 2019-05-16 RX ADMIN — METHOCARBAMOL TABLETS 1500 MG: 750 TABLET, COATED ORAL at 08:25

## 2019-05-16 RX ADMIN — FENOFIBRATE 160 MG: 160 TABLET ORAL at 08:25

## 2019-05-16 RX ADMIN — Medication 10 ML: at 09:00

## 2019-05-16 RX ADMIN — INSULIN LISPRO 2 UNITS: 100 INJECTION, SOLUTION INTRAVENOUS; SUBCUTANEOUS at 08:24

## 2019-05-16 RX ADMIN — METHOCARBAMOL TABLETS 1500 MG: 750 TABLET, COATED ORAL at 14:44

## 2019-05-16 RX ADMIN — HYDROMORPHONE HYDROCHLORIDE 1 MG: 1 INJECTION, SOLUTION INTRAMUSCULAR; INTRAVENOUS; SUBCUTANEOUS at 02:38

## 2019-05-16 RX ADMIN — VANCOMYCIN HYDROCHLORIDE 2000 MG: 10 INJECTION, POWDER, LYOPHILIZED, FOR SOLUTION INTRAVENOUS at 02:39

## 2019-05-16 RX ADMIN — METHOCARBAMOL TABLETS 1500 MG: 750 TABLET, COATED ORAL at 21:02

## 2019-05-16 RX ADMIN — DIAZEPAM 5 MG: 5 TABLET ORAL at 02:38

## 2019-05-16 RX ADMIN — FAMOTIDINE 20 MG: 20 TABLET ORAL at 21:02

## 2019-05-16 RX ADMIN — MAGNESIUM HYDROXIDE 30 ML: 400 SUSPENSION ORAL at 12:49

## 2019-05-16 RX ADMIN — ACETAMINOPHEN 650 MG: 325 TABLET ORAL at 12:49

## 2019-05-16 RX ADMIN — AMLODIPINE BESYLATE 2.5 MG: 2.5 TABLET ORAL at 08:25

## 2019-05-16 ASSESSMENT — PAIN SCALES - GENERAL
PAINLEVEL_OUTOF10: 5
PAINLEVEL_OUTOF10: 6
PAINLEVEL_OUTOF10: 6
PAINLEVEL_OUTOF10: 9
PAINLEVEL_OUTOF10: 6
PAINLEVEL_OUTOF10: 8
PAINLEVEL_OUTOF10: 8

## 2019-05-16 ASSESSMENT — PAIN DESCRIPTION - ONSET
ONSET: ON-GOING

## 2019-05-16 ASSESSMENT — PAIN DESCRIPTION - FREQUENCY
FREQUENCY: CONTINUOUS

## 2019-05-16 ASSESSMENT — PAIN DESCRIPTION - PAIN TYPE
TYPE: SURGICAL PAIN

## 2019-05-16 ASSESSMENT — PAIN DESCRIPTION - LOCATION
LOCATION: BACK;LEG
LOCATION: BACK;KNEE
LOCATION: BACK

## 2019-05-16 ASSESSMENT — PAIN DESCRIPTION - ORIENTATION
ORIENTATION: LOWER
ORIENTATION: LOWER
ORIENTATION: LOWER;LEFT

## 2019-05-16 ASSESSMENT — PAIN DESCRIPTION - PROGRESSION
CLINICAL_PROGRESSION: NOT CHANGED

## 2019-05-16 ASSESSMENT — PAIN DESCRIPTION - DESCRIPTORS
DESCRIPTORS: ACHING;CONSTANT
DESCRIPTORS: BURNING
DESCRIPTORS: ACHING;CONSTANT

## 2019-05-16 ASSESSMENT — PAIN - FUNCTIONAL ASSESSMENT
PAIN_FUNCTIONAL_ASSESSMENT: PREVENTS OR INTERFERES SOME ACTIVE ACTIVITIES AND ADLS

## 2019-05-16 NOTE — PROGRESS NOTES
mobility  Rolling to Left: Maximum assistance(for partial turn, then unable to complete)  Rolling to Right: Maximum assistance  Supine to Sit: Maximum assistance;2 Person assistance;Dependent/Total  Transfers  Sit to Stand: Maximum Assistance;Dependent/Total;2 Person Assistance  Stand to sit: Dependent/Total;Moderate Assistance;2 Person Assistance  Bed to Chair: Moderate assistance;2 Person Assistance;Dependent/Total  Ambulation  Ambulation?: Yes  Ambulation 1  Surface: level tile  Device: [address]: Moderate assistance;2 Person assistance;Dependent/Total  Quality of Gait: side stepped to the right with mod x 2, difficulty advancing LLE, pt started with fairly upright posture, leaned increasingly forward as he approached chair. Distance: 2 feet     Balance  Comments: Sat on EOB 10 min, initially required max assist to maintain, progressed to SBA       Education  Patient educated regarding safety with functional mobility, transfers and gait. Pt will benefit from additional reinforcement. Pt also educated regarding use of call light for safety with mobility. Pt expresses understanding. Assessment   Assessment: Pt remains below functional baseline and will benefit from continued therapies to maximize mobility and gait. Treatment Diagnosis: impaired functional mobility  Prognosis: Good  Patient Education: role of PT, use of call light, d/c planning; pt demonstrates good understanding. REQUIRES PT FOLLOW UP: Yes  Activity Tolerance  Activity Tolerance: Patient limited by pain; Patient limited by fatigue       AM-PAC Score  AM-PAC Inpatient Mobility Raw Score : 11  AM-PAC Inpatient T-Scale Score : 33.86  Mobility Inpatient CMS 0-100% Score: 72.57  Mobility Inpatient CMS G-Code Modifier : CL          Goals  Short term goals  Time Frame for Short term goals: discharge  Short term goal 1: Pt will transfer supine <--> sit with mod A x 2  MET 5/14  New goal: Pt will transfer supine <--> sit with CGA  Short term goal 2: Pt will participate in sit <--> stand transfer and achieve full or near full stance  MET 5/14  New goal: Pt will transfer sit <--> stand with mod A x 2  Short term goal 3: Pt will participate in LE HEP  Short term goal 4: Pt will transfer bed <--> chair with mod A x 2  Patient Goals   Patient goals : eventually return home    Plan    Plan  Times per week: 2-5  Current Treatment Recommendations: Strengthening, Gait Training, Patient/Caregiver Education & Training, Functional Mobility Training, Balance Training, Transfer Training, Equipment Evaluation, Education, & procurement, Home Exercise Program  Safety Devices  Type of devices: Nurse notified, Gait belt, Call light within reach, Left in chair, Chair alarm in place     Therapy Time   Individual Concurrent Group Co-treatment   Time In 1412         Time Out 1452         Minutes 40               Timed Code Treatment Minutes:    40  Total Treatment Minutes:  [place], [id number]     This note will serve as a discharge summary in the event that the patient is discharged prior to the next treatment session.

## 2019-05-16 NOTE — PROGRESS NOTES
Progress Note  Medical Student MS IV    Hospital Day: 9                                                         Code:Full Code  Admit Date: 5/8/2019                                 PCP: EMIGDIO HARRELL               Daily Plan:  5/16/2019    Subjective:   68 y.o. male with PMHx of HTN, HLD, DM, Chronic neurogenic claudication, peripheral neuropathy, S/P laminectomy 04/19./2019 P/W Hx back pain of 2 days duration, patient had surgery to his back 04/19/2019 he was doing good till 5/6, when he started having lower back pain, constant, that radiated to his left hip. Pt was seen today at bedside and states that pain is now rated at a 7/10 and radiates into his left leg. Pt states that has been having some nausea after taking his medications. Pt denies any fevers, chills, sob, or any other symptoms. MEDICATIONS:   Scheduled Meds:   SMOG Enema   Rectal Once    sennosides-docusate sodium  2 tablet Oral BID    polyethylene glycol  17 g Oral Daily    lidocaine 1 % injection  5 mL Intradermal Once    sodium chloride flush  10 mL Intravenous 2 times per day    methocarbamol  1,500 mg Oral TID    thiamine  100 mg Oral Daily    amLODIPine  2.5 mg Oral Daily    lisinopril  20 mg Oral Daily    cefTRIAXone (ROCEPHIN) IV  2 g Intravenous Q12H    tamsulosin  0.4 mg Oral Daily    atorvastatin  20 mg Oral Nightly    fenofibrate  160 mg Oral Daily    famotidine  20 mg Oral BID    insulin lispro  0-3 Units Subcutaneous Nightly    insulin lispro  0-6 Units Subcutaneous TID WC    enoxaparin  40 mg Subcutaneous Daily      Continuous Infusions:   dextrose       PRN Meds:bisacodyl, sodium chloride flush, diazepam, ondansetron, oxyCODONE-acetaminophen **OR** oxyCODONE-acetaminophen, HYDROmorphone **OR** HYDROmorphone, calcium carbonate, glucose, dextrose, glucagon (rDNA), dextrose, naloxone, magnesium hydroxide, acetaminophen    Allergies:    Allergies   Allergen Reactions    Morphine Other (See Comments)     Doesn't work epidural collection extending throughout the laminectomy site which may be reactive in nature. Infection would be difficult to exclude. 6. No convincing evidence of osteomyelitis or discitis. 7. Large posterior central disc protrusion at L5-S1 resulting in a moderate canal narrowing and mild/moderate impingement upon the exiting S1 nerve roots bilaterally. XR Eye Foreign Body   Final Result      No foreign body large or opaque enough to be identified radiographically. XR CHEST PORTABLE   Final Result   Impression: No acute abnormality or significant change. XR CHEST PORTABLE   Final Result       Right jugular central venous catheter appears to be in place. No pneumothorax. Airspace opacity over right upper lung zone, may represent pneumonia,    artifact versus underlying mass lesion. Followup to resolution is    recommended. Comparison with prior studies, especially recent chest CT    may help to clarify                ASSESSMENT AND PLAN:   MSSA bacteremia-mild elev in WBC today (17.9)  Wound cx with MRSA and blood cultures with MSSA  ID eval appreciated-awaiting final cx result  Cont IV vancomycin +ceftriaxone   Follow up  [facility] - 5/12 with NGTD.       Lumbar epidural abscess   Postoperative care per neurosurgery, they recommend neurochecks q4h, continue robaxin, PRN percocet, and PT/OT     Diabetes mellitus type II  sliding scale insulin and carb controlled diet, will continue to monitor as glucose was at 230 today     Anemia  Chronic illness most likely, stable at 10.7  Monitor and transfuse as indicated        Sepsis present on arrival   resolved. -on antibiotic treatment and s/p surgical intervention     Hyponatremia  Mild. Continue monitoring basic metabolic panel  Was on hydrochlorothiazide at home-held     HTN  Continue home meds     Knee pain.   Rt sided,s/p prior TKA  PT/OT  Consider MRI knee if pain persists and pt unable to bear weight     DVT Prophylaxis: Lovenox  Diet:

## 2019-05-16 NOTE — PROGRESS NOTES
ID Follow-up NOTE  Medical Student note - reviewed and modified, see Attending addendum at bottom    CC:   Lumbar SSI / infection, S aureus bacteremia  Antibiotics: vancomycin & ceftriaxone    Admit Date: 5/8/2019  Hospital Day: 9    Subjective:     Patient reports he was nauseated last night but no vomiting, zofran has helped. Central Line was removed yesterday and PICC line on right arm. He denies f/c.    Objective:     Patient Vitals for the past 8 hrs:   BP Temp Temp src Pulse Resp SpO2   05/16/19 0745 (!) 157/83 98.2 °F (36.8 °C) Oral 86 18 92 %   05/16/19 0430 (!) 145/70 98.5 °F (36.9 °C) Oral 95 20 92 %     I/O last 3 completed shifts: In: 960 [P.O.:960]  Out: 3200 [Urine:3125; Drains:75]  No intake/output data recorded. EXAM:  GENERAL:      No apparent distress. Obese.    HEENT:           Membranes moist, no oral lesion  NECK:             Supple  LUNGS:           Clear b/l, no rales, no dullness  CARDIAC:       RRR, no murmur appreciated  ABD:                + BS, soft / NT  EXT:                No rash, no edema, no lesions  Wound:           dressing in C/D/I with bloody serous drainage in the drain  NEURO:          moving all extremities without difficulty  PSYCH:           Orientation, sensorium, mood normal  LINES:             Peripheral iv      Data Review:  Lab Results   Component Value Date    WBC 17.9 (H) 05/16/2019    HGB 10.7 (L) 05/16/2019    HCT 33.2 (L) 05/16/2019    MCV 85.6 05/16/2019     05/16/2019     Lab Results   Component Value Date    CREATININE 0.6 (L) 05/16/2019    BUN 13 05/16/2019     (L) 05/16/2019    K 4.1 05/16/2019    CL 89 (L) 05/16/2019    CO2 25 05/16/2019       Hepatic Function Panel:   Lab Results   Component Value Date    ALKPHOS 110 05/16/2019    ALT 21 05/16/2019    AST 21 05/16/2019    PROT 6.7 05/16/2019    BILITOT 0.5 05/16/2019    LABALBU 3.1 05/16/2019       MICRO:  5/8/2019 - BC X 2 - PCR +Staph aureus, Staph Aureus    Staphylococcus aureus (2) Antibiotic Interpretation KASSY Status    ceFAZolin Sensitive <=2 mcg/mL     clindamycin Resistant <=0.5 mcg/mL     erythromycin Resistant >4 mcg/mL     oxacillin Sensitive 0.5 mcg/mL     tetracycline Sensitive <=0.5 mcg/mL     trimethoprim-sulfamethoxazole Sensitive <=0.5/9.5 mcg/mL           5/9/2019 - Wound Culture - 3+ GPC on GS; moderate MRSA growth  Staph aureus mrsa (1)     Antibiotic Interpretation KASSY Status    ceFAZolin Resistant <=4 mcg/mL     ciprofloxacin Resistant >2 mcg/mL     clindamycin Resistant <=0.5 mcg/mL     erythromycin Resistant >4 mcg/mL     oxacillin Resistant >2 mcg/mL     tetracycline Resistant >8 mcg/mL     trimethoprim-sulfamethoxazole Sensitive <=0.5/9.5 mcg/mL     vancomycin Sensitive 2 mcg/mL       5/12/19 - Surgical Cultures: 1+ WBC, 1+ GPC on GS; heavy staph aureus growth  Staphylococcus aureus (1)     Antibiotic Interpretation KASSY Status    benzylpenicillin Resistant >=0.5 mcg/mL     ciprofloxacin Sensitive <=0.5 mcg/mL     clindamycin Resistant       erythromycin Resistant       oxacillin Sensitive 0.5 mcg/mL     tetracycline Sensitive <=1 mcg/mL     trimethoprim-sulfamethoxazole Sensitive <=10 mcg/mL         5/12/19 - BC X2: NGTD    IMAGING:    CT Lumbar (5/8/2019)  FINDINGS:    CT LUMBAR SPINE:     Normal lumbar lordosis. Vertebral body heights are maintained. Multilevel intervertebral disc height loss with subchondral sclerosis and vacuum gas in the lumbar spine most prominent at L2-L3. Anterolisthesis of L5 on S1 by 4 mm. Laminectomy defects are seen throughout the lumbar spine. Multilevel severe narrowing of the central canal and neural foramina. Air and fluid containing collection seen in the dorsal paraspinal soft tissues of the laminectomy site measuring approximately 12 cm in craniocaudal dimension, 9.6 cm in AP dimension and 5.6 cm in transverse dimension. Subtle rim enhancement seen.  There is associated fat stranding and subcutaneous edema.        MRI Lumbar (5/9/2019)  Impression   1. Posterior fluid collection along the laminectomy bed of unclear significance. The fluid collection exerts complex layering signal with gas bubbles and peripheral enhancement. This may or present a seroma or postoperative hematoma. An infected fluid    collection be difficult to exclude. A pseudomeningocele is not excluded. The fluid collection extensively laminectomy bed without significant mass effect. 2. Multifactorial multilevel mild canal narrowing as described in part related to extensive facet arthropathy resulting in a narrowing of the canal from a lateral dimension. 3. Tortuous bunched nerve roots in the upper cauda equina surrounding the conus presumably related to chronic stenosis. 4. Multifactorial multilevel foraminal narrowing with moderate or severe foraminal narrowing from L1-L2 through L5-S1.   5. Diffuse circumferential epidural enhancement without discrete epidural collection extending throughout the laminectomy site which may be reactive in nature. Infection would be difficult to exclude. 6. No convincing evidence of osteomyelitis or discitis. 7. Large posterior central disc protrusion at L5-S1 resulting in a moderate canal narrowing and mild/moderate impingement upon the exiting S1 nerve roots bilaterally.        Scheduled Meds:   sennosides-docusate sodium  2 tablet Oral BID    polyethylene glycol  17 g Oral Daily    lidocaine 1 % injection  5 mL Intradermal Once    sodium chloride flush  10 mL Intravenous 2 times per day    methocarbamol  1,500 mg Oral TID    vancomycin  2,000 mg Intravenous Q12H    thiamine  100 mg Oral Daily    amLODIPine  2.5 mg Oral Daily    lisinopril  20 mg Oral Daily    cefTRIAXone (ROCEPHIN) IV  2 g Intravenous Q12H    tamsulosin  0.4 mg Oral Daily    atorvastatin  20 mg Oral Nightly    fenofibrate  160 mg Oral Daily    famotidine  20 mg Oral BID    insulin lispro  0-3 Units Subcutaneous Nightly    insulin lispro  0-6 Units Subcutaneous TID WC    enoxaparin  40 mg Subcutaneous Daily       Continuous Infusions:   dextrose         PRN Meds:  bisacodyl, sodium chloride flush, diazepam, ondansetron, oxyCODONE-acetaminophen **OR** oxyCODONE-acetaminophen, HYDROmorphone **OR** HYDROmorphone, calcium carbonate, glucose, dextrose, glucagon (rDNA), dextrose, naloxone, magnesium hydroxide, acetaminophen      Assessment:   69 yo w/ DM, HTN, HLD, GERD and DDD. Pt underwent lumbar decompression w/ Dr. Amos Many that he tolerated well without any surgical site issues on 4/10/2019. He presented with septic shock on 5/8/2019, requiring resuscitation with fluids and levophed for blood pressure control. He has remained afebrile, with leukocytosis of 17.9 today. ESR of 61 and CRP of 190 on admission. CT showed fluid collection that self-drained with purulent to now serous drainage at the surgical site with wound dishesence. MRI shows fluid collection. He is on Vancomycin and Ceftriaxone. Wound Cultures/Blood Cultures are growing MRSA and MSSA. Patient is s/p wound washout w/ drain by NSY with purulent drainage per OP note. Plan:     - post surgical care per NSY  - continue ceftriaxone, d/c vancomycin, home on ceftriaxone  - continue to follow wound cultures and blood cultures  - PICC line placed     Discussed with Dr. Yolanda Stephens MD.  Darian Fernando     Addendum to Medical Student Progress note:  Pt seen,examined and evaluated. I have independently performed history, physical exam, lab and data review.  I have determined assessment and plan as documented by student (SARA Dawn).    69 yo man with obesity, DM, HTN, lumbar stenosis     4/19/19 - L 3 & 4 lumbar laminectomy, L3/4 & L4/5bilateral facetectomy , foraminotomies over L4 &5 nerve roots.       Pt had fall and injured R knee   Pt developed LBP over few days, increased over time.  Associated chills, nausea.   Presented to [facility] ED on 5/8 - afebrile, low BP, LA 3.  CT with air and fluid in ST / laminectomy site  ESR 61, Cr 1.7     Transferred and admitted to [facility] 5/8 - afebrile, WBC 11.4.  Started on vancomycin and zosyn.    MRI with fluid collection with gas and peripheral enhancement, see report  Seen by Neurosurg, may take to OR 5/11     Wound dehiscence on 5/9.  Wound cult with mod growth S aureus (MRSA - Micro will rerun)  Wound with purulent drainage     OR 5/12 - epidural abscess.  Per OR note, \"large amount of liquid pus', extended to deep surg space, epidural space. GS 1+GPC.  Surg cult + MSSA     PICC placed 5/15     IMP/  Lumbar SSI   OR 5/12  I&D to deep space / epidural abscess, surg cult MSSA (not MRSA)  S aureus / MSSA bacteremia  Leukocytosis - WBC up today     REC/  Cont ceftriaxone   D/c vancomycin     Will check results f/u BC - 5/12, no growth to date  Micro is repeating cult ID / sens from isolate 5/9     Postop care per Neurosurg - still has drain in place  See Replaced by [facility]     Discussed with pt, kit Villalobos MD     INFUSION ORDERS:  Ceftriaxone 2 gm iv q 12 through 6/26/19  - First dose given in hospital  - Disposition / date discharge - [facility] [place] [phone]  - Check CBC w diff, CMP, ESR, CRP every Mon or [place] 64 result to [phone]  - Call with antibiotic / infusion issues, [phone]  - Facility - any change in status, transfer out of facility or to hospital - call [phone]  - No f/u in outpatient ID office necessary.   F/u Neurosurg, Dr Quintin Ibarra

## 2019-05-16 NOTE — PROGRESS NOTES
NEUROSURGERY PROGRESS NOTE    5/16/2019 8:55 AM                               Dom Wu                      LOS: 8 days   POD#4 s/p Washout    Subjective:  Patient continues to c/o incisional pain and pain in RLE (thigh). Patient has not had a bowel movement since prior to admission. Will check KUB and then order enema if no obstruction. Physical Exam:  Patient seen and examined    Vitals:    05/16/19 0745   BP: (!) 157/83   Pulse: 86   Resp: 18   Temp: 98.2 °F (36.8 °C)   SpO2: 92%     GCS:  4 - Opens eyes on own  5 - Alert and oriented  6 - Follows simple motor commands  General: Morbidly obese gentleman. Alert and cooperative in no acute distress.     HENT: atraumatic, neck supple  Eyes: Optic discs: Not tested  Pulmonary: unlabored respiratory effort  Cardiovascular:  Warm well perfused. No peripheral edema  Gastrointestinal: abdomen soft, NT, ND     Neurological:  Mental Status: Awake, alert, oriented x 4, speech clear and appropriate  Attention: Intact  Language: No aphasia or dysarthria noted  Sensation: Intact to all extremities to light touch  Coordination: Intact     Musculoskeletal:   Gait: Not tested   Assist devices: None   Tone: Normal  Motor strength: Hip flexor limited 2/2 pain    Right  Left      Right  Left    Deltoid  5 5   Hip Flex  4 4   Biceps  5 5   Knee Extensors  5 5   Triceps  5 5   Knee Flexors  5 5   Wrist Ext  5 5   Ankle Dorsiflex. 5 5   Wrist Flex  5 5   Ankle Plantarflex. 5 5   Handgrip  5 5   Ext Nixon Longus  5 5   Thumb Ext  5 5             Incision: Covered with dressing, which is CDI    Drain: 40/35/0= 75 mL    Radiological Findings:  CT Lumbar w contrast  Result Date: 5/8/2019  1.  Laminectomy defects are seen throughout the lumbar spine. 2. Air and fluid containing collection seen in the dorsal paraspinal soft tissues of the laminectomy site measuring approximately 12 cm in craniocaudal dimension, 9.6 cm in AP dimension and 5.6 cm in transverse dimension.  Subtle rim and plan.      Electronically signed by: KALEB Lopez-CNP, 5/16/2019 8:55 AM  [phone]

## 2019-05-16 NOTE — PROGRESS NOTES
VSS. Patient had fever of 101. Gave Tylenol and notified MD. Rechecked Temp and it was 98.2. Patient has had numerous loose bowel movements. Patient said lower back pain is improving. Patient tolerating diet and liquids. Fall precautions in place and call light within reach. Will continue to monitor.

## 2019-05-16 NOTE — PROGRESS NOTES
Occupational Therapy  Facility/Department: [facility] 5T ORTHO/NEURO  Daily Treatment Note  NAME: Chao Araya  : 1950  MRN: [id number]    Date of Service: 2019    Discharge Recommendations:Dom Wu scored a 13/24 on the AM-PAC ADL Inpatient form. Current research shows that an AM-PAC score of 17 or less is typically not associated with a discharge to the patient's home setting. Based on the patients AM-PAC score and their current ADL deficits, it is recommended that the patient have 3-5 sessions per week of Occupational Therapy at d/c to increase the patients independence. OT Equipment Recommendations  Equipment Needed: No  Other: defer to [facility]     Assessment   Performance deficits / Impairments: Decreased functional mobility ; Decreased ADL status; Decreased cognition;Decreased safe awareness;Decreased endurance;Decreased strength;Decreased sensation;Decreased balance;Decreased high-level IADLs;Decreased ROM  Assessment: Pt demo increased alertness this date. Pt able to follow directives with less cues/ encouragment. Pt continues to demo some self limiting behaviors. Pt needing ongoing encouragement to maximize functional level. Pt would benefit from ongoin OT at as inpt at d/c  / goals met. Treatment Diagnosis: impaired ADLs /functional transfers /decreased endurance 2/2 back infection/ sepsis   Prognosis: Fair  REQUIRES OT FOLLOW UP: Yes  Activity Tolerance  Activity Tolerance: Patient limited by fatigue  Activity Tolerance: Pt with continues self limiting behaviors. Needing Max encouragement to try increasing activity, Pt with improved mentation   Safety Devices  Safety Devices in place: Yes  Type of devices: Nurse notified;Call light within reach;Gait belt; Chair alarm in place; Left in chair         Patient Diagnosis(es): The encounter diagnosis was Sepsis, due to unspecified organism [facility]).       has a past medical history of Arthritis, Back pain, DDD (degenerative disc disease), 1  Activity: functional transfers /stance with walker   Functional Mobility  Functional - Mobility Device: Rolling Walker  Activity: Other  Assist Level: Dependent/Total(Mod A x 2 )  Functional Mobility Comments: 3-4 steps to chair - shuffled steps   Toilet Transfers  Toilet - Technique: Stand step  Equipment Used: Extra wide bedside commode  Toilet Transfer: Dependent/Total;2 Person assistance;Supervision  Toilet Transfers Comments: simulated to chair   Bed mobility  Rolling to Left: Maximum assistance(for partial turn, then unable to complete)  Rolling to Right: Maximum assistance  Supine to Sit: Maximum assistance;2 Person assistance;Dependent/Total  Transfers  Sit to stand: Dependent/Total;2 Person assistance(Max A x 2 )  Stand to sit: (Max A x 2 )  Transfer Comments: Pt able to stand on 3rd and fourth attempt. Pt needing cues and mod encouragement     Cognition  Overall Cognitive Status: WFL(increased mentation- pt needing redirection at times. some flight of ideas.  )     Plan  This note will serve as a discharge summary if patient is discharged from hospital before next treatment session. Plan  Times per week: 2-5x  Times per day: Daily  Current Treatment Recommendations: Functional Mobility Training, Endurance Training, Equipment Evaluation, Education, & procurement, Safety Education & Training, Patient/Caregiver Education & Training, Self-Care / ADL    AM-PAC Score  AM-[facility] Inpatient Daily Activity Raw Score: 13  AM-PAC Inpatient ADL T-Scale Score : 32.03  ADL Inpatient CMS 0-100% Score: 63.03  ADL Inpatient CMS G-Code Modifier : CL    Goals  Short term goals  Time Frame for Short term goals: at [employer]   Short term goal 1: Sit at EOB x 7 mins with Supervision for ADLs  - not met   Short term goal 2: Grooming with setup - not met   Short term goal 3: Sit to stand with Mod A x 2 in prep for functional tranfers.  not met   Short term goal 4: Oriented to environment 3/4 attempts  - met   Patient Goals   Patient goals : Go home, return to PLOF     Therapy Time   Individual Concurrent Group Co-treatment   Time In 1413         Time Out 1453         Minutes 40            Timed Code Treatment Minutes:  40 mins     Total Treatment Minutes:  40 mins       Warden Schmitt OT

## 2019-05-17 ENCOUNTER — HOSPITAL ENCOUNTER (INPATIENT)
Age: 69
LOS: 11 days | Discharge: ANOTHER HOSPITAL | DRG: 559 | End: 2019-05-28
Attending: PHYSICAL MEDICINE & REHABILITATION | Admitting: PHYSICAL MEDICINE & REHABILITATION
Payer: MEDICARE

## 2019-05-17 VITALS
DIASTOLIC BLOOD PRESSURE: 69 MMHG | BODY MASS INDEX: 41.75 KG/M2 | OXYGEN SATURATION: 92 % | SYSTOLIC BLOOD PRESSURE: 144 MMHG | TEMPERATURE: 98.9 F | HEART RATE: 96 BPM | RESPIRATION RATE: 18 BRPM | HEIGHT: 73 IN | WEIGHT: 315 LBS

## 2019-05-17 DIAGNOSIS — A49.01 STAPHYLOCOCCUS AUREUS INFECTION: Primary | ICD-10-CM

## 2019-05-17 PROBLEM — R53.81 DEBILITY: Status: ACTIVE | Noted: 2019-05-17

## 2019-05-17 LAB
A/G RATIO: 0.8 (ref 1.1–2.2)
ALBUMIN SERPL-MCNC: 2.9 G/DL (ref 3.4–5)
ALP BLD-CCNC: 92 U/L (ref 40–129)
ALT SERPL-CCNC: 19 U/L (ref 10–40)
ANAEROBIC CULTURE: ABNORMAL
ANION GAP SERPL CALCULATED.3IONS-SCNC: 15 MMOL/L (ref 3–16)
AST SERPL-CCNC: 22 U/L (ref 15–37)
BANDED NEUTROPHILS RELATIVE PERCENT: 1 % (ref 0–7)
BASOPHILS ABSOLUTE: 0 K/UL (ref 0–0.2)
BASOPHILS RELATIVE PERCENT: 0 %
BILIRUB SERPL-MCNC: 0.5 MG/DL (ref 0–1)
BLOOD CULTURE, ROUTINE: NORMAL
BUN BLDV-MCNC: 14 MG/DL (ref 7–20)
CALCIUM SERPL-MCNC: 8.3 MG/DL (ref 8.3–10.6)
CHLORIDE BLD-SCNC: 88 MMOL/L (ref 99–110)
CO2: 24 MMOL/L (ref 21–32)
CREAT SERPL-MCNC: 0.6 MG/DL (ref 0.8–1.3)
CULTURE SURGICAL: ABNORMAL
CULTURE, BLOOD 2: NORMAL
EOSINOPHILS ABSOLUTE: 0 K/UL (ref 0–0.6)
EOSINOPHILS RELATIVE PERCENT: 0 %
GFR AFRICAN AMERICAN: >60
GFR NON-AFRICAN AMERICAN: >60
GLOBULIN: 3.6 G/DL
GLUCOSE BLD-MCNC: 175 MG/DL (ref 70–99)
GLUCOSE BLD-MCNC: 186 MG/DL (ref 70–99)
GLUCOSE BLD-MCNC: 203 MG/DL (ref 70–99)
GLUCOSE BLD-MCNC: 206 MG/DL (ref 70–99)
GRAM STAIN RESULT: ABNORMAL
HCT VFR BLD CALC: 32.6 % (ref 40.5–52.5)
HEMOGLOBIN: 10.5 G/DL (ref 13.5–17.5)
LYMPHOCYTES ABSOLUTE: 2.2 K/UL (ref 1–5.1)
LYMPHOCYTES RELATIVE PERCENT: 14 %
MCH RBC QN AUTO: 28.1 PG (ref 26–34)
MCHC RBC AUTO-ENTMCNC: 32.4 G/DL (ref 31–36)
MCV RBC AUTO: 86.8 FL (ref 80–100)
METAMYELOCYTES RELATIVE PERCENT: 3 %
MONOCYTES ABSOLUTE: 1.9 K/UL (ref 0–1.3)
MONOCYTES RELATIVE PERCENT: 12 %
MYELOCYTE PERCENT: 3 %
NEUTROPHILS ABSOLUTE: 11.5 K/UL (ref 1.7–7.7)
NEUTROPHILS RELATIVE PERCENT: 67 %
ORGANISM: ABNORMAL
PDW BLD-RTO: 14.3 % (ref 12.4–15.4)
PERFORMED ON: ABNORMAL
PLATELET # BLD: 470 K/UL (ref 135–450)
PMV BLD AUTO: 7.8 FL (ref 5–10.5)
POTASSIUM REFLEX MAGNESIUM: 4 MMOL/L (ref 3.5–5.1)
RBC # BLD: 3.75 M/UL (ref 4.2–5.9)
SODIUM BLD-SCNC: 127 MMOL/L (ref 136–145)
TOTAL PROTEIN: 6.5 G/DL (ref 6.4–8.2)
WBC # BLD: 15.6 K/UL (ref 4–11)

## 2019-05-17 PROCEDURE — 6370000000 HC RX 637 (ALT 250 FOR IP): Performed by: STUDENT IN AN ORGANIZED HEALTH CARE EDUCATION/TRAINING PROGRAM

## 2019-05-17 PROCEDURE — 97530 THERAPEUTIC ACTIVITIES: CPT

## 2019-05-17 PROCEDURE — 80053 COMPREHEN METABOLIC PANEL: CPT

## 2019-05-17 PROCEDURE — 6360000002 HC RX W HCPCS: Performed by: INTERNAL MEDICINE

## 2019-05-17 PROCEDURE — 97535 SELF CARE MNGMENT TRAINING: CPT

## 2019-05-17 PROCEDURE — 6370000000 HC RX 637 (ALT 250 FOR IP): Performed by: NURSE PRACTITIONER

## 2019-05-17 PROCEDURE — 6370000000 HC RX 637 (ALT 250 FOR IP): Performed by: INTERNAL MEDICINE

## 2019-05-17 PROCEDURE — 2580000003 HC RX 258: Performed by: NURSE PRACTITIONER

## 2019-05-17 PROCEDURE — 99233 SBSQ HOSP IP/OBS HIGH 50: CPT | Performed by: INTERNAL MEDICINE

## 2019-05-17 PROCEDURE — 6360000002 HC RX W HCPCS: Performed by: NURSE PRACTITIONER

## 2019-05-17 PROCEDURE — 6370000000 HC RX 637 (ALT 250 FOR IP): Performed by: PHYSICAL MEDICINE & REHABILITATION

## 2019-05-17 PROCEDURE — 85025 COMPLETE CBC W/AUTO DIFF WBC: CPT

## 2019-05-17 PROCEDURE — 6360000002 HC RX W HCPCS: Performed by: STUDENT IN AN ORGANIZED HEALTH CARE EDUCATION/TRAINING PROGRAM

## 2019-05-17 PROCEDURE — 1280000000 HC REHAB R&B

## 2019-05-17 PROCEDURE — 6360000002 HC RX W HCPCS: Performed by: PHYSICAL MEDICINE & REHABILITATION

## 2019-05-17 PROCEDURE — 2580000003 HC RX 258: Performed by: INTERNAL MEDICINE

## 2019-05-17 PROCEDURE — 2580000003 HC RX 258: Performed by: PHYSICAL MEDICINE & REHABILITATION

## 2019-05-17 RX ORDER — DIAZEPAM 5 MG/1
5 TABLET ORAL EVERY 6 HOURS PRN
Status: DISCONTINUED | OUTPATIENT
Start: 2019-05-17 | End: 2019-05-21

## 2019-05-17 RX ORDER — OXYCODONE HYDROCHLORIDE AND ACETAMINOPHEN 5; 325 MG/1; MG/1
2 TABLET ORAL EVERY 4 HOURS PRN
Status: DISCONTINUED | OUTPATIENT
Start: 2019-05-17 | End: 2019-05-18

## 2019-05-17 RX ORDER — DEXTROSE MONOHYDRATE 50 MG/ML
100 INJECTION, SOLUTION INTRAVENOUS PRN
Status: DISCONTINUED | OUTPATIENT
Start: 2019-05-17 | End: 2019-05-28 | Stop reason: HOSPADM

## 2019-05-17 RX ORDER — DEXTROSE MONOHYDRATE 25 G/50ML
12.5 INJECTION, SOLUTION INTRAVENOUS PRN
Status: CANCELLED | OUTPATIENT
Start: 2019-05-17

## 2019-05-17 RX ORDER — OXYCODONE HYDROCHLORIDE AND ACETAMINOPHEN 5; 325 MG/1; MG/1
1 TABLET ORAL EVERY 4 HOURS PRN
Qty: 12 TABLET | Refills: 0 | Status: SHIPPED | OUTPATIENT
Start: 2019-05-17 | End: 2019-05-20

## 2019-05-17 RX ORDER — DIPHENHYDRAMINE HCL 25 MG
25 TABLET ORAL EVERY 6 HOURS PRN
Status: DISCONTINUED | OUTPATIENT
Start: 2019-05-17 | End: 2019-05-28 | Stop reason: HOSPADM

## 2019-05-17 RX ORDER — HYDRALAZINE HYDROCHLORIDE 50 MG/1
50 TABLET, FILM COATED ORAL EVERY 8 HOURS PRN
Status: DISCONTINUED | OUTPATIENT
Start: 2019-05-17 | End: 2019-05-28 | Stop reason: HOSPADM

## 2019-05-17 RX ORDER — ATORVASTATIN CALCIUM 20 MG/1
20 TABLET, FILM COATED ORAL NIGHTLY
Status: DISCONTINUED | OUTPATIENT
Start: 2019-05-17 | End: 2019-05-28 | Stop reason: HOSPADM

## 2019-05-17 RX ORDER — ONDANSETRON 4 MG/1
4 TABLET, ORALLY DISINTEGRATING ORAL EVERY 8 HOURS PRN
Status: CANCELLED | OUTPATIENT
Start: 2019-05-17

## 2019-05-17 RX ORDER — OXYCODONE HYDROCHLORIDE AND ACETAMINOPHEN 5; 325 MG/1; MG/1
1 TABLET ORAL EVERY 4 HOURS PRN
Status: DISCONTINUED | OUTPATIENT
Start: 2019-05-17 | End: 2019-05-18

## 2019-05-17 RX ORDER — FENOFIBRATE 160 MG/1
160 TABLET ORAL DAILY
Status: DISCONTINUED | OUTPATIENT
Start: 2019-05-18 | End: 2019-05-28 | Stop reason: HOSPADM

## 2019-05-17 RX ORDER — CALCIUM CARBONATE 200(500)MG
500 TABLET,CHEWABLE ORAL 3 TIMES DAILY PRN
Status: CANCELLED | OUTPATIENT
Start: 2019-05-17

## 2019-05-17 RX ORDER — METHOCARBAMOL 750 MG/1
1500 TABLET, FILM COATED ORAL 3 TIMES DAILY
Qty: 60 TABLET | Refills: 0 | Status: SHIPPED | OUTPATIENT
Start: 2019-05-17 | End: 2019-05-27

## 2019-05-17 RX ORDER — DIPHENHYDRAMINE HCL 25 MG
25 TABLET ORAL EVERY 6 HOURS PRN
Status: CANCELLED | OUTPATIENT
Start: 2019-05-17

## 2019-05-17 RX ORDER — TRAZODONE HYDROCHLORIDE 50 MG/1
50 TABLET ORAL NIGHTLY PRN
Status: DISCONTINUED | OUTPATIENT
Start: 2019-05-17 | End: 2019-05-28 | Stop reason: HOSPADM

## 2019-05-17 RX ORDER — TAMSULOSIN HYDROCHLORIDE 0.4 MG/1
0.4 CAPSULE ORAL DAILY
Status: CANCELLED | OUTPATIENT
Start: 2019-05-18

## 2019-05-17 RX ORDER — NALOXONE HYDROCHLORIDE 0.4 MG/ML
0.4 INJECTION, SOLUTION INTRAMUSCULAR; INTRAVENOUS; SUBCUTANEOUS PRN
Status: CANCELLED | OUTPATIENT
Start: 2019-05-17

## 2019-05-17 RX ORDER — ACETAMINOPHEN 325 MG/1
650 TABLET ORAL EVERY 4 HOURS PRN
Status: CANCELLED | OUTPATIENT
Start: 2019-05-17

## 2019-05-17 RX ORDER — TAMSULOSIN HYDROCHLORIDE 0.4 MG/1
0.4 CAPSULE ORAL DAILY
Status: DISCONTINUED | OUTPATIENT
Start: 2019-05-18 | End: 2019-05-28 | Stop reason: HOSPADM

## 2019-05-17 RX ORDER — NICOTINE POLACRILEX 4 MG
15 LOZENGE BUCCAL PRN
Status: CANCELLED | OUTPATIENT
Start: 2019-05-17

## 2019-05-17 RX ORDER — ATORVASTATIN CALCIUM 20 MG/1
20 TABLET, FILM COATED ORAL NIGHTLY
Status: CANCELLED | OUTPATIENT
Start: 2019-05-17

## 2019-05-17 RX ORDER — NICOTINE POLACRILEX 4 MG
15 LOZENGE BUCCAL PRN
Status: DISCONTINUED | OUTPATIENT
Start: 2019-05-17 | End: 2019-05-28 | Stop reason: HOSPADM

## 2019-05-17 RX ORDER — SENNA AND DOCUSATE SODIUM 50; 8.6 MG/1; MG/1
2 TABLET, FILM COATED ORAL 2 TIMES DAILY
Status: CANCELLED | OUTPATIENT
Start: 2019-05-17

## 2019-05-17 RX ORDER — BISACODYL 10 MG
10 SUPPOSITORY, RECTAL RECTAL DAILY PRN
Status: DISCONTINUED | OUTPATIENT
Start: 2019-05-17 | End: 2019-05-28 | Stop reason: HOSPADM

## 2019-05-17 RX ORDER — ACETAMINOPHEN 325 MG/1
650 TABLET ORAL EVERY 4 HOURS PRN
Status: DISCONTINUED | OUTPATIENT
Start: 2019-05-17 | End: 2019-05-18

## 2019-05-17 RX ORDER — SODIUM CHLORIDE 0.9 % (FLUSH) 0.9 %
10 SYRINGE (ML) INJECTION EVERY 12 HOURS SCHEDULED
Status: CANCELLED | OUTPATIENT
Start: 2019-05-17

## 2019-05-17 RX ORDER — ONDANSETRON 4 MG/1
4 TABLET, ORALLY DISINTEGRATING ORAL EVERY 8 HOURS PRN
Status: DISCONTINUED | OUTPATIENT
Start: 2019-05-17 | End: 2019-05-28 | Stop reason: HOSPADM

## 2019-05-17 RX ORDER — DEXTROSE MONOHYDRATE 50 MG/ML
100 INJECTION, SOLUTION INTRAVENOUS PRN
Status: CANCELLED | OUTPATIENT
Start: 2019-05-17

## 2019-05-17 RX ORDER — OXYCODONE HYDROCHLORIDE AND ACETAMINOPHEN 5; 325 MG/1; MG/1
2 TABLET ORAL EVERY 4 HOURS PRN
Status: CANCELLED | OUTPATIENT
Start: 2019-05-17

## 2019-05-17 RX ORDER — SODIUM CHLORIDE 0.9 % (FLUSH) 0.9 %
10 SYRINGE (ML) INJECTION PRN
Status: DISCONTINUED | OUTPATIENT
Start: 2019-05-17 | End: 2019-05-28 | Stop reason: HOSPADM

## 2019-05-17 RX ORDER — FAMOTIDINE 20 MG/1
20 TABLET, FILM COATED ORAL 2 TIMES DAILY
Status: CANCELLED | OUTPATIENT
Start: 2019-05-17

## 2019-05-17 RX ORDER — POLYETHYLENE GLYCOL 3350 17 G/17G
17 POWDER, FOR SOLUTION ORAL DAILY
Status: CANCELLED | OUTPATIENT
Start: 2019-05-18

## 2019-05-17 RX ORDER — DIAZEPAM 5 MG/1
5 TABLET ORAL EVERY 6 HOURS PRN
Status: CANCELLED | OUTPATIENT
Start: 2019-05-17

## 2019-05-17 RX ORDER — SENNA AND DOCUSATE SODIUM 50; 8.6 MG/1; MG/1
2 TABLET, FILM COATED ORAL 2 TIMES DAILY
Status: DISCONTINUED | OUTPATIENT
Start: 2019-05-17 | End: 2019-05-28 | Stop reason: HOSPADM

## 2019-05-17 RX ORDER — FAMOTIDINE 20 MG/1
20 TABLET, FILM COATED ORAL 2 TIMES DAILY
Status: DISCONTINUED | OUTPATIENT
Start: 2019-05-17 | End: 2019-05-28 | Stop reason: HOSPADM

## 2019-05-17 RX ORDER — POLYETHYLENE GLYCOL 3350 17 G/17G
17 POWDER, FOR SOLUTION ORAL DAILY
Status: DISCONTINUED | OUTPATIENT
Start: 2019-05-18 | End: 2019-05-28 | Stop reason: HOSPADM

## 2019-05-17 RX ORDER — BISACODYL 10 MG
10 SUPPOSITORY, RECTAL RECTAL DAILY PRN
Status: CANCELLED | OUTPATIENT
Start: 2019-05-17

## 2019-05-17 RX ORDER — LANOLIN ALCOHOL/MO/W.PET/CERES
100 CREAM (GRAM) TOPICAL DAILY
Qty: 30 TABLET | Refills: 3 | Status: SHIPPED | OUTPATIENT
Start: 2019-05-18

## 2019-05-17 RX ORDER — METHOCARBAMOL 750 MG/1
1500 TABLET, FILM COATED ORAL 3 TIMES DAILY
Status: DISCONTINUED | OUTPATIENT
Start: 2019-05-17 | End: 2019-05-21

## 2019-05-17 RX ORDER — FENOFIBRATE 160 MG/1
160 TABLET ORAL DAILY
Status: CANCELLED | OUTPATIENT
Start: 2019-05-18

## 2019-05-17 RX ORDER — FAMOTIDINE 20 MG/1
20 TABLET, FILM COATED ORAL 2 TIMES DAILY
Qty: 60 TABLET | Refills: 3 | Status: SHIPPED | OUTPATIENT
Start: 2019-05-17

## 2019-05-17 RX ORDER — THIAMINE MONONITRATE (VIT B1) 100 MG
100 TABLET ORAL DAILY
Status: CANCELLED | OUTPATIENT
Start: 2019-05-18

## 2019-05-17 RX ORDER — LISINOPRIL 20 MG/1
20 TABLET ORAL DAILY
Status: CANCELLED | OUTPATIENT
Start: 2019-05-18

## 2019-05-17 RX ORDER — METHOCARBAMOL 750 MG/1
1500 TABLET, FILM COATED ORAL 3 TIMES DAILY
Status: CANCELLED | OUTPATIENT
Start: 2019-05-17

## 2019-05-17 RX ORDER — NALOXONE HYDROCHLORIDE 0.4 MG/ML
0.4 INJECTION, SOLUTION INTRAMUSCULAR; INTRAVENOUS; SUBCUTANEOUS PRN
Status: DISCONTINUED | OUTPATIENT
Start: 2019-05-17 | End: 2019-05-28 | Stop reason: HOSPADM

## 2019-05-17 RX ORDER — CALCIUM CARBONATE 200(500)MG
500 TABLET,CHEWABLE ORAL 3 TIMES DAILY PRN
Status: DISCONTINUED | OUTPATIENT
Start: 2019-05-17 | End: 2019-05-28 | Stop reason: HOSPADM

## 2019-05-17 RX ORDER — SODIUM CHLORIDE 0.9 % (FLUSH) 0.9 %
10 SYRINGE (ML) INJECTION PRN
Status: CANCELLED | OUTPATIENT
Start: 2019-05-17

## 2019-05-17 RX ORDER — DEXTROSE MONOHYDRATE 25 G/50ML
12.5 INJECTION, SOLUTION INTRAVENOUS PRN
Status: DISCONTINUED | OUTPATIENT
Start: 2019-05-17 | End: 2019-05-28 | Stop reason: HOSPADM

## 2019-05-17 RX ORDER — THIAMINE MONONITRATE (VIT B1) 100 MG
100 TABLET ORAL DAILY
Status: DISCONTINUED | OUTPATIENT
Start: 2019-05-18 | End: 2019-05-28 | Stop reason: HOSPADM

## 2019-05-17 RX ORDER — SODIUM CHLORIDE 0.9 % (FLUSH) 0.9 %
10 SYRINGE (ML) INJECTION EVERY 12 HOURS SCHEDULED
Status: DISCONTINUED | OUTPATIENT
Start: 2019-05-17 | End: 2019-05-24 | Stop reason: SDUPTHER

## 2019-05-17 RX ORDER — TRAZODONE HYDROCHLORIDE 50 MG/1
50 TABLET ORAL NIGHTLY PRN
Status: CANCELLED | OUTPATIENT
Start: 2019-05-17

## 2019-05-17 RX ORDER — HYDRALAZINE HYDROCHLORIDE 50 MG/1
50 TABLET, FILM COATED ORAL EVERY 8 HOURS PRN
Status: CANCELLED | OUTPATIENT
Start: 2019-05-17

## 2019-05-17 RX ORDER — AMLODIPINE BESYLATE 5 MG/1
2.5 TABLET ORAL DAILY
Status: DISCONTINUED | OUTPATIENT
Start: 2019-05-18 | End: 2019-05-28 | Stop reason: HOSPADM

## 2019-05-17 RX ORDER — AMLODIPINE BESYLATE 2.5 MG/1
2.5 TABLET ORAL DAILY
Status: CANCELLED | OUTPATIENT
Start: 2019-05-18

## 2019-05-17 RX ORDER — LISINOPRIL 20 MG/1
20 TABLET ORAL DAILY
Status: DISCONTINUED | OUTPATIENT
Start: 2019-05-18 | End: 2019-05-24

## 2019-05-17 RX ORDER — OXYCODONE HYDROCHLORIDE AND ACETAMINOPHEN 5; 325 MG/1; MG/1
1 TABLET ORAL EVERY 4 HOURS PRN
Status: CANCELLED | OUTPATIENT
Start: 2019-05-17

## 2019-05-17 RX ADMIN — ATORVASTATIN CALCIUM 20 MG: 20 TABLET, FILM COATED ORAL at 21:16

## 2019-05-17 RX ADMIN — INSULIN LISPRO 1 UNITS: 100 INJECTION, SOLUTION INTRAVENOUS; SUBCUTANEOUS at 08:59

## 2019-05-17 RX ADMIN — OXYCODONE HYDROCHLORIDE AND ACETAMINOPHEN 2 TABLET: 5; 325 TABLET ORAL at 21:16

## 2019-05-17 RX ADMIN — ENOXAPARIN SODIUM 120 MG: 120 INJECTION SUBCUTANEOUS at 15:25

## 2019-05-17 RX ADMIN — TAMSULOSIN HYDROCHLORIDE 0.4 MG: 0.4 CAPSULE ORAL at 09:01

## 2019-05-17 RX ADMIN — Medication 10 ML: at 21:20

## 2019-05-17 RX ADMIN — ACETAMINOPHEN 650 MG: 325 TABLET ORAL at 15:23

## 2019-05-17 RX ADMIN — OXYCODONE HYDROCHLORIDE AND ACETAMINOPHEN 2 TABLET: 5; 325 TABLET ORAL at 07:31

## 2019-05-17 RX ADMIN — METHOCARBAMOL TABLETS 1500 MG: 750 TABLET, COATED ORAL at 09:01

## 2019-05-17 RX ADMIN — OXYCODONE HYDROCHLORIDE AND ACETAMINOPHEN 2 TABLET: 5; 325 TABLET ORAL at 03:33

## 2019-05-17 RX ADMIN — LISINOPRIL 20 MG: 20 TABLET ORAL at 09:01

## 2019-05-17 RX ADMIN — INSULIN LISPRO 2 UNITS: 100 INJECTION, SOLUTION INTRAVENOUS; SUBCUTANEOUS at 12:09

## 2019-05-17 RX ADMIN — METHOCARBAMOL TABLETS 1500 MG: 750 TABLET, COATED ORAL at 15:25

## 2019-05-17 RX ADMIN — CEFTRIAXONE SODIUM 2 G: 2 INJECTION, POWDER, FOR SOLUTION INTRAMUSCULAR; INTRAVENOUS at 06:52

## 2019-05-17 RX ADMIN — METHOCARBAMOL TABLETS 1500 MG: 750 TABLET, COATED ORAL at 21:15

## 2019-05-17 RX ADMIN — HYDROMORPHONE HYDROCHLORIDE 1 MG: 1 INJECTION, SOLUTION INTRAMUSCULAR; INTRAVENOUS; SUBCUTANEOUS at 00:01

## 2019-05-17 RX ADMIN — FAMOTIDINE 20 MG: 20 TABLET ORAL at 09:01

## 2019-05-17 RX ADMIN — SENNOSIDES, DOCUSATE SODIUM 2 TABLET: 50; 8.6 TABLET, FILM COATED ORAL at 09:00

## 2019-05-17 RX ADMIN — ENOXAPARIN SODIUM 150 MG: 150 INJECTION SUBCUTANEOUS at 21:20

## 2019-05-17 RX ADMIN — AMLODIPINE BESYLATE 2.5 MG: 2.5 TABLET ORAL at 09:01

## 2019-05-17 RX ADMIN — FENOFIBRATE 160 MG: 160 TABLET ORAL at 09:01

## 2019-05-17 RX ADMIN — CEFTRIAXONE SODIUM 2 G: 2 INJECTION, POWDER, FOR SOLUTION INTRAMUSCULAR; INTRAVENOUS at 18:29

## 2019-05-17 RX ADMIN — DIAZEPAM 5 MG: 5 TABLET ORAL at 03:34

## 2019-05-17 RX ADMIN — INSULIN LISPRO 1 UNITS: 100 INJECTION, SOLUTION INTRAVENOUS; SUBCUTANEOUS at 21:20

## 2019-05-17 RX ADMIN — SENNOSIDES,DOCUSATE SODIUM 2 TABLET: 8.6; 5 TABLET, FILM COATED ORAL at 21:16

## 2019-05-17 RX ADMIN — POLYETHYLENE GLYCOL (3350) 17 G: 17 POWDER, FOR SOLUTION ORAL at 09:00

## 2019-05-17 RX ADMIN — ENOXAPARIN SODIUM 40 MG: 40 INJECTION SUBCUTANEOUS at 09:00

## 2019-05-17 RX ADMIN — Medication 100 MG: at 09:01

## 2019-05-17 RX ADMIN — Medication 10 ML: at 09:02

## 2019-05-17 RX ADMIN — FAMOTIDINE 20 MG: 20 TABLET ORAL at 21:16

## 2019-05-17 RX ADMIN — INSULIN LISPRO 2 UNITS: 100 INJECTION, SOLUTION INTRAVENOUS; SUBCUTANEOUS at 18:39

## 2019-05-17 ASSESSMENT — PAIN DESCRIPTION - ONSET
ONSET: ON-GOING

## 2019-05-17 ASSESSMENT — PAIN DESCRIPTION - ORIENTATION
ORIENTATION: MID
ORIENTATION: MID;LEFT;LOWER
ORIENTATION: RIGHT;LEFT
ORIENTATION: LEFT;RIGHT;LOWER;MID
ORIENTATION: RIGHT;LEFT;LOWER
ORIENTATION: LOWER;LEFT

## 2019-05-17 ASSESSMENT — PAIN DESCRIPTION - DESCRIPTORS
DESCRIPTORS: ACHING
DESCRIPTORS: ACHING
DESCRIPTORS: NUMBNESS;TINGLING
DESCRIPTORS: ACHING;BURNING
DESCRIPTORS: BURNING

## 2019-05-17 ASSESSMENT — PAIN DESCRIPTION - LOCATION
LOCATION: BACK;LEG
LOCATION: BACK
LOCATION: BACK;LEG
LOCATION: BACK;LEG
LOCATION: LEG
LOCATION: BACK;LEG

## 2019-05-17 ASSESSMENT — PAIN DESCRIPTION - PAIN TYPE
TYPE: CHRONIC PAIN
TYPE: SURGICAL PAIN
TYPE: ACUTE PAIN
TYPE: SURGICAL PAIN;CHRONIC PAIN

## 2019-05-17 ASSESSMENT — PAIN DESCRIPTION - PROGRESSION
CLINICAL_PROGRESSION: NOT CHANGED
CLINICAL_PROGRESSION: GRADUALLY WORSENING
CLINICAL_PROGRESSION: NOT CHANGED
CLINICAL_PROGRESSION: NOT CHANGED

## 2019-05-17 ASSESSMENT — PAIN SCALES - GENERAL
PAINLEVEL_OUTOF10: 9
PAINLEVEL_OUTOF10: 0
PAINLEVEL_OUTOF10: 9
PAINLEVEL_OUTOF10: 0

## 2019-05-17 ASSESSMENT — PAIN DESCRIPTION - FREQUENCY
FREQUENCY: CONTINUOUS

## 2019-05-17 NOTE — PROGRESS NOTES
Patient's temperature was 102.0 at 1509. Tylenol was given. MD made aware. Temperature rechecked at 1620 and temperature was 98.9.

## 2019-05-17 NOTE — PLAN OF CARE
Problem: Pain:  Goal: Pain level will decrease  5/17/2019 0634 by Shu Fall RN  Outcome: Ongoing  Medicated pt per orders, please see e-Mar. Encourage pt to call for all assistance. Call light is in reach, bed alarm is activated for safety, bed locked and in lowest position. Will continue to monitor and follow POC. Problem: Risk for Impaired Skin Integrity  Goal: Tissue integrity - skin and mucous membranes  Outcome: Ongoing  Pt has some excoriation to the groin. Barrier wipes used after each incontinent episode. Encourage pt to turn frequently and reposition with nursing staff help. Will continue to monitor. Problem: Falls - Risk of:  Goal: Will remain free from falls  5/17/2019 0634 by Shu Fall RN  Outcome: Ongoing  Pt remains free from injury during this shift. Pt is a max assist of 2-3 people. Encourage pt to call for all assistance. Call light is in reach, bed alarm is activated for safety, bed locked and in lowest position. Will continue to monitor and follow POC.

## 2019-05-17 NOTE — PROGRESS NOTES
Patient alert and oriented x4. Vital signs stable. Complaints of left leg pain and back pain controlled with PRN pain medication. Endorses nausea but refused an antiemetic. No emesis. Clear breath sounds, active bowel sounds. Tolerating food and liquid. Continent of urine, voiding well via urinal. Incontinent of bowel; no BM this shift. Full sensation to all extremities. No numbness or tingling. Strong dorsi/plantar flexion bilaterally. Surgical incision to lower back is clean, dry, and intact; closed with staples. No drainage noted. Hemovac drain removed with 5 mL output noted. Dry gauze and tegaderm applied to site. Patient tolerated drain removal well. Fall precautions in place. Call light within reach; patient calling out appropriately when needing assistance. Family currently at bedside. Patient has no expressed needs at this time. Will continue to monitor.

## 2019-05-17 NOTE — PROGRESS NOTES
Progress Note  Medical Student MS III    Hospital Day: 10                                                         Code:Full Code  Admit Date: 5/8/2019                                 PCP: EMIGDIO HARRELL        Daily Plan:  5/17/2019    Subjective:   68 y. o. male with PMHx of HTN, HLD, DM, Chronic neurogenic claudication, peripheral neuropathy, S/P laminectomy 04/19./2019 P/W Hx back pain of 2 days duration, patient had surgery to his back 04/19/2019 he was doing good till 5/6, when he started having lower back pain, constant, that radiated to his left hip. Pt was seen at bedside and appeared somnolent and appears that his back pain is rated at a 7/10. States that his pain shoots into his left leg and he has been having a decrease strength in his left leg. Pt continued to fall asleep during my encounter but he denied any other symptoms.  Denies fevers, chills, sob, chest pain, and n/v.    MEDICATIONS:   Scheduled Meds:   SMOG Enema   Rectal Once    sennosides-docusate sodium  2 tablet Oral BID    polyethylene glycol  17 g Oral Daily    lidocaine 1 % injection  5 mL Intradermal Once    sodium chloride flush  10 mL Intravenous 2 times per day    methocarbamol  1,500 mg Oral TID    thiamine  100 mg Oral Daily    amLODIPine  2.5 mg Oral Daily    lisinopril  20 mg Oral Daily    cefTRIAXone (ROCEPHIN) IV  2 g Intravenous Q12H    tamsulosin  0.4 mg Oral Daily    atorvastatin  20 mg Oral Nightly    fenofibrate  160 mg Oral Daily    famotidine  20 mg Oral BID    insulin lispro  0-3 Units Subcutaneous Nightly    insulin lispro  0-6 Units Subcutaneous TID WC    enoxaparin  40 mg Subcutaneous Daily      Continuous Infusions:   dextrose       PRN Meds:bisacodyl, sodium chloride flush, diazepam, ondansetron, oxyCODONE-acetaminophen **OR** oxyCODONE-acetaminophen, HYDROmorphone **OR** HYDROmorphone, calcium carbonate, glucose, dextrose, glucagon (rDNA), dextrose, naloxone, magnesium hydroxide, acetaminophen    Allergies: Allergies   Allergen Reactions    Morphine Other (See Comments)     Doesn't work  vicodan and percocet work       PHYSICAL EXAM:       Vitals: BP (!) 167/84   Pulse 97   Temp 99.5 °F (37.5 °C) (Oral)   Resp 20   Ht 6' 1\" (1.854 m)   Wt (!) 350 lb 1.5 oz (158.8 kg)   SpO2 93%   BMI 46.19 kg/m²     I/O:      Intake/Output Summary (Last 24 hours) at 5/17/2019 1027  Last data filed at 5/17/2019 0723  Gross per 24 hour   Intake 960 ml   Output 1208 ml   Net -248 ml     I/O this shift:  In: -   Out: 350 [Urine:350]  I/O last 3 completed shifts: In: 12 [P.O.:960]  Out: 1233 [Urine:1175; Drains:58]    Physical Exam   General appearance: No apparent distress, appears stated age and cooperative. HEENT: Pupils equal, round, and reactive to light. Conjunctivae/corneas clear. Neck: Supple,  Respiratory:  Normal respiratory effort. Clear to auscultation  Cardiovascular: Regular rate and rhythm with normal S1/S2   Abdomen: Soft, non-tender, non-distended with normal bowel sounds. Musculoskeletal:   Legs are slightly weak, but no focal changes  Skin: Skin color, texture, turgor normal.  No rashes or lesions. Neurologic:  Neurovascularly intact/  non-focal. Full strength in bilateral LE's. Psychiatric: Alert and oriented, thought content appropriate, normal insight  Capillary Refill: Brisk,< 3 seconds   Peripheral Pulses: +2 palpable, equal bilaterally       No results for input(s): PHART, NWO7ISS, PO2ART in the last 72 hours.     DATA:       Labs  CBC:   Recent Labs     05/15/19  0545 05/16/19  0500 05/17/19  0640   WBC 12.8* 17.9* 15.6*   HGB 10.0* 10.7* 10.5*   HCT 30.5* 33.2* 32.6*    447 470*       BMP:   Recent Labs     05/15/19  0545 05/16/19  0500 05/17/19  0640   * 128* 127*   K 3.9 4.1 4.0   CL 94* 89* 88*   CO2 28 25 24   BUN 20 13 14   CREATININE 0.6* 0.6* 0.6*   GLUCOSE 235* 230* 175*     LFT's:   Recent Labs     05/15/19  0545 05/16/19  0500 05/17/19  0640   AST foraminal narrowing from L1-L2 through L5-S1.   5. Diffuse circumferential epidural enhancement without discrete epidural collection extending throughout the laminectomy site which may be reactive in nature. Infection would be difficult to exclude. 6. No convincing evidence of osteomyelitis or discitis. 7. Large posterior central disc protrusion at L5-S1 resulting in a moderate canal narrowing and mild/moderate impingement upon the exiting S1 nerve roots bilaterally. XR Eye Foreign Body   Final Result      No foreign body large or opaque enough to be identified radiographically. XR CHEST PORTABLE   Final Result   Impression: No acute abnormality or significant change. XR CHEST PORTABLE   Final Result       Right jugular central venous catheter appears to be in place. No pneumothorax. Airspace opacity over right upper lung zone, may represent pneumonia,    artifact versus underlying mass lesion. Followup to resolution is    recommended. Comparison with prior studies, especially recent chest CT    may help to clarify                ASSESSMENT AND PLAN:   MSSA bacteremia-mild elev in WBC today (15.6)  Wound cx with MRSA and blood cultures with MSSA  ID eval appreciated-awaiting final cx result  Cont IV vancomycin +ceftriaxone   Follow up [facility] - 5/12 with NGTD.       Lumbar epidural abscess   Postoperative care per neurosurgery, they recommend neurochecks q4h, continue robaxin, PRN percocet, and PT/OT     DVT  VL doppler of the right lower extremity showed a partially occluding DVT involving the right posterior tibial veins. Pt was started on 150 mg subq lovenox BID. Will consider either bridging to warfarin or starting on a NOAC. Diabetes mellitus type II  sliding scale insulin and carb controlled diet, will continue to monitor as glucose was at 175 today.  Glucose seems better controlled today compared to yesterday     Anemia  Chronic illness most likely, stable at 10.5  Monitor and transfuse as indicated        Sepsis present on arrival  [place]. -on antibiotic treatment and s/p surgical intervention     Hyponatremia  Mild. Continue monitoring basic metabolic panel  Was on hydrochlorothiazide at home-held     HTN  Continue home meds     Knee pain.   Rt sided,s/p prior TKA  PT/OT  Consider MRI knee if pain persists and pt unable to bear weight     DVT Prophylaxis: Lovenox  Diet: DIET CARB CONTROL; Carb Control: 4 carb choices (60 gms)/meal  Code Status: Full Code     PT/OT Eval Status: Pending        -----------------------------  Fela Bryant Chad 87 IV  Acting Scribe  5/17/2019  10:27 AM

## 2019-05-17 NOTE — PROGRESS NOTES
NEUROSURGERY PROGRESS NOTE    5/17/2019 10:40 AM                               Dom Wu                      LOS: 9 days   POD#5 s/p Washout    Subjective:  Patient continues to c/o incisional pain and pain in RLE (thigh). Patient had multiple BM's overnight s/p enema    Physical Exam:  Patient seen and examined    Vitals:    05/17/19 0800   BP: (!) 167/84   Pulse: 97   Resp: 20   Temp: 99.5 °F (37.5 °C)   SpO2: 93%     GCS:  4 - Opens eyes on own  5 - Alert and oriented  6 - Follows simple motor commands  General: Morbidly obese gentleman. Alert and cooperative in no acute distress.     HENT: atraumatic, neck supple  Eyes: Optic discs: Not tested  Pulmonary: unlabored respiratory effort  Cardiovascular:  Warm well perfused. No peripheral edema  Gastrointestinal: abdomen soft, NT, ND     Neurological:  Mental Status: Awake, alert, oriented x 4, speech clear and appropriate  Attention: Intact  Language: No aphasia or dysarthria noted  Sensation: Intact to all extremities to light touch  Coordination: Intact     Musculoskeletal:   Gait: Not tested   Assist devices: None   Tone: Normal  Motor strength: Hip flexor limited 2/2 pain    Right  Left      Right  Left    Deltoid  5 5   Hip Flex  4 4   Biceps  5 5   Knee Extensors  5 5   Triceps  5 5   Knee Flexors  5 5   Wrist Ext  5 5   Ankle Dorsiflex. 5 5   Wrist Flex  5 5   Ankle Plantarflex. 5 5   Handgrip  5 5   Ext Nixon Longus  5 5   Thumb Ext  5 5             Incision: Covered with dressing, which is CDI    Drain: 40/18= 58 mL    Radiological Findings:  CT Lumbar w contrast  Result Date: 5/8/2019  1.  Laminectomy defects are seen throughout the lumbar spine. 2. Air and fluid containing collection seen in the dorsal paraspinal soft tissues of the laminectomy site measuring approximately 12 cm in craniocaudal dimension, 9.6 cm in AP dimension and 5.6 cm in transverse dimension. Subtle rim enhancement seen worrisome for an abscess.   3. There is associated fat stranding and subcutaneous edema. Xr Knee Right (1-2 Views)  Result Date: 5/16/2019  Small joint effusion, indeterminant. Sterile versus nonsterile fluid not excluded    Xr Abdomen (kub) (single Ap View)  Result Date: 5/16/2019  Nonspecific bowel gas pattern. Large calcification overlying the right mid abdomen, indeterminate    Xr Hip 2-3 Vw W Pelvis Right  Result Date: 5/16/2019  Moderate degenerative changes      Labs:  Recent Labs     05/17/19  0640   WBC 15.6*   HGB 10.5*   HCT 32.6*   *       Recent Labs     05/17/19  0640   *   K 4.0   CL 88*   CO2 24   BUN 14   CREATININE 0.6*   GLUCOSE 175*   CALCIUM 8.3       No results for input(s): PROTIME, INR, APTT in the last 72 hours. Patient Active Problem List    Diagnosis Date Noted    Bacteremia due to methicillin susceptible Staphylococcus aureus (MSSA) 05/13/2019    Surgical site infection 05/10/2019    Staphylococcus aureus infection 05/10/2019    Back abscess 05/08/2019    Sepsis ([address] 05/08/2019    Lumbar stenosis with neurogenic claudication 04/20/2019    Lumbar spinal stenosis 04/19/2019    Degenerative lumbar spinal stenosis 04/19/2019     Assessment:  Patient is a 68 y.o. male s/p washout 2/2 large post-op fluid collection in paraspinal soft tissue showing s/s of infection.     Plan:  1. Neurologically stable  2. Neurologic exams frequency: Q4H  3. For change in exam MUST contact neurosurgery team along with critical care or primary team  4. Post-op Fluid Collection:  - s/p washout 5/12/2019  - DC drain today  - Incisional Care: Wash incision daily with warm soapy water or shower daily, and pat dry with clean dry towel  5. Muscle spasms: Robaxin  6. Pain:  - PRN Dilaudid and Percocet  - X-rays of hip & right knee show no acute abnormalities  7. Constipation: Resolved  - SMOG enema yesterday  - Multiple BM's overnight  - Continue bowel regiment  - KUB showed no obstruction  8.  DVT:  - RLE below the knee DVT  - OK to increase Lovenox for anticoagulation  9. PT/OT consulted, appreciate recs  10. PMR consulted, appreciate recs  11. Advance diet / activity per primary team  12. Will follow inpatient.  Please call with any questions or decline in neurological status     DISPO: OK to transfer to ARU from neurosurgery standpoint. Dispo timing to be determined by primary team once patient is medically stable for discharge.     Patient was discussed with Dr. Leo Garcia who agrees with above assessment and plan.      Electronically signed by: ALYSIA Elmore, 5/17/2019 10:40 AM  [phone]

## 2019-05-17 NOTE — CARE COORDINATION
[facility] acute rehab unit [phone] and faxed referral to them to review [phone]. They do not expect to have a bed until Friday or Saturday but we will need precert if they can accept.      Electronically signed by Berenice Smith RN on 5/15/2019 at 11:17 AM  [phone]
Melissa Charles called from ARU and [facility] but is not medically stable for discharge. Once medically ready they will accept him.      Electronically signed by Alisa Ganser, RN on 5/16/2019 at 3:15 PM  [phone]
Pt has decided that he prefers to stay here and go to our rehab unit. I called Garth Lamb in admissions and she was going to start precert.      Electronically signed by Albania Rahman RN on 5/15/2019 at [address] PM  [phone]
Department  Ph: [phone] Fax: [phone]

## 2019-05-17 NOTE — PROGRESS NOTES
Occupational Therapy  Facility/Department: [facility] 5T ORTHO/NEURO  Daily Treatment Note  NAME: Crystal Mcintosh  : 1950  MRN: [id number]    Date of Service: 2019    Discharge Recommendations:  Crystal Mcintosh scored a 10 on the AM-PAC ADL Inpatient form. Current research shows that an AM-PAC score of 17 or less is typically not associated with a discharge to the patient's home setting. Based on the patients AM-PAC score and their current ADL deficits, it is recommended that the patient have 3-5 sessions per week of Occupational Therapy at d/c to increase the patients independence. OT Equipment Recommendations  Equipment Needed: No  Other: defer to [facility] center     Assessment   Performance deficits / Impairments: Decreased functional mobility ; Decreased ADL status; Decreased cognition;Decreased safe awareness;Decreased endurance;Decreased strength;Decreased sensation;Decreased balance;Decreased high-level IADLs;Decreased ROM  Assessment: Pt limited this date by pain, fatigue, deficits in motor planning and decreased cognition. Pt Dependent Max A x 2 for bed mobility, sit to stand, stand to sit and pt unable to advance BLEs. Pt would benefit from speech eval due to deficits in cognition. Pt needing ongoing encouragement to maximize functional level. Pt would benefit from ongoing OT. Treatment Diagnosis: impaired ADLs /functional transfers /decreased endurance 2/2 back infection/ sepsis   Prognosis: Fair  Patient Education: ARU, safe transfers,   REQUIRES OT FOLLOW UP: Yes  Activity Tolerance  Activity Tolerance: Patient limited by pain; Patient limited by fatigue;Treatment limited secondary to decreased cognition  Activity Tolerance: limited also by motor planning  Safety Devices  Safety Devices in place: Yes  Type of devices: Nurse notified;Call light within reach;Gait belt; Chair alarm in place; Left in chair; All fall risk precautions in place; Patient at risk for falls         Patient Diagnosis(es): The Dependent (Max A x 2) with pt pushing against therapist to sit upright due to poor motor planning. Pt sat EOB ~10 min at Min A - SBA. Pt handed wet wash cloth and instructeed to wash \"private area\" pt washed face twice with same aforementioned command, third time pt washed eric area. Pt donned brief and socks at Dependent. Pt sit to stand from EOB Dependent (Max A x 2) and pt stood at Dependent (Max A x 2) ~10 sec x 3 with BLEs buckling on last stand. Stand to sit Dependent (Max A x 2). Pt then sit to stand at [address] (Max A x 2) and stand to sit in chair (Max A x 2). Call light in reach and chair alarm on. Nursing contacted and discussed wife's conern pt \"had a fever\" and pt/wife's concern about ARU 3 hr therapy 5 x a wk. Nursing to contact to   Pain Assessment  Pain Level: 9  Pain Type: Surgical pain;Chronic pain  Pain Location: Back;Leg  Pain Orientation: Left;Right; Lower;Mid  Vital Signs  Patient Currently in Pain: Yes   Orientation  Orientation  Overall Orientation Status: Impaired  Orientation Level: Oriented to time;Oriented to person;Disoriented to situation;Disoriented to place  Objective             Balance  Sitting Balance: Minimal assistance(Min A - SBA)  Standing Balance: Dependent/Total  Standing Balance  Time: ~30 sec  Activity: static standing  Functional Mobility  Functional Mobility Comments: pt unable to advance BLEs  Bed mobility  Supine to Sit: Dependent/Total  Sit to Supine: Dependent/Total  Transfers  Sit to stand: 2 Person assistance;Dependent/Total  Stand to sit: 2 Person assistance;Dependent/Total                       Cognition  Arousal/Alertness: Delayed responses to stimuli  Following Commands: Follows one step commands with repetition; Follows one step commands with increased time; Follows multistep commands with increased time; Inconsistently follows commands  Attention Span: Attends with cues to redirect; Difficulty dividing attention  Memory: Decreased recall of recent

## 2019-05-17 NOTE — PROGRESS NOTES
Pt having loose stool after receiving bowel regimen of laxative/stool softener. Pt unable to control bowel so he has been incontinent. No senna given this PM shift. Did eric-care with soap and water after bowel movement and used barrier wipes as well. Remainder of pain skin remains intact. Will continue to monitor.

## 2019-05-17 NOTE — PROGRESS NOTES
The fluid collection extensively laminectomy bed without significant mass effect. 2. Multifactorial multilevel mild canal narrowing as described in part related to extensive facet arthropathy resulting in a narrowing of the canal from a lateral dimension. 3. Tortuous bunched nerve roots in the upper cauda equina surrounding the conus presumably related to chronic stenosis. 4. Multifactorial multilevel foraminal narrowing with moderate or severe foraminal narrowing from L1-L2 through L5-S1.   5. Diffuse circumferential epidural enhancement without discrete epidural collection extending throughout the laminectomy site which may be reactive in nature. Infection would be difficult to exclude. 6. No convincing evidence of osteomyelitis or discitis. 7. Large posterior central disc protrusion at L5-S1 resulting in a moderate canal narrowing and mild/moderate impingement upon the exiting S1 nerve roots bilaterally.        Scheduled Meds:   SMOG Enema   Rectal Once    sennosides-docusate sodium  2 tablet Oral BID    polyethylene glycol  17 g Oral Daily    lidocaine 1 % injection  5 mL Intradermal Once    sodium chloride flush  10 mL Intravenous 2 times per day    methocarbamol  1,500 mg Oral TID    thiamine  100 mg Oral Daily    amLODIPine  2.5 mg Oral Daily    lisinopril  20 mg Oral Daily    cefTRIAXone (ROCEPHIN) IV  2 g Intravenous Q12H    tamsulosin  0.4 mg Oral Daily    atorvastatin  20 mg Oral Nightly    fenofibrate  160 mg Oral Daily    famotidine  20 mg Oral BID    insulin lispro  0-3 Units Subcutaneous Nightly    insulin lispro  0-6 Units Subcutaneous TID WC    enoxaparin  40 mg Subcutaneous Daily       Continuous Infusions:   dextrose         PRN Meds:  bisacodyl, sodium chloride flush, diazepam, ondansetron, oxyCODONE-acetaminophen **OR** oxyCODONE-acetaminophen, HYDROmorphone **OR** HYDROmorphone, calcium carbonate, glucose, dextrose, glucagon (rDNA), dextrose, naloxone, magnesium hydroxide, acetaminophen      Assessment:     67 yo w/ DM, HTN, HLD, GERD and DDD. Pt underwent lumbar decompression w/ Dr. Chetna Mcdonough that he tolerated well without any surgical site issues on 4/10/2019. He presented with septic shock on 5/8/2019, requiring resuscitation with fluids and levophed for blood pressure control. He has remained afebrile, with leukocytosis of 17.9 today. ESR of 61 and CRP of 190 on admission. CT showed fluid collection that self-drained with purulent to now serous drainage at the surgical site with wound dishesence. MRI shows fluid collection. He is on Ceftriaxone. Wound Cultures/Blood Cultures are growing MRSA and MSSA. Patient is s/p wound washout w/ drain by NSY with purulent drainage per OP note. Plan:     - post surgical care per NSY  - continue ceftriaxone, disharge on ceftriaxone  - DVT treatment per primary team  - continue to follow wound cultures and blood cultures  - PICC line placed     Discussed with Dr. Robbie Duarte MD.  Nicanor Raymond MS IV    Addendum to Medical Student Progress note:  Pt seen,examined and evaluated. I have independently performed history, physical exam, lab and data review.  I have determined assessment and plan as documented by student (SARA Dawn).    67 yo man with obesity, DM, HTN, lumbar stenosis     4/19/19 - L 3 & 4 lumbar laminectomy, L3/4 & L4/5 bilateral facetectomy , foraminotomies over L4 &5 nerve roots.       Pt had fall and injured R knee   Pt developed LBP over few days, increased over time.  Associated chills, nausea.   Presented to [facility] ED on 5/8 - afebrile, low BP, LA 3.  CT with air and fluid in ST / laminectomy site  ESR 61, Cr 1.7     Transferred and admitted to [facility] 5/8 - afebrile, WBC 11.4.  Started on vancomycin and zosyn.    MRI with fluid collection with gas and peripheral enhancement, see report  Seen by Neurosurg, may take to OR 5/11  BC x 2  MSSA     Wound dehiscence on 5/9.  Wound cult with mod growth S aureus (MRSA - Micro will rerun)  Wound with purulent drainage     OR 5/12 - epidural abscess.  Per OR note, \"large amount of liquid pus', extended to deep surg space, epidural space.   GS 1+GPC.  Surg cult + MSSA     PICC placed 5/15     IMP/  Lumbar SSI   OR 5/12  I&D to deep space / epidural abscess, surg cult MSSA   S aureus / MSSA bacteremia, f/u BC no growth  Leukocytosis   R leg DVT     REC/  Cont ceftriaxone (giving 3rd gen cephalosporin over 1st for CNS penetration)     Postop care per Neurosurg   See VALERI      Discussed with pt, wife  Doroteo Tilley MD     INFUSION ORDERS:  Ceftriaxone 2 gm iv q 12 through 6/26/19  - First dose given in hospital  - Disposition / date discharge  Rehab 5/17/19  - Check CBC w diff, CMP, ESR, CRP every Mon or Tue - FAX result to [phone]  - Call with antibiotic / infusion issues, [phone]  - Facility - any change in status, transfer out of facility or to hospital - call [phone]  - No f/u in outpatient ID office necessary.  F/u Neurosurg, Dr Mic Restrepo

## 2019-05-17 NOTE — PLAN OF CARE
Problem: Pain:  Goal: Pain level will decrease  Description  Pain level will decrease  5/17/2019 1101 by Sara Cruz, RN  Outcome: Ongoing  Note:   Patient complains of back pain and left leg pain. PRN medication given per MAR. Upon reassessment, patient awake and visiting with family. States that pain in back is better but leg pain has not resolved. Will continue to monitor. Problem: Falls - Risk of:  Goal: Will remain free from falls  Description  Will remain free from falls  5/17/2019 1101 by Sara Cruz RN  Outcome: Met This Shift  Note:   Patient will remain free from falls throughout shift. Up x2-3 with cielo dunlap. Fall precautions in place. Non-skid socks on. Bed locked in lowest position with alarm on and 2/4 side rails up. Bedside table and call light within reach. Patient calls out appropriately when needing assistance. Hourly rounding in anticipation of patient's needs. Floor clean and free from clutter. Room door open. Will continue to monitor.

## 2019-05-17 NOTE — PROGRESS NOTES
Physical Therapy  Facility/Department: [facility] 5T ORTHO/NEURO  Daily Treatment Note  NAME: Marina Manley  : 1950  MRN: [id number]    Date of Service: 2019    Discharge Recommendations: Marina Manley scored a 7/ on the AM-PAC short mobility form. Current research shows that an AM-PAC score of 17 or less is typically not associated with a discharge to the patient's home setting. Based on the patients AM-PAC score and their current functional mobility deficits, it is recommended that the patient have 3-5 sessions per week of Physical Therapy at d/c to increase the patients independence. Assessment: Pt remains below functional baseline and will benefit from continued therapies to maximize mobility and gait. Pt demonstrates limited activity tolerance and is fatigued at end of session. Mobility limited by fatigue, pain, decreased motor planning. Wife concerned pt is unable to tolerate 3 hrs of therapy per day. Will follow. PT Equipment Recommendations  Other: defer to next level of care    Patient Diagnosis(es): The primary encounter diagnosis was Sepsis, due to unspecified organism [facility]). A diagnosis of Back abscess was also pertinent to this visit. has a past medical history of Arthritis, Back pain, DDD (degenerative disc disease), cervical, Diabetes mellitus ([address] Edema of both legs, GERD (gastroesophageal reflux disease), Hyperlipidemia, Hypertension, MRSA (methicillin resistant staph aureus) culture positive, Nausea in adult, Neuropathy, Vitamin B 12 deficiency, and Walking troubles. has a past surgical history that includes back surgery; Prostate surgery; shoulder surgery; Cystoscopy; knee surgery; Hemorrhoid surgery; eye surgery; Tonsillectomy; Cholecystectomy; Lumbar spine surgery (N/A, 2019); and REPAIR DURAL / CSF LEAK (N/A, 2019).     Restrictions  Position Activity Restriction  Other position/activity restrictions: activity as tolerated  Subjective   General  Chart Reviewed: Yes  Additional Pertinent Hx: Admit 5/8 with worsening LBP with fever and chills; admit to ICU with severe sepsis; to OR 5/12 for lumbar wound washout; PMHx: arthritis, back pain, DDD, DM, HLD, HTN, walking trouble, back surgery x 2, knee surgery, lumbar laminectomy 4/19/19, shoulder surgery  Referring Practitioner: Manoj Sheriff MD  Subjective  Subjective: Pt supine in bed upon PT entry, agreeable to working with PT. \"Good morning. \"  Re-oriented pt to time of day (afternoon). Pain Screening  Patient Currently in Pain: Yes(9/10 back pain, RN aware)       Orientation  Orientation  Overall Orientation Status: Impaired(oriented to name and birthdate, current year; not oriented to month when given choices or place)  Cognition      Objective   Bed mobility  Supine to Sit: Dependent/Total(max A x 2; slow and effortful, use of rail, via log roll)  Transfers  Sit to Stand: Dependent/Total(max A x 2 from raised EOB x 3 trials to walker, max A x 2 to cielo stedy; mod A x 2 from cielo stedy seat)  Stand to sit: Dependent/Total(max A x 2 for controlled descent, v/c given 2* pt  pushing against therapist due to decreased motor planning)  Bed to Chair: Dependent/Total(via cielo stedy)        Balance  Sitting - Static: Fair  Sitting - Dynamic: Fair  Standing - Static: Poor  Comments: Sat on EOB 10 min, initially required min A 2* posterior lean, progressed to SBA; stood with max A x 2 and B UE support 3 x <10 sec with LEs buckling during 3rd stance trial                           Assessment   Body structures, Functions, Activity limitations: Decreased functional mobility   Assessment: Pt remains below functional baseline and will benefit from continued therapies to maximize mobility and gait. Pt demonstrates limited activity tolerance and is fatigued at end of session. Mobility limited by fatigue, pain, decreased motor planning. Wife concerned pt is unable to tolerate 3 hrs of therapy per day. Will follow.   Treatment Diagnosis: impaired functional mobility  Prognosis: Good  Patient Education: role of PT, use of call light, d/c planning; pt demonstrates good understanding. REQUIRES PT FOLLOW UP: Yes     G-Code     OutComes Score                                                  AM-PAC Score  AM-PAC Inpatient Mobility Raw Score : 7  AM-PAC Inpatient T-Scale Score : 26.42  Mobility Inpatient CMS 0-100% Score: 92.36  Mobility Inpatient CMS G-Code Modifier : CM          Goals  Short term goals  Time Frame for Short term goals: discharge  Short term goal 1: Pt will transfer supine <--> sit with mod A x 2  MET 5/14  New goal: Pt will transfer supine <--> sit with CGA  ongoing  Short term goal 2: Pt will participate in sit <--> stand transfer and achieve full or near full stance  MET 5/14  New goal: Pt will transfer sit <--> stand with mod A x 2   ongoing  Short term goal 3: Pt will participate in LE HEP   ongoing  Short term goal 4: Pt will transfer bed <--> chair with mod A x 2   ongoing  Patient Goals   Patient goals : eventually return home    Plan    Plan  Times per week: 2-5  Current Treatment Recommendations: Strengthening, Gait Training, Patient/Caregiver Education & Training, Functional Mobility Training, Balance Training, Transfer Training, Equipment Evaluation, Education, & procurement, Home Exercise Program  Safety Devices  Type of devices: Nurse notified, Gait belt, Call light within reach, Left in chair, Chair alarm in place(LEs elevated)     Therapy Time   Individual Concurrent Group Co-treatment   Time In 1319         Time Out 1357         Minutes 38                 Timed Code Treatment Minutes:  38    Total Treatment Minutes:  38    If patient is discharged prior to next treatment, this note will serve as the discharge summary.   Quintin Ibarra, PT, DPT  [phone]

## 2019-05-17 NOTE — DISCHARGE INSTR - COC
Continuity of Care Form    Patient Name: Mary Gaming   :  1950  MRN:  [id number]    Admit date:  2019  Discharge date:  2019      Code Status Order: Full Code   Advance Directives:   [facility] Documentation     Date/Time Healthcare Directive Type of Healthcare Directive Copy in [address] [facility] [facility] [address] Agent's Name Healthcare Agent's Phone Number    19 0400  Yes, patient has an advance directive for healthcare treatment  Durable power of  for health care  No, copy requested from family  --  --  --    19 0643  Yes, patient has an advance directive for healthcare treatment  Durable power of  for health care  No, copy requested from family  --  --  --          Admitting Physician:  Amalia Eubanks MD  PCP: [facility]    Discharging Nurse: [facility] Unit/Room#: 9208/1583-58  Discharging Unit Phone Number: ***    Emergency Contact:   Extended Emergency Contact Information  Primary Emergency Contact: kishor teague  Home Phone: [phone]  Mobile Phone: [phone]  Relation: Spouse  Secondary Emergency Contact: Marta Olguin  Mobile Phone: [phone]  Relation: Child    Past Surgical History:  Past Surgical History:   Procedure Laterality Date    BACK SURGERY      x 2    CHOLECYSTECTOMY      CYSTOSCOPY      EYE SURGERY      retina repair    HEMORRHOID SURGERY      KNEE SURGERY      LUMBAR SPINE SURGERY N/A 2019    DECOMPRESSION LAMINECTOMY L3-4 POSSIBLY L5 performed by Shubham Aparicio MD at [place] [address]       retracted penis    REPAIR DURAL / CSF LEAK N/A 2019    LUMBAR WOUND WASHOUT performed by Shubham Aparicio MD at [address] [place] [address]         Immunization History: There is no immunization history on file for this patient.     Active Problems:  Patient Active Problem List   Diagnosis Code    Lumbar spinal stenosis M48.061    Degenerative lumbar spinal stenosis M48.061    Lumbar stenosis with neurogenic claudication M48.062    Back abscess L02.212    Sepsis (HCC) A41.9    Surgical site infection T81.49XA    Staphylococcus aureus infection A49.01    Bacteremia due to methicillin susceptible Staphylococcus aureus (MSSA) R78.81       Isolation/Infection:   Isolation          No Isolation        Patient Infection Status     Infection Encounter Level? Onset Date Added Added By Resolved Resolved By Review Date    MRSA No 05/09/19 05/11/19 Wound Culture             Nurse Assessment:  Last Vital Signs: BP (!) 167/84   Pulse 97   Temp 99.5 °F (37.5 °C) (Oral)   Resp 20   Ht 6' 1\" (1.854 m)   Wt (!) 350 lb 1.5 oz (158.8 kg)   SpO2 93%   BMI 46.19 kg/m²     Last documented pain score (0-10 scale): Pain Level: 9  Last Weight:   Wt Readings from Last 1 Encounters:   05/11/19 (!) 350 lb 1.5 oz (158.8 kg)     Mental Status:  oriented, alert, coherent, logical, thought processes intact and able to concentrate and follow conversation    IV Access:  - PICC - site  R Upper Arm, insertion date: ***    Nursing Mobility/ADLs:  Walking   Assisted  Transfer  Assisted  Bathing  Dependent  Dressing  Assisted  [facility]  Med Delivery   whole    Wound Care Documentation and Therapy:  Wound 05/08/19 Toe (Comment  which one) Anterior;Right Big toe (Active)   Wound Traumatic 5/10/2019  5:05 PM   Dressing Status Clean;Dry; Intact 5/17/2019  4:00 AM   Dressing Changed Changed/New 5/12/2019  5:06 PM   Dressing/Treatment Other (comment) 5/12/2019  5:06 PM   Wound Assessment Other (Comment) 5/17/2019  4:00 AM   Drainage Amount None 5/17/2019  4:00 AM   Drainage Description GUME 5/13/2019  4:00 PM   Odor None 5/17/2019  4:00 AM   Tequila-wound Assessment GUME 5/15/2019  4:00 PM   Number of days: 9        Elimination:  Continence:   · Bowel: No  · Bladder: Yes  Urinary Catheter: None   Colostomy/Ileostomy/Ileal Conduit: No       Date of Last BM: 5/17     Intake/Output Summary (Last 24 hours) at 5/17/2019 1041  Last data filed at 5/17/2019 0723  Gross per 24 hour   Intake 960 ml   Output 1208 ml   Net -248 ml     I/O last 3 completed shifts: In: 960 [P.O.:960]  Out: 1233 [FPVGK:4998; Drains:58]    Safety Concerns: At Risk for Falls    Impairments/Disabilities:      None    Nutrition Therapy:  Current Nutrition Therapy:   - Oral Diet:  Carb Control 4 carbs/meal (1800kcals/day)    Routes of Feeding: Oral  Liquids: No Restrictions  Daily Fluid Restriction: no  Last Modified Barium Swallow with Video (Video Swallowing Test): not done    Treatments at the Time of Hospital Discharge:   Respiratory Treatments: ***  Oxygen Therapy:  is not on home oxygen therapy.   Ventilator:    - No ventilator support    Rehab Therapies: Physical Therapy and Occupational Therapy  Weight Bearing Status/Restrictions: No weight bearing restirctions  Other Medical Equipment (for information only, NOT a DME order):  wheelchair, walker and bedside commode  Other Treatments: ***    Patient's personal belongings (please select all that are sent with patient):  Glasses    RN SIGNATURE:  Electronically signed by Marysol Purvis RN on 5/17/19 at 11:23 AM    CASE MANAGEMENT/SOCIAL WORK SECTION    Inpatient Status Date: 05/08/2019    Readmission Risk Assessment Score:  Readmission Risk              Risk of Unplanned Readmission:        11           Discharging to Facility/ Agency   · Name: [facility]  · Address:  · Phone:[facility]- [phone]  · Fax:    ·     / signature: Electronically signed by Elizabeth Marie RN on 5/17/19 at 11:14 AM    PHYSICIAN SECTION    Prognosis: Fair    Condition at Discharge: Stable    Rehab Potential (if transferring to Rehab): Good    Recommended Labs or Other Treatments After Discharge:  Start coumadin 5/20 and adjust for INR 2-3 or eliquis 10 mg bid x 1 week then 5 mg bid and bridge off the lovenox    Physician Certification: I certify the above information and transfer of Duane Lai  is necessary for the continuing treatment of the diagnosis listed and that he requires Acute Rehab for less 30 days.      Update Admission H&P: No change in H&P    PHYSICIAN SIGNATURE:  Electronically signed by Darion Rivera MD on 5/17/19 at 10:41 AM

## 2019-05-17 NOTE — PROGRESS NOTES
Pharmacy Progress Note    Enoxaparin 160 mg subcutaneous BID ordered, which provides 1 mg/kg BID based on patient weight of 158.8 kg. Per protocol, pharmacy has rounded ordered dose to nearest prefilled syringe size. Order has been changed to 150 mg subcutaneous BID. Rounding up or down to the closest prefilled syringe size vs the actual weight-based dose will be a small dose difference that is clinically insignificant to impact patient care. Twice daily dosing:  Weight (kg) Actual Dose (mg) Recommended prefilled syringe size (mg)   30-35 30-35 30   36-49 36-49 40   50-69 50-69 60   70-89 70-89 80     100   110-134 110-134 120   135-159 135-159 150     ? 160 kg : RP to contact provider to discuss alternative therapy       Once daily dosing:  Weight (kg) Actual Dose (mg) Recommended prefilled syringe size (mg)   30-33 45-50 40   34-47 51-70.5 60   48-60 72-90 80   61-73  100   74-92 111-138 120    139.5-160.5 150     ? 108 kg : RP to contact provider to recommend 1 mg/kg BID dosing      Please call Pharmacy with any questions.   Nelly Smith, PharmD  [phone]  5/17/2019 11:22 AM

## 2019-05-18 LAB
ANION GAP SERPL CALCULATED.3IONS-SCNC: 15 MMOL/L (ref 3–16)
BUN BLDV-MCNC: 18 MG/DL (ref 7–20)
CALCIUM SERPL-MCNC: 8.4 MG/DL (ref 8.3–10.6)
CHLORIDE BLD-SCNC: 86 MMOL/L (ref 99–110)
CO2: 24 MMOL/L (ref 21–32)
CREAT SERPL-MCNC: 0.8 MG/DL (ref 0.8–1.3)
GFR AFRICAN AMERICAN: >60
GFR NON-AFRICAN AMERICAN: >60
GLUCOSE BLD-MCNC: 200 MG/DL (ref 70–99)
GLUCOSE BLD-MCNC: 216 MG/DL (ref 70–99)
GLUCOSE BLD-MCNC: 227 MG/DL (ref 70–99)
GLUCOSE BLD-MCNC: 233 MG/DL (ref 70–99)
GLUCOSE BLD-MCNC: 246 MG/DL (ref 70–99)
GLUCOSE BLD-MCNC: 284 MG/DL (ref 70–99)
HCT VFR BLD CALC: 32.1 % (ref 40.5–52.5)
HEMOGLOBIN: 10.4 G/DL (ref 13.5–17.5)
MCH RBC QN AUTO: 27.9 PG (ref 26–34)
MCHC RBC AUTO-ENTMCNC: 32.2 G/DL (ref 31–36)
MCV RBC AUTO: 86.5 FL (ref 80–100)
PDW BLD-RTO: 14.9 % (ref 12.4–15.4)
PERFORMED ON: ABNORMAL
PLATELET # BLD: 541 K/UL (ref 135–450)
PMV BLD AUTO: 8.1 FL (ref 5–10.5)
POTASSIUM SERPL-SCNC: 3.6 MMOL/L (ref 3.5–5.1)
RBC # BLD: 3.71 M/UL (ref 4.2–5.9)
SODIUM BLD-SCNC: 125 MMOL/L (ref 136–145)
WBC # BLD: 14.6 K/UL (ref 4–11)

## 2019-05-18 PROCEDURE — 2580000003 HC RX 258: Performed by: PHYSICAL MEDICINE & REHABILITATION

## 2019-05-18 PROCEDURE — 6360000002 HC RX W HCPCS: Performed by: PHYSICAL MEDICINE & REHABILITATION

## 2019-05-18 PROCEDURE — 85027 COMPLETE CBC AUTOMATED: CPT

## 2019-05-18 PROCEDURE — 97530 THERAPEUTIC ACTIVITIES: CPT

## 2019-05-18 PROCEDURE — 97535 SELF CARE MNGMENT TRAINING: CPT

## 2019-05-18 PROCEDURE — 83935 ASSAY OF URINE OSMOLALITY: CPT

## 2019-05-18 PROCEDURE — 6370000000 HC RX 637 (ALT 250 FOR IP): Performed by: PHYSICAL MEDICINE & REHABILITATION

## 2019-05-18 PROCEDURE — 80048 BASIC METABOLIC PNL TOTAL CA: CPT

## 2019-05-18 PROCEDURE — 97162 PT EVAL MOD COMPLEX 30 MIN: CPT

## 2019-05-18 PROCEDURE — 97166 OT EVAL MOD COMPLEX 45 MIN: CPT

## 2019-05-18 PROCEDURE — 1280000000 HC REHAB R&B

## 2019-05-18 RX ORDER — ACETAMINOPHEN 325 MG/1
650 TABLET ORAL 4 TIMES DAILY
Status: DISCONTINUED | OUTPATIENT
Start: 2019-05-18 | End: 2019-05-28 | Stop reason: HOSPADM

## 2019-05-18 RX ORDER — OXYCODONE HYDROCHLORIDE 15 MG/1
15 TABLET ORAL EVERY 4 HOURS PRN
Status: DISCONTINUED | OUTPATIENT
Start: 2019-05-18 | End: 2019-05-19

## 2019-05-18 RX ORDER — OXYCODONE HYDROCHLORIDE 5 MG/1
10 TABLET ORAL EVERY 4 HOURS PRN
Status: DISCONTINUED | OUTPATIENT
Start: 2019-05-18 | End: 2019-05-19

## 2019-05-18 RX ADMIN — INSULIN LISPRO 2 UNITS: 100 INJECTION, SOLUTION INTRAVENOUS; SUBCUTANEOUS at 09:33

## 2019-05-18 RX ADMIN — OXYCODONE HYDROCHLORIDE 15 MG: 15 TABLET ORAL at 07:40

## 2019-05-18 RX ADMIN — METHOCARBAMOL TABLETS 1500 MG: 750 TABLET, COATED ORAL at 21:18

## 2019-05-18 RX ADMIN — OXYCODONE HYDROCHLORIDE 15 MG: 15 TABLET ORAL at 21:08

## 2019-05-18 RX ADMIN — INSULIN LISPRO 1 UNITS: 100 INJECTION, SOLUTION INTRAVENOUS; SUBCUTANEOUS at 21:28

## 2019-05-18 RX ADMIN — OXYCODONE HYDROCHLORIDE 15 MG: 15 TABLET ORAL at 16:31

## 2019-05-18 RX ADMIN — LISINOPRIL 20 MG: 20 TABLET ORAL at 09:28

## 2019-05-18 RX ADMIN — DIAZEPAM 5 MG: 5 TABLET ORAL at 03:38

## 2019-05-18 RX ADMIN — ACETAMINOPHEN 650 MG: 325 TABLET ORAL at 16:31

## 2019-05-18 RX ADMIN — SENNOSIDES,DOCUSATE SODIUM 2 TABLET: 8.6; 5 TABLET, FILM COATED ORAL at 21:18

## 2019-05-18 RX ADMIN — CEFTRIAXONE SODIUM 2 G: 2 INJECTION, POWDER, FOR SOLUTION INTRAMUSCULAR; INTRAVENOUS at 06:14

## 2019-05-18 RX ADMIN — ATORVASTATIN CALCIUM 20 MG: 20 TABLET, FILM COATED ORAL at 21:18

## 2019-05-18 RX ADMIN — FAMOTIDINE 20 MG: 20 TABLET ORAL at 21:18

## 2019-05-18 RX ADMIN — ENOXAPARIN SODIUM 150 MG: 150 INJECTION SUBCUTANEOUS at 21:26

## 2019-05-18 RX ADMIN — METHOCARBAMOL TABLETS 1500 MG: 750 TABLET, COATED ORAL at 14:17

## 2019-05-18 RX ADMIN — AMLODIPINE BESYLATE 2.5 MG: 5 TABLET ORAL at 09:27

## 2019-05-18 RX ADMIN — ACETAMINOPHEN 650 MG: 325 TABLET ORAL at 12:18

## 2019-05-18 RX ADMIN — FAMOTIDINE 20 MG: 20 TABLET ORAL at 09:29

## 2019-05-18 RX ADMIN — Medication 100 MG: at 09:29

## 2019-05-18 RX ADMIN — METHOCARBAMOL TABLETS 1500 MG: 750 TABLET, COATED ORAL at 09:26

## 2019-05-18 RX ADMIN — ACETAMINOPHEN 650 MG: 325 TABLET ORAL at 21:18

## 2019-05-18 RX ADMIN — INSULIN LISPRO 2 UNITS: 100 INJECTION, SOLUTION INTRAVENOUS; SUBCUTANEOUS at 12:07

## 2019-05-18 RX ADMIN — OXYCODONE HYDROCHLORIDE AND ACETAMINOPHEN 1 TABLET: 5; 325 TABLET ORAL at 04:12

## 2019-05-18 RX ADMIN — TAMSULOSIN HYDROCHLORIDE 0.4 MG: 0.4 CAPSULE ORAL at 09:28

## 2019-05-18 RX ADMIN — Medication 10 ML: at 13:30

## 2019-05-18 RX ADMIN — ENOXAPARIN SODIUM 150 MG: 150 INJECTION SUBCUTANEOUS at 09:27

## 2019-05-18 RX ADMIN — ACETAMINOPHEN 650 MG: 325 TABLET ORAL at 09:28

## 2019-05-18 RX ADMIN — INSULIN LISPRO 2 UNITS: 100 INJECTION, SOLUTION INTRAVENOUS; SUBCUTANEOUS at 17:04

## 2019-05-18 RX ADMIN — OXYCODONE HYDROCHLORIDE 15 MG: 15 TABLET ORAL at 12:18

## 2019-05-18 RX ADMIN — OXYCODONE HYDROCHLORIDE AND ACETAMINOPHEN 1 TABLET: 5; 325 TABLET ORAL at 03:32

## 2019-05-18 RX ADMIN — Medication 10 ML: at 21:25

## 2019-05-18 RX ADMIN — FENOFIBRATE 160 MG: 160 TABLET ORAL at 09:26

## 2019-05-18 RX ADMIN — CEFTRIAXONE SODIUM 2 G: 2 INJECTION, POWDER, FOR SOLUTION INTRAMUSCULAR; INTRAVENOUS at 18:06

## 2019-05-18 RX ADMIN — SENNOSIDES,DOCUSATE SODIUM 2 TABLET: 8.6; 5 TABLET, FILM COATED ORAL at 09:28

## 2019-05-18 ASSESSMENT — PAIN SCALES - GENERAL
PAINLEVEL_OUTOF10: 9
PAINLEVEL_OUTOF10: 8
PAINLEVEL_OUTOF10: 5
PAINLEVEL_OUTOF10: 7
PAINLEVEL_OUTOF10: 8
PAINLEVEL_OUTOF10: 10
PAINLEVEL_OUTOF10: 8
PAINLEVEL_OUTOF10: 8
PAINLEVEL_OUTOF10: 9

## 2019-05-18 ASSESSMENT — PAIN DESCRIPTION - ONSET
ONSET: ON-GOING

## 2019-05-18 ASSESSMENT — PAIN DESCRIPTION - PROGRESSION
CLINICAL_PROGRESSION: NOT CHANGED

## 2019-05-18 ASSESSMENT — PAIN DESCRIPTION - FREQUENCY
FREQUENCY: CONTINUOUS

## 2019-05-18 ASSESSMENT — PAIN DESCRIPTION - ORIENTATION
ORIENTATION: MID

## 2019-05-18 ASSESSMENT — PAIN DESCRIPTION - DESCRIPTORS
DESCRIPTORS: BURNING;ACHING

## 2019-05-18 ASSESSMENT — PAIN DESCRIPTION - LOCATION
LOCATION: BACK

## 2019-05-18 ASSESSMENT — PAIN - FUNCTIONAL ASSESSMENT
PAIN_FUNCTIONAL_ASSESSMENT: PREVENTS OR INTERFERES SOME ACTIVE ACTIVITIES AND ADLS

## 2019-05-18 ASSESSMENT — PAIN DESCRIPTION - DIRECTION
RADIATING_TOWARDS: LEG

## 2019-05-18 ASSESSMENT — PAIN DESCRIPTION - PAIN TYPE
TYPE: ACUTE PAIN

## 2019-05-18 NOTE — PROGRESS NOTES
Patient was grimacing and complained of severe back pain. He also reports left leg numbness and tingling. He was medicated with Percocet 2 tabs with some relief. Repositioned in bed. Back incision approximated. Staples in place. Open to air. No drainage noted. Skin assessment completed. Instructed to call with any needs. Call light in reach.

## 2019-05-18 NOTE — PROGRESS NOTES
Patient up to chair. VSS. A/Ox4. Fall precautions in place. For patient safety, Visual Surveillance is in place, reviewed with patient/family, verbalized understanding.     Vitals:    05/18/19 0924   BP: (!) 156/75   Pulse: 95   Resp: 16   Temp: 97.4 °F (36.3 °C)   SpO2: 95%

## 2019-05-18 NOTE — PROGRESS NOTES
Patient attempted to have bowel movement but was unable to go. He continues to complain of pain. He also states that he feels like he is chilling. Temp 97.9. Repositioned in bed.

## 2019-05-18 NOTE — H&P
Patient: Mary Gaming  [id number]  Date: 5/18/2019      Chief Complaint: Low back pain    History of Present Illness/Hospital Course:  77-year-old male with a history of diabetes, hypertension, hyperlipidemia, and neurogenic claudication who underwent a laminectomy on 4/19. He re-presented on 5/8 with fever and chills and was noted to meet sepsis criteria. CT revealed an incisional abscess measuring approximately 12 x 9 cm. He underwent washout on 5/12 as well as drain placement. His drain remains currently. ID evaluated and suggested a prolonged course. He was also noted to have lower extremity DVT and initiated on weight-based Lovenox. He is to transition to loading dose Eliquis on Monday. He was evaluated by therapy and suggested to continue in an inpatient setting prior to returning home. He reports his pain this morning as uncontrolled. Prior Level of Function:  Independently but did require assistance for some IADLs    Current Level of Function:  Total assist     has a past medical history of Arthritis, Back pain, DDD (degenerative disc disease), cervical, Diabetes mellitus ([address] Edema of both legs, GERD (gastroesophageal reflux disease), Hyperlipidemia, Hypertension, MRSA (methicillin resistant staph aureus) culture positive, Nausea in adult, Neuropathy, Vitamin B 12 deficiency, and Walking troubles. has a past surgical history that includes back surgery; Prostate surgery; shoulder surgery; Cystoscopy; knee surgery; Hemorrhoid surgery; eye surgery; Tonsillectomy; Cholecystectomy; Lumbar spine surgery (N/A, 4/19/2019); and REPAIR DURAL / CSF LEAK (N/A, 5/12/2019). reports that he has never smoked. He has never used smokeless tobacco. He reports that he drinks alcohol. He reports that he has current or past drug history.     family history includes Arthritis in his father and mother; Diabetes in his father; Heart Disease in his father; High Blood Pressure in his father; Stroke in his father. Allergies: Patient has no known allergies.     Current Facility-Administered Medications   Medication Dose Route Frequency Provider Last Rate Last Dose    acetaminophen (TYLENOL) tablet 650 mg  650 mg Oral 4x Daily Joya Delgado MD        oxyCODONE (ROXICODONE) immediate release tablet 10 mg  10 mg Oral Q4H PRN Joya Delgado MD        Or    oxyCODONE (OXY-IR) immediate release tablet 15 mg  15 mg Oral Q4H PRN Joya Delgaod MD   15 mg at 05/18/19 0740    magnesium hydroxide (MILK OF MAGNESIA) 400 MG/5ML suspension 30 mL  30 mL Oral Daily PRN Joya Delgado MD        amLODIPine (NORVASC) tablet 2.5 mg  2.5 mg Oral Daily Joya Delgado MD        [START ON 5/20/2019] apixaban (ELIQUIS) tablet 10 mg  10 mg Oral BID Joya Delgado MD        atorvastatin (LIPITOR) tablet 20 mg  20 mg Oral Nightly Joya Delgado MD   20 mg at 05/17/19 2116    bisacodyl (DULCOLAX) suppository 10 mg  10 mg Rectal Daily PRN Joya Delgado MD        calcium carbonate (TUMS) chewable tablet 500 mg  500 mg Oral TID PRN Joya Delgado MD        cefTRIAXone (ROCEPHIN) 2 g IVPB in D5W 50ml minibag  2 g Intravenous Q12H Joya Delgado MD   Stopped at 05/18/19 0630    dextrose 5 % solution  100 mL/hr Intravenous PRN Joya Delgado MD        dextrose 50 % solution 12.5 g  12.5 g Intravenous PRN Joya Delgado MD        diazepam (VALIUM) tablet 5 mg  5 mg Oral Q6H PRN Joya Delgado MD   5 mg at 05/18/19 0338    diphenhydrAMINE (BENADRYL) tablet 25 mg  25 mg Oral Q6H PRN Joya Delgado MD        enoxaparin (LOVENOX) injection 150 mg  150 mg Subcutaneous BID Joya Delgado MD   150 mg at 05/17/19 2120    famotidine (PEPCID) tablet 20 mg  20 mg Oral BID Joya Delgado MD   20 mg at 05/17/19 2116    fenofibrate tablet 160 mg  160 mg Oral Daily Joya Delgado MD        glucagon (rDNA) injection 1 mg  1 mg Intramuscular PRN Joya Delgado MD        glucose (GLUTOSE) 40 % oral gel 15 g  15 g Oral PRN Alma Wiseman MD        hydrALAZINE (APRESOLINE) tablet 50 mg  50 mg Oral Q8H PRN Alma Wiseman MD        insulin lispro (HUMALOG) injection pen 0-3 Units  0-3 Units Subcutaneous Nightly Alma Wiseman MD   1 Units at 05/17/19 2120    insulin lispro (HUMALOG) injection pen 0-6 Units  0-6 Units Subcutaneous TID WC Alma Wiseman MD        lisinopril (PRINIVIL;ZESTRIL) tablet 20 mg  20 mg Oral Daily Alma Wiseman MD        methocarbamol (ROBAXIN) tablet 1,500 mg  1,500 mg Oral TID Alma Wiseman MD   1,500 mg at 05/17/19 2115    naloxone (NARCAN) injection 0.4 mg  0.4 mg Intravenous PRN Alma Wiseman MD        ondansetron (ZOFRAN-ODT) disintegrating tablet 4 mg  4 mg Oral Q8H PRN Alma Wiseman MD        polyethylene glycol [facility]) packet 17 g  17 g Oral Daily Alma Wiseman MD        sennosides-docusate sodium (SENOKOT-S) 8.6-50 MG tablet 2 tablet  2 tablet Oral BID Alma Wiseman MD   2 tablet at 05/17/19 2116    sodium chloride flush 0.9 % injection 10 mL  10 mL Intravenous 2 times per day Alma Wiseman MD   10 mL at 05/17/19 2120    sodium chloride flush 0.9 % injection 10 mL  10 mL Intravenous PRN Alma Wiseman MD        tamsulosin [facility]) capsule 0.4 mg  0.4 mg Oral Daily Alma Wiseman MD        traZODone (DESYREL) tablet 50 mg  50 mg Oral Nightly PRN Alma Wiseman MD        vitamin B-1 (THIAMINE) tablet 100 mg  100 mg Oral Daily Alma Wiseman MD           REVIEW OF SYSTEMS:   CONSTITUTIONAL: negative for fevers, chills, diaphoresis, appetite change, fatigue, night sweats and unexpected weight change. EYES: negative for blurred vision, eye discharge, visual disturbance and icterus. HEENT: negative for hearing loss, tinnitus, ear drainage, sinus pressure, nasal congestion, and epistaxis. RESPIRATORY: Negative for hemoptysis, cough, sputum production.    CARDIOVASCULAR: negative for chest pain, palpitations, exertional chest pressure/discomfort, edema, syncope. GASTROINTESTINAL: negative for nausea, vomiting, diarrhea, constipation, blood in stool and abdominal pain. GENITOURINARY: negative for frequency, dysuria, urinary incontinence, decreased urine volume, and hematuria. HEMATOLOGIC/LYMPHATIC: negative for easy bruising, bleeding and lymphadenopathy. ALLERGIC/IMMUNOLOGIC: negative for recurrent infections, angioedema, anaphylaxis and drug reactions. ENDOCRINE: negative for weight changes and diabetic symptoms including polyuria, polydipsia and polyphagia. MUSCULOSKELETAL: positive for pain, joint swelling, decreased range of motion and muscle weakness. NEUROLOGICAL: negative for headaches, slurred speech, unilateral weakness. PSYCHIATRIC/BEHAVIORAL: negative for hallucinations, behavioral problems, confusion and agitation. All pertinent positives are noted in the HPI. Physical Examination:  Vitals:   Patient Vitals for the past 24 hrs:   BP Temp Temp src Pulse Resp SpO2 Height Weight   05/18/19 0645 -- 97.9 °F (36.6 °C) -- -- -- -- -- --   05/18/19 0415 -- 98.3 °F (36.8 °C) -- -- -- -- -- --   05/17/19 1944 (!) 164/76 98.1 °F (36.7 °C) Oral 87 16 96 % 6' 1\" (1.854 m) (!) 357 lb 5.9 oz (162.1 kg)     Psych: Stable mood, normal judgement, normal affect   Const: No distress  Eyes: Conjunctiva noninjected, no icterus noted; pupils equal, round. HENT: Atraumatic, normocephalic; Oral mucosa moist  Neck: Trachea midline, neck supple. No thyromegaly noted. CV: Regular rate and rhythm, no murmur rub or gallop noted  Resp: Lungs clear to auscultation bilaterally, no rales wheezes or ronchi, no retractions. Respirations unlabored. GI: Soft, nontender, nondistended. Normal bowel sounds. No palpable masses. Neuro: Alert, oriented, appropriate. No cranial nerve deficits appreciated. Sensation intact to light touch.  Motor examination reveals: BUE 5/5 RLE 3/5 HF 4/5 distal, LLE 2/5 HF 3/5 distal. Reflexes normal and symmetric. Skin: Normal temperature and turgor, posterior incision covered in clean dressing with TONNY drain in place  MSK: No joint abnormalities noted. Ext: No significant edema appreciated. No varicosities. Lab Results   Component Value Date    WBC 15.6 (H) 05/17/2019    HGB 10.5 (L) 05/17/2019    HCT 32.6 (L) 05/17/2019    MCV 86.8 05/17/2019     (H) 05/17/2019     Lab Results   Component Value Date    INR 1.35 (H) 05/09/2019    PROTIME 15.4 (H) 05/09/2019     Lab Results   Component Value Date    CREATININE 0.6 (L) 05/17/2019    BUN 14 05/17/2019     (L) 05/17/2019    K 4.0 05/17/2019    CL 88 (L) 05/17/2019    CO2 24 05/17/2019     Lab Results   Component Value Date    ALT 19 05/17/2019    AST 22 05/17/2019    ALKPHOS 92 05/17/2019    BILITOT 0.5 05/17/2019       Most recent imaging studies revealed   History: Evaluate for abscess. Back surgery. Status post laminectomy.       MRI lumbar spine with and without contrast 5/9/2019.       TECHNIQUE: Multiplanar T1 and T2-weighted images were obtained lumbar spine without use of contrast. Postcontrast images were obtained after the patient was given 20 mL of ProHance.       FINDINGS: Multilevel degenerative disc disease with disc space narrowing and osteophyte formation. Severe disc space narrowing in the mid and upper lumbar spine. The conus terminates at the level of L1.       L1-L2: Severe disc space narrowing with large diffuse disc bulge. This displaces the anterior thecal sac. The canal is not significantly narrowed in part related to previous laminectomy. Bilateral facet arthropathy. The nerve roots in the upper cauda    equina appear bunched and tortuous presumably related to stenosis. No measurable narrowing in the anterior to posterior dimension. In the lateral dimension there is mild narrowing to 9 mm. Moderate bilateral foraminal narrowing.       L2-L3: Severe disc space narrowing.  Fluid signal along the disc space without significant enhancement presumably related to degenerative fluid cleft. Posterior decompressive laminectomy. Bilateral facet arthropathy and ligamentous hypertrophy contributes    to a mild circumferential narrowing of the canal. Bilaterally there is moderate to severe foraminal narrowing worse on the right side.       At L3-L4: Severe disc space narrowing. Diffuse disc bulge with fluid cleft along the disc space. There is a posterior decompressive laminectomy. There is a posterior fluid collection with gas bubbles within the collection. This appears resultant mild    mass effect upon the posterior aspect of the cauda equina. Severe bilateral facet arthropathy results in a circumferential mild/moderate canal narrowing to between 8 and 9 mm in both the anterior and lateral dimensions. Moderate to severe right foraminal    narrowing. Severe left foraminal narrowing.       At L4-L5: Moderate disc space narrowing. Moderate diffuse disc bulge. Bilateral facet arthropathy. Status post laminectomy. The central canal is not significantly narrowed. Moderate to severe multifactorial bilateral foraminal narrowing worse on the    left.       L5-S1: Moderate disc space narrowing. Diffuse disc bulge with large posterior central disc protrusion resulting in a moderate canal narrowing. This impinges upon the S1 nerve roots in the lateral recesses bilaterally. Bilateral multifactorial severe    foraminal narrowing worse on the right side.       There is a complex fluid collection along the laminectomy bed extending from the level of L2 to the level of L4-L5. The fluid collection extends through the posterior muscular fascia with signal voids compatible with gas bubbles within the collection. There is enhancement along the margins of the collection. The fluid collection measures approximately 8.6 cm in AP dimension, 9.8 cm in craniocaudal dimension and measures approximately 1.8 cm in transverse dimension along the laminectomy site.  The collection enlarges slightly along the posterior margin in the subcutaneous fat. The collection approaches the skin surface without definite communication. There is layering signal within the collection suggesting a postoperative seroma or hematoma. An    infected fluid collection be difficult to exclude. There is enhancement along the epidural region throughout the laminectomy site extending from L2 to L5 with circumferential epidural enhancement. This is presumably reactive. Infection would be difficult    to exclude. There is not appear to be significant amount of osseous edema or osseous enhancement to suggest osteomyelitis. In addition the fluid signal along the L1-L2, L2-L3, L3-L4 disc spaces does not demonstrate enhancement and is presumably related    to degenerative fluid cleft.           Impression   1. Posterior fluid collection along the laminectomy bed of unclear significance. The fluid collection exerts complex layering signal with gas bubbles and peripheral enhancement. This may or present a seroma or postoperative hematoma. An infected fluid    collection be difficult to exclude. A pseudomeningocele is not excluded. The fluid collection extensively laminectomy bed without significant mass effect. 2. Multifactorial multilevel mild canal narrowing as described in part related to extensive facet arthropathy resulting in a narrowing of the canal from a lateral dimension. 3. Tortuous bunched nerve roots in the upper cauda equina surrounding the conus presumably related to chronic stenosis. 4. Multifactorial multilevel foraminal narrowing with moderate or severe foraminal narrowing from L1-L2 through L5-S1.   5. Diffuse circumferential epidural enhancement without discrete epidural collection extending throughout the laminectomy site which may be reactive in nature. Infection would be difficult to exclude. 6. No convincing evidence of osteomyelitis or discitis.    7. Large posterior central disc protrusion at L5-S1 resulting in a moderate canal narrowing and mild/moderate impingement upon the exiting S1 nerve roots bilaterally. RIGHT KNEE ON 5/16/2019       HISTORY: Right knee pain       COMPARISON: None       FINDINGS:       3 views of the right knee show a small joint effusion.       There is a total knee replacement in anatomic alignment in good position.       No fracture or acute bony pathology is seen.       No evidence of loosening.           Impression       Small joint effusion, indeterminant. Sterile versus nonsterile fluid not excluded         The above laboratory data have been reviewed. The above imaging data have been reviewed. The above medical testing have been reviewed. Body mass index is 47.15 kg/m². Barriers to Discharge: pain, endurance, ADLs    POST ADMISSION PHYSICIAN EVALUATION  The patient has agreed to being admitted to our comprehensive inpatient  rehabilitation facility consisting of at least 180 minutes of therapy a day,  5 out of 7 days a week. The patient/family has a good understanding of our discharge process. The  patient has potential to make improvement and is in need of at least two of  the following multidisciplinary therapies including but not limited to  physical, occupational, respiratory, and speech, nutritional services, wound care, and prosthetics and orthotics. Given the patients complex condition  and risk of further medical complications, rehabilitation services cannot be  safely provided at a lower level of care such as a skilled nursing facility. I have compared the patients medical and functional status at the time of the  preadmission screening and the same on this date, and there are no significant changes except as documented below in the assessment and plan.     By signing this document, I acknowledge that I have personally performed a  full physical examination on this patient within 24 hours of admission to  this inpatient rehabilitation facility and have determined the patient to be  able to tolerate the above course of treatment at an intensive level for a  reasonable period of time. I will be completing a detailed individualized  Plan of Care for this patient by day four of the patients stay based upon the  Preadmission Screen, this Post-Admission Evaluation, and the therapy  evaluations. Assessment and Plan:  Lumbar epidural abscess: s/p washout 5/12. Rocephin 2 Q12 thru 6/26. Weekly labs per ID recs. PT/OT    Lumbar spondylosis/radiculopathy: Pain control. PT/OT    DM: SSI    HTN: Norvasc, lisinopril    HLD: Lipitor 20, fenofibrate    DVT: Lovenox through 5/19. Loading dose Eliquis on 5/20    Bowels: Per protocol  Bladder: Per protocol   Sleep: Trazodone provided prn. Pain: Robaxin scheduled, -adjust to scheduled Tylenol and Barb 10-15 PRN   DVT PPx: As above     Roseanne Villegas MD 5/18/2019, 8:30 AM     * This document was created using dictation software. While all precautions were taken to ensure accuracy, errors may have occurred. Please disregard any typographical errors.

## 2019-05-18 NOTE — PROGRESS NOTES
Physical Therapy    Facility/Department: [facility] ACUTE REHAB UNIT  Initial Assessment/Treatment Note    NAME: Anne Luis  : 1950  MRN: [id number]    Date of Service: 2019    Discharge Recommendations:  24 hour supervision or assist, Home with Home health PT   PT Equipment Recommendations  Other: continue to assess, pt owns 4[employer]    Assessment   Body structures, Functions, Activity limitations: Decreased functional mobility ; Decreased strength;Decreased endurance;Decreased coordination;Decreased ROM; Decreased balance; Increased Pain  Assessment: Pt is a 68 y.o. male with diagnosis of debility presenting with decreased functional mobility, strength, endurance, coordination, ROM, and balance. Pt is currently requiring assist of 1 or 2 for all functional mobility, which is a decline from reported baseline. Pt will benefit from intensive therapy to maximize independence and return to previous function. Treatment Diagnosis: decreased functional mobility due to debility  Prognosis: Fair  Decision Making: Medium Complexity  Patient Education: Role of PT, goals, d/c recommendations, pt verbalized understanding  REQUIRES PT FOLLOW UP: Yes  Activity Tolerance  Activity Tolerance: Patient limited by fatigue;Patient limited by pain; Patient limited by endurance       Patient Diagnosis(es): There were no encounter diagnoses. has a past medical history of Arthritis, Back pain, DDD (degenerative disc disease), cervical, Diabetes mellitus ([address] Edema of both legs, GERD (gastroesophageal reflux disease), Hyperlipidemia, Hypertension, MRSA (methicillin resistant staph aureus) culture positive, Nausea in adult, Neuropathy, Vitamin B 12 deficiency, and Walking troubles. has a past surgical history that includes back surgery; Prostate surgery; shoulder surgery; Cystoscopy; knee surgery; Hemorrhoid surgery; eye surgery; Tonsillectomy;  Cholecystectomy; Lumbar spine surgery (N/A, 2019); and REPAIR DURAL / CSF LEAK (N/A, 5/12/2019). Restrictions  Position Activity Restriction  Other position/activity restrictions: up with assistance  Vision/Hearing  Vision: Impaired  Vision Exceptions: Wears glasses at all times  Hearing: Within functional limits     Subjective  General  Chart Reviewed: Yes  Patient assessed for rehabilitation services?: Yes  Additional Pertinent Hx: Pt is a 68 y.o. male admitted to ARU on 5/17/19 from [facility]. Pt initially admitted to [facility] on 5/8 with complaints of low back pain, fever, and chills. Family / Caregiver Present: No  Referring Practitioner: Dr. Swapna Tucker  Diagnosis: debility  Follows Commands: Within Functional Limits  General Comment  Comments: Pt found supine in bed upon PT arrival.  Pt agreeable to therapy session. Subjective  Subjective: \"I wasn't shaking like this before. \"  Pain Screening  Patient Currently in Pain: Yes  Vital Signs  Patient Currently in Pain: Yes(Pt reported 10/10 low back and BLE pain, RN giving pt pain medicine)       Orientation  Orientation  Overall Orientation Status: Within Functional Limits  Social/Functional History  Social/Functional History  Lives With: Spouse  Type of Home: House  Home Layout: One level  Home Access: Stairs to enter with rails  Entrance Stairs - Number of Steps: 2 [place]  Entrance Stairs - Rails: Both  Bathroom Shower/Tub: Walk-in shower  Bathroom Toilet: Handicap height  Bathroom Equipment: Shower chair, Grab bars in shower, Grab bars around toilet  Bathroom Accessibility: Accessible  Home Equipment: 4 wheeled walker(walking stick)  ADL Assistance: Needs assistance(assist from wife for bathing occasionally to reach back)  Homemaking Assistance: Needs assistance(pt shares with wife, pt does yardwork)  Ambulation Assistance: Independent(with rollator)  Transfer Assistance: Independent  Active : Yes  Occupation: On disability  Type of occupation:   Leisure & Hobbies: gardening, fishing, spending time with grandchildren  Additional Comments: Pt states that his family can provide 24 hr supervision/assist at d/c if necessary. Cognition        Objective          AROM RLE (degrees)  RLE General AROM: grossly decreased due to pain and weakness  AROM LLE (degrees)  LLE General AROM: grossly decreased due to pain and weakness  Strength RLE  Comment: pt able to perform ankle pumps and partial heel slide with increased effort supine in bed  Strength LLE  Comment: pt able to perform ankle pumps and partial heel slide with increased effort supine in bed     Sensation  Overall Sensation Status: WFL(intact to light touch over all BLE dermatomes, pt denied numbness/tingling)  Bed mobility  Rolling to Left: Maximum assistance(MaxA without use of bedrails, ModA with bedrails)  Rolling to Right: Maximum assistance(MaxA without use of bedrails, ModA with bedrails)  Supine to Sit: Maximum assistance;Dependent/Total(assist for BLE and trunk placement, x2 trials MaxA, x1 trial MaxAx1 + minAx1)  Sit to Supine: Maximum assistance;Dependent/Total(assist for BLE and trunk placement, x1 trial MaxA, x1 trial ModAx1 + minAx1)  Comment: HOB flat, cues for log roll. Upon initial supine to sit, pt reporting lightheadedness, pt returned to supine. BP measured in supine 193/88. BP measured seated EOB after 2nd supine to sit transfer 182/94. RN notified of elevated BP.   Transfers  Sit to Stand: Dependent/Total(MaxAx2 to stand from EOB to cielo steady, MinAx2 from cielo steady)  Stand to sit: Dependent/Total(MinAx2)  Bed to Chair: Dependent/Total(EOB to w/c via cielo steady)  Ambulation  Ambulation?: No(unsafe to attempt)  Stairs/Curb  Stairs?: No(unsafe to attempt)  Wheelchair Activities  Wheelchair Size: 22\"  Wheelchair Type: Standard  Wheelchair Cushion: Standard  Wheelchair Parts Management: No  Propulsion: Yes  Propulsion 1  Propulsion: Manual  Level: Level Tile  Method: RUE;LUE  Level of Assistance: Supervision  Description/ Details: pt navigating turns and doorways  Distance: 100'     Balance  Comments: Pt required SBA-CGA for sitting balance EOB for approximately 10-15 minutes for bathing and dressing tasks. Pt requesting to lean against rollator for comfort during rest breaks. See OT note for bathing and dressing tasks. 2nd Session: Pt found seated in w/c with OT present upon PT arrival.  Pt was agreeable to therapy session and stated, \"I really don't want to give up, but it seems like it's harder to do things since this second surgery. \"  Pt reported 9/10 back pain, RN notified. Pt required significantly increased time to propel w/c 150' + 75' (limited by destination) on level tile with BUE and supervision. Pt performed sit to/from stand (pt achieving nearly full stand) from w/c to parallel bars with MaxAx2, B knee blocking required, verbal cues for hand placement. Pt requiring significantly extended seated rest break after standing attempt due to reported pain. Pt left seated in w/c at end of session with chair alarm set and call light/needs within reach.     Plan   Plan  Times per week: 5x/week, 90 minutes a day  Current Treatment Recommendations: Strengthening, Transfer Training, Endurance Training, Neuromuscular Re-education, Patient/Caregiver Education & Training, ROM, Wheelchair Mobility Training, Equipment Evaluation, Education, & procurement, Balance Training, Gait Training, Home Exercise Program, Functional Mobility Training, Stair training, Safety Education & Training  Safety Devices  Type of devices: (pt left seated in w/c with OT present)    G-Code       OutComes Score                                                  AM-PAC Score             Goals  Short term goals  Time Frame for Short term goals: 1-2 weeks  Short term goal 1: Pt will perform all bed mobility with ModA  Short term goal 2: Pt will perform sit to/from stand with RW and MaxA  Short term goal 3: Pt will perform squat pivot with MaxA  Short term goal 4: Pt will ambulate 10' with RW and MaxA  Short term goal 5: Pt will propel w/c 150' with supervision  Long term goals  Time Frame for Long term goals : 2-3 weeks  Long term goal 1: Pt will perform all bed mobility with supervision  Long term goal 2: Pt will perform sit to/from stand with RW and supervision  Long term goal 3: Pt will perform squat pivot with supervision  Long term goal 4: Pt will ambulate 48' with RW and supervision  Long term goal 5: Pt will propel w/c 150' with [address] term goal 6: Pt will ascend/descend 2 stairs with BHR and Nicole  Patient Goals   Patient goals : \"To be able to do what I was doing before I got sick. \"       Therapy Time   Individual Concurrent Group Co-treatment   Time In 0730     0745   Time Out 0745     0830   Minutes 15     39        Second Session Therapy Time:   Individual Concurrent Group Co-treatment   Time In 1400     1338   Time Out 1408     1400   Minutes 8     22     Timed Code Treatment Minutes:  39 + 30    Total Treatment Minutes:  [facility], PT, DPT, CLT

## 2019-05-18 NOTE — PROGRESS NOTES
Assistance: Needs assistance(shares with wife, pt does yard work)  Ambulation Assistance: Independent(with rollator)  Transfer Assistance: Independent  Active : Yes  Occupation: On disability  Type of occupation:   Leisure & Hobbies: gardening, fishing, spending time with grandchildren  Additional Comments: Pt states that his family can provide 24 hr supervision/assist at d/c if necessary. Objective   Vision: Impaired  Vision Exceptions: Wears glasses at all times  Hearing: Within functional limits          Functional Mobility  Functional - Mobility Device: Wheelchair  Activity: (to/from room ~100')  Assist Level: Supervision     ADL  Feeding: Setup(to open milk carton)  Grooming: Moderate assistance - required assist for hand/face washing. Set-up assist for oral care  UE Bathing: Dependent/Total  LE Bathing: Dependent/Total  UE Dressing: Unable to assess(comment)(to shirt available)  LE Dressing: Dependent/Total  Toileting: Unable to assess(comment)(no continent episode during OT/PT eval)  Comments: seated EOB for UB bathing/dressing unable to assist with tasks this date d/t pain and fatigue. Supine for LB bathing/dressing. No shirt available during 1st or 2nd session - dependent to don gown    Tone RUE  RUE Tone: Normotonic  Tone LUE  LUE Tone: Normotonic  Coordination  Movements Are Fluid And Coordinated: Yes     Bed mobility  Rolling to Left: Maximum assistance(Max without BRs, Mod A with bedrails)  Rolling to Right: Maximum assistance(Max without BRs, Mod A with bedrails)  Supine to Sit: Maximum assistance;Dependent/Total(x2 trials max A, x1 trial max + min A. Requires assist with BLEs and trunk)  Sit to Supine: Maximum assistance;Dependent/Total(x1 trial max A, x1 trial mod + min A. Requires assist with trunk and BLEs)  Comment: HOB flat, cues for log roll. Upon initial supine to sit, pt reported lightheadedeness, pt returned to supine. BP supine 193/88.  BP EOB after 2nd supine to sit improved functional activity tolerance  Short term goal 5: Pt will be MIN A with BUE HEP to increase strength to facilitate independence with ADLs and functional mobility  Long term goals  Time Frame for Long term goals : 3 weeks  Long term goal 1: Pt will be SBA with toilet transfer and toileting  Long term goal 2: Pt will bed SBA with LE dressing with use of adap equip as needed  Long term goal 3: Pt will be SBA with bathing with use of long handle sponge and adaptive techniques as needed  Long term goal 4: Pt will tolerated standing x5 minutes to demo improved functional activity tolerance  Long term goal 5: Pt will be MOD I with BUE HEP to increase strength to facilitate independence with ADLs and functional mobility  Patient Goals   Patient goals : Go home, return to prior level of functioning       Therapy Time   Individual Concurrent Group Co-treatment   Time In 0830     0745   Time Out 0845     0830   Minutes 15     45   Timed Code Treatment Minutes: 45 Minutes  Total Treatment Minutes: 60 minutes (15 minutes mod complex eval)     Second Session Therapy Time:   Individual Concurrent Group Co-treatment   Time In 1300     1338   Time Out 1338     1400   Minutes 8     22     Timed Code Treatment Minutes: 30 +  45 Minutes    Total Treatment Minutes:  90 minutes    Damian Verma OT

## 2019-05-18 NOTE — PROGRESS NOTES
Assisted patient to bed. Patient was unable to stand on own to stedy. Had to call in a 3rd staff member to help patient up to the stedy. Patient states right hip is bothering him. Patient was up in wheelchair for most of the day. Family at bedside.

## 2019-05-18 NOTE — PLAN OF CARE
Problem: Falls - Risk of:  Goal: Will remain free from falls  Description  Will remain free from falls  5/18/2019 1554 by Amy Arauz RN  Outcome: Ongoing  Note:   No falls this shift. Patient alert and oriented. Call light in reach. Calls out for assistance. Non skid footwear on.     5/18/2019 0301 by Jese Escudero RN  Outcome: Ongoing  Note:   Fall precautions initiated and reviewed with patient. Bed alarm set. Bed in low and locked position. Call light and bedside table within reach. [employer] camera in use. He has remained free from injury. Problem: Risk for Impaired Skin Integrity  Goal: Tissue integrity - skin and mucous membranes  Description  Structural intactness and normal physiological function of skin and  mucous membranes. 5/18/2019 0301 by Jese Escudero RN  Outcome: Ongoing  Note:   Staples in place to back incision. Incision without drainage. Open to air. [place] area red and excoriated. Skin cleansed and barrier cream applied. Abrasion noted on right lower leg. Scabbed abrasions noted on right great toe and left knee. Simon scale orders initiated. Low air loss bed in use. Patient turned and repositioned with pillow support. Continent of urine. Problem: Infection:  Goal: Will remain free from infection  Description  Will remain free from infection  Outcome: Ongoing     Problem: Pain:  Goal: Patient's pain/discomfort is manageable  Description  Patient's pain/discomfort is manageable  5/18/2019 1554 by Amy Arauz RN  Outcome: Ongoing  Note:   Patient rates pain 8/10. Pain in back and legs. Pain medicine given when available. 5/18/2019 0301 by Jese Escudero RN  Outcome: Ongoing  Note:   Patient currently denies need for pain medication. States that he is comfortable. Instructed to call with any needs.   Goal: Control of chronic pain  Description  Control of chronic pain  Outcome: Ongoing

## 2019-05-18 NOTE — PROGRESS NOTES
Patient arrived to room from 5th floor. Patient appears sleepy but easily awakened. Contact isolation continued. Oriented to room Call light in reach.

## 2019-05-18 NOTE — PROGRESS NOTES
[facility] [address] [place]  Inpatient Rehabilitation           Discharge Checklist    Patient Name: Gertrudis Newman        MRN: [id number]    : 1950  (73 y.o.)  Discharge Date: ***          Therapy:   Equipment needs for safe Patient discharge: ***   [] Std Savannah Larson   [] Kirit Pain   [] Single Point [place]   [] Drop Arm [facility]   [] Hip Kit     []  Tub Bench    [] Shower Chair    []Wheelchair  [] Other:         Social Work:    Is Home eval warranted prior to discharge? YES  []   NO  []   - Date & Time of Home eval:  - Name of Family member accompanying/transporting pt for home eval:     Patient/Family Aware of discharge date/plan? YES  []   NO  []         Who will be transporting patient? ***     All above equipment Needs have been Ordered/Delivered to unit or set up for home delivery? YES  []   NO  []       **If Out of Pocket cost for DME, is patient/family aware? YES  []   NO  []      Pharmacy discussed with patient? YES  []   NO  []   Preferred Pharmacy Name: ______________________    o If [address] [place] [address], is family agreeable to  scripts Monday - Friday by 5pm?   YES  []   NO  []    · Is patient to receive home care? YES  []   NO  []   · Name of Physician Following Home Care: _______________________    · Is patient to receive OP therapy? YES  []   NO  []    MD:   - Home Care Order in? YES  []   NO  []            - Outpatient PT/OT? YES  []   NO  []          If YES, Hard script signed and at [employer]? YES  []   NO  []     o Meds E-scripted/hard copies at nurses station? YES  []   NO  []       Nursing:   Does patient have a line that needs removed prior to d/c? YES  []   NO  []      - AVS section completed with agency phone number? YES  []   NO  []     Follow-up Appointments Made? YES  []   NO  []      MD, RN, SW/CM:     - All portions of VALERI completed?    YES  []   NO  []      Signatures:  SW/CM:    Nursing:    PT:    OT:    MD:

## 2019-05-18 NOTE — PROGRESS NOTES
Patient complaining of increased back and leg pain. He states that he fell asleep but then coughed and pain came severe pain returned to his back and bilateral lower extremities. Dr Lillie Hernandez notified. He will be in to see patient shortly. Patient informed.

## 2019-05-18 NOTE — PROGRESS NOTES
Patient continues to grimace and moan. Complaining of increasing bilateral leg pain and burning. He now rates his pain 9 on pain scale. Medicated with second Percocet. Repositioned frequently. Afebrile. Will continue to monitor.

## 2019-05-18 NOTE — PLAN OF CARE
ARU PATIENT TREATMENT PLAN  [facility] [place]  [facility], [address]  [address] [place] [address]    : 1950  Acct #: [de-identified]  MRN: [id number]   PHYSICIAN:  Jack Valdivia MD  Primary Problem    Patient Active Problem List   Diagnosis    Lumbar spinal stenosis    Degenerative lumbar spinal stenosis    Lumbar stenosis with neurogenic claudication    Back abscess    Sepsis ([address]    Surgical site infection    Staphylococcus aureus infection    Bacteremia due to methicillin susceptible Staphylococcus aureus (MSSA)    Debility       Rehabilitation Diagnosis:  Orthopedic, 8.9, Other Orthopedic   ADMIT DATE:2019    Patient Goals: Regain strength and endurance, return home  Admitting Impairments: Lumbar epidural abscess impacting motor function  Activity Restrictions: Reduced independence with ADL, Transfers, Mobility  Participation Limitations: Enjoys gardening, fishing, spending time with grandchildren       CARE PLAN     NURSING:  Rafi Holloway while on this unit will:   [x] Be continent of bowel and bladder      [x] Have an adequate number of bowel movements   [x] Urinate with no urinary retention >300ml in bladder   [] Complete bladder protocol with tapia removal   [x] Maintain O2 SATs at ___%   [x] Have pain managed while on ARU        [] Be pain free by discharge    [x] Have no skin breakdown while on ARU   [x] Have improved skin integrity via wound measurements   [x] Have no signs/symptoms of infection at the wound site  [x] Be free from injury during hospitalization   [x] Complete education with patient/family with understanding demonstrated for:  [] Adjustment   [] Other:   Nursing Interventions will include:  [] bowel/bladder training   [x] education for medical assistive devices   [x] medication education   [x] O2 saturation management   [x] energy conservation   [x] stress management techniques   [x] fall prevention   [x] alarms protocol   [x] training, Safety Education & Training    OCCUPATIONAL THERAPY:  Goals:             Short term goals  Time Frame for Short term goals: 2 weeks  Short term goal 1: Pt will be MOD A with toilet transfer and toileting  Short term goal 2: Pt will bed MOD A with LE dressing with use of adap equip as needed  Short term goal 3: Pt will be MOD A with bathing with use of long handle sponge and adaptive techniques as needed  Short term goal 4: Pt will tolerated standing x2 minutes to demo improved functional activity tolerance  Short term goal 5: Pt will be MIN A with BUE HEP to increase strength to facilitate independence with ADLs and functional mobility :  Long term goals  Time Frame for Long term goals : 3 weeks  Long term goal 1: Pt will be SBA with toilet transfer and toileting  Long term goal 2: Pt will bed SBA with LE dressing with use of adap equip as needed  Long term goal 3: Pt will be SBA with bathing with use of long handle sponge and adaptive techniques as needed  Long term goal 4: Pt will tolerated standing x5 minutes to demo improved functional activity tolerance  Long term goal 5: Pt will be MOD I with BUE HEP to increase strength to facilitate independence with ADLs and functional mobility : These goals were reviewed with this patient at the time of assessment and Mary Gaming is in agreement    Plan of Care:  Pt to be seen 5 out of 7 days per week per ARU protocol ( 90  minutes with OT)  Patient Education: Role of OT in IRF - verb understanding    SPEECH THERAPY: Goals will be left blank if speech is not following this patient. Goals:                                                                            These goals were reviewed with this patient at the time of assessment and Mary Gaming is in agreement    Plan of Care:  Pt to be seen 5 out of 7 days per week per ARU protocol (    minutes with SLP)    Therapy Treatments will include:  [x]  therapeutic exercises    [x]  gait training     [x]  neuromuscular re-ed                            [x]  transfer training             [x] community reintegration    [x] bed mobility                          [x]  w/c mobility and training  [x]  self care    [x]home mgmt    []  cognitive training            []  energy conservation        []  dysphagia tx    []  speech/language/communication therapy   []  group therapy    [x]  patient/family education    [] Other:    CASE MANAGEMENT:  Goals:   Assist patient/family with discharge planning, patient/family counseling,   and coordination with insurance during ARU stay.     Admission Period/Goal FIM SCORES  Admit Score Goal Score   Eatin - Feeds self with setup/supervision/cues and/or requires only setup/supervision/cues to perform tube feedings GOAL: Eatin   Grooming: 3 - Able to perform 2-3 tasks (mod assist) GOAL: Groomin   Bathin - Able to bathe 2 or less areas/total assist GOAL: Bathin   Dressing-Upper: 0 - Did not occur(no shirt available) GOAL: Dressing-Upper: 6   Dressing-Lower: 1 - Total assist with lower body dressing GOAL: Dressing-Lower: 5   Toiletin - Did not occur(no continent episode during OT/PT eval) GOAL: Toiletin     GOAL: Bladder: 6     GOAL: Bowel: 6   Bed, Chair, Wheel Chair: 1 - Requires >75% assitance to transfer GOAL: Bed, Chair, Wheel Chair: 5   Toilet Transfer: 1 - Requires > 75% assist getting on & off toilet GOAL: Toilet: 5     GOAL: Tub, Shower: 5   Primary Mode: Wheel Chair         Distance Traveled in Wheel Chair: 100'    Walk: 1 - Total Assistance Walks < 50 feet OR requires two or more people OR patient performs < 25% of locomotion effort(pt would be totalA at this time)    Wheel Chair: 2 - Maximal Assistance Requires up to [address] requires assistance of one person to operate wheelchair between  feet    FIM:    FIM: GOAL: Walk/Wheel Chair: 6   Stairs: 1- Total Assistance perfoms less than 25% of the effort, or requirs the assistance of two people, or goes up and down fewer than 4 stairs(pt would be totalA at this time) GOAL: Stairs: 1   Comprehension: 6 - Complex ideas 90% or device (hearing aid/glasses) GOAL: Comprehension: 6   Expression: 6 - Device used to express complex ideas/needs GOAL: Expression: 6   Social Interaction: 5 - Patient is appropriate with supervision/cues GOAL: Social Interaction: 6   Problem Solvin - Patient able to solve simple/routine tasks GOAL: Problem Solvin   Memory: 5 - Patient requires prompting with stress/unfamiliar situations GOAL: Memory: 6             Dom Wu will be seen a minimum of 3 hours of therapy per day, a minimum of 5 out of 7 days per week  (please see above for specific treatment plan per PT/OT/SLP). [] In this rare instance due to the nature of this patient's medical involvement, this patient will be seen 15 hours per week (900 minutes within a 7 day period). In addition, dietician/nutritionist may monitor calorie count as well as intake and collaboratively work with SLP on dietary upgrades. Neuropsychology/Psychology may evaluate and provide necessary support. Medical issues being managed closely and that require 24 hour availability of a physician:   [] Swallowing Precautions  [x] Bowel/Bladder Fx  [] Weight bearing precautions   [x] Wound Care    [x] Pain Mgmt   [x] Infection Protection   [x] DVT Prophylaxis   [x] Fall Precautions  [x] Fluid/Electrolyte/Nutrition Balance   [] Voice Protection   [] Respiratory  [] Other:    Medical Prognosis: [x] Good  [] Fair    [] Guarded   Total expected IRF days 16  Anticipated discharge destination:    [] Home Independently   [x] Home Modified Independent  [] Home with supervision    []SNF     [] Other                                           Physician anticipated functional outcomes:   Mod I for ADL, transfers, mobility    IPOC brief synthesis: 47-year-old male admitted to inpatient rehabilitation to address strengthening, endurance, balance, gait, activities

## 2019-05-18 NOTE — PROGRESS NOTES
Patient complained of bilateral leg pain and back pain. He rated his pain 4-5 on pain scale. He was initially medicated with Percocet 5 mg. He continued to become restless and began moaning and praying. Frequently repositioned without relief. He described pain as burning, gripping pain. Medicated with Valium 5 mg for possible muscle spasms.  Ice packs applied to thighs on request.

## 2019-05-19 LAB
ANION GAP SERPL CALCULATED.3IONS-SCNC: 14 MMOL/L (ref 3–16)
BUN BLDV-MCNC: 15 MG/DL (ref 7–20)
CALCIUM SERPL-MCNC: 8.5 MG/DL (ref 8.3–10.6)
CHLORIDE BLD-SCNC: 90 MMOL/L (ref 99–110)
CO2: 26 MMOL/L (ref 21–32)
CREAT SERPL-MCNC: 0.7 MG/DL (ref 0.8–1.3)
GFR AFRICAN AMERICAN: >60
GFR NON-AFRICAN AMERICAN: >60
GLUCOSE BLD-MCNC: 192 MG/DL (ref 70–99)
GLUCOSE BLD-MCNC: 199 MG/DL (ref 70–99)
GLUCOSE BLD-MCNC: 213 MG/DL (ref 70–99)
GLUCOSE BLD-MCNC: 232 MG/DL (ref 70–99)
GLUCOSE BLD-MCNC: 255 MG/DL (ref 70–99)
HCT VFR BLD CALC: 31.8 % (ref 40.5–52.5)
HEMOGLOBIN: 10.3 G/DL (ref 13.5–17.5)
MCH RBC QN AUTO: 28.2 PG (ref 26–34)
MCHC RBC AUTO-ENTMCNC: 32.3 G/DL (ref 31–36)
MCV RBC AUTO: 87.3 FL (ref 80–100)
OSMOLALITY URINE: 438 MOSM/KG (ref 390–1070)
OSMOLALITY: 275 MOSM/KG (ref 278–305)
PDW BLD-RTO: 14.4 % (ref 12.4–15.4)
PERFORMED ON: ABNORMAL
PLATELET # BLD: 574 K/UL (ref 135–450)
PMV BLD AUTO: 7.5 FL (ref 5–10.5)
POTASSIUM SERPL-SCNC: 3.9 MMOL/L (ref 3.5–5.1)
RBC # BLD: 3.64 M/UL (ref 4.2–5.9)
SODIUM BLD-SCNC: 130 MMOL/L (ref 136–145)
WBC # BLD: 11.1 K/UL (ref 4–11)

## 2019-05-19 PROCEDURE — 83930 ASSAY OF BLOOD OSMOLALITY: CPT

## 2019-05-19 PROCEDURE — 80048 BASIC METABOLIC PNL TOTAL CA: CPT

## 2019-05-19 PROCEDURE — 1280000000 HC REHAB R&B

## 2019-05-19 PROCEDURE — 2580000003 HC RX 258: Performed by: PHYSICAL MEDICINE & REHABILITATION

## 2019-05-19 PROCEDURE — 6360000002 HC RX W HCPCS: Performed by: PHYSICAL MEDICINE & REHABILITATION

## 2019-05-19 PROCEDURE — 85027 COMPLETE CBC AUTOMATED: CPT

## 2019-05-19 PROCEDURE — 6370000000 HC RX 637 (ALT 250 FOR IP): Performed by: PHYSICAL MEDICINE & REHABILITATION

## 2019-05-19 RX ORDER — MORPHINE SULFATE 15 MG/1
15 TABLET, FILM COATED, EXTENDED RELEASE ORAL EVERY 12 HOURS SCHEDULED
Status: DISCONTINUED | OUTPATIENT
Start: 2019-05-19 | End: 2019-05-24

## 2019-05-19 RX ORDER — OXYCODONE HYDROCHLORIDE 5 MG/1
5 TABLET ORAL EVERY 4 HOURS PRN
Status: DISCONTINUED | OUTPATIENT
Start: 2019-05-19 | End: 2019-05-28 | Stop reason: HOSPADM

## 2019-05-19 RX ORDER — OXYCODONE HYDROCHLORIDE 5 MG/1
10 TABLET ORAL EVERY 4 HOURS PRN
Status: DISCONTINUED | OUTPATIENT
Start: 2019-05-19 | End: 2019-05-28 | Stop reason: HOSPADM

## 2019-05-19 RX ADMIN — ACETAMINOPHEN 650 MG: 325 TABLET ORAL at 13:45

## 2019-05-19 RX ADMIN — OXYCODONE HYDROCHLORIDE 15 MG: 15 TABLET ORAL at 09:44

## 2019-05-19 RX ADMIN — INSULIN LISPRO 2 UNITS: 100 INJECTION, SOLUTION INTRAVENOUS; SUBCUTANEOUS at 21:47

## 2019-05-19 RX ADMIN — METHOCARBAMOL TABLETS 1500 MG: 750 TABLET, COATED ORAL at 08:18

## 2019-05-19 RX ADMIN — LISINOPRIL 20 MG: 20 TABLET ORAL at 08:18

## 2019-05-19 RX ADMIN — SENNOSIDES,DOCUSATE SODIUM 2 TABLET: 8.6; 5 TABLET, FILM COATED ORAL at 08:17

## 2019-05-19 RX ADMIN — SENNOSIDES,DOCUSATE SODIUM 2 TABLET: 8.6; 5 TABLET, FILM COATED ORAL at 21:46

## 2019-05-19 RX ADMIN — OXYCODONE HYDROCHLORIDE 10 MG: 5 TABLET ORAL at 17:59

## 2019-05-19 RX ADMIN — ACETAMINOPHEN 650 MG: 325 TABLET ORAL at 17:11

## 2019-05-19 RX ADMIN — CEFTRIAXONE SODIUM 2 G: 2 INJECTION, POWDER, FOR SOLUTION INTRAMUSCULAR; INTRAVENOUS at 08:21

## 2019-05-19 RX ADMIN — INSULIN LISPRO 2 UNITS: 100 INJECTION, SOLUTION INTRAVENOUS; SUBCUTANEOUS at 11:40

## 2019-05-19 RX ADMIN — INSULIN LISPRO 2 UNITS: 100 INJECTION, SOLUTION INTRAVENOUS; SUBCUTANEOUS at 17:12

## 2019-05-19 RX ADMIN — ATORVASTATIN CALCIUM 20 MG: 20 TABLET, FILM COATED ORAL at 21:46

## 2019-05-19 RX ADMIN — MORPHINE SULFATE 15 MG: 15 TABLET, FILM COATED, EXTENDED RELEASE ORAL at 21:46

## 2019-05-19 RX ADMIN — DIAZEPAM 5 MG: 5 TABLET ORAL at 02:44

## 2019-05-19 RX ADMIN — CEFTRIAXONE SODIUM 2 G: 2 INJECTION, POWDER, FOR SOLUTION INTRAMUSCULAR; INTRAVENOUS at 18:00

## 2019-05-19 RX ADMIN — Medication 100 MG: at 08:18

## 2019-05-19 RX ADMIN — FENOFIBRATE 160 MG: 160 TABLET ORAL at 08:18

## 2019-05-19 RX ADMIN — TAMSULOSIN HYDROCHLORIDE 0.4 MG: 0.4 CAPSULE ORAL at 08:19

## 2019-05-19 RX ADMIN — FAMOTIDINE 20 MG: 20 TABLET ORAL at 21:46

## 2019-05-19 RX ADMIN — OXYCODONE HYDROCHLORIDE 15 MG: 15 TABLET ORAL at 01:12

## 2019-05-19 RX ADMIN — ACETAMINOPHEN 650 MG: 325 TABLET ORAL at 21:46

## 2019-05-19 RX ADMIN — ENOXAPARIN SODIUM 150 MG: 150 INJECTION SUBCUTANEOUS at 08:17

## 2019-05-19 RX ADMIN — INSULIN LISPRO 1 UNITS: 100 INJECTION, SOLUTION INTRAVENOUS; SUBCUTANEOUS at 08:23

## 2019-05-19 RX ADMIN — OXYCODONE HYDROCHLORIDE 15 MG: 15 TABLET ORAL at 05:21

## 2019-05-19 RX ADMIN — POLYETHYLENE GLYCOL (3350) 17 G: 17 POWDER, FOR SOLUTION ORAL at 08:17

## 2019-05-19 RX ADMIN — DIAZEPAM 5 MG: 5 TABLET ORAL at 11:40

## 2019-05-19 RX ADMIN — AMLODIPINE BESYLATE 2.5 MG: 5 TABLET ORAL at 08:18

## 2019-05-19 RX ADMIN — FAMOTIDINE 20 MG: 20 TABLET ORAL at 08:17

## 2019-05-19 RX ADMIN — OXYCODONE HYDROCHLORIDE 10 MG: 5 TABLET ORAL at 13:44

## 2019-05-19 RX ADMIN — ACETAMINOPHEN 650 MG: 325 TABLET ORAL at 08:18

## 2019-05-19 RX ADMIN — METHOCARBAMOL TABLETS 1500 MG: 750 TABLET, COATED ORAL at 21:46

## 2019-05-19 RX ADMIN — Medication 10 ML: at 21:46

## 2019-05-19 RX ADMIN — Medication 10 ML: at 08:17

## 2019-05-19 ASSESSMENT — PAIN DESCRIPTION - LOCATION
LOCATION: BACK;LEG
LOCATION: BACK
LOCATION: BACK
LOCATION: BACK;LEG
LOCATION: BACK
LOCATION: BACK

## 2019-05-19 ASSESSMENT — PAIN DESCRIPTION - DIRECTION
RADIATING_TOWARDS: LEG

## 2019-05-19 ASSESSMENT — PAIN DESCRIPTION - PAIN TYPE
TYPE: ACUTE PAIN

## 2019-05-19 ASSESSMENT — PAIN DESCRIPTION - FREQUENCY
FREQUENCY: CONTINUOUS

## 2019-05-19 ASSESSMENT — PAIN - FUNCTIONAL ASSESSMENT
PAIN_FUNCTIONAL_ASSESSMENT: PREVENTS OR INTERFERES SOME ACTIVE ACTIVITIES AND ADLS

## 2019-05-19 ASSESSMENT — PAIN DESCRIPTION - ORIENTATION
ORIENTATION: RIGHT;LEFT
ORIENTATION: RIGHT;LEFT
ORIENTATION: MID

## 2019-05-19 ASSESSMENT — PAIN DESCRIPTION - ONSET
ONSET: ON-GOING

## 2019-05-19 ASSESSMENT — PAIN SCALES - GENERAL
PAINLEVEL_OUTOF10: 5
PAINLEVEL_OUTOF10: 9
PAINLEVEL_OUTOF10: 9
PAINLEVEL_OUTOF10: 8
PAINLEVEL_OUTOF10: 7
PAINLEVEL_OUTOF10: 8
PAINLEVEL_OUTOF10: 9
PAINLEVEL_OUTOF10: 7

## 2019-05-19 ASSESSMENT — PAIN DESCRIPTION - DESCRIPTORS
DESCRIPTORS: ACHING;BURNING
DESCRIPTORS: BURNING;ACHING
DESCRIPTORS: ACHING;BURNING

## 2019-05-19 ASSESSMENT — PAIN DESCRIPTION - PROGRESSION
CLINICAL_PROGRESSION: NOT CHANGED

## 2019-05-19 NOTE — PLAN OF CARE
Problem: Falls - Risk of:  Goal: Will remain free from falls  Description  Will remain free from falls  5/19/2019 0036 by Jose Tatum RN  Outcome: Ongoing  Note:    Pt is a High fall risk. See [place] Law Fall Score and ABCDS Injury Risk assessments. High Fall Risk per PARISI/ABCDS: Explained fall risk precautions to pt and family and rationale behind their use to keep the patient safe. Pt bed is in low position, side rails up, call light and belongings are in reach. Fall wristband applied and present on pts wrist.  Bed alarm on. Pt encouraged to call for assistance. Will continue with hourly rounds for PO intake, pain needs, toileting and repositioning as needed.

## 2019-05-20 ENCOUNTER — APPOINTMENT (OUTPATIENT)
Dept: GENERAL RADIOLOGY | Age: 69
DRG: 559 | End: 2019-05-20
Attending: PHYSICAL MEDICINE & REHABILITATION
Payer: MEDICARE

## 2019-05-20 LAB
ANION GAP SERPL CALCULATED.3IONS-SCNC: 10 MMOL/L (ref 3–16)
BUN BLDV-MCNC: 12 MG/DL (ref 7–20)
CALCIUM SERPL-MCNC: 8.4 MG/DL (ref 8.3–10.6)
CHLORIDE BLD-SCNC: 90 MMOL/L (ref 99–110)
CO2: 28 MMOL/L (ref 21–32)
CREAT SERPL-MCNC: 0.6 MG/DL (ref 0.8–1.3)
GFR AFRICAN AMERICAN: >60
GFR NON-AFRICAN AMERICAN: >60
GLUCOSE BLD-MCNC: 193 MG/DL (ref 70–99)
GLUCOSE BLD-MCNC: 195 MG/DL (ref 70–99)
GLUCOSE BLD-MCNC: 196 MG/DL (ref 70–99)
GLUCOSE BLD-MCNC: 209 MG/DL (ref 70–99)
GLUCOSE BLD-MCNC: 267 MG/DL (ref 70–99)
HCT VFR BLD CALC: 29.6 % (ref 40.5–52.5)
HEMOGLOBIN: 9.9 G/DL (ref 13.5–17.5)
MCH RBC QN AUTO: 28.7 PG (ref 26–34)
MCHC RBC AUTO-ENTMCNC: 33.4 G/DL (ref 31–36)
MCV RBC AUTO: 85.9 FL (ref 80–100)
PDW BLD-RTO: 14.7 % (ref 12.4–15.4)
PERFORMED ON: ABNORMAL
PLATELET # BLD: 598 K/UL (ref 135–450)
PMV BLD AUTO: 7.2 FL (ref 5–10.5)
POTASSIUM SERPL-SCNC: 4.2 MMOL/L (ref 3.5–5.1)
RBC # BLD: 3.45 M/UL (ref 4.2–5.9)
SODIUM BLD-SCNC: 128 MMOL/L (ref 136–145)
WBC # BLD: 7.7 K/UL (ref 4–11)

## 2019-05-20 PROCEDURE — 6370000000 HC RX 637 (ALT 250 FOR IP): Performed by: PHYSICAL MEDICINE & REHABILITATION

## 2019-05-20 PROCEDURE — 97535 SELF CARE MNGMENT TRAINING: CPT

## 2019-05-20 PROCEDURE — 2580000003 HC RX 258: Performed by: PHYSICAL MEDICINE & REHABILITATION

## 2019-05-20 PROCEDURE — 71045 X-RAY EXAM CHEST 1 VIEW: CPT

## 2019-05-20 PROCEDURE — 1280000000 HC REHAB R&B

## 2019-05-20 PROCEDURE — 6360000002 HC RX W HCPCS: Performed by: PHYSICAL MEDICINE & REHABILITATION

## 2019-05-20 PROCEDURE — 80048 BASIC METABOLIC PNL TOTAL CA: CPT

## 2019-05-20 PROCEDURE — 85027 COMPLETE CBC AUTOMATED: CPT

## 2019-05-20 PROCEDURE — 97530 THERAPEUTIC ACTIVITIES: CPT

## 2019-05-20 RX ADMIN — LISINOPRIL 20 MG: 20 TABLET ORAL at 09:14

## 2019-05-20 RX ADMIN — APIXABAN 10 MG: 5 TABLET, FILM COATED ORAL at 21:40

## 2019-05-20 RX ADMIN — Medication 10 ML: at 09:17

## 2019-05-20 RX ADMIN — ACETAMINOPHEN 650 MG: 325 TABLET ORAL at 21:40

## 2019-05-20 RX ADMIN — ACETAMINOPHEN 650 MG: 325 TABLET ORAL at 17:03

## 2019-05-20 RX ADMIN — Medication 100 MG: at 09:14

## 2019-05-20 RX ADMIN — FAMOTIDINE 20 MG: 20 TABLET ORAL at 09:11

## 2019-05-20 RX ADMIN — INSULIN LISPRO 1 UNITS: 100 INJECTION, SOLUTION INTRAVENOUS; SUBCUTANEOUS at 21:48

## 2019-05-20 RX ADMIN — MORPHINE SULFATE 15 MG: 15 TABLET, FILM COATED, EXTENDED RELEASE ORAL at 21:41

## 2019-05-20 RX ADMIN — APIXABAN 10 MG: 5 TABLET, FILM COATED ORAL at 09:15

## 2019-05-20 RX ADMIN — SENNOSIDES,DOCUSATE SODIUM 2 TABLET: 8.6; 5 TABLET, FILM COATED ORAL at 09:12

## 2019-05-20 RX ADMIN — INSULIN LISPRO 1 UNITS: 100 INJECTION, SOLUTION INTRAVENOUS; SUBCUTANEOUS at 18:10

## 2019-05-20 RX ADMIN — ACETAMINOPHEN 650 MG: 325 TABLET ORAL at 12:52

## 2019-05-20 RX ADMIN — AMLODIPINE BESYLATE 2.5 MG: 5 TABLET ORAL at 09:15

## 2019-05-20 RX ADMIN — OXYCODONE HYDROCHLORIDE 10 MG: 5 TABLET ORAL at 06:18

## 2019-05-20 RX ADMIN — HYDRALAZINE HYDROCHLORIDE 50 MG: 50 TABLET, FILM COATED ORAL at 21:40

## 2019-05-20 RX ADMIN — ATORVASTATIN CALCIUM 20 MG: 20 TABLET, FILM COATED ORAL at 21:41

## 2019-05-20 RX ADMIN — METHOCARBAMOL TABLETS 1500 MG: 750 TABLET, COATED ORAL at 21:40

## 2019-05-20 RX ADMIN — OXYCODONE HYDROCHLORIDE 10 MG: 5 TABLET ORAL at 21:41

## 2019-05-20 RX ADMIN — OXYCODONE HYDROCHLORIDE 10 MG: 5 TABLET ORAL at 17:03

## 2019-05-20 RX ADMIN — METHOCARBAMOL TABLETS 1500 MG: 750 TABLET, COATED ORAL at 12:55

## 2019-05-20 RX ADMIN — FAMOTIDINE 20 MG: 20 TABLET ORAL at 21:41

## 2019-05-20 RX ADMIN — ACETAMINOPHEN 650 MG: 325 TABLET ORAL at 09:16

## 2019-05-20 RX ADMIN — FENOFIBRATE 160 MG: 160 TABLET ORAL at 09:13

## 2019-05-20 RX ADMIN — CEFTRIAXONE SODIUM 2 G: 2 INJECTION, POWDER, FOR SOLUTION INTRAMUSCULAR; INTRAVENOUS at 18:08

## 2019-05-20 RX ADMIN — METHOCARBAMOL TABLETS 1500 MG: 750 TABLET, COATED ORAL at 09:16

## 2019-05-20 RX ADMIN — Medication 10 ML: at 21:41

## 2019-05-20 RX ADMIN — INSULIN LISPRO 1 UNITS: 100 INJECTION, SOLUTION INTRAVENOUS; SUBCUTANEOUS at 09:12

## 2019-05-20 RX ADMIN — ENOXAPARIN SODIUM 150 MG: 150 INJECTION SUBCUTANEOUS at 01:08

## 2019-05-20 RX ADMIN — CEFTRIAXONE SODIUM 2 G: 2 INJECTION, POWDER, FOR SOLUTION INTRAMUSCULAR; INTRAVENOUS at 06:18

## 2019-05-20 RX ADMIN — SENNOSIDES,DOCUSATE SODIUM 2 TABLET: 8.6; 5 TABLET, FILM COATED ORAL at 21:40

## 2019-05-20 RX ADMIN — MORPHINE SULFATE 15 MG: 15 TABLET, FILM COATED, EXTENDED RELEASE ORAL at 09:14

## 2019-05-20 RX ADMIN — OXYCODONE HYDROCHLORIDE 10 MG: 5 TABLET ORAL at 09:55

## 2019-05-20 RX ADMIN — INSULIN LISPRO 3 UNITS: 100 INJECTION, SOLUTION INTRAVENOUS; SUBCUTANEOUS at 12:49

## 2019-05-20 RX ADMIN — TAMSULOSIN HYDROCHLORIDE 0.4 MG: 0.4 CAPSULE ORAL at 09:15

## 2019-05-20 RX ADMIN — POLYETHYLENE GLYCOL (3350) 17 G: 17 POWDER, FOR SOLUTION ORAL at 09:16

## 2019-05-20 ASSESSMENT — PAIN SCALES - GENERAL
PAINLEVEL_OUTOF10: 10
PAINLEVEL_OUTOF10: 7
PAINLEVEL_OUTOF10: 10
PAINLEVEL_OUTOF10: 9
PAINLEVEL_OUTOF10: 9
PAINLEVEL_OUTOF10: 7
PAINLEVEL_OUTOF10: 9
PAINLEVEL_OUTOF10: 9
PAINLEVEL_OUTOF10: 10
PAINLEVEL_OUTOF10: 7

## 2019-05-20 ASSESSMENT — PAIN DESCRIPTION - DIRECTION
RADIATING_TOWARDS: LEG
RADIATING_TOWARDS: LEG

## 2019-05-20 ASSESSMENT — PAIN DESCRIPTION - ORIENTATION
ORIENTATION: RIGHT;LEFT

## 2019-05-20 ASSESSMENT — PAIN DESCRIPTION - PROGRESSION
CLINICAL_PROGRESSION: NOT CHANGED

## 2019-05-20 ASSESSMENT — PAIN DESCRIPTION - DESCRIPTORS
DESCRIPTORS: ACHING;BURNING
DESCRIPTORS: ACHING;BURNING

## 2019-05-20 ASSESSMENT — PAIN - FUNCTIONAL ASSESSMENT
PAIN_FUNCTIONAL_ASSESSMENT: PREVENTS OR INTERFERES WITH MANY ACTIVE NOT PASSIVE ACTIVITIES
PAIN_FUNCTIONAL_ASSESSMENT: PREVENTS OR INTERFERES SOME ACTIVE ACTIVITIES AND ADLS

## 2019-05-20 ASSESSMENT — PAIN DESCRIPTION - LOCATION
LOCATION: BACK;LEG

## 2019-05-20 ASSESSMENT — PAIN DESCRIPTION - FREQUENCY
FREQUENCY: CONTINUOUS
FREQUENCY: CONTINUOUS

## 2019-05-20 ASSESSMENT — PAIN DESCRIPTION - PAIN TYPE
TYPE: ACUTE PAIN;SURGICAL PAIN
TYPE: ACUTE PAIN;SURGICAL PAIN

## 2019-05-20 ASSESSMENT — PAIN DESCRIPTION - ONSET
ONSET: ON-GOING
ONSET: ON-GOING

## 2019-05-20 NOTE — PROGRESS NOTES
Physical Therapy  Facility/Department: [facility]  Daily Treatment Note  NAME: Nidia Gamez  : 1950  MRN: [id number]    Date of Service: 2019    Discharge Recommendations:  24 hour supervision or assist, Home with Home health PT   PT Equipment Recommendations  Other: continue to assess, pt owns 4WW    Patient Diagnosis(es): There were no encounter diagnoses. has a past medical history of Arthritis, Back pain, DDD (degenerative disc disease), cervical, Diabetes mellitus ([address] Edema of both legs, GERD (gastroesophageal reflux disease), Hyperlipidemia, Hypertension, MRSA (methicillin resistant staph aureus) culture positive, Nausea in adult, Neuropathy, Vitamin B 12 deficiency, and Walking troubles. has a past surgical history that includes back surgery; Prostate surgery; shoulder surgery; Cystoscopy; knee surgery; Hemorrhoid surgery; eye surgery; Tonsillectomy; Cholecystectomy; Lumbar spine surgery (N/A, 2019); and REPAIR DURAL / CSF LEAK (N/A, 2019). Restrictions  Position Activity Restriction  Other position/activity restrictions: up with assistance  Subjective   General  Chart Reviewed: Yes  Additional Pertinent Hx: Pt is a 68 y.o. male admitted to ARU on 19 from [facility]. Pt initially admitted to [facility] on  with complaints of low back pain, fever, and chills. Family / Caregiver Present: No  Referring Practitioner: Dr. Jennifer Munoz  Subjective  Subjective: \"I just have to get out of this bed. \"  General Comment  Comments: Pt found supine in bed upon PT arrival.  Pt agreeable to therapy session.   Pain Screening  Patient Currently in Pain: Yes(Pt reported 10/10 back pain, pt stating that he just received pain medicine)  Vital Signs  Patient Currently in Pain: Yes(Pt reported 10/10 back pain, pt stating that he just received pain medicine)       Orientation     Cognition      Objective   Bed mobility  Rolling to Left: Dependent/Total(MaxAx1 + ModAx1, HOB flat, use of bedrails, for pericare and pants management)  Rolling to Right: Dependent/Total(MaxAx1 + ModAx1, HOB flat, use of bedrails, for pericare and pants management)  Supine to Sit: Dependent/Total(MaxAx2, HOB elevated, use of bedrail, increased time to perform)  Comment: Pt reporting increased pain with all bed mobility. Pt reporting lightheadedness once seated EOB, BP measured in sitting 195/85, . Transfers  Sit to Stand: Dependent/Total(MaxAx2 from elevated bed to cielo steady, MinAx2 from cielo steady, verbal cues to not lean on elbows during transfer, pt not receptive to cues, facilitation for anterior weight shifting)  Stand to sit: Dependent/Total(ModAx2 for eccentric control)  Bed to Chair: Dependent/Total(EOB to w/c via cielo steady)  Comment: x2 attempts to stand w/c to parallel bars with MaxAx2 and B knee blocking. Pt unable to clear bottom from w/c either trial due to LE weakness and reported pain. Ambulation  Ambulation?: No(unsafe to attempt)  Stairs/Curb  Stairs?: No(unsafe to attempt)  Wheelchair Activities  Propulsion: No(encouraged pt to attempt w/c propulsion to gym, however pt declined stating that he is in too much pain to attempt propulsion)                  Comment: Pt reporting that he felt \"shaky\" and that he wanted PT/OT to check his temperature to make sure he wasn't feeling chills. Temperature measured orally 98.1. 2nd Session: Pt found supine in bed upon PT arrival.  Pt was agreeable to therapy session and stated, \"I'm really trying. \"  Pt reported 7/10 (progressing to 10/10 throughout session) back pain. MaxA to roll to R and L for pericare and pants management, HOB slightly elevated, use of bedrails. See OT note for toileting comments. Supine to sit with MAxAx2, HOB slightly elevated, use of bedrails. Pt performing lateral scooting to R in short sitting on EOB with ModAx2 for improved positioning.   Pt performed the following therex x10 reps seated EOB: marches (decreased ROM), LAQs, ankle pumps, for LE strengthening. x1 trial elbow propping to R (MaxA) and L (CGA) seated EOB for prep for slide board transfers for future sessions, pt reporting increased back pain with activity. Sit to supine with MaxAx2, HOB slightly elevated, use of bedrails. MaxAx2 to scoot up in bed in supine for improved positioning. Pt left supine in bed at end of session with bed alarm set and call light/needs within reach. Assessment   Body structures, Functions, Activity limitations: Decreased functional mobility ; Decreased strength;Decreased endurance;Decreased coordination;Decreased ROM; Decreased balance; Increased Pain  Assessment: Pt limited by pain this session, requiring increased assist for all functional mobility. Pt also requiring significant increased breaks between tasks due to reported pain. Pt unable to achieve full  parallel bars this session due to LE weakness and pain. Continue POC. Treatment Diagnosis: decreased functional mobility due to debility  Patient Education: Cues for appropriate posture when standing in cielo steady/not leaning on elbows in cielo steady, pt not receptive to cues  REQUIRES PT FOLLOW UP: Yes  Activity Tolerance  Activity Tolerance: Patient limited by fatigue;Patient limited by pain; Patient limited by endurance     G-Code     OutComes Score                                                  AM-PAC Score             Goals  Short term goals  Time Frame for Short term goals: 1-2 weeks -ongoing  Short term goal 1: Pt will perform all bed mobility with ModA  Short term goal 2: Pt will perform sit to/from stand with RW and MaxA  Short term goal 3: Pt will perform squat pivot with MaxA  Short term goal 4: Pt will ambulate 10' with RW and MaxA  Short term goal 5: Pt will propel w/c 150' with supervision  Long term goals  Time Frame for Long term goals : 2-3 weeks -ongoing  Long term goal 1: Pt will perform all bed mobility with supervision  Long term goal 2: Pt will perform sit to/from stand with RW and supervision  Long term goal 3: Pt will perform squat pivot with supervision  Long term goal 4: Pt will ambulate 48' with RW and supervision  Long term goal 5: Pt will propel w/c 150' with [address] term goal 6: Pt will ascend/descend 2 stairs with BHR and Nicole  Patient Goals   Patient goals : \"To be able to do what I was doing before I got sick. \"    Plan    Plan  Times per week: 5x/week, 90 minutes a day  Current Treatment Recommendations: Strengthening, Transfer Training, Endurance Training, Neuromuscular Re-education, Patient/Caregiver Education & Training, ROM, Wheelchair Mobility Training, Equipment Evaluation, Education, & procurement, Balance Training, Gait Training, Home Exercise Program, Functional Mobility Training, Stair training, Safety Education & Training  Safety Devices  Type of devices: Call light within reach, Chair alarm in place, Gait belt, Left in chair     Therapy Time   Individual Concurrent Group Co-treatment   Time In       0730   Time Out       0830   Minutes       60        Second Session Therapy Time:   Individual Concurrent Group Co-treatment   Time In       1300   Time Out       1330   Minutes       30     Timed Code Treatment Minutes:  60 + 30    Total Treatment Minutes:  [facility], PT, DPT, CLT

## 2019-05-20 NOTE — PROGRESS NOTES
Patient resting in bed. He complained of severe lower back pain and bilateral lower extremity burning discomfort. He has been repositoned in bed. Medicated with Oxycodone on request as well as scheduled MS contin. Back incision well approximated. Staples in place. Will continue to monitor.

## 2019-05-20 NOTE — PROGRESS NOTES
128 today. Follow.      DM: SSI     HTN: Norvasc, lisinopril     HLD: Lipitor 20, fenofibrate     DVT: Lovenox through 5/19. Loading dose Eliquis on 5/20     Bowels: Per protocol  Bladder: Per protocol   Sleep: Trazodone provided prn. Pain: Robaxin scheduled, -adjust to scheduled Tylenol and Barb 10-15 PRN   DVT PPx: As above      Shantanu Delacruz MD 5/20/2019, 8:43 AM

## 2019-05-20 NOTE — CARE COORDINATION
2019  [address] [place] [address]  Clinical Case Management Department    Patient is a pleasant 69 y/o male who lives with his wife in [facility] in a ranch style house. He owns a 4 wheeled rolling walker with seat which is currently in patient's room. He also owns a shower chair. Patient spoke about his pain and can't understand what is causing him to have such a high level of pain. \"Just touching my skin is like someone is sharp pain all over. \"  Patient has never used home health care and SW reviewed hhc along with IV ABX needs probable on d/c. Patient is agreeable to [address]. SW made referral to [address] [place] [address] who states that for IV ABX  and patient's insurance, an RN can't go to home 2x a day. If this is required prior to d/c patient and wife will need to be trained to do IV ABX if 2xday. SW will continue to follow to update patient and patient's wife and assist with discharge planning.     Patient: Alejandra Mondragon  MRN: [id number] / : 1950  ACCT: [de-identified]     Emergency Contacts  Healthcare Agent Appointed: Spouse    Admission Documentation  Attending Provider: Susan Canseco MD  Admit date/time: 2019  7:24 PM  Status: Inpatient [101]  Diagnosis: Debility     Readmission within last 30 days:  no     Living Situation  Discharge Planning  Living Arrangements: Spouse/Significant Other  Support Systems: Spouse/Significant Other, Family Members  Potential Assistance Needed: Outpatient PT/OT, Home Care  Type of Home Care Services: None  Patient expects to be discharged to[de-identified] home    Service Assessment       Values / Beliefs  Do you have any ethnic, cultural, sacramental, or spiritual [employer]al needs you would like us to be aware of while you are in the hospital?: No    Advance Directives (For Healthcare)  Pre-existing DNR Comfort Care/DNR Arrest/DNI Order: No  Healthcare Directive: Yes, patient has an advance directive for healthcare treatment  Type of Healthcare Directive: Durable power of  for health care  Copy in Chart: No, copy requested from family  Healthcare Agent Appointed: [address]   Patient owns a 4 wheeled walker and shower chair. Paresh Nelson in patient's room. Home Health/Skilled Nursing  Services at Discharge: [address], Occupational Therapy and Nursing 3x week  Home care at home? No      Therapy Consults  PT evaluation needed?: Yes (Comment)  OT Evalulation Needed?: Yes (Comment)  SLP evaluation needed?: No    Home Medical Care  Patient was fairly independent at home with wife assisting when needed. Pharmacy: N/A   Potential Assistance Purchasing Medications:  No  Does patient want to participate in local refill/meds to beds program?: No     Meds To Beds General Rules:  1. Can ONLY be done Monday- Friday between 8:30am-5pm  2. Prescription(s) must be in pharmacy by 3pm to be filled same day  3. Copy of patient's insurance/ prescription drug card and patient face sheet must be sent along with the prescription(s)  4. Cost of Rx cannot be added to hospital bill. If financial assistance is needed, please contact unit  or ;  or  CANNOT provide pharmacy voucher for patients co-pays  5. Patients can then  the prescription on their way out of the hospital at discharge, or pharmacy can deliver to the bedside if staff is available. (payment due at time of pick-up or delivery - cash, check, or card accepted)        Goals of Care  Patient expects to be discharged to[de-identified] home  Patient plans for SNF: No      Mode of transport from hospital: TBD     Factors facilitating achievement of predicted outcomes: Family support, Cooperative and Pleasant    Barriers to discharge: Pain and medical necessity.      YOLI Argueta, [facility]  Case Management Department  Ph: [phone] Fax: [phone]

## 2019-05-20 NOTE — CARE COORDINATION
[facility].   Will need IV ABX until 6/26/19    Electronically signed by Derwin Saint, MSW, MARTÍNEZ on 5/20/2019 at 8:44 AM

## 2019-05-20 NOTE — CARE COORDINATION
[facility] said    $100 per week for Rocephin 2 grams daily. Supplies - after $4,900 Max OOP is met, then supply fees and RN fees go away. Until then it is $16 a day for supplies and $22 per RN visit.     Electronically signed by YOLI Aj, LSW on 5/20/2019 at 2:23 PM

## 2019-05-20 NOTE — FLOWSHEET NOTE
05/20/19 1636   Encounter Summary   Services provided to: Patient   Referral/Consult From: Rounding   Continue Visiting   (es 5/20)   Complexity of Encounter Low   Length of Encounter 15 minutes   Routine   Type Initial   Assessment Approachable   Intervention Active listening;Prayer   Outcome Receptive;Engaged in conversation

## 2019-05-20 NOTE — PROGRESS NOTES
Occupational Therapy  Facility/Department: [facility] ACUTE REHAB UNIT  Daily Treatment Note  NAME: Alejandra Mondragon  : 1950  MRN: [id number]    Date of Service: 2019    Discharge Recommendations:  24 hour supervision or assist, Home with Home health OT  OT Equipment Recommendations  Equipment Needed: Yes  Other: cont to assess pending progress made    Assessment   Performance deficits / Impairments: Decreased functional mobility ; Decreased high-level IADLs;Decreased ADL status; Decreased endurance;Decreased strength;Decreased balance  Assessment: Pt demonstrated decreased activity tolerance and decreased sit to stand transfers. Pt required 2PA for Lb dressing, requiring verbal cueing to attempt to participate in ADLs. Pt is greatly limited by pain and fatigue. Pt would benefit from continued skilled OT while in ARU, continue OT POc. Treatment Diagnosis: impaired ADLs /functional transfers /decreased endurance 2/2 back infection/ sepsis   Patient Education: importance of participation in functional tasks- verb understanding, continue to educate however as pt with limited receptiveness at this time  REQUIRES OT FOLLOW UP: Yes  Activity Tolerance  Activity Tolerance: Patient limited by fatigue;Patient limited by pain(limited by anxiety)  Activity Tolerance: Pt noted with increased MCKEON during most tasks, requiring verbal cueing to slow breathing and engage in PLB. Pt also required education and encouragement to participate in functional tasks, limited receptiveness to education regarding benefits of ADLs and progressing independence with mobility (i.e. attempting to complete bed mobility/ ADLs versus having immediate assistance). Safety Devices  Safety Devices in place: Yes  Type of devices: Left in chair;Call light within reach; Chair alarm in place(left in bed with bed alarm activated after pm session.)         Patient Diagnosis(es): There were no encounter diagnoses.       has a past medical history of Arthritis, Back pain, DDD (degenerative disc disease), cervical, Diabetes mellitus ([address] Edema of both legs, GERD (gastroesophageal reflux disease), Hyperlipidemia, Hypertension, MRSA (methicillin resistant staph aureus) culture positive, Nausea in adult, Neuropathy, Vitamin B 12 deficiency, and Walking troubles. has a past surgical history that includes back surgery; Prostate surgery; shoulder surgery; Cystoscopy; knee surgery; Hemorrhoid surgery; eye surgery; Tonsillectomy; Cholecystectomy; Lumbar spine surgery (N/A, 4/19/2019); and REPAIR DURAL / CSF LEAK (N/A, 5/12/2019). Restrictions  Position Activity Restriction  Other position/activity restrictions: up with assistance  Subjective   General  Chart Reviewed: Yes  Patient assessed for rehabilitation services?: Yes  Additional Pertinent Hx: Admit 5/8 to ICU with sepsis vs back abcess       Neurosx consult - to OR 5/12 s/p I&D back wound with draining of abcess. Admitted to [facility] ARU on 5/17. PMHX: recent  DECOMPRESSION LAMINECTOMY L3-4 POSSIBLY L5.  on 4/19 /19   OA, DDD, DM,edema BLE,HTN, HLD and neuropathy   Family / Caregiver Present: No  Referring Practitioner: Nilson  Diagnosis: Back abcess s/p I&D of back wound   Subjective  Subjective: Pt supine in bed upon arrival talking on his cell phone with his wife. Agreeable to OT session. \"I need to get out of this bed, they didn't have enough help yesterday to get me out of bed\"  General Comment  Comments: Unrated L hip pain reported throughout session.   Vital Signs  Temp: 98.1 °F (36.7 °C)  BP: (!) 195/83(taken after bed mobility while seated EOB)  BP Location: Left upper arm   Orientation  Orientation  Overall Orientation Status: Impaired  Orientation Level: Oriented to time;Oriented to person;Disoriented to situation;Disoriented to place  Objective    ADL  LE Dressing: Dependent/Total(2PA for threading pants and to pull up standing at cielo stedy)  Toileting: Unable to assess(comment)(no continent episode during OT session)  Additional Comments: Pt with slow and effortful movements during LB dressing. Pt required max cueing to educate on importance of completing functional tasks, as pt initially reporting \"I need to work on my function, being able to lift my legs\" Pt educated on ARU protocol to have clothing on during therapy, pt with limited receptiveness to education/ beginning to talk over therapist regarding difficulty with mobility. Pt required max cueing for PLB, as pt noted with increased anxiety and MCKEON during most tasks. Pt reluctantly agreeable to donning pants, however declined donning shirt due to pain. Sit to stand at [address] [facility] to pull pants up, first stance pt noted with increased trunk flexion and leaning onto R forearm versus pushing up with UEs. Pt educated on importance of [facility], however pt reported limited due to R RCT. Upon second sit to stand however pt able to push up with R UE with hand on bar. Pt would benefit from continued education regarding role/purpose of OT and engaging in functional tasks. Balance  Sitting Balance: Minimal assistance(progressing to SBA seated EOB)  Standing Balance: Dependent/Total(max A x 2 standing at [facility] to pull pants up)  Standing Balance  Time: <30 sec each trial  Activity: standing at [facility]  Comment: Pt with first sit to  [facility] with max A x 2 from bed to pull up pants, pt able to achieve approximately 50% stand (cued to push from hand versus leaning on bar with forearm), requiring need to sit. Second stand bed was elevated, pt demonstrated increased safety with hand placement during transfer coming to full stand with max A x 2. Pt transferred via cielo [facility] to , min A x 2 to stand from [address] [facility] (elevated surface), mod A x 2 to slow descent into . Attempted sit to  paralell bars from  level, max A x 2 for two unsuccessful attempts to stand, pt unable to clear buttocks from wc. Functional Mobility  Functional - Mobility Device: Wheelchair  Activity: Other(in hallway/ inroom)  Assist Level: Dependent/Total  Functional Mobility Comments: Pt reported unable to wheel self in  due to reported pain and fatigue. Bed mobility  Rolling to Left: Dependent/Total;2 Person assistance  Rolling to Right: Dependent/Total;2 Person assistance  Supine to Sit: Dependent/Total;2 Person assistance(max A x 2, HOB elevated, cueing for safe log roll technique)  Sit to Supine: 2 Person assistance;Dependent/Total(max A x 2 with max cueing for log roll technique.)  Scootin Person assistance;Dependent/Total(mod A x 2 to scoot to R seated EOB in preparation for SBT)  Transfers  Sit to stand: 2 Person assistance;Dependent/Total(max A x 2 with cielo [place], pt unable to clear buttocks during attempted  paralell bars from wc level)  Stand to sit: 2 Person assistance;Dependent/Total(mod A x 2 to slow descent)         Additional Activities Comment  Additional Activities: Complted unsupported sitting balance tasks EOB to prepare for SBT and to increase core strength for ADLs. Pt seated EOB second session approximately 10 mins with SBA for balance. Engaged in LE ther ex within pain tolerance, noted no LOB. Pt completed lateral lean B sides with max A x 1 to lean to R, CGA to lean to L. Pt educated on use of SBT for transfers and verb good understanding however reported he has not utilized yet.          Plan   Plan  Times per week: 5x per week x90 minutes  Current Treatment Recommendations: Strengthening, Pain Management, Wheelchair Mobility Training, Safety Education & Training, Balance Training, Patient/Caregiver Education & Training, Self-Care / ADL, Functional Mobility Training, Home Management Training, Endurance Training    Goals  Short term goals  Time Frame for Short term goals: 2 weeks  Short term goal 1: Pt will be MOD A with toilet transfer and toileting- progressing, continue  Short term goal 2: Pt will bed MOD A with LE dressing with use of adap equip as needed- progressing, continue  Short term goal 3: Pt will be MOD A with bathing with use of long handle sponge and adaptive techniques as needed- progressing, continue  Short term goal 4: Pt will tolerated standing x2 minutes to demo improved functional activity tolerance- progressing, continue  Short term goal 5: Pt will be MIN A with BUE HEP to increase strength to facilitate independence with ADLs and functional mobility- progressing, continue  Long term goals  Time Frame for Long term goals : 3 weeks  Long term goal 1: Pt will be SBA with toilet transfer and toileting  Long term goal 2: Pt will bed SBA with LE dressing with use of adap equip as needed  Long term goal 3: Pt will be SBA with bathing with use of long handle sponge and adaptive techniques as needed  Long term goal 4: Pt will tolerated standing x5 minutes to demo improved functional activity tolerance  Long term goal 5: Pt will be MOD I with BUE HEP to increase strength to facilitate independence with ADLs and functional mobility  Patient Goals   Patient goals : Go home, return to prior level of functioning       Therapy Time   Individual Concurrent Group Co-treatment   Time In       0730   Time Out       0830   Minutes       60   Timed Code Treatment Minutes: 60 Minutes    Second Session Therapy Time:   Individual Concurrent Group Co-treatment   Time In      1300   Time Out      1330   Minutes      30     Timed Code Treatment Minutes:  60+30    Total Treatment Minutes:  [address]  [address] [facility], OTR/L [id number]

## 2019-05-21 ENCOUNTER — APPOINTMENT (OUTPATIENT)
Dept: GENERAL RADIOLOGY | Age: 69
DRG: 559 | End: 2019-05-21
Attending: PHYSICAL MEDICINE & REHABILITATION
Payer: MEDICARE

## 2019-05-21 LAB
ANION GAP SERPL CALCULATED.3IONS-SCNC: 12 MMOL/L (ref 3–16)
BUN BLDV-MCNC: 12 MG/DL (ref 7–20)
CALCIUM SERPL-MCNC: 9 MG/DL (ref 8.3–10.6)
CHLORIDE BLD-SCNC: 92 MMOL/L (ref 99–110)
CO2: 25 MMOL/L (ref 21–32)
CREAT SERPL-MCNC: 0.6 MG/DL (ref 0.8–1.3)
EKG ATRIAL RATE: 78 BPM
EKG DIAGNOSIS: NORMAL
EKG P AXIS: 72 DEGREES
EKG P-R INTERVAL: 196 MS
EKG Q-T INTERVAL: 422 MS
EKG QRS DURATION: 108 MS
EKG QTC CALCULATION (BAZETT): 481 MS
EKG R AXIS: 56 DEGREES
EKG T AXIS: 66 DEGREES
EKG VENTRICULAR RATE: 78 BPM
GFR AFRICAN AMERICAN: >60
GFR NON-AFRICAN AMERICAN: >60
GLUCOSE BLD-MCNC: 194 MG/DL (ref 70–99)
GLUCOSE BLD-MCNC: 238 MG/DL (ref 70–99)
GLUCOSE BLD-MCNC: 253 MG/DL (ref 70–99)
GLUCOSE BLD-MCNC: 278 MG/DL (ref 70–99)
GLUCOSE BLD-MCNC: 289 MG/DL (ref 70–99)
HCT VFR BLD CALC: 32.8 % (ref 40.5–52.5)
HEMOGLOBIN: 10.9 G/DL (ref 13.5–17.5)
MCH RBC QN AUTO: 29 PG (ref 26–34)
MCHC RBC AUTO-ENTMCNC: 33.1 G/DL (ref 31–36)
MCV RBC AUTO: 87.6 FL (ref 80–100)
PDW BLD-RTO: 14.3 % (ref 12.4–15.4)
PERFORMED ON: ABNORMAL
PLATELET # BLD: 681 K/UL (ref 135–450)
PMV BLD AUTO: 7.6 FL (ref 5–10.5)
POTASSIUM SERPL-SCNC: 4.8 MMOL/L (ref 3.5–5.1)
RBC # BLD: 3.75 M/UL (ref 4.2–5.9)
SODIUM BLD-SCNC: 129 MMOL/L (ref 136–145)
WBC # BLD: 10.4 K/UL (ref 4–11)

## 2019-05-21 PROCEDURE — 2580000003 HC RX 258: Performed by: PHYSICAL MEDICINE & REHABILITATION

## 2019-05-21 PROCEDURE — 97530 THERAPEUTIC ACTIVITIES: CPT

## 2019-05-21 PROCEDURE — 99232 SBSQ HOSP IP/OBS MODERATE 35: CPT | Performed by: INTERNAL MEDICINE

## 2019-05-21 PROCEDURE — 6370000000 HC RX 637 (ALT 250 FOR IP): Performed by: PHYSICAL MEDICINE & REHABILITATION

## 2019-05-21 PROCEDURE — 80048 BASIC METABOLIC PNL TOTAL CA: CPT

## 2019-05-21 PROCEDURE — 6360000002 HC RX W HCPCS: Performed by: PHYSICAL MEDICINE & REHABILITATION

## 2019-05-21 PROCEDURE — 1280000000 HC REHAB R&B

## 2019-05-21 PROCEDURE — 97535 SELF CARE MNGMENT TRAINING: CPT

## 2019-05-21 PROCEDURE — 85027 COMPLETE CBC AUTOMATED: CPT

## 2019-05-21 PROCEDURE — 71046 X-RAY EXAM CHEST 2 VIEWS: CPT

## 2019-05-21 RX ORDER — SODIUM CHLORIDE 0.9 % (FLUSH) 0.9 %
10 SYRINGE (ML) INJECTION PRN
Status: DISCONTINUED | OUTPATIENT
Start: 2019-05-21 | End: 2019-05-28 | Stop reason: HOSPADM

## 2019-05-21 RX ORDER — LIDOCAINE HYDROCHLORIDE 10 MG/ML
5 INJECTION, SOLUTION EPIDURAL; INFILTRATION; INTRACAUDAL; PERINEURAL ONCE
Status: DISCONTINUED | OUTPATIENT
Start: 2019-05-21 | End: 2019-05-28 | Stop reason: HOSPADM

## 2019-05-21 RX ORDER — QUETIAPINE FUMARATE 25 MG/1
25 TABLET, FILM COATED ORAL NIGHTLY
Status: DISCONTINUED | OUTPATIENT
Start: 2019-05-21 | End: 2019-05-24

## 2019-05-21 RX ORDER — SODIUM CHLORIDE 0.9 % (FLUSH) 0.9 %
10 SYRINGE (ML) INJECTION EVERY 12 HOURS SCHEDULED
Status: DISCONTINUED | OUTPATIENT
Start: 2019-05-21 | End: 2019-05-28 | Stop reason: HOSPADM

## 2019-05-21 RX ORDER — METHOCARBAMOL 500 MG/1
1000 TABLET, FILM COATED ORAL 3 TIMES DAILY
Status: DISCONTINUED | OUTPATIENT
Start: 2019-05-21 | End: 2019-05-23

## 2019-05-21 RX ADMIN — INSULIN LISPRO 1 UNITS: 100 INJECTION, SOLUTION INTRAVENOUS; SUBCUTANEOUS at 09:43

## 2019-05-21 RX ADMIN — ACETAMINOPHEN 650 MG: 325 TABLET ORAL at 17:15

## 2019-05-21 RX ADMIN — Medication 10 ML: at 11:20

## 2019-05-21 RX ADMIN — FAMOTIDINE 20 MG: 20 TABLET ORAL at 09:47

## 2019-05-21 RX ADMIN — INSULIN LISPRO 3 UNITS: 100 INJECTION, SOLUTION INTRAVENOUS; SUBCUTANEOUS at 13:00

## 2019-05-21 RX ADMIN — APIXABAN 10 MG: 5 TABLET, FILM COATED ORAL at 21:21

## 2019-05-21 RX ADMIN — Medication 100 MG: at 09:46

## 2019-05-21 RX ADMIN — INSULIN LISPRO 2 UNITS: 100 INJECTION, SOLUTION INTRAVENOUS; SUBCUTANEOUS at 17:05

## 2019-05-21 RX ADMIN — AMLODIPINE BESYLATE 2.5 MG: 5 TABLET ORAL at 09:46

## 2019-05-21 RX ADMIN — METHOCARBAMOL TABLETS 1000 MG: 500 TABLET, COATED ORAL at 13:18

## 2019-05-21 RX ADMIN — CEFTRIAXONE SODIUM 2 G: 2 INJECTION, POWDER, FOR SOLUTION INTRAMUSCULAR; INTRAVENOUS at 10:20

## 2019-05-21 RX ADMIN — FENOFIBRATE 160 MG: 160 TABLET ORAL at 09:45

## 2019-05-21 RX ADMIN — METHOCARBAMOL TABLETS 1000 MG: 500 TABLET, COATED ORAL at 21:21

## 2019-05-21 RX ADMIN — METHOCARBAMOL TABLETS 1500 MG: 750 TABLET, COATED ORAL at 09:44

## 2019-05-21 RX ADMIN — ACETAMINOPHEN 650 MG: 325 TABLET ORAL at 12:56

## 2019-05-21 RX ADMIN — INSULIN LISPRO 2 UNITS: 100 INJECTION, SOLUTION INTRAVENOUS; SUBCUTANEOUS at 21:41

## 2019-05-21 RX ADMIN — CEFTRIAXONE SODIUM 2 G: 2 INJECTION, POWDER, FOR SOLUTION INTRAMUSCULAR; INTRAVENOUS at 21:36

## 2019-05-21 RX ADMIN — APIXABAN 10 MG: 5 TABLET, FILM COATED ORAL at 09:46

## 2019-05-21 RX ADMIN — FAMOTIDINE 20 MG: 20 TABLET ORAL at 21:22

## 2019-05-21 RX ADMIN — OXYCODONE HYDROCHLORIDE 10 MG: 5 TABLET ORAL at 04:51

## 2019-05-21 RX ADMIN — TAMSULOSIN HYDROCHLORIDE 0.4 MG: 0.4 CAPSULE ORAL at 09:45

## 2019-05-21 RX ADMIN — LISINOPRIL 20 MG: 20 TABLET ORAL at 09:47

## 2019-05-21 RX ADMIN — ONDANSETRON 4 MG: 4 TABLET, ORALLY DISINTEGRATING ORAL at 13:18

## 2019-05-21 RX ADMIN — ACETAMINOPHEN 650 MG: 325 TABLET ORAL at 21:21

## 2019-05-21 RX ADMIN — SENNOSIDES,DOCUSATE SODIUM 2 TABLET: 8.6; 5 TABLET, FILM COATED ORAL at 09:44

## 2019-05-21 RX ADMIN — OXYCODONE HYDROCHLORIDE 10 MG: 5 TABLET ORAL at 17:14

## 2019-05-21 RX ADMIN — OXYCODONE HYDROCHLORIDE 10 MG: 5 TABLET ORAL at 12:55

## 2019-05-21 RX ADMIN — POLYETHYLENE GLYCOL (3350) 17 G: 17 POWDER, FOR SOLUTION ORAL at 09:48

## 2019-05-21 RX ADMIN — ACETAMINOPHEN 650 MG: 325 TABLET ORAL at 09:45

## 2019-05-21 RX ADMIN — ATORVASTATIN CALCIUM 20 MG: 20 TABLET, FILM COATED ORAL at 21:22

## 2019-05-21 RX ADMIN — QUETIAPINE FUMARATE 25 MG: 25 TABLET ORAL at 21:22

## 2019-05-21 RX ADMIN — Medication 10 ML: at 21:23

## 2019-05-21 ASSESSMENT — PAIN SCALES - GENERAL
PAINLEVEL_OUTOF10: 10
PAINLEVEL_OUTOF10: 1
PAINLEVEL_OUTOF10: 10
PAINLEVEL_OUTOF10: 10
PAINLEVEL_OUTOF10: 9
PAINLEVEL_OUTOF10: 8
PAINLEVEL_OUTOF10: 9

## 2019-05-21 ASSESSMENT — PAIN DESCRIPTION - DIRECTION
RADIATING_TOWARDS: LEG
RADIATING_TOWARDS: LEG

## 2019-05-21 ASSESSMENT — PAIN DESCRIPTION - PROGRESSION
CLINICAL_PROGRESSION: NOT CHANGED

## 2019-05-21 ASSESSMENT — PAIN DESCRIPTION - LOCATION
LOCATION: BACK;LEG

## 2019-05-21 ASSESSMENT — PAIN DESCRIPTION - FREQUENCY
FREQUENCY: CONTINUOUS

## 2019-05-21 ASSESSMENT — PAIN DESCRIPTION - DESCRIPTORS
DESCRIPTORS: ACHING;BURNING

## 2019-05-21 ASSESSMENT — PAIN DESCRIPTION - ORIENTATION
ORIENTATION: RIGHT;LEFT

## 2019-05-21 ASSESSMENT — PAIN DESCRIPTION - ONSET
ONSET: ON-GOING
ONSET: ON-GOING

## 2019-05-21 ASSESSMENT — PAIN DESCRIPTION - PAIN TYPE
TYPE: ACUTE PAIN;SURGICAL PAIN

## 2019-05-21 NOTE — PROGRESS NOTES
Spoke with Dr. Padmini Morales and orders received for a 2 view chest x ray to verify PICC placement since 1 view did not verify. Attempted to transport patient but unable due to pt pain level upon transport and unable to get bed down to x ray as patient is in a bariatric bed. Transport team notified and will transport 6 am. PICC not in use until verification made per Dr. Padmini Morales. Pt aware.

## 2019-05-21 NOTE — PROGRESS NOTES
Upon changing PICC dressing, Patient moved arm and now unable to verify PICC placement  measurement. 1 view chest x ray ordered to verify placement. Pt aware. Dressing in place.

## 2019-05-21 NOTE — PROGRESS NOTES
collection exerts complex layering signal with gas bubbles and peripheral enhancement. This may or present a seroma or postoperative hematoma. An infected fluid    collection be difficult to exclude. A pseudomeningocele is not excluded. The fluid collection extensively laminectomy bed without significant mass effect. 2. Multifactorial multilevel mild canal narrowing as described in part related to extensive facet arthropathy resulting in a narrowing of the canal from a lateral dimension. 3. Tortuous bunched nerve roots in the upper cauda equina surrounding the conus presumably related to chronic stenosis. 4. Multifactorial multilevel foraminal narrowing with moderate or severe foraminal narrowing from L1-L2 through L5-S1.   5. Diffuse circumferential epidural enhancement without discrete epidural collection extending throughout the laminectomy site which may be reactive in nature. Infection would be difficult to exclude. 6. No convincing evidence of osteomyelitis or discitis. 7. Large posterior central disc protrusion at L5-S1 resulting in a moderate canal narrowing and mild/moderate impingement upon the exiting S1 nerve roots bilaterally.        Scheduled Meds:   morphine  15 mg Oral 2 times per day    acetaminophen  650 mg Oral 4x Daily    amLODIPine  2.5 mg Oral Daily    apixaban  10 mg Oral BID    atorvastatin  20 mg Oral Nightly    cefTRIAXone (ROCEPHIN) IV  2 g Intravenous Q12H    famotidine  20 mg Oral BID    fenofibrate  160 mg Oral Daily    insulin lispro  0-3 Units Subcutaneous Nightly    insulin lispro  0-6 Units Subcutaneous TID WC    lisinopril  20 mg Oral Daily    methocarbamol  1,500 mg Oral TID    polyethylene glycol  17 g Oral Daily    sennosides-docusate sodium  2 tablet Oral BID    sodium chloride flush  10 mL Intravenous 2 times per day    tamsulosin  0.4 mg Oral Daily    thiamine  100 mg Oral Daily       Continuous Infusions:   dextrose         PRN Meds:  oxyCODONE **OR** oxyCODONE, magnesium hydroxide, bisacodyl, calcium carbonate, dextrose, dextrose, diazepam, diphenhydrAMINE, glucagon (rDNA), glucose, hydrALAZINE, naloxone, ondansetron, sodium chloride flush, traZODone      Assessment:     69 yo man with obesity, DM, HTN, lumbar stenosis     4/19/19 - L 3 & 4 lumbar laminectomy, L3/4 & L4/5 bilateral facetectomy , foraminotomies over L4 &5 nerve roots.    5/8/19 -  LBP. Presented to [place] ED. CT with air and fluid in ST / laminectomy site    5/8/19 -  Transferred and admitted to [facility] 5/8. Wound with purulent drainage  MRI with fluid collection with gas and peripheral enhancement, see report  BC x 2  MSSA  Wound cult with mod growth S aureus     OR 5/12 - epidural abscess.  Per OR note, \"large amount of liquid pus', extended to deep surg space, epidural space.   GS 1+GPC.  Surg cult + MSSA     IMP/  Lumbar SSI   OR 5/12  I&D to deep space / epidural abscess, surg cult MSSA   S aureus / MSSA bacteremia, f/u BC no growth  Leukocytosis - resolved  R leg DVT    Plan:     Cont ceftriaxone (giving 3rd gen cephalosporin over 1st for CNS penetration)  Treat x 6-8 weeks, through 6/26/19     Postop care per Neurosurg   See VALERI      Discussed with pt, Rehab staff  Román Martinez MD

## 2019-05-21 NOTE — PROGRESS NOTES
EKG found a prolonged QT. RN notified Sweta Mathias MD at [phone]. MD okayed nightly Seroquel to be given. MD ordered a repeat EKG for tomorrow morning.

## 2019-05-21 NOTE — PROGRESS NOTES
Pt awake in wheelchair. Physical assessment and vital signs as charted. Pt currently rates his pain as a 9 out of 10 on the pain scale, pain medication given at this time. Call light placed within reach. RN will continue to monitor Pt.

## 2019-05-21 NOTE — PROGRESS NOTES
Physical Therapy  Facility/Department: [facility] ACUTE REHAB UNIT  Daily Treatment Note  NAME: Rafi Holloway  : 1950  MRN: [id number]    Date of Service: 2019    Discharge Recommendations:  24 hour supervision or assist, Home with Home health PT   PT Equipment Recommendations  Other: continue to assess, pt owns 4WW    Patient Diagnosis(es): There were no encounter diagnoses. has a past medical history of Arthritis, Back pain, DDD (degenerative disc disease), cervical, Diabetes mellitus ([facility] [address] Edema of both legs, GERD (gastroesophageal reflux disease), Hyperlipidemia, Hypertension, MRSA (methicillin resistant staph aureus) culture positive, Nausea in adult, Neuropathy, Vitamin B 12 deficiency, and Walking troubles. has a past surgical history that includes back surgery; Prostate surgery; shoulder surgery; Cystoscopy; knee surgery; Hemorrhoid surgery; eye surgery; Tonsillectomy; Cholecystectomy; Lumbar spine surgery (N/A, 2019); and REPAIR DURAL / CSF LEAK (N/A, 2019). Restrictions  Position Activity Restriction  Other position/activity restrictions: up with assistance  Subjective   General  Chart Reviewed: Yes  Additional Pertinent Hx: Pt is a 68 y.o. male admitted to ARU on 19 from [facility]. Pt initially admitted to [facility] on  with complaints of low back pain, fever, and chills. Family / Caregiver Present: No  Referring Practitioner: Dr. Yves Cardenas  Subjective  Subjective: \"My legs are numb and burning. \"  Pt stating that these symptoms are new as of today. RN in room for report. General Comment  Comments: Pt found supine in bed upon PT arrival.  Pt agreeable to therapy session with verbal encouragement.   Pain Screening  Patient Currently in Pain: Yes(Pt reported 10/10 back pain, RN stating pt not due for pain medicine)  Vital Signs  Patient Currently in Pain: Yes(Pt reported 10/10 back pain, RN stating pt not due for pain medicine)       Orientation     Cognition      Objective   Bed that his pants be pulled down while seated in the recliner for use of urinal and comfort. Pt left seated in recliner at end of session with chair alarm set and call light/needs within reach. Assessment   Body structures, Functions, Activity limitations: Decreased functional mobility ; Decreased strength;Decreased endurance;Decreased coordination;Decreased ROM; Decreased balance; Increased Pain  Assessment: Pt continues to require assist of two skilled therapists for all functional mobility for increased safety and to maximize outcomes. Pt requiring assist of 3 for sit to stand from EOB to cielo steady this session due to UE/LE weakness and inability to fully extend elbows and knees. Pt reporting that he does not cognitively feel normal at this time, RN notified. Continue POC. Treatment Diagnosis: decreased functional mobility due to debility  Patient Education: [employer] board transfers  REQUIRES PT FOLLOW UP: Yes  Activity Tolerance  Activity Tolerance: Patient limited by fatigue;Patient limited by pain; Patient limited by endurance  Activity Tolerance: Pt reporting that he is having trouble communicating, thinking, and asks where he is. Also asks if he is in a time machine. RN notified.      G-Code     OutComes Score                                                  AM-PAC Score             Goals  Short term goals  Time Frame for Short term goals: 1-2 weeks -ongoing  Short term goal 1: Pt will perform all bed mobility with ModA  Short term goal 2: Pt will perform sit to/from stand with RW and MaxA  Short term goal 3: Pt will perform squat pivot with MaxA  Short term goal 4: Pt will ambulate 10' with RW and MaxA  Short term goal 5: Pt will propel w/c 150' with supervision  Long term goals  Time Frame for Long term goals : 2-3 weeks -ongoing  Long term goal 1: Pt will perform all bed mobility with supervision  Long term goal 2: Pt will perform sit to/from stand with RW and supervision  Long term goal 3: Pt will perform squat pivot with supervision  Long term goal 4: Pt will ambulate 48' with RW and supervision  Long term goal 5: Pt will propel w/c 150' with [address] term goal 6: Pt will ascend/descend 2 stairs with BHR and Nicole  Patient Goals   Patient goals : \"To be able to do what I was doing before I got sick. \"    Plan    Plan  Times per week: 5x/week, 90 minutes a day  Current Treatment Recommendations: Strengthening, Transfer Training, Endurance Training, Neuromuscular Re-education, Patient/Caregiver Education & Training, ROM, Wheelchair Mobility Training, Equipment Evaluation, Education, & procurement, Balance Training, Gait Training, Home Exercise Program, Functional Mobility Training, Stair training, Safety Education & Training  Safety Devices  Type of devices: Call light within reach, Chair alarm in place, Gait belt, Left in chair     Therapy Time   Individual Concurrent Group Co-treatment   Time In       0735   Time Out       0840   Minutes       65        Second Session Therapy Time:   Individual Concurrent Group Co-treatment   Time In 1430     1416   Time Out 1447     1430   Minutes 17     14     Timed Code Treatment Minutes:  65 + 31    Total Treatment Minutes:  [address] [facility], PT, DPT, CLT

## 2019-05-21 NOTE — PROGRESS NOTES
Storm Ennis  5/21/2019  [id number]    Chief Complaint: Debility    Subjective:   Continues to endorse struggles with pain control but is also lethargic and oversedated. ROS: No CP, sob, n/v, f/c  Objective:  Patient Vitals for the past 24 hrs:   BP Temp Temp src Pulse Resp SpO2   05/20/19 2323 (!) 144/82 -- -- -- -- --   05/20/19 2140 (!) 186/84 -- -- -- -- --   05/20/19 2127 (!) 186/84 97.9 °F (36.6 °C) Oral 86 18 94 %   05/20/19 1250 (!) 153/83 -- -- 80 -- --   05/20/19 1219 (!) 195/83 98.1 °F (36.7 °C) -- -- -- --   05/20/19 0910 112/63 97.7 °F (36.5 °C) Oral 91 18 94 %     Gen: No distress, pleasant. HEENT: Normocephalic, atraumatic. CV: Regular rate and rhythm. Resp: No respiratory distress. Abd: Soft, nontender   Ext: No edema.    Neuro: Lethargic, cooperative  Wt Readings from Last 3 Encounters:   05/20/19 (!) 343 lb 0.6 oz (155.6 kg)   05/11/19 (!) 350 lb 1.5 oz (158.8 kg)   04/19/19 (!) 320 lb (145.2 kg)       Laboratory data:   Lab Results   Component Value Date    WBC 7.7 05/20/2019    HGB 9.9 (L) 05/20/2019    HCT 29.6 (L) 05/20/2019    MCV 85.9 05/20/2019     (H) 05/20/2019       Lab Results   Component Value Date     05/20/2019    K 4.2 05/20/2019    K 4.0 05/17/2019    CL 90 05/20/2019    CO2 28 05/20/2019    BUN 12 05/20/2019    CREATININE 0.6 05/20/2019    GLUCOSE 195 05/20/2019    CALCIUM 8.4 05/20/2019        Therapy progress:  PT  Position Activity Restriction  Other position/activity restrictions: up with assistance  Objective     Sit to Stand: Dependent/Total(MaxAx2 from elevated bed to cielo steady, MinAx2 from cielo steady, verbal cues to not lean on elbows during transfer, pt not receptive to cues, facilitation for anterior weight shifting)  Stand to sit: Dependent/Total(ModAx2 for eccentric control)  Bed to Chair: Dependent/Total(EOB to w/c via cielo steady)     OT  PT Equipment Recommendations  Other: continue to assess, pt owns 4WW     Assessment        SLP Body mass index is 45.26 kg/m². Assessment and Plan:  Lumbar epidural abscess: s/p washout 5/12. Rocephin 2 Q12 thru 6/26. Weekly labs per ID recs. PT/OT    Line. Concern for picc displacement last night. On repeat imaging picc seems to have advanced from proximal SVC to cavoatrial junction. Discussed w/ picc team & Dr. Tina Aj who do not believe the picc needs to be replaced.     Lumbar spondylosis/radiculopathy: Pain control. PT/OT    Anxiety    Hyponatremia. Na has been somewhat variable. Follow.      DM: SSI     HTN: Norvasc, lisinopril     HLD: Lipitor 20, fenofibrate     DVT: Lovenox through 5/19. Loading dose Eliquis on 5/20     Bowels: Per protocol  Bladder: Per protocol   Sleep: Trazodone provided prn. Pain: Robaxin scheduled, -adjust to scheduled Tylenol and Barb 10-15 PRN   DVT PPx: As above      Shantanu Lancaster MD 5/21/2019, 8:35 AM

## 2019-05-21 NOTE — PROCEDURES
Received call from [facility] regarding concerns about PICC line dressing change this AM, night nurse was changing line and pt moved arm and line appeared to slide out and in, without being present and seeing occurrence, Jacky Nimesh states line is in correct position, previous documentation line at 0, sounds like at skin entrance site PICC appeared to night nurse to do the above d/t loose/skin, site was cleaned and Sirisha to evaluate site. Recommend monitoring, line continues to be in correct position.

## 2019-05-21 NOTE — PROGRESS NOTES
surgery; Prostate surgery; shoulder surgery; Cystoscopy; knee surgery; Hemorrhoid surgery; eye surgery; Tonsillectomy; Cholecystectomy; Lumbar spine surgery (N/A, 4/19/2019); and REPAIR DURAL / CSF LEAK (N/A, 5/12/2019). Restrictions  Position Activity Restriction  Other position/activity restrictions: up with assistance  Subjective   General  Chart Reviewed: Yes  Patient assessed for rehabilitation services?: Yes  Additional Pertinent Hx: Admit 5/8 to ICU with sepsis vs back abcess       Neurosx consult - to OR 5/12 s/p I&D back wound with draining of abcess. Admitted to [facility] ARU on 5/17. PMHX: recent  DECOMPRESSION LAMINECTOMY L3-4 POSSIBLY L5.  on 4/19 /19   OA, DDD, DM,edema BLE,HTN, HLD and neuropathy   Response to previous treatment: Patient with no complaints from previous session  Family / Caregiver Present: No  Referring Practitioner: Nilson  Diagnosis: Back abcess s/p I&D of back wound   Subjective  Subjective: Pt supine in bed upon entry, requiring max vc for encouragement for participation. Pt stating during therapy session, \"I just can't get my thoughts together. I feel like I'm in a time machine. \" Pt also stated, \"I can't move my legs. \" Nursing contacted. COTX of both disciplies OT/PT to maximize safety and promote functional transfers. General Comment  Comments: Pt knee flexion BLEs with encouragement. Pt rolling left and right Dependent (Mod A x 2). Dress LB (Dependent) supine in bed. Pt supine to sit Dependent x 2. Pt sit EOB CGA - Min A, dressed UB - tank top (Dependent). Pt then 2 attempts sit to stand at [address] x 2 and pt unable to achieve. Pt then sit to stand at [address] (Max A x 3), with difficulty achieving erect posture. Pt then transferred to  via [facility] stedy and sit to stand from [address] (Min A x 2) and pt stand to sit in wc Dependent (Max A x 2) for controlled decent into . Pt then wheeled to therapy gym.  Pt beasy board to mat table Dependent (Max A + Mod A) for placement of beasy board and transfer. Pt then transfer back to  from beasy board Dependent (Max A + Mod A). Pt then wheeled to room, breakfast served, call light in reach and chair alarm on. Pain Assessment  Pain Level: 10  Pain Type: Acute pain;Surgical pain  Pain Location: Back;Leg  Pain Orientation: Right;Left  Pain Descriptors: Aching;Burning  Pain Frequency: Continuous  Vital Signs  Patient Currently in Pain: Yes   Orientation  Orientation  Overall Orientation Status: Impaired  Orientation Level: Disoriented to place;Oriented to time;Oriented to person;Oriented to situation  Objective    ADL  UE Dressing: Dependent/Total(tank top)  LE Dressing: Dependent/Total(shorts)        Balance  Sitting Balance: Minimal assistance(CGA - Min A)  Standing Balance  Time: ~<20 secf  Activity: standing at cielo stedy to transfer  Bed mobility  Rolling to Left: Dependent/Total  Rolling to Right: Dependent/Total  Supine to Sit: 2 Person assistance;Dependent/Total(HOB slightly raised, vc)  Transfers  Sit to stand: Dependent/Total(Max A x 3 cielo stedy)  Stand to sit: Dependent/Total(Max A x 2 cileo stedy)                       Cognition  Overall Cognitive Status: Exceptions  Initiation: Requires cues for some  Sequencing: Requires cues for some        ADDENDUM [id number]  Pt seated in recliner upon entry with pain of \"7 back and legs,\" and nursing notified. Pt agreeable to therapy session and requesting to use portable urinal. Pt weight shift left and right Max A and assist of therapy aid to to doff pants below waist. Pt with portable urinal placed by wife, pt unable to void. Pt weight shift left and right Max A and assist of therapy aid to don pants to waist. Pt then sit to stand at [address] (Max A x 2) and pt transferred via [address] [facility] to  and sit to stand from [address] (Min A x 2) and stand to sit Dependent (Max A x 2) for controlled decent.  Pt then Supervision for activity tolerance  Long term goal 5: Pt will be MOD I with BUE HEP to increase strength to facilitate independence with ADLs and functional mobility  Patient Goals   Patient goals : Go home, return to prior level of functioning       Therapy Time   Individual Concurrent Group Co-treatment   Time In       0735   Time Out       0840   Minutes       65   Timed Code Treatment Minutes: 65 Minutes     Therapy Time   Individual Concurrent Group Co-treatment   Time In  0691     3358   Time Out  3284     6051   Minutes  30     15   Timed Code Treatment Minutes: [facility], [address] [place] [address]

## 2019-05-22 LAB
ANION GAP SERPL CALCULATED.3IONS-SCNC: 11 MMOL/L (ref 3–16)
BUN BLDV-MCNC: 15 MG/DL (ref 7–20)
CALCIUM SERPL-MCNC: 9 MG/DL (ref 8.3–10.6)
CHLORIDE BLD-SCNC: 93 MMOL/L (ref 99–110)
CO2: 26 MMOL/L (ref 21–32)
CREAT SERPL-MCNC: 0.7 MG/DL (ref 0.8–1.3)
GFR AFRICAN AMERICAN: >60
GFR NON-AFRICAN AMERICAN: >60
GLUCOSE BLD-MCNC: 193 MG/DL (ref 70–99)
GLUCOSE BLD-MCNC: 211 MG/DL (ref 70–99)
GLUCOSE BLD-MCNC: 216 MG/DL (ref 70–99)
GLUCOSE BLD-MCNC: 217 MG/DL (ref 70–99)
GLUCOSE BLD-MCNC: 248 MG/DL (ref 70–99)
HCT VFR BLD CALC: 31 % (ref 40.5–52.5)
HEMOGLOBIN: 10.2 G/DL (ref 13.5–17.5)
MCH RBC QN AUTO: 28.6 PG (ref 26–34)
MCHC RBC AUTO-ENTMCNC: 32.8 G/DL (ref 31–36)
MCV RBC AUTO: 87.1 FL (ref 80–100)
PDW BLD-RTO: 14.8 % (ref 12.4–15.4)
PERFORMED ON: ABNORMAL
PLATELET # BLD: 688 K/UL (ref 135–450)
PMV BLD AUTO: 7.1 FL (ref 5–10.5)
POTASSIUM SERPL-SCNC: 4.4 MMOL/L (ref 3.5–5.1)
RBC # BLD: 3.56 M/UL (ref 4.2–5.9)
SODIUM BLD-SCNC: 130 MMOL/L (ref 136–145)
WBC # BLD: 6.5 K/UL (ref 4–11)

## 2019-05-22 PROCEDURE — 85027 COMPLETE CBC AUTOMATED: CPT

## 2019-05-22 PROCEDURE — 80048 BASIC METABOLIC PNL TOTAL CA: CPT

## 2019-05-22 PROCEDURE — 97535 SELF CARE MNGMENT TRAINING: CPT

## 2019-05-22 PROCEDURE — 2580000003 HC RX 258: Performed by: PHYSICAL MEDICINE & REHABILITATION

## 2019-05-22 PROCEDURE — 6370000000 HC RX 637 (ALT 250 FOR IP): Performed by: PHYSICAL MEDICINE & REHABILITATION

## 2019-05-22 PROCEDURE — 97530 THERAPEUTIC ACTIVITIES: CPT

## 2019-05-22 PROCEDURE — 1280000000 HC REHAB R&B

## 2019-05-22 PROCEDURE — 93005 ELECTROCARDIOGRAM TRACING: CPT | Performed by: PHYSICAL MEDICINE & REHABILITATION

## 2019-05-22 PROCEDURE — 6360000002 HC RX W HCPCS: Performed by: PHYSICAL MEDICINE & REHABILITATION

## 2019-05-22 PROCEDURE — 36592 COLLECT BLOOD FROM PICC: CPT

## 2019-05-22 RX ADMIN — ACETAMINOPHEN 650 MG: 325 TABLET ORAL at 09:15

## 2019-05-22 RX ADMIN — CEFTRIAXONE SODIUM 2 G: 2 INJECTION, POWDER, FOR SOLUTION INTRAMUSCULAR; INTRAVENOUS at 22:22

## 2019-05-22 RX ADMIN — INSULIN LISPRO 2 UNITS: 100 INJECTION, SOLUTION INTRAVENOUS; SUBCUTANEOUS at 09:20

## 2019-05-22 RX ADMIN — Medication 10 ML: at 20:38

## 2019-05-22 RX ADMIN — Medication 10 ML: at 09:15

## 2019-05-22 RX ADMIN — Medication 100 MG: at 09:15

## 2019-05-22 RX ADMIN — AMLODIPINE BESYLATE 2.5 MG: 5 TABLET ORAL at 09:15

## 2019-05-22 RX ADMIN — MAGNESIUM HYDROXIDE 30 ML: 400 SUSPENSION ORAL at 18:28

## 2019-05-22 RX ADMIN — INSULIN LISPRO 1 UNITS: 100 INJECTION, SOLUTION INTRAVENOUS; SUBCUTANEOUS at 18:27

## 2019-05-22 RX ADMIN — APIXABAN 10 MG: 5 TABLET, FILM COATED ORAL at 09:15

## 2019-05-22 RX ADMIN — Medication 10 ML: at 12:21

## 2019-05-22 RX ADMIN — METHOCARBAMOL TABLETS 1000 MG: 500 TABLET, COATED ORAL at 20:36

## 2019-05-22 RX ADMIN — LISINOPRIL 20 MG: 20 TABLET ORAL at 09:15

## 2019-05-22 RX ADMIN — ACETAMINOPHEN 650 MG: 325 TABLET ORAL at 20:37

## 2019-05-22 RX ADMIN — METHOCARBAMOL TABLETS 1000 MG: 500 TABLET, COATED ORAL at 09:15

## 2019-05-22 RX ADMIN — SENNOSIDES,DOCUSATE SODIUM 2 TABLET: 8.6; 5 TABLET, FILM COATED ORAL at 09:16

## 2019-05-22 RX ADMIN — POLYETHYLENE GLYCOL (3350) 17 G: 17 POWDER, FOR SOLUTION ORAL at 09:16

## 2019-05-22 RX ADMIN — SENNOSIDES,DOCUSATE SODIUM 2 TABLET: 8.6; 5 TABLET, FILM COATED ORAL at 20:37

## 2019-05-22 RX ADMIN — TAMSULOSIN HYDROCHLORIDE 0.4 MG: 0.4 CAPSULE ORAL at 09:15

## 2019-05-22 RX ADMIN — APIXABAN 10 MG: 5 TABLET, FILM COATED ORAL at 20:37

## 2019-05-22 RX ADMIN — ATORVASTATIN CALCIUM 20 MG: 20 TABLET, FILM COATED ORAL at 20:37

## 2019-05-22 RX ADMIN — ACETAMINOPHEN 650 MG: 325 TABLET ORAL at 12:15

## 2019-05-22 RX ADMIN — OXYCODONE HYDROCHLORIDE 10 MG: 5 TABLET ORAL at 02:53

## 2019-05-22 RX ADMIN — OXYCODONE HYDROCHLORIDE 10 MG: 5 TABLET ORAL at 11:21

## 2019-05-22 RX ADMIN — OXYCODONE HYDROCHLORIDE 10 MG: 5 TABLET ORAL at 15:56

## 2019-05-22 RX ADMIN — INSULIN LISPRO 1 UNITS: 100 INJECTION, SOLUTION INTRAVENOUS; SUBCUTANEOUS at 20:39

## 2019-05-22 RX ADMIN — QUETIAPINE FUMARATE 25 MG: 25 TABLET ORAL at 20:37

## 2019-05-22 RX ADMIN — INSULIN LISPRO 2 UNITS: 100 INJECTION, SOLUTION INTRAVENOUS; SUBCUTANEOUS at 12:15

## 2019-05-22 RX ADMIN — FENOFIBRATE 160 MG: 160 TABLET ORAL at 09:15

## 2019-05-22 RX ADMIN — METHOCARBAMOL TABLETS 1000 MG: 500 TABLET, COATED ORAL at 15:55

## 2019-05-22 RX ADMIN — FAMOTIDINE 20 MG: 20 TABLET ORAL at 09:15

## 2019-05-22 RX ADMIN — FAMOTIDINE 20 MG: 20 TABLET ORAL at 20:37

## 2019-05-22 RX ADMIN — OXYCODONE HYDROCHLORIDE 10 MG: 5 TABLET ORAL at 07:09

## 2019-05-22 RX ADMIN — CEFTRIAXONE SODIUM 2 G: 2 INJECTION, POWDER, FOR SOLUTION INTRAMUSCULAR; INTRAVENOUS at 12:15

## 2019-05-22 RX ADMIN — ACETAMINOPHEN 650 MG: 325 TABLET ORAL at 18:27

## 2019-05-22 RX ADMIN — MORPHINE SULFATE 15 MG: 15 TABLET, FILM COATED, EXTENDED RELEASE ORAL at 20:37

## 2019-05-22 ASSESSMENT — PAIN SCALES - GENERAL
PAINLEVEL_OUTOF10: 9
PAINLEVEL_OUTOF10: 0
PAINLEVEL_OUTOF10: 9
PAINLEVEL_OUTOF10: 8
PAINLEVEL_OUTOF10: 7
PAINLEVEL_OUTOF10: 8
PAINLEVEL_OUTOF10: 8
PAINLEVEL_OUTOF10: 6
PAINLEVEL_OUTOF10: 9
PAINLEVEL_OUTOF10: 9
PAINLEVEL_OUTOF10: 8
PAINLEVEL_OUTOF10: 10
PAINLEVEL_OUTOF10: 9
PAINLEVEL_OUTOF10: 8

## 2019-05-22 ASSESSMENT — PAIN DESCRIPTION - DIRECTION: RADIATING_TOWARDS: LEG

## 2019-05-22 ASSESSMENT — PAIN DESCRIPTION - DESCRIPTORS
DESCRIPTORS: ACHING;BURNING
DESCRIPTORS: DISCOMFORT

## 2019-05-22 ASSESSMENT — PAIN DESCRIPTION - PAIN TYPE
TYPE: SURGICAL PAIN
TYPE: ACUTE PAIN;SURGICAL PAIN

## 2019-05-22 ASSESSMENT — PAIN DESCRIPTION - LOCATION
LOCATION: BACK;LEG
LOCATION: BACK

## 2019-05-22 ASSESSMENT — PAIN DESCRIPTION - ORIENTATION
ORIENTATION: RIGHT;LEFT
ORIENTATION: MID;LOWER

## 2019-05-22 ASSESSMENT — PAIN DESCRIPTION - PROGRESSION
CLINICAL_PROGRESSION: NOT CHANGED

## 2019-05-22 ASSESSMENT — PAIN DESCRIPTION - ONSET
ONSET: ON-GOING
ONSET: ON-GOING

## 2019-05-22 ASSESSMENT — PAIN DESCRIPTION - FREQUENCY
FREQUENCY: CONTINUOUS
FREQUENCY: CONTINUOUS

## 2019-05-22 ASSESSMENT — PAIN - FUNCTIONAL ASSESSMENT: PAIN_FUNCTIONAL_ASSESSMENT: PREVENTS OR INTERFERES WITH ALL ACTIVE AND SOME PASSIVE ACTIVITIES

## 2019-05-22 NOTE — PLAN OF CARE
Problem: Falls - Risk of:  Goal: Will remain free from falls  Description  Will remain free from falls  5/22/2019 0054 by Rodri Mcgee RN  Outcome: Ongoing  Note:   Pt is a high fall risk. See Cleda Asper Fall Score. Pt bed is in low position, side rails up, call light and belongings are in reach. Pt x 2-3 with stedy. Bed alarm in use. Pt encouraged to call for assistance, pt using call light appropriately. Will continue with hourly rounds for po intake, pain needs, toileting and repositioning as needed. 5/21/2019 1426 by Michelet Diaz RN  Outcome: Ongoing     Problem: Pain:  Goal: Patient's pain/discomfort is manageable  Description  Patient's pain/discomfort is manageable  Outcome: Ongoing  Note:   No complaints of pain voiced. Will cont to monitor.

## 2019-05-22 NOTE — PROGRESS NOTES
Assessment complete. VSS. Patient up to chair. PT/OT had to use lj lift to transfer patient. Patient very sleepy sitting in chair. Held morphine this morning. Will continue to monitor.     Vitals:    05/22/19 0923   BP: (!) 163/82   Pulse: 96   Resp: 17   Temp: 98.2 °F (36.8 °C)   SpO2: 96%

## 2019-05-22 NOTE — PROGRESS NOTES
Family concerned of an internal abscess/lump on the left side of surgical site. Examined area with family and found no abscess or lump. Did feel a little bump from where his drain was. Patient was incontinent of urine. Cleaned surgical site with normal saline and padded dry. Reminded patient and family to try to use urinal every couple of hours so avoid incontinence. Patient also complains of intense leg pain after turning and repositioning. Pain medication given and ice placed on left knee.

## 2019-05-22 NOTE — PATIENT CARE CONFERENCE
The [address] [place] [address] Rehabilitation  Weekly Team Conference Note    Patient Name: Christina Bocanegra        MRN: [id number]    : 1950  (73 y.o.)  Gender: male   Referring Practitioner: Dr. Maira Medrano  Diagnosis: debility    The team conference for this patient was held on 2019 at 9:00am by:  Adarsh Hayden. Monetta Kocher, MD.    PHYSICAL THERAPY:  Bed Mobility:   Scootin Person assistance, Dependent/Total(mod A x 2 to scoot to R seated EOB in preparation for SBT)    Transfers:  Sit to Stand: Dependent/Total(x2 attempt from elevated EOB to cielo steady, unsuccessful. x1 attempt MaxAx3 from elevated EOB to cielo steady, unsuccessful.)  Stand to sit: Dependent/Total(MaxAx2)  Bed to Chair: Dependent/Total(EOB to recliner via lj lift x3 assist for placement)  Comment: Pt stating that he isn't able to feel his legs and isn't sure if they are moving or if they are still. Pt able to identify light touch to B knees and proximal.  Pt stating that he is unable to feel light touch to B heels and great toes, however PT noting pt with B foot movement when these areas are touched. Once sitting EOB, PT/OT asking pt if he would like to wipe his face with a cleansing wipe. Pt stating yes. Once OT handed pt the cleansing wipe, pt put wipe in mouth and began chewing/sucking on wipe. PT educating pt that he should not put the wipe in his moth due to it having cleanser in it. Pt then pulling wipe out of his mouth and throwing it at PT.               FIMS:  Bed, Chair, Wheel Chair: 1 - Requires >75% assitance to transfer  Walk: 1 - Total Assistance Walks < 50 feet OR requires two or more people OR patient performs < 25% of locomotion effort(pt would be totalA at this time)  Wheel Chair: 2 - Maximal Assistance Requires up to [address] requires assistance of one person to operate wheelchair between [address] in [address] [place] [address] Street: 100'  Stairs: 1- Total Assistance perfoms less than 25% of Restriction: 1500 ml  Please see nutrition note for details. NURSING:    FIMS:  Bladder Level of Assistance: 2- Requires 50-74% assistance for bladder management tasks (ie. helper places, holds and removes/empties device)  Bladder Frequency of Accidents: (0)  Bowel Level of Assistance: 2- Requires 50-74% assistance for bowel management tasks (ie. staff places, holds and removes/empties device)    Flory Mile Fall Risk Score: High - 85  Wounds/Incisions/Ulcers: Surgical incision to back, abrasions to R lower leg & L knee  Medication Review: Reviewed with patient  Pain: Pt c/o 8-9/10 pain in back and bilat legs. Controlled with scheduled and PRN pain medication. Consultations/Labs/X-rays: CBC and BMP daily    Patient/Family Education provided by team: Importance of participation in therapy    CASE MANAGEMENT:  Assessment:    Patient is a pleasant 69 y/o male who lives with his wife in [facility] in a ranch style house. He owns a 4 wheeled rolling walker with seat which is currently in patient's room. He also owns a shower chair. Patient spoke about his pain and can't understand what is causing him to have such a high level of pain. \"Just touching my skin is like someone is sharp pain all over. \"  Patient has never used home health care and SW reviewed hhc along with IV ABX needs probable on d/c. Patient is agreeable to [facility] made referral to [address] [place] [address] who states that for IV ABX  and patient's insurance, an RN can't go to home 2x a day. If this is required prior to d/c patient and wife will need to be trained to do IV ABX if 2xday.     SW will continue to follow to update patient and patient's wife and assist with discharge planning. TEAM SUMMARY: Pete Cole is limiting participation, refusing PT,  Consult Neuro-surg to see pt regarding participation.   If non-par transfer to SNF     DISCHARGE PLAN:  Risk for Readmission:Moderate (10-19)   Critical Items: If High Risk, consider the following recommendations: Patient not at high risk   Estimated Length of Stay:15 days  Destination: undetermined at this time  Services at Discharge: Other TBD  Community Resources:   Equipment at Discharge: pt owns 3JA, may require w/c if d/c home  Factors facilitating achievement of predicted outcomes: Family support  Barriers to the achievement of predicted outcomes: Pain, Decreased motivation, Limited participation, Unrealistic expectations, Decreased endurance, Decreased sensation, Upper extremity weakness, Lower extremity weakness, Incontinence of bladder and Stairs at home    Interdisciplinary Individualized Plan of Care Review:    · Continue Current Plan of Care: No    · Modifications:_____________________________    Special Needs in the Upcoming Week:    [] Family/Caregiver Education  [] Home visit  []Therapeutic Pass   [] Consults:_______   [] Family Training  [] Other;_______    Patient Goals for Rehab stay:  1. \"To be able to do what I was doing before I got sick. \"  2.   3.      Rehab Team Goals for patient for the Upcoming Week:  1. Increased participation in therapy sessions  2. Increased independence with functional transfers  3. Rehab Team Members in attendance for Team Conference:  :  Rose Marie Sood    Nurse Manager:  Arcelia Bhat, MSN, RN    Therapy Manager:  Maxwell Kurtz, PT, DPT    Social Work:  Allyn Quezada, MSW, LSW    Nursing: Jannette Segura, RN  Opal Garcia BSN, RN    Therapy:  Cherelle Contreras, PT  Abraham Purcell, PT, DPT  Moises Simon, PT, DPT    Talib De La Rosa, OTR/L  [facility], OTR/L    Sowmya Griffiths MA/CCC-SLP  Luigi Webb MA/CCC-SLP    Nutrition:  Abigail Acosta, PRINCESS LD    I approve the established interdisciplinary plan of care as documented within the medical record of Dom Hays MD 5/24/2019, 10:17 AM

## 2019-05-22 NOTE — PROGRESS NOTES
Pt's family educated that Pt is on a fluid restriction and what that amount is after bringing the Pt a full pitcher of water. RN will continue educate Pt and family as needed. RN will continue to monitor Pt.

## 2019-05-22 NOTE — PROGRESS NOTES
Physical Therapy  Facility/Department: [facility] ACUTE REHAB UNIT  Daily Treatment Note  NAME: Duane Lai  : 1950  MRN: [id number]    Date of Service: 2019    Discharge Recommendations:  24 hour supervision or assist, Home with Home health PT   PT Equipment Recommendations  Other: continue to assess, pt owns 4WW    Patient Diagnosis(es): There were no encounter diagnoses. has a past medical history of Arthritis, Back pain, DDD (degenerative disc disease), cervical, Diabetes mellitus ([address] Edema of both legs, GERD (gastroesophageal reflux disease), Hyperlipidemia, Hypertension, MRSA (methicillin resistant staph aureus) culture positive, Nausea in adult, Neuropathy, Vitamin B 12 deficiency, and Walking troubles. has a past surgical history that includes back surgery; Prostate surgery; shoulder surgery; Cystoscopy; knee surgery; Hemorrhoid surgery; eye surgery; Tonsillectomy; Cholecystectomy; Lumbar spine surgery (N/A, 2019); and REPAIR DURAL / CSF LEAK (N/A, 2019). Restrictions  Position Activity Restriction  Other position/activity restrictions: up with assistance  Subjective   General  Chart Reviewed: Yes  Additional Pertinent Hx: Pt is a 68 y.o. male admitted to ARU on 19 from [facility]. Pt initially admitted to [facility] on  with complaints of low back pain, fever, and chills. Family / Caregiver Present: No  Referring Practitioner: Dr. Anahi Mulligan  Subjective  Subjective: \"I can't do this. \"  General Comment  Comments: Pt found supine in bed upon PT arrival.  Pt agreeable to therapy session with heavy verbal encouragement. Pt receiving EKG upon PT/OT arrival, delaying session by 15 minutes.   Pain Screening  Patient Currently in Pain: Yes(Pt reported 9/10 back and BLE pain)  Vital Signs  Patient Currently in Pain: Yes(Pt reported 9/10 back and BLE pain)       Orientation     Cognition      Objective   Bed mobility  Rolling to Left: Dependent/Total(MaxAx2 multiple trials for pericare and pants management, HOHA for use of bedrails)  Rolling to Right: Dependent/Total(MaxAx2 multiple trials for pericare and pants management, HOHA for use of bedrails)  Supine to Sit: Dependent/Total(MaxAx2, HOB slightly elevated, use of bedrail, significantly increased time to perform)  Transfers  Sit to Stand: Dependent/Total(x2 attempt from elevated EOB to cielo steady, unsuccessful. x1 attempt MaxAx3 from elevated EOB to cielo steady, unsuccessful.)  Stand to sit: Dependent/Total(MaxAx2)  Bed to Chair: Dependent/Total(EOB to recliner via lj lift x3 assist for placement)  Comment: Significant time spent encouraging pt to perform sit to stand to cielo steady, minimal initiation noted with no bottom clearance from EOB despite MaxAx2-3. Ambulation  Ambulation?: No(unsafe to attempt)  Stairs/Curb  Stairs?: No(unsafe to attempt)     Balance  Comments: SBA-Margarita to maintain sitting balance EOB for approximately 15 minutes to don gait belt, attempt standing, and place lj pad. Pt reporting increased back pain. Comment: Pt stating that he isn't able to feel his legs and isn't sure if they are moving or if they are still. Pt able to identify light touch to B knees and proximal.  Pt stating that he is unable to feel light touch to B heels and great toes, however PT noting pt with B foot movement when these areas are touched. Once sitting EOB, PT/OT asking pt if he would like to wipe his face with a cleansing wipe. Pt stating yes. Once OT handed pt the cleansing wipe, pt put wipe in mouth and began chewing/sucking on wipe. PT educating pt that he should not put the wipe in his moth due to it having cleanser in it. Pt then pulling wipe out of his mouth and throwing it at PT. Assessment   Body structures, Functions, Activity limitations: Decreased functional mobility ; Decreased strength;Decreased endurance;Decreased coordination;Decreased ROM; Decreased balance; Increased Pain  Assessment: Pt continues to require assist of two skilled therapists for all functional mobility for increased safety and to maximize outcomes. Pt with regression in progress this session, requiring assist of 3 to attempt sit to stand from elevated EOB to cielo steady which was unsuccessful. Pt ultimately requiring use of a lj lift due to little initiation to attempts of standing and reported pain. Pt making little to no progress toward goals due to reported pain. Continue POC. Treatment Diagnosis: decreased functional mobility due to debility  Patient Education: Importance of OOB activity  REQUIRES PT FOLLOW UP: Yes  Activity Tolerance  Activity Tolerance: Patient limited by fatigue;Patient limited by pain; Patient limited by endurance     G-Code     OutComes Score                                                  AM-PAC Score             Goals  Short term goals  Time Frame for Short term goals: 1-2 weeks -ongoing  Short term goal 1: Pt will perform all bed mobility with ModA  Short term goal 2: Pt will perform sit to/from stand with RW and MaxA  Short term goal 3: Pt will perform squat pivot with MaxA  Short term goal 4: Pt will ambulate 10' with RW and MaxA  Short term goal 5: Pt will propel w/c 150' with supervision  Long term goals  Time Frame for Long term goals : 2-3 weeks -ongoing  Long term goal 1: Pt will perform all bed mobility with supervision  Long term goal 2: Pt will perform sit to/from stand with RW and supervision  Long term goal 3: Pt will perform squat pivot with supervision  Long term goal 4: Pt will ambulate 48' with RW and supervision  Long term goal 5: Pt will propel w/c 150' with Margarita  Long term goal 6: Pt will ascend/descend 2 stairs with BHR and Nicole  Patient Goals   Patient goals : \"To be able to do what I was doing before I got sick. \"    Plan    Plan  Times per week: 5x/week, 90 minutes a day  Current Treatment Recommendations: Strengthening, Transfer Training, Endurance Training, Neuromuscular Re-education, Patient/Caregiver Education & Training, ROM, Wheelchair Mobility Training, Equipment Evaluation, Education, & procurement, Balance Training, Gait Training, Home Exercise Program, Functional Mobility Training, Stair training, Safety Education & Training  Safety Devices  Type of devices: Call light within reach, Chair alarm in place, Gait belt, Left in chair     Therapy Time   Individual Concurrent Group Co-treatment   Time In       0745   Time Out       0845   Minutes       60      Variance: 30  Reason: (due to pt's limited tolerance)    Cholo Ward, PT, DPT, CLT

## 2019-05-22 NOTE — PROGRESS NOTES
Wilmer Nguyen  5/22/2019  [id number]    Chief Complaint: Debility    Subjective:   Seems less uncomfortable and anxious this morning; family expressed concern regarding overmedication, so advised judicious use of pain medications. ROS: No CP, sob, n/v, f/c  Objective:  Patient Vitals for the past 24 hrs:   BP Temp Temp src Pulse Resp SpO2   05/22/19 0923 (!) 163/82 98.2 °F (36.8 °C) Oral 96 17 96 %   05/21/19 2104 (!) 143/72 97.9 °F (36.6 °C) Oral 80 18 98 %   05/21/19 1259 -- 97.8 °F (36.6 °C) Oral -- -- --     Gen: No distress, pleasant. HEENT: Normocephalic, atraumatic. CV: Regular rate and rhythm. Resp: No respiratory distress. Abd: Soft, nontender   Ext: No edema. Neuro: Alert, cooperative  Wt Readings from Last 3 Encounters:   05/20/19 (!) 343 lb 0.6 oz (155.6 kg)   05/11/19 (!) 350 lb 1.5 oz (158.8 kg)   04/19/19 (!) 320 lb (145.2 kg)       Laboratory data:   Lab Results   Component Value Date    WBC 6.5 05/22/2019    HGB 10.2 (L) 05/22/2019    HCT 31.0 (L) 05/22/2019    MCV 87.1 05/22/2019     (H) 05/22/2019       Lab Results   Component Value Date     05/22/2019    K 4.4 05/22/2019    K 4.0 05/17/2019    CL 93 05/22/2019    CO2 26 05/22/2019    BUN 15 05/22/2019    CREATININE 0.7 05/22/2019    GLUCOSE 217 05/22/2019    CALCIUM 9.0 05/22/2019        Therapy progress:  PT  Position Activity Restriction  Other position/activity restrictions: up with assistance  Objective     Sit to Stand: Dependent/Total(x2 unsuccessful attempts to stand from EOB to cielo steady with MaxAx2.   MaxAx3 from EOB to cielo steady, pt unable to perform full knee extension or elbow extension, resulting in cielo steady beginning to tip forward, MinAx2 from cielo steady)  Stand to sit: Dependent/Total(MaxAx2 for eccentric control)  Bed to Chair: Dependent/Total(EOB to w/c via cielo steady)     OT  PT Equipment Recommendations  Other: continue to assess, pt owns 4WW     Assessment        SLP                Body mass index is 45.26 kg/m². Assessment and Plan:  Lumbar epidural abscess: s/p washout 5/12. Rocephin 2 Q12 thru 6/26. Weekly labs per ID recs. PT/OT     Lumbar spondylosis/radiculopathy: Pain control. PT/OT    Hyponatremia. Na has been somewhat variable. Follow.      DM: SSI     HTN: Norvasc, lisinopril     HLD: Lipitor 20, fenofibrate     DVT: Lovenox through 5/19. Loading dose Eliquis on 5/20     Bowels: Per protocol  Bladder: Per protocol   Sleep: Trazodone provided prn. Pain: Robaxin scheduled, -adjust to scheduled Tylenol and Barb 10-15 PRN   DVT PPx: As above      Shantanu Borrego MD 5/22/2019, 10:13 AM

## 2019-05-22 NOTE — PROGRESS NOTES
Occupational Therapy  Facility/Department: [facility] ACUTE REHAB UNIT  Daily Treatment Note  NAME: Fran Armstrong  : 1950  MRN: [id number]    Date of Service: 2019    Discharge Recommendations:  24 hour supervision or assist, Home with Home health OT  OT Equipment Recommendations  Equipment Needed: Yes  Other: cont to assess pending progress made    Assessment   Performance deficits / Impairments: Decreased functional mobility ; Decreased high-level IADLs;Decreased ADL status; Decreased endurance;Decreased strength;Decreased balance  Assessment: Pt significantly limited by pain and limited active participation with functional mobility and ADLs. Pt required max encouragement for minimal effort. Pt regressing with functional mobility status requiring lj versus Kathline Hira. Prognosis for rehab changed to guarded d/t regression with functional mobility and ADLs. Cont per POC as pt tolerates. Treatment Diagnosis: impaired ADLs /functional transfers /decreased endurance 2/2 back infection/ sepsis   Prognosis: Guarded  Patient Education: importance of active participation with therapy - verb understanding   REQUIRES OT FOLLOW UP: Yes  Activity Tolerance  Activity Tolerance: Patient limited by pain  Safety Devices  Safety Devices in place: Yes  Type of devices: Left in chair;Call light within reach; Chair alarm in place;Nurse notified       Patient Diagnosis(es): There were no encounter diagnoses. has a past medical history of Arthritis, Back pain, DDD (degenerative disc disease), cervical, Diabetes mellitus ([address] Edema of both legs, GERD (gastroesophageal reflux disease), Hyperlipidemia, Hypertension, MRSA (methicillin resistant staph aureus) culture positive, Nausea in adult, Neuropathy, Vitamin B 12 deficiency, and Walking troubles. has a past surgical history that includes back surgery; Prostate surgery; shoulder surgery; Cystoscopy; knee surgery; Hemorrhoid surgery; eye surgery;  Tonsillectomy; Cholecystectomy; Lumbar spine surgery (N/A, 4/19/2019); and REPAIR DURAL / CSF LEAK (N/A, 5/12/2019). Restrictions  Position Activity Restriction  Other position/activity restrictions: up with assistance    Subjective   General  Chart Reviewed: Yes  Patient assessed for rehabilitation services?: Yes  Additional Pertinent Hx: Admit 5/8 to ICU with sepsis vs back abcess       Neurosx consult - to OR 5/12 s/p I&D back wound with draining of abcess. Admitted to [facility] ARU on 5/17. PMHX: recent  DECOMPRESSION LAMINECTOMY L3-4 POSSIBLY L5.  on 4/19 /19   OA, DDD, DM,edema BLE,HTN, HLD and neuropathy   Response to previous treatment: Patient with no complaints from previous session  Family / Caregiver Present: No  Referring Practitioner: Nilson  Diagnosis: Back abcess s/p I&D of back wound   Subjective  Subjective: In bed on arrival, EKG being performed. Upon completion of EKG pt reluctantly agrees to therapy  General Comment  Comments: . Pain Assessment  Pain Assessment: 0-10  Pain Level: 9  Patient's Stated Pain Goal: No pain  Pain Type: Acute pain;Surgical pain  Pain Location: Back;Leg  Pain Orientation: Right;Left  Pain Radiating Towards: leg  Pain Descriptors: Aching;Burning  Pain Frequency: Continuous  Pain Onset: On-going  Clinical Progression: Not changed  Functional Pain Assessment: Prevents or interferes with all active and some passive activities  Non-Pharmaceutical Pain Intervention(s): Therapeutic presence;Repositioned  Response to Pain Intervention: None  Vital Signs  Patient Currently in Pain: Yes     Orientation  Orientation  Overall Orientation Status: Impaired    Objective    ADL  Feeding: Setup  Grooming: asked pt to wash face with cleansing wipe. Instead of washing face, pt put in mouth and began chewing.  Upon redirection, pt agitated and threw cleansing wipe at PT.   LE Dressing: Dependent to don shorts  Toileting: incontinent of bladder with accident; pt required assist x2 for hygiene and to change depends      Bed mobility  Rolling to Left: Dependent/Total 2x for pants management and hygiene (max A x2)  Rolling to Right: Dependent/Total 2x for pants management and hygiene (max A x)  Supine to Sit: 2 Person assistance;Dependent/Total (max A x2)    Transfers  Transfer Comments: [facility] transfer (3 person assist); pt demo'd poor participation in w/s side to side to assist with placing lj pad   -Attempted [employer] Ste[employer] transfer with assist x2 then assist x3. Unable to clear buttocks to place paddles of [employer]. [facility] used to complete bed->recliner chair      Cognition  Overall Cognitive Status: Exceptions  Following Commands: Follows one step commands with increased time; Follows one step commands with repetition  Attention Span: Attends with cues to redirect  Memory: Decreased recall of recent events;Decreased short term memory  Safety Judgement: Decreased awareness of need for assistance  Problem Solving: Decreased awareness of errors;Assistance required to generate solutions;Assistance required to implement solutions;Assistance required to identify errors made;Assistance required to correct errors made  Insights: Decreased awareness of deficits  Initiation: Requires cues for some       Plan   Plan  Times per week: 5x per week x90 minutes  Times per day: Daily  Current Treatment Recommendations: Strengthening, Pain Management, Wheelchair Mobility Training, Safety Education & Training, Balance Training, Patient/Caregiver Education & Training, Self-Care / ADL, Functional Mobility Training, Home Management Training, Endurance Training    Goals  Short term goals  Time Frame for Short term goals: 2 weeks  Short term goal 1: Pt will be MOD A with toilet transfer and toileting- progressing, continue  Short term goal 2: Pt will bed MOD A with LE dressing with use of adap equip as needed- progressing, continue  Short term goal 3: Pt will be MOD A with bathing with use of long handle sponge and adaptive

## 2019-05-23 LAB
ANION GAP SERPL CALCULATED.3IONS-SCNC: 11 MMOL/L (ref 3–16)
BUN BLDV-MCNC: 12 MG/DL (ref 7–20)
CALCIUM SERPL-MCNC: 9.1 MG/DL (ref 8.3–10.6)
CHLORIDE BLD-SCNC: 94 MMOL/L (ref 99–110)
CO2: 26 MMOL/L (ref 21–32)
CREAT SERPL-MCNC: 0.6 MG/DL (ref 0.8–1.3)
EKG ATRIAL RATE: 87 BPM
EKG DIAGNOSIS: NORMAL
EKG P AXIS: 57 DEGREES
EKG P-R INTERVAL: 164 MS
EKG Q-T INTERVAL: 384 MS
EKG QRS DURATION: 102 MS
EKG QTC CALCULATION (BAZETT): 462 MS
EKG R AXIS: 5 DEGREES
EKG T AXIS: 34 DEGREES
EKG VENTRICULAR RATE: 87 BPM
GFR AFRICAN AMERICAN: >60
GFR NON-AFRICAN AMERICAN: >60
GLUCOSE BLD-MCNC: 194 MG/DL (ref 70–99)
GLUCOSE BLD-MCNC: 202 MG/DL (ref 70–99)
GLUCOSE BLD-MCNC: 223 MG/DL (ref 70–99)
GLUCOSE BLD-MCNC: 235 MG/DL (ref 70–99)
GLUCOSE BLD-MCNC: 239 MG/DL (ref 70–99)
HCT VFR BLD CALC: 31.3 % (ref 40.5–52.5)
HEMOGLOBIN: 10.5 G/DL (ref 13.5–17.5)
MCH RBC QN AUTO: 28.8 PG (ref 26–34)
MCHC RBC AUTO-ENTMCNC: 33.5 G/DL (ref 31–36)
MCV RBC AUTO: 86.1 FL (ref 80–100)
PDW BLD-RTO: 14.9 % (ref 12.4–15.4)
PERFORMED ON: ABNORMAL
PLATELET # BLD: 732 K/UL (ref 135–450)
PMV BLD AUTO: 7 FL (ref 5–10.5)
POTASSIUM SERPL-SCNC: 4.5 MMOL/L (ref 3.5–5.1)
RBC # BLD: 3.63 M/UL (ref 4.2–5.9)
SODIUM BLD-SCNC: 131 MMOL/L (ref 136–145)
WBC # BLD: 7.1 K/UL (ref 4–11)

## 2019-05-23 PROCEDURE — 6370000000 HC RX 637 (ALT 250 FOR IP): Performed by: PHYSICAL MEDICINE & REHABILITATION

## 2019-05-23 PROCEDURE — 1280000000 HC REHAB R&B

## 2019-05-23 PROCEDURE — 97530 THERAPEUTIC ACTIVITIES: CPT

## 2019-05-23 PROCEDURE — 99232 SBSQ HOSP IP/OBS MODERATE 35: CPT | Performed by: INTERNAL MEDICINE

## 2019-05-23 PROCEDURE — 80048 BASIC METABOLIC PNL TOTAL CA: CPT

## 2019-05-23 PROCEDURE — 97110 THERAPEUTIC EXERCISES: CPT

## 2019-05-23 PROCEDURE — 6360000002 HC RX W HCPCS: Performed by: PHYSICAL MEDICINE & REHABILITATION

## 2019-05-23 PROCEDURE — 93010 ELECTROCARDIOGRAM REPORT: CPT | Performed by: INTERNAL MEDICINE

## 2019-05-23 PROCEDURE — 2580000003 HC RX 258: Performed by: PHYSICAL MEDICINE & REHABILITATION

## 2019-05-23 PROCEDURE — 85027 COMPLETE CBC AUTOMATED: CPT

## 2019-05-23 PROCEDURE — 36592 COLLECT BLOOD FROM PICC: CPT

## 2019-05-23 RX ORDER — METHOCARBAMOL 750 MG/1
750 TABLET, FILM COATED ORAL 3 TIMES DAILY
Status: DISCONTINUED | OUTPATIENT
Start: 2019-05-23 | End: 2019-05-24

## 2019-05-23 RX ADMIN — OXYCODONE HYDROCHLORIDE 10 MG: 5 TABLET ORAL at 10:42

## 2019-05-23 RX ADMIN — AMLODIPINE BESYLATE 2.5 MG: 5 TABLET ORAL at 08:33

## 2019-05-23 RX ADMIN — ACETAMINOPHEN 650 MG: 325 TABLET ORAL at 17:31

## 2019-05-23 RX ADMIN — Medication 10 ML: at 21:00

## 2019-05-23 RX ADMIN — ATORVASTATIN CALCIUM 20 MG: 20 TABLET, FILM COATED ORAL at 20:58

## 2019-05-23 RX ADMIN — APIXABAN 10 MG: 5 TABLET, FILM COATED ORAL at 20:59

## 2019-05-23 RX ADMIN — MORPHINE SULFATE 15 MG: 15 TABLET, FILM COATED, EXTENDED RELEASE ORAL at 08:34

## 2019-05-23 RX ADMIN — Medication 100 MG: at 08:32

## 2019-05-23 RX ADMIN — HYDRALAZINE HYDROCHLORIDE 50 MG: 50 TABLET, FILM COATED ORAL at 20:58

## 2019-05-23 RX ADMIN — APIXABAN 10 MG: 5 TABLET, FILM COATED ORAL at 08:31

## 2019-05-23 RX ADMIN — ACETAMINOPHEN 650 MG: 325 TABLET ORAL at 20:59

## 2019-05-23 RX ADMIN — METHOCARBAMOL TABLETS 750 MG: 750 TABLET, COATED ORAL at 08:31

## 2019-05-23 RX ADMIN — OXYCODONE HYDROCHLORIDE 10 MG: 5 TABLET ORAL at 17:30

## 2019-05-23 RX ADMIN — FENOFIBRATE 160 MG: 160 TABLET ORAL at 08:32

## 2019-05-23 RX ADMIN — MORPHINE SULFATE 15 MG: 15 TABLET, FILM COATED, EXTENDED RELEASE ORAL at 20:59

## 2019-05-23 RX ADMIN — INSULIN LISPRO 2 UNITS: 100 INJECTION, SOLUTION INTRAVENOUS; SUBCUTANEOUS at 08:36

## 2019-05-23 RX ADMIN — CEFTRIAXONE SODIUM 2 G: 2 INJECTION, POWDER, FOR SOLUTION INTRAMUSCULAR; INTRAVENOUS at 21:35

## 2019-05-23 RX ADMIN — ACETAMINOPHEN 650 MG: 325 TABLET ORAL at 14:17

## 2019-05-23 RX ADMIN — FAMOTIDINE 20 MG: 20 TABLET ORAL at 08:32

## 2019-05-23 RX ADMIN — INSULIN LISPRO 2 UNITS: 100 INJECTION, SOLUTION INTRAVENOUS; SUBCUTANEOUS at 17:31

## 2019-05-23 RX ADMIN — TAMSULOSIN HYDROCHLORIDE 0.4 MG: 0.4 CAPSULE ORAL at 08:31

## 2019-05-23 RX ADMIN — SENNOSIDES,DOCUSATE SODIUM 2 TABLET: 8.6; 5 TABLET, FILM COATED ORAL at 20:58

## 2019-05-23 RX ADMIN — Medication 10 ML: at 08:33

## 2019-05-23 RX ADMIN — METHOCARBAMOL TABLETS 750 MG: 750 TABLET, COATED ORAL at 20:58

## 2019-05-23 RX ADMIN — FAMOTIDINE 20 MG: 20 TABLET ORAL at 20:59

## 2019-05-23 RX ADMIN — SENNOSIDES,DOCUSATE SODIUM 2 TABLET: 8.6; 5 TABLET, FILM COATED ORAL at 08:31

## 2019-05-23 RX ADMIN — METHOCARBAMOL TABLETS 750 MG: 750 TABLET, COATED ORAL at 14:17

## 2019-05-23 RX ADMIN — ACETAMINOPHEN 650 MG: 325 TABLET ORAL at 08:31

## 2019-05-23 RX ADMIN — LISINOPRIL 20 MG: 20 TABLET ORAL at 08:31

## 2019-05-23 RX ADMIN — CEFTRIAXONE SODIUM 2 G: 2 INJECTION, POWDER, FOR SOLUTION INTRAMUSCULAR; INTRAVENOUS at 11:19

## 2019-05-23 RX ADMIN — Medication 10 ML: at 08:34

## 2019-05-23 RX ADMIN — INSULIN LISPRO 2 UNITS: 100 INJECTION, SOLUTION INTRAVENOUS; SUBCUTANEOUS at 12:17

## 2019-05-23 RX ADMIN — OXYCODONE HYDROCHLORIDE 10 MG: 5 TABLET ORAL at 04:58

## 2019-05-23 RX ADMIN — QUETIAPINE FUMARATE 25 MG: 25 TABLET ORAL at 20:58

## 2019-05-23 ASSESSMENT — PAIN DESCRIPTION - PROGRESSION

## 2019-05-23 ASSESSMENT — PAIN DESCRIPTION - DESCRIPTORS
DESCRIPTORS: DISCOMFORT

## 2019-05-23 ASSESSMENT — PAIN DESCRIPTION - ONSET
ONSET: ON-GOING

## 2019-05-23 ASSESSMENT — PAIN - FUNCTIONAL ASSESSMENT
PAIN_FUNCTIONAL_ASSESSMENT: PREVENTS OR INTERFERES SOME ACTIVE ACTIVITIES AND ADLS
PAIN_FUNCTIONAL_ASSESSMENT: PREVENTS OR INTERFERES WITH MANY ACTIVE NOT PASSIVE ACTIVITIES
PAIN_FUNCTIONAL_ASSESSMENT: PREVENTS OR INTERFERES SOME ACTIVE ACTIVITIES AND ADLS
PAIN_FUNCTIONAL_ASSESSMENT: PREVENTS OR INTERFERES SOME ACTIVE ACTIVITIES AND ADLS

## 2019-05-23 ASSESSMENT — PAIN DESCRIPTION - LOCATION
LOCATION: BACK
LOCATION: BACK
LOCATION: BACK;LEG
LOCATION: BACK

## 2019-05-23 ASSESSMENT — PAIN DESCRIPTION - ORIENTATION
ORIENTATION: MID;LOWER
ORIENTATION: MID;LOWER;RIGHT;LEFT
ORIENTATION: LOWER;MID
ORIENTATION: LOWER;MID

## 2019-05-23 ASSESSMENT — PAIN SCALES - GENERAL
PAINLEVEL_OUTOF10: 5
PAINLEVEL_OUTOF10: 0
PAINLEVEL_OUTOF10: 9
PAINLEVEL_OUTOF10: 0
PAINLEVEL_OUTOF10: 10
PAINLEVEL_OUTOF10: 8

## 2019-05-23 ASSESSMENT — PAIN DESCRIPTION - PAIN TYPE
TYPE: SURGICAL PAIN

## 2019-05-23 ASSESSMENT — PAIN DESCRIPTION - FREQUENCY
FREQUENCY: CONTINUOUS

## 2019-05-23 NOTE — PROGRESS NOTES
Ambreen Adan  5/23/2019  [id number]    Chief Complaint: Debility    Subjective:   Less anxious, but seems a bit lethargic. Agreeable to continued wean of robaxin. ROS: No CP, sob, n/v, f/c  Objective:  Patient Vitals for the past 24 hrs:   BP Temp Temp src Pulse Resp SpO2   05/22/19 1933 (!) 146/75 97.8 °F (36.6 °C) Oral 71 18 92 %   05/22/19 0923 (!) 163/82 98.2 °F (36.8 °C) Oral 96 17 96 %     Gen: No distress, pleasant. HEENT: Normocephalic, atraumatic. CV: Regular rate and rhythm. Resp: No respiratory distress. Abd: Soft, nontender   Ext: No edema. Neuro: Alert, cooperative  Wt Readings from Last 3 Encounters:   05/20/19 (!) 343 lb 0.6 oz (155.6 kg)   05/11/19 (!) 350 lb 1.5 oz (158.8 kg)   04/19/19 (!) 320 lb (145.2 kg)       Laboratory data:   Lab Results   Component Value Date    WBC 7.1 05/23/2019    HGB 10.5 (L) 05/23/2019    HCT 31.3 (L) 05/23/2019    MCV 86.1 05/23/2019     (H) 05/23/2019       Lab Results   Component Value Date     05/23/2019    K 4.5 05/23/2019    K 4.0 05/17/2019    CL 94 05/23/2019    CO2 26 05/23/2019    BUN 12 05/23/2019    CREATININE 0.6 05/23/2019    GLUCOSE 194 05/23/2019    CALCIUM 9.1 05/23/2019        Therapy progress:  PT  Position Activity Restriction  Other position/activity restrictions: up with assistance  Objective     Sit to Stand: Dependent/Total(x2 attempt from elevated EOB to cielo steady, unsuccessful. x1 attempt MaxAx3 from elevated EOB to cielo steady, unsuccessful.)  Stand to sit: Dependent/Total(MaxAx2)  Bed to Chair: Dependent/Total(EOB to recliner via lj lift x3 assist for placement)     OT  PT Equipment Recommendations  Other: continue to assess, pt owns 4WW     Assessment        SLP                Body mass index is 45.26 kg/m². Assessment and Plan:  Lumbar epidural abscess: s/p washout 5/12. Rocephin 2 Q12 thru 6/26. Weekly labs per ID recs. PT/OT     Lumbar spondylosis/radiculopathy: Pain control. PT/OT    Hyponatremia.  Na has been somewhat variable. Follow.      DM: SSI     HTN: Norvasc, lisinopril     HLD: Lipitor 20, fenofibrate     DVT: Lovenox through 5/19. Loading dose Eliquis on 5/20     Bowels: Per protocol  Bladder: Per protocol   Sleep: Trazodone provided prn. Pain: Robaxin scheduled, decreaseing; -adjust to scheduled Tylenol and Barb 10-15 PRN   DVT PPx: As above      Shantanu Hastings MD 5/23/2019, 8:02 AM

## 2019-05-23 NOTE — PROGRESS NOTES
ID Follow-up NOTE    CC:   S aureus bacteremia, lumbar surgical site infection  Antibiotics: Ceftriaxone    Admit Date: 2019  Hospital Day: 7    Subjective:     Lethargic this am      Objective:     Patient Vitals for the past 8 hrs:   BP Temp Temp src Pulse Resp SpO2   19 0823 (!) 173/83 97.5 °F (36.4 °C) Oral 89 18 93 %     I/O last 3 completed shifts: In: 384 [P.O.:384]  Out: 1050 [Urine:1050]  I/O this shift:  In: -   Out: 650 [Urine:650]    EXAM:  GENERAL: No apparent distress. RA  HEENT: Membranes moist, no oral lesion  NECK:  Supple  LUNGS: Clear b/l, no rales, no dullness  CARDIAC: RRR, no murmur appreciated  ABD:  + BS, soft / NT  EXT:  No rash, no edema, no lesions  BACK with dressing   NEURO: No focal neurologic findings  PSYCH: Orientation, sensorium, mood normal  LINES:  R PICC in place       Data Review:  Lab Results   Component Value Date    WBC 7.1 2019    HGB 10.5 (L) 2019    HCT 31.3 (L) 2019    MCV 86.1 2019     (H) 2019     Lab Results   Component Value Date    CREATININE 0.6 (L) 2019    BUN 12 2019     (L) 2019    K 4.5 2019    CL 94 (L) 2019    CO2 26 2019       Hepatic Function Panel:   Lab Results   Component Value Date    ALKPHOS 92 2019    ALT 19 2019    AST 22 2019    PROT 6.5 2019    BILITOT 0.5 2019    LABALBU 2.9 2019       MICRO:   Surg cult - heavy MSSA, R clinda   BC no growth     Surg cult+ mod MSSA (corrected report)     BC x 2 + MSSA  Staphylococcus aureus (2)   Antibiotic Interpretation KASSY Status     ceFAZolin Sensitive <=2 mcg/mL       clindamycin Resistant <=0.5 mcg/mL       erythromycin Resistant >4 mcg/mL       oxacillin Sensitive 0.5 mcg/mL       tetracycline Sensitive <=0.5 mcg/mL       trimethoprim-sulfamethoxazole Sensitive <=0.5/9.5 mcg/mL       IMAGIN/9 Lumbar MRI w/wo contrast:  1.  Posterior fluid collection along the laminectomy bed of unclear significance. The fluid collection exerts complex layering signal with gas bubbles and peripheral enhancement. This may or present a seroma or postoperative hematoma. An infected fluid    collection be difficult to exclude. A pseudomeningocele is not excluded. The fluid collection extensively laminectomy bed without significant mass effect. 2. Multifactorial multilevel mild canal narrowing as described in part related to extensive facet arthropathy resulting in a narrowing of the canal from a lateral dimension. 3. Tortuous bunched nerve roots in the upper cauda equina surrounding the conus presumably related to chronic stenosis. 4. Multifactorial multilevel foraminal narrowing with moderate or severe foraminal narrowing from L1-L2 through L5-S1.   5. Diffuse circumferential epidural enhancement without discrete epidural collection extending throughout the laminectomy site which may be reactive in nature. Infection would be difficult to exclude. 6. No convincing evidence of osteomyelitis or discitis. 7. Large posterior central disc protrusion at L5-S1 resulting in a moderate canal narrowing and mild/moderate impingement upon the exiting S1 nerve roots bilaterally.        Scheduled Meds:   methocarbamol  750 mg Oral TID    lidocaine 1 % injection  5 mL Intradermal Once    sodium chloride flush  10 mL Intravenous 2 times per day    QUEtiapine  25 mg Oral Nightly    morphine  15 mg Oral 2 times per day    acetaminophen  650 mg Oral 4x Daily    amLODIPine  2.5 mg Oral Daily    apixaban  10 mg Oral BID    atorvastatin  20 mg Oral Nightly    cefTRIAXone (ROCEPHIN) IV  2 g Intravenous Q12H    famotidine  20 mg Oral BID    fenofibrate  160 mg Oral Daily    insulin lispro  0-3 Units Subcutaneous Nightly    insulin lispro  0-6 Units Subcutaneous TID     lisinopril  20 mg Oral Daily    polyethylene glycol  17 g Oral Daily    sennosides-docusate sodium  2 tablet Oral BID    sodium chloride flush  10 mL Intravenous 2 times per day    tamsulosin  0.4 mg Oral Daily    thiamine  100 mg Oral Daily       Continuous Infusions:   dextrose         PRN Meds:  sodium chloride flush, oxyCODONE **OR** oxyCODONE, magnesium hydroxide, bisacodyl, calcium carbonate, dextrose, dextrose, diphenhydrAMINE, glucagon (rDNA), glucose, hydrALAZINE, naloxone, ondansetron, sodium chloride flush, traZODone      Assessment:     69 yo man with obesity, DM, HTN, lumbar stenosis     4/19/19 - L 3 & 4 lumbar laminectomy, L3/4 & L4/5 bilateral facetectomy , foraminotomies over L4 &5 nerve roots.    5/8/19 -  LBP. Presented to [facility] ED. CT with air and fluid in ST / laminectomy site    5/8/19 -  Transferred and admitted to [facility] 5/8. Wound with purulent drainage  MRI with fluid collection with gas and peripheral enhancement, see report  BC x 2  MSSA  Wound cult with mod growth S aureus     OR 5/12 - epidural abscess.  Per OR note, \"large amount of liquid pus', extended to deep surg space, epidural space.   Surg cult + MSSA     IMP/  Lumbar SSI   OR 5/12  I&D to deep space / epidural abscess, surg cult MSSA   S aureus / MSSA bacteremia, f/u BC no growth  Leukocytosis - resolved  R leg DVT    Plan:     Cont ceftriaxone (giving 3rd gen cephalosporin over 1st for CNS penetration)  Treat x 6-8 weeks, through 6/26/19     Postop care per Neurosurg   See VALERI      Discussed with pt, Rehab staff  Romel Arenas MD

## 2019-05-23 NOTE — CARE COORDINATION
Spoke with patient's wife, Talisha Epstein, to update her on the team conference held this morning. SW informed her that the team anticipates d/c to SNF and SW explained the patient is unable to participate 3 hours a day x 5 days a week. Patient's wife was not surprised and she is going to look a several SNFs in the [place], [place] area, near where they live. Two SNFs possible are [facility] (SW spoke with them and they do have beds and take patient's insurance) SW waiting for patient's wife to return call regarding this SNF and will fax info when wife consents. The other SNF is [facility]. Electronically signed by YOLI Maya LSW on 5/23/2019 at 11:58 AM      Update:  [facility] does not have bed, not able to take patient. Electronically signed by YOLI Maya LSW on 5/23/2019 at 3:54 PM     Update 5/24/19 -  Spoke with patient's wife this morning she would like referrals to [facility] and [facility]. Faxed referral to [facility] and will fax referral to [facility] when admissions returns call with fax number.   Electronically signed by YOLI Maya LSW on 5/24/2019 at 8:58 AM

## 2019-05-23 NOTE — PROGRESS NOTES
Occupational Therapy  Facility/Department: [facility] ACUTE REHAB UNIT  Daily Treatment Note  NAME: Mary Gaming  : 1950  MRN: [id number]    Date of Service: 2019    Discharge Recommendations:  Subacute/Skilled Nursing Facility  OT Equipment Recommendations  Equipment Needed: Yes  Other: cont to assess pending progress made    Assessment   Performance deficits / Impairments: Decreased functional mobility ; Decreased high-level IADLs;Decreased ADL status; Decreased endurance;Decreased strength;Decreased balance  Assessment: Pt cont to c/o pain and feeling like he can't move, stating he is paralized. Much time spent encouraging pt to try and participate in any activity possible. Pt expressed feeling discouraged and disapointed. Treatment ended early secondary to lack of pt participation. Treatment Diagnosis: impaired ADLs /functional transfers /decreased endurance 2/2 back infection/ sepsis   Prognosis: Guarded  Patient Education: importance of active participation with therapy - verb understanding   REQUIRES OT FOLLOW UP: Yes  Activity Tolerance  Activity Tolerance: Patient limited by fatigue  Safety Devices  Safety Devices in place: Yes  Type of devices: Left in chair;Call light within reach; Chair alarm in place;Nurse notified       Restrictions  Position Activity Restriction  Other position/activity restrictions: up with assistance  Subjective   General  Chart Reviewed: Yes  Patient assessed for rehabilitation services?: Yes  Additional Pertinent Hx: Admit  to ICU with sepsis vs back abcess       Neurosx consult - to OR  s/p I&D back wound with draining of abcess. Admitted to [facility] ARU on .               PMHX: recent  DECOMPRESSION LAMINECTOMY L3-4 POSSIBLY L5.  on    OA, DDD, DM,edema BLE,HTN, HLD and neuropathy   Response to previous treatment: Patient with no complaints from previous session  Family / Caregiver Present: No  Referring Practitioner: Nilson  Diagnosis: Back abcess s/p I&D of goal 4: Pt will tolerated standing x2 minutes to demo improved functional activity tolerance- progressing, continue  Short term goal 5: Pt will be MIN A with BUE HEP to increase strength to facilitate independence with ADLs and functional mobility- progressing, continue  Long term goals  Time Frame for Long term goals : 3 weeks  Long term goal 1: Pt will be SBA with toilet transfer and toileting  Long term goal 2: Pt will bed SBA with LE dressing with use of adap equip as needed  Long term goal 3: Pt will be SBA with bathing with use of long handle sponge and adaptive techniques as needed  Long term goal 4: Pt will tolerated standing x5 minutes to demo improved functional activity tolerance  Long term goal 5: Pt will be MOD I with BUE HEP to increase strength to facilitate independence with ADLs and functional mobility  Patient Goals   Patient goals : Go home, return to prior level of functioning       Therapy Time   Individual Concurrent Group Co-treatment   Time In       1300   Time Out       1330   Minutes       30   Timed Code Treatment Minutes: 30 Minutes  Variance: 15   Total Treatment Time:80    Hoang David, OTR/L [id number]

## 2019-05-23 NOTE — PLAN OF CARE
Problem: Falls - Risk of:  Goal: Will remain free from falls  Description  Will remain free from falls  Outcome: Ongoing  Note:   Pt is a high fall risk. See Mariposa Bernard Fall Score. Pt bed is in low position, side rails up, call light and belongings are in reach. Pt up x 2 w/steady. Bed alarm in use. Pt encouraged to call for assistance, pt using call light appropriately. Will continue with hourly rounds for po intake, pain needs, toileting and repositioning as needed. Problem: Pain:  Goal: Patient's pain/discomfort is manageable  Description  Patient's pain/discomfort is manageable  Outcome: Ongoing  Note:   Pt c/o back pain and bilat leg pain. Managed with scheduled and PRN pain medication. Will cont to monitor.

## 2019-05-23 NOTE — PROGRESS NOTES
Notified Dr. Tracy Brennan about Tuesdays EKG and questioning wether cardiology needs to be consulted. NNO at this time.

## 2019-05-23 NOTE — PROGRESS NOTES
Occupational Therapy  Facility/Department: [facility] ACUTE REHAB UNIT  Daily Treatment Note  NAME: Duane Lai  : 1950  MRN: [id number]    Date of Service: 2019    Discharge Recommendations:  Subacute/Skilled Nursing Facility  OT Equipment Recommendations  Equipment Needed: Yes  Other: cont to assess pending progress made    Assessment   Performance deficits / Impairments: Decreased functional mobility ; Decreased high-level IADLs;Decreased ADL status; Decreased endurance;Decreased strength;Decreased balance  Assessment: Pt in bed upon entry and c/o 11/10 pain. Pt req max encouragement for transfer OOB. Req max assist for rolling i bed to place lift pad. Pt declined further activitiy once in chair secondary to c/o pain. Treatment Diagnosis: impaired ADLs /functional transfers /decreased endurance 2/2 back infection/ sepsis   Prognosis: Guarded  Patient Education: importance of active participation with therapy - verb understanding   REQUIRES OT FOLLOW UP: Yes  Activity Tolerance  Activity Tolerance: Patient limited by fatigue  Safety Devices  Safety Devices in place: Yes  Type of devices: Left in chair;Call light within reach; Chair alarm in place;Nurse notified         Restrictions  Position Activity Restriction  Other position/activity restrictions: up with assistance  Subjective   General  Chart Reviewed: Yes  Patient assessed for rehabilitation services?: Yes  Additional Pertinent Hx: Admit  to ICU with sepsis vs back abcess       Neurosx consult - to OR  s/p I&D back wound with draining of abcess. Admitted to [facility] ARU on . PMHX: recent  DECOMPRESSION LAMINECTOMY L3-4 POSSIBLY L5.  on    OA, DDD, DM,edema BLE,HTN, HLD and neuropathy   Response to previous treatment: Patient with no complaints from previous session  Family / Caregiver Present: No  Referring Practitioner: Nilson  Diagnosis: Back abcess s/p I&D of back wound   Subjective  Subjective: Pt in bed upon entry.   \" I use of adap equip as needed- progressing, continue  Short term goal 3: Pt will be MOD A with bathing with use of long handle sponge and adaptive techniques as needed- progressing, continue  Short term goal 4: Pt will tolerated standing x2 minutes to demo improved functional activity tolerance- progressing, continue  Short term goal 5: Pt will be MIN A with BUE HEP to increase strength to facilitate independence with ADLs and functional mobility- progressing, continue  Long term goals  Time Frame for Long term goals : 3 weeks  Long term goal 1: Pt will be SBA with toilet transfer and toileting  Long term goal 2: Pt will bed SBA with LE dressing with use of adap equip as needed  Long term goal 3: Pt will be SBA with bathing with use of long handle sponge and adaptive techniques as needed  Long term goal 4: Pt will tolerated standing x5 minutes to demo improved functional activity tolerance  Long term goal 5: Pt will be MOD I with BUE HEP to increase strength to facilitate independence with ADLs and functional mobility  Patient Goals   Patient goals : Go home, return to prior level of functioning       Therapy Time   Individual Concurrent Group Co-treatment   Time In       1015   Time Out       1105   Minutes       50   Timed Code Treatment Minutes: [address]: [address] [place] [address]/[facility], OTR/L [id number]

## 2019-05-23 NOTE — PROGRESS NOTES
Pt A & O. VSS. Pt up x 2 w/stedy. Bed alarm in use. Pt c/o back pain and bilat leg pain. Scheduled Morphine given. Reports effective. Pt continent with periods of incontinence at times. Pt tolerates diet. No complaints of nausea voiced. Will cont to monitor.

## 2019-05-23 NOTE — PLAN OF CARE
At risk for falls, lj to move, no falls/injuries this shift. Risk for impaired skin integrity; skin inspected Qshift and prn; no new breakdown noted.   Pain r/t surgical site, radiates to legs; medication administered per order; will continue to monitor

## 2019-05-23 NOTE — PROGRESS NOTES
Physical Therapy  Facility/Department: [facility] ACUTE REHAB UNIT  Daily Treatment Note  NAME: Josee Erazo  : 1950  MRN: [id number]    Date of Service: 2019    Discharge Recommendations:  Subacute/Skilled Nursing Facility   PT Equipment Recommendations  Other: defer    Patient Diagnosis(es): There were no encounter diagnoses. has a past medical history of Arthritis, Back pain, DDD (degenerative disc disease), cervical, Diabetes mellitus ([address] Edema of both legs, GERD (gastroesophageal reflux disease), Hyperlipidemia, Hypertension, MRSA (methicillin resistant staph aureus) culture positive, Nausea in adult, Neuropathy, Vitamin B 12 deficiency, and Walking troubles. has a past surgical history that includes back surgery; Prostate surgery; shoulder surgery; Cystoscopy; knee surgery; Hemorrhoid surgery; eye surgery; Tonsillectomy; Cholecystectomy; Lumbar spine surgery (N/A, 2019); and REPAIR DURAL / CSF LEAK (N/A, 2019). Restrictions  Position Activity Restriction  Other position/activity restrictions: up with assistance  Subjective   General  Chart Reviewed: Yes  Additional Pertinent Hx: Pt is a 68 y.o. male admitted to ARU on 19 from [facility]. Pt initially admitted to [facility] on  with complaints of low back pain, fever, and chills. Family / Caregiver Present: No  Referring Practitioner: Dr. Swathi Magana  Subjective  Subjective: \"I don't care about this therapy. \"  \"I give up. \"  \"Just give me something to make this go away. \"  \"I know I'm an asshole, you guys know I'm an asshole. Just kick me out of here. I just want to get up and walk out of here. \"  RN notified. General Comment  Comments: Pt found supine in bed upon PT arrival.  Pt requiring significant verbal encouragement to participate in bed to recliner transfer this session. Pt reporting that he is unable to perform any standing because his legs aren't working.   Pt able to perform ankle pumps and partial heel slides bilaterally, but states these things, but you want me to still try. \"  PT/OT ending session due to minimal therapeutic benefit/gains. Pt left seated in recliner at end of session with chair alarm set and call light/needs within reach. Assessment   Body structures, Functions, Activity limitations: Decreased functional mobility ; Decreased strength;Decreased endurance;Decreased coordination;Decreased ROM; Decreased balance; Increased Pain  Assessment: Pt continues to require assist of two skilled therapists for all functional mobility for increased safety and to maximize outcomes. Pt continues to regress in progress during therapy sessions, reporting that he would not attempt to perform a stand this session, was only agreeable to lj transfer bed to recliner. Continue POC. Treatment Diagnosis: decreased functional mobility due to debility  Patient Education: Reinforcement of importance of OOB activity  REQUIRES PT FOLLOW UP: Yes  Activity Tolerance  Activity Tolerance: Patient limited by fatigue;Patient limited by pain; Patient limited by endurance     G-Code     OutComes Score                                                  AM-PAC Score             Goals  Short term goals  Time Frame for Short term goals: 1-2 weeks -ongoing  Short term goal 1: Pt will perform all bed mobility with ModA  Short term goal 2: Pt will perform sit to/from stand with RW and MaxA  Short term goal 3: Pt will perform squat pivot with MaxA  Short term goal 4: Pt will ambulate 10' with RW and MaxA  Short term goal 5: Pt will propel w/c 150' with supervision  Long term goals  Time Frame for Long term goals : 2-3 weeks -ongoing  Long term goal 1: Pt will perform all bed mobility with supervision  Long term goal 2: Pt will perform sit to/from stand with RW and supervision  Long term goal 3: Pt will perform squat pivot with supervision  Long term goal 4: Pt will ambulate 48' with RW and supervision  Long term goal 5: Pt will propel w/c 150' with Margarita  Long term goal 6: Pt will ascend/descend 2 stairs with BHR and Nicole  Patient Goals   Patient goals : \"To be able to do what I was doing before I got sick. \"    Plan    Plan  Times per week: 5x/week, 90 minutes a day  Current Treatment Recommendations: Strengthening, Transfer Training, Endurance Training, Neuromuscular Re-education, Patient/Caregiver Education & Training, ROM, Wheelchair Mobility Training, Equipment Evaluation, Education, & procurement, Balance Training, Gait Training, Home Exercise Program, Functional Mobility Training, Stair training, Safety Education & Training  Safety Devices  Type of devices: Call light within reach, Chair alarm in place, Gait belt, Left in chair     Therapy Time   Individual Concurrent Group Co-treatment   Time In       1015   Time Out       1105   Minutes       50    Variance: 10 minutes due to pt's decreased tolerance    Second Session Therapy Time:   Individual Concurrent Group Co-treatment   Time In       1300   Time Out       1330   Minutes       30   Variance: 15 minutes due to pt refusal  Timed Code Treatment Minutes:  50 + 30    Total Treatment Minutes:  Narcisa [id number], PT, DPT, CLT

## 2019-05-24 LAB
AMMONIA: 27 UMOL/L (ref 16–60)
ANION GAP SERPL CALCULATED.3IONS-SCNC: 13 MMOL/L (ref 3–16)
BUN BLDV-MCNC: 14 MG/DL (ref 7–20)
CALCIUM SERPL-MCNC: 9.5 MG/DL (ref 8.3–10.6)
CHLORIDE BLD-SCNC: 93 MMOL/L (ref 99–110)
CO2: 24 MMOL/L (ref 21–32)
CREAT SERPL-MCNC: 0.7 MG/DL (ref 0.8–1.3)
GFR AFRICAN AMERICAN: >60
GFR NON-AFRICAN AMERICAN: >60
GLUCOSE BLD-MCNC: 212 MG/DL (ref 70–99)
GLUCOSE BLD-MCNC: 215 MG/DL (ref 70–99)
GLUCOSE BLD-MCNC: 221 MG/DL (ref 70–99)
GLUCOSE BLD-MCNC: 243 MG/DL (ref 70–99)
GLUCOSE BLD-MCNC: 251 MG/DL (ref 70–99)
HCT VFR BLD CALC: 34.1 % (ref 40.5–52.5)
HEMOGLOBIN: 11.3 G/DL (ref 13.5–17.5)
MCH RBC QN AUTO: 28.7 PG (ref 26–34)
MCHC RBC AUTO-ENTMCNC: 33.1 G/DL (ref 31–36)
MCV RBC AUTO: 86.7 FL (ref 80–100)
PDW BLD-RTO: 15.2 % (ref 12.4–15.4)
PERFORMED ON: ABNORMAL
PLATELET # BLD: 808 K/UL (ref 135–450)
PMV BLD AUTO: 7 FL (ref 5–10.5)
POTASSIUM SERPL-SCNC: 4.6 MMOL/L (ref 3.5–5.1)
RBC # BLD: 3.94 M/UL (ref 4.2–5.9)
SODIUM BLD-SCNC: 130 MMOL/L (ref 136–145)
TSH REFLEX: 1.63 UIU/ML (ref 0.27–4.2)
WBC # BLD: 6.8 K/UL (ref 4–11)

## 2019-05-24 PROCEDURE — 97530 THERAPEUTIC ACTIVITIES: CPT

## 2019-05-24 PROCEDURE — 97535 SELF CARE MNGMENT TRAINING: CPT

## 2019-05-24 PROCEDURE — 1280000000 HC REHAB R&B

## 2019-05-24 PROCEDURE — 6360000002 HC RX W HCPCS: Performed by: PHYSICAL MEDICINE & REHABILITATION

## 2019-05-24 PROCEDURE — 2580000003 HC RX 258: Performed by: PHYSICAL MEDICINE & REHABILITATION

## 2019-05-24 PROCEDURE — 82140 ASSAY OF AMMONIA: CPT

## 2019-05-24 PROCEDURE — 51798 US URINE CAPACITY MEASURE: CPT

## 2019-05-24 PROCEDURE — 6370000000 HC RX 637 (ALT 250 FOR IP): Performed by: PHYSICAL MEDICINE & REHABILITATION

## 2019-05-24 PROCEDURE — 80048 BASIC METABOLIC PNL TOTAL CA: CPT

## 2019-05-24 PROCEDURE — 85027 COMPLETE CBC AUTOMATED: CPT

## 2019-05-24 PROCEDURE — 84443 ASSAY THYROID STIM HORMONE: CPT

## 2019-05-24 PROCEDURE — 36592 COLLECT BLOOD FROM PICC: CPT

## 2019-05-24 RX ORDER — METHOCARBAMOL 500 MG/1
500 TABLET, FILM COATED ORAL 3 TIMES DAILY
Status: DISCONTINUED | OUTPATIENT
Start: 2019-05-24 | End: 2019-05-28 | Stop reason: HOSPADM

## 2019-05-24 RX ORDER — SODIUM CHLORIDE 9 MG/ML
INJECTION, SOLUTION INTRAVENOUS CONTINUOUS
Status: ACTIVE | OUTPATIENT
Start: 2019-05-24 | End: 2019-05-25

## 2019-05-24 RX ORDER — OXYCODONE HYDROCHLORIDE 5 MG/1
5 TABLET ORAL EVERY 4 HOURS PRN
Qty: 20 TABLET | Refills: 0 | Status: SHIPPED | OUTPATIENT
Start: 2019-05-24 | End: 2019-05-27

## 2019-05-24 RX ADMIN — METHOCARBAMOL TABLETS 500 MG: 500 TABLET, COATED ORAL at 22:13

## 2019-05-24 RX ADMIN — ACETAMINOPHEN 650 MG: 325 TABLET ORAL at 22:13

## 2019-05-24 RX ADMIN — CEFTRIAXONE SODIUM 2 G: 2 INJECTION, POWDER, FOR SOLUTION INTRAMUSCULAR; INTRAVENOUS at 09:45

## 2019-05-24 RX ADMIN — MAGNESIUM HYDROXIDE 30 ML: 400 SUSPENSION ORAL at 09:27

## 2019-05-24 RX ADMIN — OXYCODONE HYDROCHLORIDE 10 MG: 5 TABLET ORAL at 22:27

## 2019-05-24 RX ADMIN — Medication 100 MG: at 09:26

## 2019-05-24 RX ADMIN — FAMOTIDINE 20 MG: 20 TABLET ORAL at 22:13

## 2019-05-24 RX ADMIN — LISINOPRIL 20 MG: 20 TABLET ORAL at 09:27

## 2019-05-24 RX ADMIN — ATORVASTATIN CALCIUM 20 MG: 20 TABLET, FILM COATED ORAL at 22:14

## 2019-05-24 RX ADMIN — METHOCARBAMOL TABLETS 750 MG: 750 TABLET, COATED ORAL at 09:24

## 2019-05-24 RX ADMIN — ACETAMINOPHEN 650 MG: 325 TABLET ORAL at 18:01

## 2019-05-24 RX ADMIN — FAMOTIDINE 20 MG: 20 TABLET ORAL at 09:24

## 2019-05-24 RX ADMIN — ACETAMINOPHEN 650 MG: 325 TABLET ORAL at 14:35

## 2019-05-24 RX ADMIN — Medication 10 ML: at 11:17

## 2019-05-24 RX ADMIN — MORPHINE SULFATE 15 MG: 15 TABLET, FILM COATED, EXTENDED RELEASE ORAL at 09:27

## 2019-05-24 RX ADMIN — CEFTRIAXONE SODIUM 2 G: 2 INJECTION, POWDER, FOR SOLUTION INTRAMUSCULAR; INTRAVENOUS at 22:14

## 2019-05-24 RX ADMIN — SODIUM CHLORIDE: 900 INJECTION, SOLUTION INTRAVENOUS at 16:51

## 2019-05-24 RX ADMIN — AMLODIPINE BESYLATE 2.5 MG: 5 TABLET ORAL at 09:26

## 2019-05-24 RX ADMIN — POLYETHYLENE GLYCOL (3350) 17 G: 17 POWDER, FOR SOLUTION ORAL at 09:27

## 2019-05-24 RX ADMIN — METHOCARBAMOL TABLETS 500 MG: 500 TABLET, COATED ORAL at 14:35

## 2019-05-24 RX ADMIN — SENNOSIDES,DOCUSATE SODIUM 2 TABLET: 8.6; 5 TABLET, FILM COATED ORAL at 22:13

## 2019-05-24 RX ADMIN — TAMSULOSIN HYDROCHLORIDE 0.4 MG: 0.4 CAPSULE ORAL at 09:26

## 2019-05-24 RX ADMIN — APIXABAN 10 MG: 5 TABLET, FILM COATED ORAL at 22:13

## 2019-05-24 RX ADMIN — ACETAMINOPHEN 650 MG: 325 TABLET ORAL at 09:25

## 2019-05-24 RX ADMIN — SENNOSIDES,DOCUSATE SODIUM 2 TABLET: 8.6; 5 TABLET, FILM COATED ORAL at 09:24

## 2019-05-24 RX ADMIN — APIXABAN 10 MG: 5 TABLET, FILM COATED ORAL at 11:06

## 2019-05-24 RX ADMIN — OXYCODONE HYDROCHLORIDE 10 MG: 5 TABLET ORAL at 05:20

## 2019-05-24 RX ADMIN — FENOFIBRATE 160 MG: 160 TABLET ORAL at 09:27

## 2019-05-24 RX ADMIN — OXYCODONE HYDROCHLORIDE 10 MG: 5 TABLET ORAL at 09:42

## 2019-05-24 ASSESSMENT — PAIN SCALES - GENERAL
PAINLEVEL_OUTOF10: 0
PAINLEVEL_OUTOF10: 0
PAINLEVEL_OUTOF10: 9
PAINLEVEL_OUTOF10: 7
PAINLEVEL_OUTOF10: 10
PAINLEVEL_OUTOF10: 9
PAINLEVEL_OUTOF10: 8

## 2019-05-24 ASSESSMENT — PAIN DESCRIPTION - PAIN TYPE: TYPE: SURGICAL PAIN

## 2019-05-24 ASSESSMENT — PAIN DESCRIPTION - PROGRESSION
CLINICAL_PROGRESSION: NOT CHANGED

## 2019-05-24 ASSESSMENT — PAIN DESCRIPTION - LOCATION: LOCATION: BACK;LEG

## 2019-05-24 ASSESSMENT — PAIN DESCRIPTION - ORIENTATION: ORIENTATION: MID;LOWER;RIGHT;LEFT

## 2019-05-24 ASSESSMENT — PAIN DESCRIPTION - ONSET: ONSET: ON-GOING

## 2019-05-24 ASSESSMENT — PAIN DESCRIPTION - FREQUENCY: FREQUENCY: CONTINUOUS

## 2019-05-24 ASSESSMENT — PAIN DESCRIPTION - DESCRIPTORS: DESCRIPTORS: DISCOMFORT

## 2019-05-24 ASSESSMENT — PAIN - FUNCTIONAL ASSESSMENT: PAIN_FUNCTIONAL_ASSESSMENT: PREVENTS OR INTERFERES SOME ACTIVE ACTIVITIES AND ADLS

## 2019-05-24 NOTE — PROGRESS NOTES
declining, unsafe to attempt)  Stairs/Curb  Stairs?: No(unsafe to attempt)  Wheelchair Activities  Propulsion: (pt declining OOB activity)                  Comment: OT asking pt to attempt to apply chapstick to his lips. Pt with increased difficulty grasping chapstick tube and was unable to appropriately apply. OT asking pt to reach up for her hand, pt with BUE tremoring while attempting to reach for OT's hands, increased time to reach OT's hand with minimal  strength when asked to squeeze her fingers. Inconsistencies noted throughout session with UE strength due to pt lifting LUE smoothly for BP cuff placement, lifting BUE smoothly to don gown sleeves, and pulling up on bedrails to assist with scooting. 2nd Session: Pt found supine in bed upon PT/OT arrival.  Pt with minimal eye contact and verbal responses throughout attempt. PT/OT asking pt how he is feeling, pt responded, \"I'm nauseated. \"  PT/OT asking pt if he is able to perform therapy session or if they can assist him to recliner. Pt shaking head no, no verbal response. PT/OT asking again if pt is able to participate in session, pt responded, \"I'm nauseated. \"  PT/OT asking pt if he has received anything for nausea from his RN, or if there is anything they can do for him at this time to make him more comfortable or less nauseated. Pt with no verbal response and no head shaking or nodding. PT/OT attempting to place pillow behind pt's head and asked if he would like them to help reposition in bed, pt with no verbal response. PT/OT reporting to pt that they would be leaving at this time, and stating, \"Have a good rest of your morning. \"  Pt stating, \"Thank you, you too. \"  Pt left supine in bed at end of session with bed alarm set and call light/needs within reach. Missed 30 minutes due to pt lethargy. Assessment   Body structures, Functions, Activity limitations: Decreased functional mobility ; Decreased strength;Decreased Paulette Vallejo \"To be able to do what I was doing before I got sick. \"    Plan    Plan  Times per week: 5x/week, 90 minutes a day  Current Treatment Recommendations: Strengthening, Transfer Training, Endurance Training, Neuromuscular Re-education, Patient/Caregiver Education & Training, ROM, Wheelchair Mobility Training, Equipment Evaluation, Education, & procurement, Balance Training, Gait Training, Home Exercise Program, Functional Mobility Training, Stair training, Safety Education & Training  Safety Devices  Type of devices: (pt left supine in bed with OT present)     Therapy Time   Individual Concurrent Group Co-treatment   Time In       0730   Time Out       0815   Minutes       45      Variance: 15  Reason: (decreased participation, lethargy, and cognition)    Variance: 30 minutes due to pt lethargy    Alexia Fowler, PT, DPT, CLT

## 2019-05-24 NOTE — PLAN OF CARE
Pt educated to use his call light when he needs assistance. Bed alarm on when Pt in bed and chair alarm on when Pt up in chair. Non skid socks on and call light placed within reach. RN will continue to monitor Pt.

## 2019-05-24 NOTE — CARE COORDINATION
[facility] is choice for SNF from patient's wife. SW informed Familiaie Standard ([phone])[id number] at [facility] to start precert today. Precert started.     Electronically signed by YOLI Hay, JAQUIW on 5/24/2019 at 1:24 PM

## 2019-05-24 NOTE — PROGRESS NOTES
Pt A & O. BP elevated. PRN Hydralizine given. Pt up x 2 w/stedy. Bed alarm in use. C/o low back and bilat leg pain. Managed with scheduled and PRN pain medication. Pt incontinent at times. Will cont to monitor.

## 2019-05-24 NOTE — PLAN OF CARE
Problem: Falls - Risk of:  Goal: Will remain free from falls  Description  Will remain free from falls  5/23/2019 2018 by Linda Siegel RN  Outcome: Ongoing  Note:   Pt is a high fall risk. See [facility] Fall Score. Pt bed is in low position, side rails up, call light and belongings are in reach. Pt up x 2 w/stedy. Bed alarm in use. Pt encouraged to call for assistance, pt using call light appropriately. Will continue with hourly rounds for po intake, pain needs, toileting and repositioning as needed. 5/23/2019 1806 by Marguerite Babinski, RN  Outcome: Ongoing     Problem: Pain:  Goal: Patient's pain/discomfort is manageable  Description  Patient's pain/discomfort is manageable  5/23/2019 2018 by Linda Siegel RN  Outcome: Ongoing  Note:   Pt c/o low back and bilat leg pain. Managed with scheduled and PRN pain medication. Will cont to monitor.

## 2019-05-24 NOTE — PROGRESS NOTES
Occupational Therapy  Facility/Department: [facility] ACUTE REHAB UNIT  Daily Treatment Note/Possible Discharge Summary   NAME: Danielle Kauffman  : 1950  MRN: [id number]    Date of Service: 2019    Discharge Recommendations:  Subacute/Skilled Nursing Facility  OT Equipment Recommendations  Other: cont to assess pending progress made    Assessment   Performance deficits / Impairments: Decreased functional mobility ; Decreased high-level IADLs;Decreased ADL status; Decreased endurance;Decreased strength;Decreased balance  Assessment: Pt with limited abilitly to participate secondary to Bowles Sophiaside and cognitive status. Pt requiring max encouragement and Max Ax2 for rolling side to side with significant extra time and difficulty following simple one step commands for sequencing task. Continue OT per POC. Treatment Diagnosis: impaired ADLs /functional transfers /decreased endurance 2/2 back infection/ sepsis   Patient Education: activity promotion - limited understanding  REQUIRES OT FOLLOW UP: Yes  Activity Tolerance  Activity Tolerance: Treatment limited secondary to decreased cognition;Patient limited by fatigue;Patient limited by pain  Activity Tolerance: Pt with pain and limited UE and LE movement and ability to follow simple one step commands. Pt requiring max encouragment for all participation  Safety Devices  Safety Devices in place: Yes  Type of devices: Left in bed;Call light within reach;Nurse notified; Bed alarm in place;Gait belt         Restrictions  Position Activity Restriction  Other position/activity restrictions: up with assistance  Subjective   General  Chart Reviewed: Yes  Patient assessed for rehabilitation services?: Yes  Additional Pertinent Hx: Admit  to ICU with sepsis vs back abcess       Neurosx consult - to OR  s/p I&D back wound with draining of abcess. Admitted to [facility] ARU on .               PMHX: recent  DECOMPRESSION LAMINECTOMY L3-4 POSSIBLY L5.  on    OA, DDD, DM,edema BLE,HTN, HLD and neuropathy   Response to previous treatment: Patient with no complaints from previous session  Family / Caregiver Present: No  Referring Practitioner: Nilson  Diagnosis: Back abcess s/p I&D of back wound   Subjective  Subjective: Pt up in chair upon entry. \"I feel rough\"     You don't know what it's like to be paralized. General Comment  Comments: . Objective    ADL  Feeding: Setup; Moderate assistance(beverage managment)  Grooming: Maximum assistance(wash cloth offered for washing hands and face, Pt placed wash cloth on mouth but required Max A for completion of task.)  UE Bathing: Maximum assistance(washing arm pits, chest and arms)  UE Dressing: Maximum assistance(donning gown)  Toileting: (brief change completed supine in bed with max A)  Additional Comments: pt with juanis pad folded in brief and saturated in urine. Skin with noted redness, zinc paste applied           Bed mobility  Rolling to Left: Dependent/Total(rolling side to side for eric care/brief change requiring max encouragment and time for completion with very limited UE movement and pulling against therapist.)  Rolling to Right: Dependent/Total(Max A x2 for brief and eric care with Max encourgment and time)  Scooting: Dependent/Total(Max A x2 pt able to reach up and assit with pulling on BR)  Comment: Pt rolling multiple times for brief change. Type of ROM/Therapeutic Exercise  Type of ROM/Therapeutic Exercise: AROM  Comment: Pt demonstrated 3 attempts to reach up to touch therapist hand for shoulder flexion with 2 succesful attempts with max encouragment and extra time. 2nd session: Attempted to see pt for OT/PT session. Pt's eyes were closed and pt was unable to increase arousal to maintain alertness. Pt repeatedly stated that he was nauseous but would not respond to questioning whether he wanted medicine or not.  Multiple attempts made to wake pt up in order to participate in session however pt continued to close his eyes and would not engage w/ therapists. OT/PT asked if pt wanted to be re-adjusted in bed however pt still did not respond to questions and was closing his eyes. Pt was left supine in bed w/ bed alarm on and call light within reach. RN notified. 30 mins OT missed.       Plan  If pt discharges prior to next treatment, this note will serve as discharge summary  Plan  Times per week: 5x per week x90 minutes  Times per day: Daily  Current Treatment Recommendations: Strengthening, Pain Management, Wheelchair Mobility Training, Safety Education & Training, Balance Training, Patient/Caregiver Education & Training, Self-Care / ADL, Functional Mobility Training, Home Management Training, Endurance Training          Goals (as determined and assessed by primary OT)  Short term goals  Time Frame for Short term goals: 2 weeks  Short term goal 1: Pt will be MOD A with toilet transfer and toileting- not met  Short term goal 2: Pt will bed MOD A with LE dressing with use of adap equip as needed- not met   Short term goal 3: Pt will be MOD A with bathing with use of long handle sponge and adaptive techniques as needed- not met   Short term goal 4: Pt will tolerated standing x2 minutes to demo improved functional activity tolerance- not met   Short term goal 5: Pt will be MIN A with BUE HEP to increase strength to facilitate independence with ADLs and functional mobility- not met   Long term goals  Time Frame for Long term goals : 3 weeks  Long term goal 1: Pt will be SBA with toilet transfer and toileting - not met   Long term goal 2: Pt will bed SBA with LE dressing with use of adap equip as needed - not met   Long term goal 3: Pt will be SBA with bathing with use of long handle sponge and adaptive techniques as needed - not met   Long term goal 4: Pt will tolerated standing x5 minutes to demo improved functional activity tolerance - not met   Long term goal 5: Pt will be MOD I with BUE HEP to increase strength to facilitate independence with ADLs and functional mobility - not met   Patient Goals   Patient goals : Go home, return to prior level of functioning       Therapy Time   Individual Concurrent Group Co-treatment   Time In 815      0764   Time Out 828      0815   Minutes 13      45   Timed Code Treatment Minutes: 79+31 Minutes   Total treatment Minutes: 62 Min    2nd session- Attempted however 32 mins OT missed today due to decreased arousal (Ry OTR/L)      [address], [place]/L (1st session)

## 2019-05-24 NOTE — PROGRESS NOTES
Pt awake in bed. Physical assessment and vital signs as charted. Pt currently rates his pain as a 9 out of 10 on the pain scale, pain medication given at this time. Call light placed within reach. RN will continue to monitor Pt.

## 2019-05-24 NOTE — PROGRESS NOTES
Nutrition Assessment    Type and Reason for Visit: Reassess    Nutrition Recommendations:   1. Carb control, fluid restriction 1500 ml/day  2. Ensure high protein with lunch and dinner (would not include with fluid restriction since Ensure high protein contains osmolality of 442 mOsm/L per bottle)  3. Will monitor nutritional adequacy, nutrition-related labs, weights, BMs, and clinical progress    Nutrition Assessment: Follow up:  Nutritional status at risk for decline with recent decrease in po intake 25-50% meals. Nurse reported patient's blood pressure is up due to pain and had not wanted to make BP medication. Patient does not like anything too sweet per nurse, does not feel patient would tolerate Magic cups or Ensure. RD will try Ensure high protein vanilla which may taste less sweet and low in carbohydate. Blood sugars greater than 200 mg/dl, insulin adjusted. RD asked for bed to elevated to 30 degrees to prevent aspiration. Nurse reported patient does not tolerate but will try. Will continue to monitor. Plan is for patient to discharge to SNF on 5/28/19. Malnutrition Assessment:  · Malnutrition Status: At risk for malnutrition  · Context: Acute illness or injury  · Findings of the 6 clinical characteristics of malnutrition (Minimum of 2 out of 6 clinical characteristics is required to make the diagnosis of moderate or severe Protein Calorie Malnutrition based on AND/ASPEN Guidelines):  1. Energy Intake-Less than or equal to 50% of estimated energy requirement, (past 2 days)    2. Weight Loss-No significant weight loss,    3. Fat Loss-Unable to assess,    4. Muscle Loss-Unable to assess,    5. Fluid Accumulation-Mild fluid accumulation, Extremities  6.  Strength-Not measured    Nutrition Risk Level:  Moderate    Nutrient Needs:  · Estimated Daily Total Kcal: 0409-8278  · Estimated Daily Protein (g):   · Estimated Daily Total Fluid (ml/day): 1 kcal/ml    Nutrition Diagnosis: · Problem: Inadequate oral intake  · Etiology: related to Insufficient energy/nutrient consumption, Increased demand for energy/nutrients     Signs and symptoms:  as evidenced by Intake 25-50%, Intake 0-25%    Objective Information:  · Nutrition-Focused Physical Findings: agitated at time of visit; wife feeds patient per nurse when visiting  · Wound Type: Surgical Wound  · Current Nutrition Therapies:  · Oral Diet Orders: Carb Control 4 Carbs/Meal   · Oral Diet intake: 51-75%, %  · Oral Nutrition Supplement (ONS) Orders: None  · ONS intake: Unable to assess  · Anthropometric Measures:  · Ht: 6' 1\" (185.4 cm)   · Current Body Wt: 357 lb 5.9 oz (162.1 kg)  · Admission Body Wt: 350 lb 1.5 oz (158.8 kg)(acute care)  · Ideal Body Wt: 184 lb (83.5 kg), % Ideal Body    · BMI Classification: BMI > or equal to 40.0 Obese Class III    Nutrition Interventions:   Continue current diet, Start ONS  Continued Inpatient Monitoring    Nutrition Evaluation:   · Evaluation: Goals set   · Goals: Patient will eat 50% or greater of meals and supplements.       · Monitoring: Supplement Intake, Meal Intake, Pertinent Labs, Mental Status/Confusion      Electronically signed by Candida Schulz RD, LULU on 5/24/19 at 10:19 AM    Contact Number: [phone]

## 2019-05-24 NOTE — PROGRESS NOTES
Jenn Dash  5/24/2019  [id number]    Chief Complaint: Debility    Subjective:   Still variably cooperative with therapy; d/w neurosurgery yesterday; may require skilled facility. ROS: No CP, sob, n/v, f/c  Objective:  Patient Vitals for the past 24 hrs:   BP Temp Temp src Pulse Resp SpO2   05/24/19 0920 (!) 178/94 98.3 °F (36.8 °C) Oral 87 18 93 %   05/24/19 0520 -- 98.6 °F (37 °C) Oral -- -- --   05/23/19 2058 (!) 158/89 -- -- -- -- --   05/23/19 2000 (!) 184/96 97.7 °F (36.5 °C) Oral 79 18 95 %     Gen: No distress, pleasant. HEENT: Normocephalic, atraumatic. CV: Regular rate and rhythm. Resp: No respiratory distress. Abd: Soft, nontender   Ext: No edema. Neuro: Alert, cooperative  Wt Readings from Last 3 Encounters:   05/20/19 (!) 343 lb 0.6 oz (155.6 kg)   05/11/19 (!) 350 lb 1.5 oz (158.8 kg)   04/19/19 (!) 320 lb (145.2 kg)       Laboratory data:   Lab Results   Component Value Date    WBC 6.8 05/24/2019    HGB 11.3 (L) 05/24/2019    HCT 34.1 (L) 05/24/2019    MCV 86.7 05/24/2019     (H) 05/24/2019       Lab Results   Component Value Date     05/24/2019    K 4.6 05/24/2019    K 4.0 05/17/2019    CL 93 05/24/2019    CO2 24 05/24/2019    BUN 14 05/24/2019    CREATININE 0.7 05/24/2019    GLUCOSE 215 05/24/2019    CALCIUM 9.5 05/24/2019        Therapy progress:  PT  Position Activity Restriction  Other position/activity restrictions: up with assistance  Objective     Sit to Stand: Dependent/Total(x2 attempt from elevated EOB to cielo steady, unsuccessful. x1 attempt MaxAx3 from elevated EOB to cielo steady, unsuccessful.)  Stand to sit: Dependent/Total(MaxAx2)  Bed to Chair: Dependent/Total(bed to recliner with lj lift, assist of 2 for placement)     OT  PT Equipment Recommendations  Other: defer     Assessment        SLP                Body mass index is 45.26 kg/m². Assessment and Plan:  Lumbar epidural abscess: s/p washout 5/12. Rocephin 2 Q12 thru 6/26. Weekly labs per ID recs. PT/OT     Lumbar spondylosis/radiculopathy: Pain control. PT/OT    Hyponatremia. Na has been somewhat variable. Follow.      DM: SSI     HTN: Norvasc, lisinopril; increase regimen     HLD: Lipitor 20, fenofibrate     DVT: Lovenox through 5/19. Loading dose Eliquis on 5/20     Bowels: Per protocol  Bladder: Per protocol   Sleep: Trazodone provided prn. Pain: Robaxin scheduled, decreasing; -adjust to scheduled Tylenol and Barb 10-15 PRN   DVT PPx: As above      Shantanu Chung MD 5/24/2019, 10:18 AM

## 2019-05-24 NOTE — PLAN OF CARE
Nutrition Problem: Inadequate oral intake  Intervention: Food and/or Nutrient Delivery: Continue current diet, Start ONS  Nutritional Goals: Patient will eat 50% or greater of meals and supplements.

## 2019-05-25 LAB
ANION GAP SERPL CALCULATED.3IONS-SCNC: 11 MMOL/L (ref 3–16)
BUN BLDV-MCNC: 19 MG/DL (ref 7–20)
CALCIUM SERPL-MCNC: 9.1 MG/DL (ref 8.3–10.6)
CHLORIDE BLD-SCNC: 93 MMOL/L (ref 99–110)
CO2: 25 MMOL/L (ref 21–32)
CREAT SERPL-MCNC: 0.7 MG/DL (ref 0.8–1.3)
GFR AFRICAN AMERICAN: >60
GFR NON-AFRICAN AMERICAN: >60
GLUCOSE BLD-MCNC: 191 MG/DL (ref 70–99)
GLUCOSE BLD-MCNC: 194 MG/DL (ref 70–99)
GLUCOSE BLD-MCNC: 206 MG/DL (ref 70–99)
GLUCOSE BLD-MCNC: 208 MG/DL (ref 70–99)
GLUCOSE BLD-MCNC: 220 MG/DL (ref 70–99)
HCT VFR BLD CALC: 31.1 % (ref 40.5–52.5)
HEMOGLOBIN: 10.5 G/DL (ref 13.5–17.5)
MCH RBC QN AUTO: 29 PG (ref 26–34)
MCHC RBC AUTO-ENTMCNC: 33.6 G/DL (ref 31–36)
MCV RBC AUTO: 86.3 FL (ref 80–100)
PDW BLD-RTO: 15.3 % (ref 12.4–15.4)
PERFORMED ON: ABNORMAL
PLATELET # BLD: 728 K/UL (ref 135–450)
PMV BLD AUTO: 6.9 FL (ref 5–10.5)
POTASSIUM SERPL-SCNC: 4.3 MMOL/L (ref 3.5–5.1)
RBC # BLD: 3.61 M/UL (ref 4.2–5.9)
SODIUM BLD-SCNC: 129 MMOL/L (ref 136–145)
WBC # BLD: 6 K/UL (ref 4–11)

## 2019-05-25 PROCEDURE — 2580000003 HC RX 258: Performed by: PHYSICAL MEDICINE & REHABILITATION

## 2019-05-25 PROCEDURE — 80048 BASIC METABOLIC PNL TOTAL CA: CPT

## 2019-05-25 PROCEDURE — 85027 COMPLETE CBC AUTOMATED: CPT

## 2019-05-25 PROCEDURE — 1280000000 HC REHAB R&B

## 2019-05-25 PROCEDURE — 6370000000 HC RX 637 (ALT 250 FOR IP): Performed by: PHYSICAL MEDICINE & REHABILITATION

## 2019-05-25 PROCEDURE — 6360000002 HC RX W HCPCS: Performed by: PHYSICAL MEDICINE & REHABILITATION

## 2019-05-25 PROCEDURE — 36592 COLLECT BLOOD FROM PICC: CPT

## 2019-05-25 RX ORDER — CASTOR OIL AND BALSAM, PERU 788; 87 MG/G; MG/G
OINTMENT TOPICAL 2 TIMES DAILY
Status: DISCONTINUED | OUTPATIENT
Start: 2019-05-25 | End: 2019-05-28 | Stop reason: HOSPADM

## 2019-05-25 RX ORDER — LACTULOSE 10 G/15ML
20 SOLUTION ORAL 3 TIMES DAILY
Status: DISCONTINUED | OUTPATIENT
Start: 2019-05-25 | End: 2019-05-26

## 2019-05-25 RX ADMIN — ACETAMINOPHEN 650 MG: 325 TABLET ORAL at 18:05

## 2019-05-25 RX ADMIN — APIXABAN 10 MG: 5 TABLET, FILM COATED ORAL at 21:13

## 2019-05-25 RX ADMIN — AMLODIPINE BESYLATE 2.5 MG: 5 TABLET ORAL at 08:17

## 2019-05-25 RX ADMIN — CEFTRIAXONE SODIUM 2 G: 2 INJECTION, POWDER, FOR SOLUTION INTRAMUSCULAR; INTRAVENOUS at 09:45

## 2019-05-25 RX ADMIN — OXYCODONE HYDROCHLORIDE 10 MG: 5 TABLET ORAL at 14:29

## 2019-05-25 RX ADMIN — METHOCARBAMOL TABLETS 500 MG: 500 TABLET, COATED ORAL at 12:56

## 2019-05-25 RX ADMIN — ACETAMINOPHEN 650 MG: 325 TABLET ORAL at 21:13

## 2019-05-25 RX ADMIN — ONDANSETRON 4 MG: 4 TABLET, ORALLY DISINTEGRATING ORAL at 20:11

## 2019-05-25 RX ADMIN — POLYETHYLENE GLYCOL (3350) 17 G: 17 POWDER, FOR SOLUTION ORAL at 08:16

## 2019-05-25 RX ADMIN — Medication 10 ML: at 22:30

## 2019-05-25 RX ADMIN — CEFTRIAXONE SODIUM 2 G: 2 INJECTION, POWDER, FOR SOLUTION INTRAMUSCULAR; INTRAVENOUS at 22:30

## 2019-05-25 RX ADMIN — METHOCARBAMOL TABLETS 500 MG: 500 TABLET, COATED ORAL at 08:16

## 2019-05-25 RX ADMIN — OXYCODONE HYDROCHLORIDE 10 MG: 5 TABLET ORAL at 03:42

## 2019-05-25 RX ADMIN — APIXABAN 10 MG: 5 TABLET, FILM COATED ORAL at 08:16

## 2019-05-25 RX ADMIN — LACTULOSE 20 G: 20 SOLUTION ORAL at 12:56

## 2019-05-25 RX ADMIN — OXYCODONE HYDROCHLORIDE 10 MG: 5 TABLET ORAL at 09:43

## 2019-05-25 RX ADMIN — OXYCODONE HYDROCHLORIDE 10 MG: 5 TABLET ORAL at 20:10

## 2019-05-25 RX ADMIN — ACETAMINOPHEN 650 MG: 325 TABLET ORAL at 08:16

## 2019-05-25 RX ADMIN — SENNOSIDES,DOCUSATE SODIUM 2 TABLET: 8.6; 5 TABLET, FILM COATED ORAL at 08:16

## 2019-05-25 RX ADMIN — FAMOTIDINE 20 MG: 20 TABLET ORAL at 21:14

## 2019-05-25 RX ADMIN — ACETAMINOPHEN 650 MG: 325 TABLET ORAL at 12:56

## 2019-05-25 RX ADMIN — LISINOPRIL 30 MG: 20 TABLET ORAL at 08:16

## 2019-05-25 RX ADMIN — TAMSULOSIN HYDROCHLORIDE 0.4 MG: 0.4 CAPSULE ORAL at 08:17

## 2019-05-25 RX ADMIN — Medication 100 MG: at 08:16

## 2019-05-25 RX ADMIN — Medication 10 ML: at 08:21

## 2019-05-25 RX ADMIN — FENOFIBRATE 160 MG: 160 TABLET ORAL at 08:16

## 2019-05-25 RX ADMIN — METHOCARBAMOL TABLETS 500 MG: 500 TABLET, COATED ORAL at 21:14

## 2019-05-25 RX ADMIN — CASTOR OIL AND BALSAM, PERU: 788; 87 OINTMENT TOPICAL at 21:14

## 2019-05-25 RX ADMIN — CASTOR OIL AND BALSAM, PERU: 788; 87 OINTMENT TOPICAL at 18:06

## 2019-05-25 RX ADMIN — ATORVASTATIN CALCIUM 20 MG: 20 TABLET, FILM COATED ORAL at 21:13

## 2019-05-25 RX ADMIN — FAMOTIDINE 20 MG: 20 TABLET ORAL at 08:16

## 2019-05-25 RX ADMIN — SENNOSIDES,DOCUSATE SODIUM 2 TABLET: 8.6; 5 TABLET, FILM COATED ORAL at 21:13

## 2019-05-25 RX ADMIN — INSULIN LISPRO 4 UNITS: 100 INJECTION, SOLUTION INTRAVENOUS; SUBCUTANEOUS at 08:20

## 2019-05-25 RX ADMIN — LACTULOSE 20 G: 20 SOLUTION ORAL at 08:16

## 2019-05-25 RX ADMIN — LACTULOSE 20 G: 20 SOLUTION ORAL at 21:14

## 2019-05-25 ASSESSMENT — PAIN DESCRIPTION - ONSET
ONSET: ON-GOING

## 2019-05-25 ASSESSMENT — PAIN DESCRIPTION - PROGRESSION
CLINICAL_PROGRESSION: NOT CHANGED

## 2019-05-25 ASSESSMENT — PAIN - FUNCTIONAL ASSESSMENT
PAIN_FUNCTIONAL_ASSESSMENT: PREVENTS OR INTERFERES WITH ALL ACTIVE AND SOME PASSIVE ACTIVITIES
PAIN_FUNCTIONAL_ASSESSMENT: INTOLERABLE, UNABLE TO DO ANY ACTIVE OR PASSIVE ACTIVITIES
PAIN_FUNCTIONAL_ASSESSMENT: INTOLERABLE, UNABLE TO DO ANY ACTIVE OR PASSIVE ACTIVITIES

## 2019-05-25 ASSESSMENT — PAIN DESCRIPTION - ORIENTATION
ORIENTATION: RIGHT;LEFT

## 2019-05-25 ASSESSMENT — PAIN DESCRIPTION - PAIN TYPE
TYPE: ACUTE PAIN

## 2019-05-25 ASSESSMENT — PAIN DESCRIPTION - FREQUENCY
FREQUENCY: CONTINUOUS

## 2019-05-25 ASSESSMENT — PAIN DESCRIPTION - LOCATION
LOCATION: BACK;LEG

## 2019-05-25 ASSESSMENT — PAIN SCALES - GENERAL
PAINLEVEL_OUTOF10: 8
PAINLEVEL_OUTOF10: 10
PAINLEVEL_OUTOF10: 10
PAINLEVEL_OUTOF10: 8
PAINLEVEL_OUTOF10: 9
PAINLEVEL_OUTOF10: 8
PAINLEVEL_OUTOF10: 8
PAINLEVEL_OUTOF10: 9
PAINLEVEL_OUTOF10: 10

## 2019-05-25 ASSESSMENT — PAIN DESCRIPTION - DESCRIPTORS: DESCRIPTORS: BURNING

## 2019-05-25 NOTE — PLAN OF CARE
Problem: Falls - Risk of:  Goal: Will remain free from falls  Description  Will remain free from falls  Note:   Remains free from falls. Nonah Evens for transfers. Problem: Infection:  Goal: Will remain free from infection  Description  Will remain free from infection  Note:   Staples intact to mid back surgical incision. Well approximated with no drainage noted.

## 2019-05-25 NOTE — PROGRESS NOTES
Assessment complete. VSS. Patient awake in bed but drowsy. Slow to answer questions. Repositioned patient to take meds and eat breakfast but is now nauseous. Holding PO meds until patient feels better. Still complaining of back pain. Rating 8/10. Will continue to monitor.      Vitals:    05/25/19 0810   BP: 116/78   Pulse: 86   Resp: 17   Temp: 98.3 °F (36.8 °C)   SpO2: 95%

## 2019-05-25 NOTE — PROGRESS NOTES
Patient continues to refuse food. Only took 2 bites of dinner. Patient states all of the food is tasting too sweet. Offered to get him something else but he says no matter what he orders its too sweet. Encouraged patient to drink his ensure but refused. Holding insulin at this time.

## 2019-05-25 NOTE — PLAN OF CARE
Problem: Pain:  Goal: Patient's pain/discomfort is manageable  Description  Patient's pain/discomfort is manageable  Outcome: Not Met This Shift  Note:   Patient continues to complain of back and leg pain. Pain medicine given PRN. Problem: Nutrition  Goal: Optimal nutrition therapy  Outcome: Not Met This Shift  Note:   Patients appetite decreasing. No BMs. Bowels are active. Lactulose, miralax, and sennekot given. Problem: Falls - Risk of:  Goal: Will remain free from falls  Description  Will remain free from falls  5/25/2019 0262 by Barbara Medina RN  Note:   Patient remains free from falls. Does not move much in bed due to pain. Fall precautions in place.

## 2019-05-25 NOTE — PLAN OF CARE
Problem: Risk for Impaired Skin Integrity  Goal: Tissue integrity - skin and mucous membranes  Description  Structural intactness and normal physiological function of skin and  mucous membranes. Note:   Patient prefers laying supine due to back pain. Encouraging patient to turn to prevent skin breakdown. Also educated patient the importance of good nutrition to prevent skin breakdown. Patient has had a lack of appetite the last couple of days.

## 2019-05-26 LAB
ANION GAP SERPL CALCULATED.3IONS-SCNC: 12 MMOL/L (ref 3–16)
BUN BLDV-MCNC: 17 MG/DL (ref 7–20)
CALCIUM SERPL-MCNC: 9.2 MG/DL (ref 8.3–10.6)
CHLORIDE BLD-SCNC: 94 MMOL/L (ref 99–110)
CO2: 25 MMOL/L (ref 21–32)
CREAT SERPL-MCNC: 0.6 MG/DL (ref 0.8–1.3)
GFR AFRICAN AMERICAN: >60
GFR NON-AFRICAN AMERICAN: >60
GLUCOSE BLD-MCNC: 192 MG/DL (ref 70–99)
GLUCOSE BLD-MCNC: 197 MG/DL (ref 70–99)
GLUCOSE BLD-MCNC: 215 MG/DL (ref 70–99)
GLUCOSE BLD-MCNC: 219 MG/DL (ref 70–99)
GLUCOSE BLD-MCNC: 219 MG/DL (ref 70–99)
HCT VFR BLD CALC: 32.8 % (ref 40.5–52.5)
HEMOGLOBIN: 10.7 G/DL (ref 13.5–17.5)
MCH RBC QN AUTO: 28.7 PG (ref 26–34)
MCHC RBC AUTO-ENTMCNC: 32.6 G/DL (ref 31–36)
MCV RBC AUTO: 87.8 FL (ref 80–100)
PDW BLD-RTO: 15 % (ref 12.4–15.4)
PERFORMED ON: ABNORMAL
PLATELET # BLD: 687 K/UL (ref 135–450)
PMV BLD AUTO: 7 FL (ref 5–10.5)
POTASSIUM SERPL-SCNC: 3.9 MMOL/L (ref 3.5–5.1)
RBC # BLD: 3.74 M/UL (ref 4.2–5.9)
SODIUM BLD-SCNC: 131 MMOL/L (ref 136–145)
WBC # BLD: 6.3 K/UL (ref 4–11)

## 2019-05-26 PROCEDURE — 80048 BASIC METABOLIC PNL TOTAL CA: CPT

## 2019-05-26 PROCEDURE — 1280000000 HC REHAB R&B

## 2019-05-26 PROCEDURE — 2580000003 HC RX 258: Performed by: PHYSICAL MEDICINE & REHABILITATION

## 2019-05-26 PROCEDURE — 6370000000 HC RX 637 (ALT 250 FOR IP): Performed by: PHYSICAL MEDICINE & REHABILITATION

## 2019-05-26 PROCEDURE — 85027 COMPLETE CBC AUTOMATED: CPT

## 2019-05-26 PROCEDURE — 6360000002 HC RX W HCPCS: Performed by: PHYSICAL MEDICINE & REHABILITATION

## 2019-05-26 RX ADMIN — CASTOR OIL AND BALSAM, PERU: 788; 87 OINTMENT TOPICAL at 21:33

## 2019-05-26 RX ADMIN — OXYCODONE HYDROCHLORIDE 10 MG: 5 TABLET ORAL at 23:00

## 2019-05-26 RX ADMIN — FAMOTIDINE 20 MG: 20 TABLET ORAL at 08:23

## 2019-05-26 RX ADMIN — APIXABAN 10 MG: 5 TABLET, FILM COATED ORAL at 21:32

## 2019-05-26 RX ADMIN — FAMOTIDINE 20 MG: 20 TABLET ORAL at 21:33

## 2019-05-26 RX ADMIN — ATORVASTATIN CALCIUM 20 MG: 20 TABLET, FILM COATED ORAL at 21:33

## 2019-05-26 RX ADMIN — METHOCARBAMOL TABLETS 500 MG: 500 TABLET, COATED ORAL at 08:33

## 2019-05-26 RX ADMIN — OXYCODONE HYDROCHLORIDE 10 MG: 5 TABLET ORAL at 08:23

## 2019-05-26 RX ADMIN — Medication 10 ML: at 08:25

## 2019-05-26 RX ADMIN — SENNOSIDES,DOCUSATE SODIUM 2 TABLET: 8.6; 5 TABLET, FILM COATED ORAL at 21:33

## 2019-05-26 RX ADMIN — OXYCODONE HYDROCHLORIDE 10 MG: 5 TABLET ORAL at 13:40

## 2019-05-26 RX ADMIN — METHOCARBAMOL TABLETS 500 MG: 500 TABLET, COATED ORAL at 13:41

## 2019-05-26 RX ADMIN — LACTULOSE 20 G: 20 SOLUTION ORAL at 13:41

## 2019-05-26 RX ADMIN — CEFTRIAXONE SODIUM 2 G: 2 INJECTION, POWDER, FOR SOLUTION INTRAMUSCULAR; INTRAVENOUS at 11:20

## 2019-05-26 RX ADMIN — APIXABAN 10 MG: 5 TABLET, FILM COATED ORAL at 08:23

## 2019-05-26 RX ADMIN — CASTOR OIL AND BALSAM, PERU: 788; 87 OINTMENT TOPICAL at 08:24

## 2019-05-26 RX ADMIN — TAMSULOSIN HYDROCHLORIDE 0.4 MG: 0.4 CAPSULE ORAL at 08:24

## 2019-05-26 RX ADMIN — METHOCARBAMOL TABLETS 500 MG: 500 TABLET, COATED ORAL at 21:32

## 2019-05-26 RX ADMIN — ACETAMINOPHEN 650 MG: 325 TABLET ORAL at 13:41

## 2019-05-26 RX ADMIN — ACETAMINOPHEN 650 MG: 325 TABLET ORAL at 17:40

## 2019-05-26 RX ADMIN — CEFTRIAXONE SODIUM 2 G: 2 INJECTION, POWDER, FOR SOLUTION INTRAMUSCULAR; INTRAVENOUS at 21:45

## 2019-05-26 RX ADMIN — AMLODIPINE BESYLATE 2.5 MG: 5 TABLET ORAL at 08:24

## 2019-05-26 RX ADMIN — ACETAMINOPHEN 650 MG: 325 TABLET ORAL at 08:24

## 2019-05-26 RX ADMIN — Medication 10 ML: at 21:34

## 2019-05-26 RX ADMIN — OXYCODONE HYDROCHLORIDE 10 MG: 5 TABLET ORAL at 18:31

## 2019-05-26 RX ADMIN — Medication 100 MG: at 08:24

## 2019-05-26 RX ADMIN — ACETAMINOPHEN 650 MG: 325 TABLET ORAL at 21:32

## 2019-05-26 RX ADMIN — LISINOPRIL 30 MG: 20 TABLET ORAL at 08:24

## 2019-05-26 RX ADMIN — OXYCODONE HYDROCHLORIDE 10 MG: 5 TABLET ORAL at 01:53

## 2019-05-26 RX ADMIN — FENOFIBRATE 160 MG: 160 TABLET ORAL at 08:24

## 2019-05-26 ASSESSMENT — PAIN SCALES - GENERAL
PAINLEVEL_OUTOF10: 10
PAINLEVEL_OUTOF10: 9
PAINLEVEL_OUTOF10: 9
PAINLEVEL_OUTOF10: 10
PAINLEVEL_OUTOF10: 6
PAINLEVEL_OUTOF10: 9

## 2019-05-26 NOTE — PLAN OF CARE
Problem: Falls - Risk of:  Goal: Will remain free from falls  Description  Will remain free from falls  Outcome: Ongoing  Note:   Fall precaution sin place. Bed in low and locked position. Bed alarm set. Call light in reach. Bedside table within reach. Frequent visual checks made. He has remained free from injury. Problem: Risk for Impaired Skin Integrity  Goal: Tissue integrity - skin and mucous membranes  Description  Structural intactness and normal physiological function of skin and  mucous membranes. Outcome: Ongoing  Note:   Scattered bruising and healing abrasions noted. Staples intact to back incision. Surrounding skin pink. No drainage noted. Tequila area reddened and excoriated. Tequila rectal area red. Venelex cream applied. Zinc barrier cream applied. Mepilex sacral heart in place. Heel elevated off bed. Patient turned to side but he can only tolerate it for a short time.

## 2019-05-26 NOTE — PROGRESS NOTES
Patient awake in bed. VSS. Complaining of pain and stating \"I feel awful\". Patient has not received pain medicine since 2am. Morning meds and pain medicine given. Patient does not want breakfast but was able to get him to drink some orange juice with meds. Encouraging patient to take a few bites. Held laxatives d/t multiple BM's last night. Will continue to monitor.      Vitals:    05/26/19 0822   BP: (!) 151/90   Pulse: 74   Resp: 17   Temp: 97.7 °F (36.5 °C)   SpO2: 95%

## 2019-05-26 NOTE — PLAN OF CARE
Problem: Falls - Risk of:  Goal: Will remain free from falls  Description  Will remain free from falls  5/26/2019 0841 by Jerry Long RN  Note:   Patient remains free from falls. Patient A/O. Due to pain, patient does not move much in bed unless with assistance. Bed alarm on. Problem: Risk for Impaired Skin Integrity  Goal: Tissue integrity - skin and mucous membranes  Description  Structural intactness and normal physiological function of skin and  mucous membranes. 5/26/2019 0841 by Jerry Long RN  Note:   Patient incontinent of stool and urine. Zinc ointment applied to excoriated areas. Venelex applied to redness on buttocks. Patient turned and repositioned through out the day. Problem: Pain:  Goal: Patient's pain/discomfort is manageable  Description  Patient's pain/discomfort is manageable  Outcome: Not Met This Shift  Note:   Patient continues to complain of back and leg pain. OxyContin given when available. Patient falls asleep shortly after pain medicine given. Problem: Nutrition  Goal: Optimal nutrition therapy  Outcome: Not Met This Shift  Note:   Patient refusing meals due to food tasting too sweet per patient. Encouraging patient to take a few bites of meals and to drink supplements.

## 2019-05-26 NOTE — PROGRESS NOTES
Patients wife is upset  is becoming more confused and not knowing where he is. Explained patient has not been eating and could be causing the confusion. Wife asking what are we going to do about him not eating. Informed her, Dr Chapincito Hankins attempted to call her but the voicemail had no identification and due to HIPPA he could not leave any information. Paged Dr Chapincito Hankins with her cell number.

## 2019-05-26 NOTE — PROGRESS NOTES
Patient has been incontinent of moderate amount of stool x 2. Patient's stool was soft non formed initially and then loose and watery. Skin excoriated on penis, scrotum  and anus. Venelex applied to anal area. Zinc barrier cream applied to eric area. Positioned on right side. Will continue to monitor.

## 2019-05-26 NOTE — PROGRESS NOTES
Patient reports that he is not feeling well. He complains of lower extremity burning pain and severe back pain with any movement. He rates his pain 10 on pain scale. He also complains of nausea.  Medicated with zofran and Oxycodone on request. Will continue to monitor

## 2019-05-27 LAB
ANION GAP SERPL CALCULATED.3IONS-SCNC: 12 MMOL/L (ref 3–16)
BUN BLDV-MCNC: 17 MG/DL (ref 7–20)
CALCIUM SERPL-MCNC: 9.1 MG/DL (ref 8.3–10.6)
CHLORIDE BLD-SCNC: 95 MMOL/L (ref 99–110)
CO2: 24 MMOL/L (ref 21–32)
CREAT SERPL-MCNC: 0.6 MG/DL (ref 0.8–1.3)
GFR AFRICAN AMERICAN: >60
GFR NON-AFRICAN AMERICAN: >60
GLUCOSE BLD-MCNC: 187 MG/DL (ref 70–99)
GLUCOSE BLD-MCNC: 197 MG/DL (ref 70–99)
GLUCOSE BLD-MCNC: 200 MG/DL (ref 70–99)
GLUCOSE BLD-MCNC: 211 MG/DL (ref 70–99)
GLUCOSE BLD-MCNC: 215 MG/DL (ref 70–99)
HCT VFR BLD CALC: 34.5 % (ref 40.5–52.5)
HEMOGLOBIN: 11.2 G/DL (ref 13.5–17.5)
MCH RBC QN AUTO: 28.1 PG (ref 26–34)
MCHC RBC AUTO-ENTMCNC: 32.4 G/DL (ref 31–36)
MCV RBC AUTO: 86.7 FL (ref 80–100)
PDW BLD-RTO: 14.9 % (ref 12.4–15.4)
PERFORMED ON: ABNORMAL
PLATELET # BLD: 678 K/UL (ref 135–450)
PMV BLD AUTO: 6.7 FL (ref 5–10.5)
POTASSIUM SERPL-SCNC: 4 MMOL/L (ref 3.5–5.1)
RBC # BLD: 3.98 M/UL (ref 4.2–5.9)
SODIUM BLD-SCNC: 131 MMOL/L (ref 136–145)
WBC # BLD: 6 K/UL (ref 4–11)

## 2019-05-27 PROCEDURE — 6360000002 HC RX W HCPCS: Performed by: PHYSICAL MEDICINE & REHABILITATION

## 2019-05-27 PROCEDURE — 2580000003 HC RX 258: Performed by: PHYSICAL MEDICINE & REHABILITATION

## 2019-05-27 PROCEDURE — 6370000000 HC RX 637 (ALT 250 FOR IP): Performed by: PHYSICAL MEDICINE & REHABILITATION

## 2019-05-27 PROCEDURE — 85027 COMPLETE CBC AUTOMATED: CPT

## 2019-05-27 PROCEDURE — 80048 BASIC METABOLIC PNL TOTAL CA: CPT

## 2019-05-27 PROCEDURE — 97535 SELF CARE MNGMENT TRAINING: CPT

## 2019-05-27 PROCEDURE — 36592 COLLECT BLOOD FROM PICC: CPT

## 2019-05-27 PROCEDURE — 97530 THERAPEUTIC ACTIVITIES: CPT

## 2019-05-27 PROCEDURE — 1280000000 HC REHAB R&B

## 2019-05-27 RX ADMIN — CASTOR OIL AND BALSAM, PERU: 788; 87 OINTMENT TOPICAL at 10:05

## 2019-05-27 RX ADMIN — Medication 100 MG: at 09:56

## 2019-05-27 RX ADMIN — OXYCODONE HYDROCHLORIDE 10 MG: 5 TABLET ORAL at 23:49

## 2019-05-27 RX ADMIN — HYDROMORPHONE HYDROCHLORIDE 1 MG: 1 INJECTION, SOLUTION INTRAMUSCULAR; INTRAVENOUS; SUBCUTANEOUS at 09:08

## 2019-05-27 RX ADMIN — HYDROMORPHONE HYDROCHLORIDE 1 MG: 1 INJECTION, SOLUTION INTRAMUSCULAR; INTRAVENOUS; SUBCUTANEOUS at 15:27

## 2019-05-27 RX ADMIN — LISINOPRIL 30 MG: 20 TABLET ORAL at 09:56

## 2019-05-27 RX ADMIN — CASTOR OIL AND BALSAM, PERU: 788; 87 OINTMENT TOPICAL at 21:31

## 2019-05-27 RX ADMIN — FENOFIBRATE 160 MG: 160 TABLET ORAL at 09:56

## 2019-05-27 RX ADMIN — ATORVASTATIN CALCIUM 20 MG: 20 TABLET, FILM COATED ORAL at 21:30

## 2019-05-27 RX ADMIN — OXYCODONE HYDROCHLORIDE 10 MG: 5 TABLET ORAL at 03:23

## 2019-05-27 RX ADMIN — TAMSULOSIN HYDROCHLORIDE 0.4 MG: 0.4 CAPSULE ORAL at 09:56

## 2019-05-27 RX ADMIN — METHOCARBAMOL TABLETS 500 MG: 500 TABLET, COATED ORAL at 09:56

## 2019-05-27 RX ADMIN — AMLODIPINE BESYLATE 2.5 MG: 5 TABLET ORAL at 09:56

## 2019-05-27 RX ADMIN — CEFTRIAXONE SODIUM 2 G: 2 INJECTION, POWDER, FOR SOLUTION INTRAMUSCULAR; INTRAVENOUS at 10:13

## 2019-05-27 RX ADMIN — METHOCARBAMOL TABLETS 500 MG: 500 TABLET, COATED ORAL at 15:19

## 2019-05-27 RX ADMIN — FAMOTIDINE 20 MG: 20 TABLET ORAL at 21:30

## 2019-05-27 RX ADMIN — SENNOSIDES,DOCUSATE SODIUM 2 TABLET: 8.6; 5 TABLET, FILM COATED ORAL at 21:30

## 2019-05-27 RX ADMIN — HYDROMORPHONE HYDROCHLORIDE 1 MG: 1 INJECTION, SOLUTION INTRAMUSCULAR; INTRAVENOUS; SUBCUTANEOUS at 03:46

## 2019-05-27 RX ADMIN — SENNOSIDES,DOCUSATE SODIUM 2 TABLET: 8.6; 5 TABLET, FILM COATED ORAL at 09:55

## 2019-05-27 RX ADMIN — INSULIN LISPRO 4 UNITS: 100 INJECTION, SOLUTION INTRAVENOUS; SUBCUTANEOUS at 12:40

## 2019-05-27 RX ADMIN — CEFTRIAXONE SODIUM 2 G: 2 INJECTION, POWDER, FOR SOLUTION INTRAMUSCULAR; INTRAVENOUS at 21:32

## 2019-05-27 RX ADMIN — Medication 10 ML: at 09:10

## 2019-05-27 RX ADMIN — ACETAMINOPHEN 650 MG: 325 TABLET ORAL at 09:56

## 2019-05-27 RX ADMIN — ACETAMINOPHEN 650 MG: 325 TABLET ORAL at 21:35

## 2019-05-27 RX ADMIN — INSULIN LISPRO 2 UNITS: 100 INJECTION, SOLUTION INTRAVENOUS; SUBCUTANEOUS at 18:43

## 2019-05-27 RX ADMIN — ACETAMINOPHEN 650 MG: 325 TABLET ORAL at 15:20

## 2019-05-27 RX ADMIN — INSULIN LISPRO 2 UNITS: 100 INJECTION, SOLUTION INTRAVENOUS; SUBCUTANEOUS at 08:14

## 2019-05-27 RX ADMIN — ACETAMINOPHEN 650 MG: 325 TABLET ORAL at 18:41

## 2019-05-27 RX ADMIN — Medication 10 ML: at 21:30

## 2019-05-27 RX ADMIN — FAMOTIDINE 20 MG: 20 TABLET ORAL at 09:55

## 2019-05-27 RX ADMIN — METHOCARBAMOL TABLETS 500 MG: 500 TABLET, COATED ORAL at 21:30

## 2019-05-27 RX ADMIN — HYDROMORPHONE HYDROCHLORIDE 1 MG: 1 INJECTION, SOLUTION INTRAMUSCULAR; INTRAVENOUS; SUBCUTANEOUS at 21:23

## 2019-05-27 ASSESSMENT — PAIN DESCRIPTION - DESCRIPTORS
DESCRIPTORS: SHARP;SHOOTING

## 2019-05-27 ASSESSMENT — PAIN DESCRIPTION - ONSET
ONSET: ON-GOING

## 2019-05-27 ASSESSMENT — PAIN DESCRIPTION - PAIN TYPE
TYPE: ACUTE PAIN
TYPE: ACUTE PAIN;SURGICAL PAIN
TYPE: ACUTE PAIN
TYPE: ACUTE PAIN;SURGICAL PAIN

## 2019-05-27 ASSESSMENT — PAIN DESCRIPTION - LOCATION
LOCATION: LEG
LOCATION: LEG
LOCATION: BACK
LOCATION: BACK;LEG

## 2019-05-27 ASSESSMENT — PAIN SCALES - GENERAL
PAINLEVEL_OUTOF10: 7
PAINLEVEL_OUTOF10: 8
PAINLEVEL_OUTOF10: 8
PAINLEVEL_OUTOF10: 10
PAINLEVEL_OUTOF10: 10
PAINLEVEL_OUTOF10: 0
PAINLEVEL_OUTOF10: 10
PAINLEVEL_OUTOF10: 8
PAINLEVEL_OUTOF10: 10
PAINLEVEL_OUTOF10: 10

## 2019-05-27 ASSESSMENT — PAIN DESCRIPTION - PROGRESSION
CLINICAL_PROGRESSION: GRADUALLY WORSENING

## 2019-05-27 ASSESSMENT — PAIN DESCRIPTION - FREQUENCY
FREQUENCY: CONTINUOUS

## 2019-05-27 ASSESSMENT — PAIN DESCRIPTION - ORIENTATION
ORIENTATION: LOWER;LEFT
ORIENTATION: LEFT
ORIENTATION: LEFT
ORIENTATION: LOWER;LEFT

## 2019-05-27 ASSESSMENT — PAIN - FUNCTIONAL ASSESSMENT
PAIN_FUNCTIONAL_ASSESSMENT: PREVENTS OR INTERFERES SOME ACTIVE ACTIVITIES AND ADLS
PAIN_FUNCTIONAL_ASSESSMENT: INTOLERABLE, UNABLE TO DO ANY ACTIVE OR PASSIVE ACTIVITIES
PAIN_FUNCTIONAL_ASSESSMENT: INTOLERABLE, UNABLE TO DO ANY ACTIVE OR PASSIVE ACTIVITIES
PAIN_FUNCTIONAL_ASSESSMENT: PREVENTS OR INTERFERES SOME ACTIVE ACTIVITIES AND ADLS

## 2019-05-27 NOTE — PROGRESS NOTES
Physical Therapy  Facility/Department: [facility] ACUTE REHAB UNIT  Daily Treatment Note  NAME: Storm Ennis  : 1950  MRN: [id number]    Date of Service: 2019    Discharge Recommendations:  Subacute/Skilled Nursing Facility   PT Equipment Recommendations  Equipment Needed: No  Other: defer    Patient Diagnosis(es): The encounter diagnosis was Staphylococcus aureus infection. has a past medical history of Arthritis, Back pain, DDD (degenerative disc disease), cervical, Diabetes mellitus ([address] Edema of both legs, GERD (gastroesophageal reflux disease), Hyperlipidemia, Hypertension, MRSA (methicillin resistant staph aureus) culture positive, Nausea in adult, Neuropathy, Vitamin B 12 deficiency, and Walking troubles. has a past surgical history that includes back surgery; Prostate surgery; shoulder surgery; Cystoscopy; knee surgery; Hemorrhoid surgery; eye surgery; Tonsillectomy; Cholecystectomy; Lumbar spine surgery (N/A, 2019); and REPAIR DURAL / CSF LEAK (N/A, 2019). Restrictions  Position Activity Restriction  Other position/activity restrictions: up with assistance  Subjective   General  Chart Reviewed: Yes  Additional Pertinent Hx: Pt is a 68 y.o. male admitted to ARU on 19 from [facility]. Pt initially admitted to [facility] on  with complaints of low back pain, fever, and chills. Family / Caregiver Present: No  Referring Practitioner: Dr. Anuj Kohli  Subjective  Subjective: Pt supine in bed upon therapist entry wtih minimal verbalizations. With encouragement, agreed to therapy session.    Pain Screening  Patient Currently in Pain: Yes(RN aware and notified, provided medication during therapy session)  Vital Signs  Patient Currently in Pain: Yes(RN aware and notified, provided medication during therapy session)       Objective   Bed mobility  Rolling to Left: Dependent/Total(tactile cues at UE/pelvis to initiate trunk with assist of 1-2 persons to complete)  Rolling to Right: Dependent/Total(tactile cues at UE/pelvis to initiate trunk with assist of 1-2 persons to complete)  Transfers  Bed to Chair: Dependent/Total(bed<->w/c with lj lift and A of 2 persons)                     Comment: Session 1: Pt log rolling with total A to don pants and to place lj pad. Pt transported from room to therapy gym with therapist pushing w/c. Once in therapy gym,  feet lowered and placed on stool. Pt reaching for 5 socks directly in front of him with maximal verbal cuing and tactile prompts on hand/arm to reach for sock and to then place in basket. Pt requiring total A to scoot/position in bed at EOS. 2nd session: Pt supine in bed with daughter and wife present. Pt appeared to be more alert. Pt requiring total A for rolling towards left and then to sit up on EOB. Once sitting on EOB, pt assisted in scooting his hips forward but required max A to do so. Pt sitting EOB with SBA for balance reaching for cones and placing on table positioned in front of him. Pt required frequent prompting and encouragement to participate in task. Required total assist x2 for sit to supine. Pt scooting himself up in bed by grabbing ahold of rails over head and pulling up in 2 separate attempts. AAROM (B) ankle df/pf and heel slides x 10. Pt left supine in bed with all needs within reach, family present, and bed alarm on. Assessment   Body structures, Functions, Activity limitations: Decreased functional mobility ; Decreased strength;Decreased endurance;Decreased coordination;Decreased ROM; Decreased balance; Increased Pain  Assessment: Pt continues to require two skilled therapist to assist in all functional mobility to increase safety and maximize outcomes. Pt with approved alertness and participation during seconds session today. Pt will continue to benefit from further skilled PT services to address the found impairments, improve safety prior to return home.     Treatment Diagnosis: decreased functional mobility due to debility  Patient Education: Importance of participation in therapy sessions. REQUIRES PT FOLLOW UP: Yes  Activity Tolerance  Activity Tolerance: Patient limited by pain; Patient limited by cognitive status       Goals  Short term goals  Time Frame for Short term goals: 1-2 weeks -ongoing  Short term goal 1: Pt will perform all bed mobility with ModA  Short term goal 2: Pt will perform sit to/from stand with RW and MaxA  Short term goal 3: Pt will perform squat pivot with MaxA  Short term goal 4: Pt will ambulate 10' with RW and MaxA  Short term goal 5: Pt will propel w/c 150' with supervision  Long term goals  Time Frame for Long term goals : 2-3 weeks -ongoing  Long term goal 1: Pt will perform all bed mobility with supervision  Long term goal 2: Pt will perform sit to/from stand with RW and supervision  Long term goal 3: Pt will perform squat pivot with supervision  Long term goal 4: Pt will ambulate 48' with RW and supervision  Long term goal 5: Pt will propel w/c 150' with Margarita  Long term goal 6: Pt will ascend/descend 2 stairs with BHR and Nicole  Patient Goals   Patient goals : \"To be able to do what I was doing before I got sick. \"    Plan    Plan  Times per week: 5x/week, 90 minutes a day  Current Treatment Recommendations: Strengthening, Transfer Training, Endurance Training, Neuromuscular Re-education, Patient/Caregiver Education & Training, ROM, Wheelchair Mobility Training, Equipment Evaluation, Education, & procurement, Balance Training, Gait Training, Home Exercise Program, Functional Mobility Training, Stair training, Safety Education & Training  Safety Devices  Type of devices: Bed alarm in place, Call light within reach, Left in bed     Therapy Time   Individual Concurrent Group Co-treatment   Time In       0845   Time Out       0945   Minutes       60   Timed Code Treatment Minutes: 60 Minutes      Second Session Therapy Time:   Individual Concurrent Group Co-treatment   Time In       1125   Time Out       1155   Minutes       30     Timed Code Treatment Minutes:  60+30    Total Treatment Minutes:  90 minutes    Alison Ervin, PT, DPT #[id number]

## 2019-05-27 NOTE — PROGRESS NOTES
Pt c/o 10/10 pain upon any movement. Pt able to move without pain. Dilaudid given for pain. Pt unable to tolerate therapy. Alert and oriented to self and situation and unable to name hospital without cues. Pt color natural, VSS. Dr Ann Dumont called about patient status and request for repeat MRI. Liver function panel ordered and neurosurgery consulted. Family at bedside and aware of consult. Pt laying flat I bed with call light in reach.

## 2019-05-27 NOTE — PROGRESS NOTES
Call to MD due to pain to L leg that is not achieving adequate relief with PO Oxycodone. Patient states he is having sharp pain to L leg that is a 10-11 on a 1-10 scale. Patient usually endorses pain to this leg but more so with movement. New order for Dilaudid IV 1 mg Q2 PRN.

## 2019-05-27 NOTE — PLAN OF CARE
Problem: Falls - Risk of:  Goal: Will remain free from falls  Description  Will remain free from falls  Note:   Remains free from falls. X2 assist with lj for transfers. Problem: Skin Integrity:  Goal: Will show no infection signs and symptoms  Description  Will show no infection signs and symptoms  Note:   Very limited with movement due to pain. Turned and repositioned Q 2 and PRN with pillow support. Heels floated on pillows for pressure reduction.

## 2019-05-27 NOTE — DISCHARGE INSTR - COC
Continuity of Care Form    Patient Name: Wilmer Nguyen   :  1950  MRN:  [id number]    Admit date:  2019  Discharge date:  ***    Code Status Order: Full Code   Advance Directives:   [facility] Documentation     Date/Time Healthcare Directive Type of Healthcare Directive Copy in [address] Agent's Name Healthcare Agent's Phone Number    19  Yes, patient has an advance directive for healthcare treatment  Durable power of  for health care  No, copy requested from family  Spouse  --  --          Admitting Physician:  Michaela Means MD  PCP: [facility]    Discharging Nurse: [facility] Unit/Room#: 3101/3101-01  Discharging Unit Phone Number: ***    Emergency Contact:   Extended Emergency Contact Information  Primary Emergency Contact: kishor teague  Home Phone: [phone]  Mobile Phone: [phone]  Relation: Spouse  Secondary Emergency Contact: Marii Forte  Mobile Phone: [phone]  Relation: Child    Past Surgical History:  Past Surgical History:   Procedure Laterality Date    BACK SURGERY      x 2    CHOLECYSTECTOMY      CYSTOSCOPY      EYE SURGERY      retina repair    HEMORRHOID SURGERY      KNEE SURGERY      LUMBAR SPINE SURGERY N/A 2019    DECOMPRESSION LAMINECTOMY L3-4 POSSIBLY L5 performed by Michelle Delgadillo MD at [place] [address]       retracted penis    REPAIR DURAL / CSF LEAK N/A 2019    LUMBAR WOUND WASHOUT performed by Michelle Delgadillo MD at [address] [place] [address]         Immunization History: There is no immunization history on file for this patient.     Active Problems:  Patient Active Problem List   Diagnosis Code    Lumbar spinal stenosis M48.061    Degenerative lumbar spinal stenosis M48.061    Lumbar stenosis with neurogenic claudication M48.062    Back abscess L02.212    Sepsis ([address] A41.9    Surgical site infection T81.49XA    Staphylococcus aureus infection A49.01    Bacteremia due to methicillin susceptible Staphylococcus aureus (MSSA) R78.81    Debility R53.81       Isolation/Infection:   Isolation          No Isolation        Patient Infection Status     Infection Encounter Level? Onset Date Added Added By Resolved Resolved By Review Date    MRSA No 05/09/19 05/11/19 Wound Culture 05/21/19 Chrissy Cuenca RN           Nurse Assessment:  Last Vital Signs: BP (!) 162/89   Pulse 80   Temp 97.9 °F (36.6 °C) (Oral)   Resp 18   Ht 6' 1\" (1.854 m)   Wt (!) 332 lb 3.2 oz (150.7 kg)   SpO2 96%   BMI 43.83 kg/m²     Last documented pain score (0-10 scale): Pain Level: 10  Last Weight:   Wt Readings from Last 1 Encounters:   05/27/19 (!) 332 lb 3.2 oz (150.7 kg)     Mental Status:  {IP PT MENTAL STATUS:67066}    IV Access:  { VALERI IV ACCESS:116400584}    Nursing Mobility/ADLs:  Walking   {[facility] DME GACT:667868184}  Transfer  {[facility] D[facility] AAEI:644165664}  Bathing  {[facility] D[facility] VLKE:714873802}  Dressing  {[facility] DME TCTE:435095467}  Toileting  {[facility] DME RORL:286666074}  Feeding  {[facility] DME UFXJ:048102506}  Med Admin  {[facility] D[facility] SHIT:046013792}  Med Delivery   { VALERI MED Delivery:972914115}    Wound Care Documentation and Therapy:  Wound 05/08/19 Toe (Comment  which one) Anterior;Right Big toe (Active)   Wound Traumatic 5/10/2019  5:05 PM   Dressing Status Clean;Dry; Intact 5/17/2019  4:12 PM   Dressing Changed Changed/New 5/12/2019  5:06 PM   Dressing/Treatment Open to air 5/26/2019  9:30 PM   Wound Assessment Other (Comment) 5/26/2019  9:30 PM   Drainage Amount None 5/25/2019  8:10 AM   Drainage Description GUME 5/13/2019  4:00 PM   Odor None 5/25/2019  8:10 AM   Tequila-wound Assessment GUME 5/15/2019  4:00 PM   Number of days: 19        Elimination:  Continence:   · Bowel: {YES / QU:96497}  · Bladder: {YES / US:71122}  Urinary Catheter: {Urinary Catheter:396847438}   Colostomy/Ileostomy/Ileal Conduit: {YES / XY:03185}       Date of Last BM: Discharge: ***    Physician Certification: I certify the above information and transfer of Becky Kimball  is necessary for the continuing treatment of the diagnosis listed and that he requires {Admit to Appropriate Level of Care:98526} for {GREATER/LESS:218654700} 30 days.      Update Admission H&P: {CHP DME Changes in OQF:968139054}    PHYSICIAN SIGNATURE:  {Esignature:778759585}

## 2019-05-27 NOTE — PROGRESS NOTES
Occupational Therapy  Facility/Department: [facility] ACUTE REHAB UNIT  Daily Treatment Note  NAME: Rosie Martinez  : 1950  MRN: [id number]    Date of Service: 2019    Discharge Recommendations:  Subacute/Skilled Nursing Facility  OT Equipment Recommendations  Equipment Needed: No  Other: defer to next level of care    Assessment   Performance deficits / Impairments: Decreased functional mobility ; Decreased high-level IADLs;Decreased ADL status; Decreased endurance;Decreased strength;Decreased balance  Assessment: Despite demonstration of ability to complete UE movement while stretching in bed, patient demonstrate decreased ability to attend and participate in functional reaching task while seated. Continues to require total assist for bed mobility and functional transfers. Would continue to benefit from OT services, cont POC. Treatment Diagnosis: impaired ADLs /functional transfers /decreased endurance 2/2 back infection/ sepsis   Prognosis: Guarded  Patient Education: activity promotion - limited understanding  REQUIRES OT FOLLOW UP: Yes  Activity Tolerance  Activity Tolerance: Patient limited by pain  Safety Devices  Safety Devices in place: Yes  Type of devices: Call light within reach; Bed alarm in place; Left in bed;Nurse notified         Patient Diagnosis(es): The encounter diagnosis was Staphylococcus aureus infection. has a past medical history of Arthritis, Back pain, DDD (degenerative disc disease), cervical, Diabetes mellitus ([address] Edema of both legs, GERD (gastroesophageal reflux disease), Hyperlipidemia, Hypertension, MRSA (methicillin resistant staph aureus) culture positive, Nausea in adult, Neuropathy, Vitamin B 12 deficiency, and Walking troubles. has a past surgical history that includes back surgery; Prostate surgery; shoulder surgery; Cystoscopy; knee surgery; Hemorrhoid surgery; eye surgery; Tonsillectomy;  Cholecystectomy; Lumbar spine surgery (N/A, 2019); and REPAIR DURAL / CSF LEAK (N/A, 5/12/2019). Restrictions  Position Activity Restriction  Other position/activity restrictions: up with assistance  Subjective   General  Chart Reviewed: Yes  Patient assessed for rehabilitation services?: Yes  Additional Pertinent Hx: Admit 5/8 to ICU with sepsis vs back abcess       Neurosx consult - to OR 5/12 s/p I&D back wound with draining of abcess. Admitted to [facility] ARU on 5/17. PMHX: recent  DECOMPRESSION LAMINECTOMY L3-4 POSSIBLY L5.  on 4/19 /19   OA, DDD, DM,edema BLE,HTN, HLD and neuropathy   Response to previous treatment: Patient with no complaints from previous session  Family / Caregiver Present: No  Referring Practitioner: Nilson  Diagnosis: Back abcess s/p I&D of back wound   Subjective  Subjective: Patient in bed upon arrival, agreeable to therapy with encouragement. Increased confusion noted. \"Oh God [phone] [id number] me. \"   General Comment  Comments: Ruben Phillips Vital Signs  Patient Currently in Pain: Yes(RN notified and aware, provided pain meds during session)   Orientation  Orientation  Overall Orientation Status: Impaired  Objective    ADL  LE Dressing: Dependent/Total(don pants while supine in bed; assist for all tasks)  Standing Balance  Comment: Completed functional reaching task while seated in w/c in order to promote increased anterior weightshift and engagement in functional activity. Repeated one-step cues required for attention to task, as patient demo increased eye closure and peserveration on pain rather than attempting activity. Provided clear target (grasp and place x5 socks) and clear reward (then you can return to bed), but continued to require increased time, v/c and assist to complete. Tactile cues at UE to initiate reach required on 2/5 trials. Required over 15 min in order to complete task.     Functional Mobility  Functional - Mobility Device: Wheelchair  Activity: To/From therapy gym  Assist Level: Dependent/Total  Functional Mobility Comments: Pt reported independence with ADLs and functional mobility- progressing, continue  Long term goals  Time Frame for Long term goals : 3 weeks  Long term goal 1: Pt will be SBA with toilet transfer and toileting - ongoing  Long term goal 2: Pt will bed SBA with LE dressing with use of adap equip as needed - ongoing  Long term goal 3: Pt will be SBA with bathing with use of long handle sponge and adaptive techniques as needed - ongoing  Long term goal 4: Pt will tolerated standing x5 minutes to demo improved functional activity tolerance - ongoing  Long term goal 5: Pt will be MOD I with BUE HEP to increase strength to facilitate independence with ADLs and functional mobility - ongoing  Patient Goals   Patient goals : Go home, return to prior level of functioning       Therapy Time   Individual Concurrent Group Co-treatment   Time In       0845   Time Out       0945   Minutes       60        Second Session Therapy Time:   Individual Concurrent Group Co-treatment   Time In      1125   Time Out      1155   Minutes      30     Timed Code Treatment Minutes:  60+30    Total Treatment Minutes:  90 min    BRADEN MERINO [facility], OTR/L #[id number]

## 2019-05-27 NOTE — CONSULTS
Neurology Consultation Note    Patient: Wilmer Nguyen MRN: [id number]    YOB: 1950  Age: 68 y.o. Sex: male   Unit: [facility] ACUTE REHAB UNIT Room/Bed: 3101/3101-01 Location: [address] [place] [address]    Date of Consultation: 5/27/2019  Date of Admission: 5/17/2019  7:24 PM ( LOS: 10 days )  Admitting Physician: Floyce Ahumada    Primary Care Physician: EMIGDIO HARRELL   Consult Requested By: Reena Martin MD     Reason for Consult: \"encephalopathy\"    ASSESSMENT & RECOMMENDATIONS     Assessment  - Concerns have been over encephalopathy but pt has just received Dilaudid 3x today, the most recent dose just prior to my visit. He was not getting Dilaudid until today  - Therefore, I cannot get an accurate assessment of his deficit   - Otherwise, narcotics had been weaned somewhat over his time since admission to acute rehab  - Nonetheless, the overall clinical picture is consistent with delirum which is likely multifactorial including medication induced, severe pain induced, prolonged hospitalization induced, metabolically induced (eg, severely & persistently hyperglycemic), and potentially sleep deprivation induced (considering he is receiving many of his prn pain meds in the middle of the night).   - Additionally, there may be other metabolic factors playing a role which need to be checked  - Certainly cannot rule out return of/persistent infection and will have to defer to ID and/or neurosurgery to assess for this, although he has had no fevers and has no leukocytosis and nursing documentation notes back wound to be clean and with no drainage.  - Overall clinical picture is inconsistent with a new primary neurologic etiology (eg, CNS infection, infarction, seizure, etc) and would consider contributions of his known issues or issues related to these known issues first    Recommendations  - Consider involvement of hospitalist and/or transfer back to medical floor since unable to participate in rehab in this this problem or anything similar in his family members. his chronic medical conditions include, but are not limited to, his DM which is under reasonable control on his current regimen. he also has a history of vitamin B12 deficiency which is under unknown control as no b12 level is on record here. he also has a history of GERD which is under good control on his current regimen. Past Medical History:   has a past medical history of Arthritis, Back pain, DDD (degenerative disc disease), cervical, Diabetes mellitus ([address] Edema of both legs, GERD (gastroesophageal reflux disease), Hyperlipidemia, Hypertension, MRSA (methicillin resistant staph aureus) culture positive, Nausea in adult, Neuropathy, Vitamin B 12 deficiency, and Walking troubles. Past Surgical History:  Past Surgical History:   Procedure Laterality Date    BACK SURGERY      x 2    CHOLECYSTECTOMY      CYSTOSCOPY      EYE SURGERY      retina repair    HEMORRHOID SURGERY      KNEE SURGERY      LUMBAR SPINE SURGERY N/A 2019    DECOMPRESSION LAMINECTOMY L3-4 POSSIBLY L5 performed by Cinda Bustos MD at [place] [id number]       retracted penis    REPAIR DURAL / CSF LEAK N/A 2019    LUMBAR WOUND WASHOUT performed by Cinda Bustos MD at [facility]         Family History:  family history includes Arthritis in his father and mother; Diabetes in his father; Heart Disease in his father; High Blood Pressure in his father; Stroke in his father. Social History:  he reports that he has never smoked. He has never used smokeless tobacco. He reports that he drinks alcohol. He reports that he has current or past drug history. Medications:  No Known Allergies    Medications Prior to Admission: [] oxyCODONE-acetaminophen (PERCOCET) 5-325 MG per tablet, Take 1 tablet by mouth every 4 hours as needed for Pain for up to 3 days.   methocarbamol (ROBAXIN) 750 MG tablet, Take 2 tablets by mouth 3 times daily for 10 days  vitamin B-1 100 MG tablet, Take 1 tablet by mouth daily  amLODIPine (NORVASC) 2.5 MG tablet, Take 2.5 mg by mouth daily  atorvastatin (LIPITOR) 20 MG tablet, Take 20 mg by mouth daily  empagliflozin (JARDIANCE) 25 MG tablet, Take 12.5 mg by mouth daily   fenofibrate (TRICOR) 145 MG tablet, Take 145 mg by mouth daily  lisinopril (PRINIVIL;ZESTRIL) 20 MG tablet, Take 20 mg by mouth daily  metFORMIN (GLUCOPHAGE) 1000 MG tablet, Take 1,000 mg by mouth 2 times daily (with meals)  tamsulosin (FLOMAX) 0.4 MG capsule, Take 0.4 mg by mouth daily  famotidine (PEPCID) 20 MG tablet, Take 1 tablet by mouth 2 times daily  [] enoxaparin (LOVENOX) 150 MG/ML injection, Inject 1.06 mLs into the skin every 12 hours for 7 days  glimepiride (AMARYL) 4 MG tablet, Take 8 mg by mouth every morning (before breakfast)   insulin NPH (HUMULIN N;NOVOLIN N) 100 UNIT/ML injection vial, Inject 20 Units into the skin 2 times daily (before meals)    Review of Systems:  (+) fevers; (+) fatigue; (+) general malaise; no visual changes/disturbances; no SOB; no cough; no CP; no palpitations; no abdominal pain; no nausea; no vomiting; no dyspepsia; no diarrhea; no constipation; no urinary complaints; no skin lesions/rashes; (+) myalgias; (+) arthralgias; no changes in mood/personality; neuro & others as per HPI    OBJECTIVE     Vitals:  Patient Vitals for the past 36 hrs:   BP Temp Temp src Pulse Resp SpO2 Weight   19 0812 (!) 162/89 97.9 °F (36.6 °C) Oral 80 18 96 % --   19 0545 -- -- -- -- -- -- (!) 332 lb 3.2 oz (150.7 kg)   19 2130 (!) 156/80 98.1 °F (36.7 °C) Oral 79 16 96 % --   19 0822 (!) 151/90 97.7 °F (36.5 °C) Oral 74 17 95 % --   19 0600 -- -- -- -- -- -- (!) 332 lb 0.2 oz (150.6 kg)     Neurological Exam (performed on 2019 at ~1530):  -Mental status: Asleep. Arouses minimally with persistent stim. Will follow commands. Will answer yes/no questions appropriately.  Will not answer open-ended questions  -Memory: N/A  -Attention: Poor/None  -Fund of Knowledge: presumed good at baseline  -Speech & Language: no suggestion of aphasia; (+) dysarthria (poor effort)  -Cranial nerves: pupils 2mm->1mm bilaterally; fields grossly intact; unable to visualize fundi; EOMI (but pt will not fully cooperate), no nystagmus; sensation grossly intact V1, V2, V3; no notable facial asymmetry; hearing grossly intact bilaterally; cannot assess mouth/pharynx; SCMs & trapezii intact bilaterally; tongue midline  -Sensory: grossly intact to lt touch throughout  -Motor:  is strong, but gives poor overall effort; able to move feet fully; able to give strong isometric contractions of quads (too painful to move or lift legs)  -Tone: Normal throughout  -Reflexes: trace in UEs, refused in LEs, refused Babinski  -Coordination: deferred  -[address]: unable  -Other: no adventitious movements noted  Other Systems  -General Appearance: well-developed, well-nourished, no apparent distress  -Neck: supple  -Lungs: breathing unlabored, regular, no audible wheezes  -CV: pulses strong x4 extremities  -Abd: flat  -Extrem: no c/c/e    Imaging: All reports below personally reviewed & actual images reviewed where indicated. Pertinent positives & negatives are addressed in Assessment & Plan section of note  NONE pertinent    Cultures:  NONE    Laboratory Review: All results below personally reviewed.  Pertinent positives & negatives are addressed in Assessment & Plan section of note  Recent Results (from the past 36 hour(s))   Basic Metabolic Panel    Collection Time: 05/26/19  4:45 AM   Result Value Ref Range    Sodium 131 (L) 136 - 145 mmol/L    Potassium 3.9 3.5 - 5.1 mmol/L    Chloride 94 (L) 99 - 110 mmol/L    CO2 25 21 - 32 mmol/L    Anion Gap 12 3 - 16    Glucose 219 (H) 70 - 99 mg/dL    BUN 17 7 - 20 mg/dL    CREATININE 0.6 (L) 0.8 - 1.3 mg/dL    GFR Non-African American >60 >60    GFR  >60 >60 Calcium 9.2 8.3 - 10.6 mg/dL   CBC    Collection Time: 05/26/19  4:45 AM   Result Value Ref Range    WBC 6.3 4.0 - 11.0 K/uL    RBC 3.74 (L) 4.20 - 5.90 M/uL    Hemoglobin 10.7 (L) 13.5 - 17.5 g/dL    Hematocrit 32.8 (L) 40.5 - 52.5 %    MCV 87.8 80.0 - 100.0 fL    MCH 28.7 26.0 - 34.0 pg    MCHC 32.6 31.0 - 36.0 g/dL    RDW 15.0 12.4 - 15.4 %    Platelets 371 (H) 229 - 450 K/uL    MPV 7.0 5.0 - 10.5 fL   POCT Glucose    Collection Time: 05/26/19  7:40 AM   Result Value Ref Range    POC Glucose 197 (H) 70 - 99 mg/dl    Performed on ACCU-CHEK    POCT Glucose    Collection Time: 05/26/19 11:55 AM   Result Value Ref Range    POC Glucose 215 (H) 70 - 99 mg/dl    Performed on ACCU-CHEK    POCT Glucose    Collection Time: 05/26/19  5:33 PM   Result Value Ref Range    POC Glucose 219 (H) 70 - 99 mg/dl    Performed on ACCU-CHEK    POCT Glucose    Collection Time: 05/26/19  9:40 PM   Result Value Ref Range    POC Glucose 192 (H) 70 - 99 mg/dl    Performed on ACCU-CHEK    Basic Metabolic Panel    Collection Time: 05/27/19  5:54 AM   Result Value Ref Range    Sodium 131 (L) 136 - 145 mmol/L    Potassium 4.0 3.5 - 5.1 mmol/L    Chloride 95 (L) 99 - 110 mmol/L    CO2 24 21 - 32 mmol/L    Anion Gap 12 3 - 16    Glucose 215 (H) 70 - 99 mg/dL    BUN 17 7 - 20 mg/dL    CREATININE 0.6 (L) 0.8 - 1.3 mg/dL    GFR Non-African American >60 >60    GFR African American >60 >60    Calcium 9.1 8.3 - 10.6 mg/dL   CBC    Collection Time: 05/27/19  5:54 AM   Result Value Ref Range    WBC 6.0 4.0 - 11.0 K/uL    RBC 3.98 (L) 4.20 - 5.90 M/uL    Hemoglobin 11.2 (L) 13.5 - 17.5 g/dL    Hematocrit 34.5 (L) 40.5 - 52.5 %    MCV 86.7 80.0 - 100.0 fL    MCH 28.1 26.0 - 34.0 pg    MCHC 32.4 31.0 - 36.0 g/dL    RDW 14.9 12.4 - 15.4 %    Platelets 659 (H) 121 - 450 K/uL    MPV 6.7 5.0 - 10.5 fL   POCT Glucose    Collection Time: 05/27/19  7:34 AM   Result Value Ref Range    POC Glucose 197 (H) 70 - 99 mg/dl    Performed on ACCU-CHEK    POCT Glucose Collection Time: 05/27/19 12:09 PM   Result Value Ref Range    POC Glucose 211 (H) 70 - 99 mg/dl    Performed on ACCU-CHEK        Scheduled Meds:   VENELEX   Topical BID    methocarbamol  500 mg Oral TID    lisinopril  30 mg Oral Daily    insulin lispro  0-12 Units Subcutaneous TID WC    lidocaine 1 % injection  5 mL Intradermal Once    sodium chloride flush  10 mL Intravenous 2 times per day    acetaminophen  650 mg Oral 4x Daily    amLODIPine  2.5 mg Oral Daily    atorvastatin  20 mg Oral Nightly    cefTRIAXone (ROCEPHIN) IV  2 g Intravenous Q12H    famotidine  20 mg Oral BID    fenofibrate  160 mg Oral Daily    polyethylene glycol  17 g Oral Daily    sennosides-docusate sodium  2 tablet Oral BID    tamsulosin  0.4 mg Oral Daily    thiamine  100 mg Oral Daily       Continuous Infusions:    dextrose        PRN Meds:    HYDROmorphone 1 mg Q2H PRN   sodium chloride flush 10 mL PRN   oxyCODONE 5 mg Q4H PRN   Or     oxyCODONE 10 mg Q4H PRN   magnesium hydroxide 30 mL Daily PRN   bisacodyl 10 mg Daily PRN   calcium carbonate 500 mg TID PRN   dextrose 100 mL/hr PRN   dextrose 12.5 g PRN   diphenhydrAMINE 25 mg Q6H PRN   glucagon (rDNA) 1 mg PRN   glucose 15 g PRN   hydrALAZINE 50 mg Q8H PRN   naloxone 0.4 mg PRN   ondansetron 4 mg Q8H PRN   sodium chloride flush 10 mL PRN   traZODone 50 mg Nightly PRN             Discussed at length with patient; pt's family; and nursing    Nieves Leon M.D.   Neurology  May 27, 2019

## 2019-05-28 ENCOUNTER — HOSPITAL ENCOUNTER (INPATIENT)
Age: 69
LOS: 6 days | Discharge: NURSING, REHAB OR LONG-TERM CARE | DRG: 092 | End: 2019-06-03
Attending: INTERNAL MEDICINE
Payer: MEDICARE

## 2019-05-28 VITALS
HEART RATE: 77 BPM | OXYGEN SATURATION: 93 % | BODY MASS INDEX: 41.75 KG/M2 | RESPIRATION RATE: 18 BRPM | DIASTOLIC BLOOD PRESSURE: 79 MMHG | SYSTOLIC BLOOD PRESSURE: 150 MMHG | WEIGHT: 315 LBS | HEIGHT: 73 IN | TEMPERATURE: 97.9 F

## 2019-05-28 DIAGNOSIS — L02.212 BACK ABSCESS: Primary | ICD-10-CM

## 2019-05-28 PROBLEM — G93.40 ENCEPHALOPATHY: Status: ACTIVE | Noted: 2019-05-28

## 2019-05-28 PROBLEM — M54.50 LOW BACK PAIN: Status: ACTIVE | Noted: 2019-05-28

## 2019-05-28 LAB
ALBUMIN SERPL-MCNC: 3.4 G/DL (ref 3.4–5)
ALP BLD-CCNC: 89 U/L (ref 40–129)
ALT SERPL-CCNC: 13 U/L (ref 10–40)
ANION GAP SERPL CALCULATED.3IONS-SCNC: 13 MMOL/L (ref 3–16)
AST SERPL-CCNC: 14 U/L (ref 15–37)
BILIRUB SERPL-MCNC: 0.3 MG/DL (ref 0–1)
BILIRUBIN DIRECT: <0.2 MG/DL (ref 0–0.3)
BILIRUBIN, INDIRECT: ABNORMAL MG/DL (ref 0–1)
BUN BLDV-MCNC: 25 MG/DL (ref 7–20)
CALCIUM SERPL-MCNC: 9.3 MG/DL (ref 8.3–10.6)
CHLORIDE BLD-SCNC: 98 MMOL/L (ref 99–110)
CO2: 24 MMOL/L (ref 21–32)
CREAT SERPL-MCNC: 0.7 MG/DL (ref 0.8–1.3)
GFR AFRICAN AMERICAN: >60
GFR NON-AFRICAN AMERICAN: >60
GLUCOSE BLD-MCNC: 176 MG/DL (ref 70–99)
GLUCOSE BLD-MCNC: 186 MG/DL (ref 70–99)
GLUCOSE BLD-MCNC: 190 MG/DL (ref 70–99)
GLUCOSE BLD-MCNC: 199 MG/DL (ref 70–99)
GLUCOSE BLD-MCNC: 221 MG/DL (ref 70–99)
HCT VFR BLD CALC: 34.1 % (ref 40.5–52.5)
HEMOGLOBIN: 11 G/DL (ref 13.5–17.5)
INR BLD: 1.43 (ref 0.86–1.14)
MCH RBC QN AUTO: 28.4 PG (ref 26–34)
MCHC RBC AUTO-ENTMCNC: 32.3 G/DL (ref 31–36)
MCV RBC AUTO: 87.9 FL (ref 80–100)
PDW BLD-RTO: 15.1 % (ref 12.4–15.4)
PERFORMED ON: ABNORMAL
PLATELET # BLD: 606 K/UL (ref 135–450)
PMV BLD AUTO: 6.7 FL (ref 5–10.5)
POTASSIUM SERPL-SCNC: 4.3 MMOL/L (ref 3.5–5.1)
PROTHROMBIN TIME: 16.3 SEC (ref 9.8–13)
RBC # BLD: 3.88 M/UL (ref 4.2–5.9)
SODIUM BLD-SCNC: 135 MMOL/L (ref 136–145)
TOTAL PROTEIN: 7.3 G/DL (ref 6.4–8.2)
WBC # BLD: 5.1 K/UL (ref 4–11)

## 2019-05-28 PROCEDURE — 2580000003 HC RX 258: Performed by: STUDENT IN AN ORGANIZED HEALTH CARE EDUCATION/TRAINING PROGRAM

## 2019-05-28 PROCEDURE — 1200000000 HC SEMI PRIVATE

## 2019-05-28 PROCEDURE — 6370000000 HC RX 637 (ALT 250 FOR IP): Performed by: INTERNAL MEDICINE

## 2019-05-28 PROCEDURE — 94760 N-INVAS EAR/PLS OXIMETRY 1: CPT

## 2019-05-28 PROCEDURE — 97110 THERAPEUTIC EXERCISES: CPT

## 2019-05-28 PROCEDURE — 6370000000 HC RX 637 (ALT 250 FOR IP): Performed by: STUDENT IN AN ORGANIZED HEALTH CARE EDUCATION/TRAINING PROGRAM

## 2019-05-28 PROCEDURE — 80076 HEPATIC FUNCTION PANEL: CPT

## 2019-05-28 PROCEDURE — 6360000002 HC RX W HCPCS: Performed by: STUDENT IN AN ORGANIZED HEALTH CARE EDUCATION/TRAINING PROGRAM

## 2019-05-28 PROCEDURE — 97535 SELF CARE MNGMENT TRAINING: CPT

## 2019-05-28 PROCEDURE — 85027 COMPLETE CBC AUTOMATED: CPT

## 2019-05-28 PROCEDURE — 6370000000 HC RX 637 (ALT 250 FOR IP): Performed by: PHYSICAL MEDICINE & REHABILITATION

## 2019-05-28 PROCEDURE — 97530 THERAPEUTIC ACTIVITIES: CPT

## 2019-05-28 PROCEDURE — 85610 PROTHROMBIN TIME: CPT

## 2019-05-28 PROCEDURE — 6360000002 HC RX W HCPCS: Performed by: PHYSICAL MEDICINE & REHABILITATION

## 2019-05-28 PROCEDURE — 80048 BASIC METABOLIC PNL TOTAL CA: CPT

## 2019-05-28 RX ORDER — ATORVASTATIN CALCIUM 20 MG/1
20 TABLET, FILM COATED ORAL NIGHTLY
Status: DISCONTINUED | OUTPATIENT
Start: 2019-05-28 | End: 2019-06-03 | Stop reason: HOSPADM

## 2019-05-28 RX ORDER — METHOCARBAMOL 500 MG/1
500 TABLET, FILM COATED ORAL 3 TIMES DAILY
Status: DISCONTINUED | OUTPATIENT
Start: 2019-05-28 | End: 2019-05-29

## 2019-05-28 RX ORDER — POLYETHYLENE GLYCOL 3350 17 G/17G
17 POWDER, FOR SOLUTION ORAL DAILY
Status: DISCONTINUED | OUTPATIENT
Start: 2019-05-29 | End: 2019-06-03 | Stop reason: HOSPADM

## 2019-05-28 RX ORDER — HYDROCHLOROTHIAZIDE 12.5 MG/1
12.5 CAPSULE, GELATIN COATED ORAL DAILY
COMMUNITY

## 2019-05-28 RX ORDER — CEFTRIAXONE 2 G/1
2 INJECTION, POWDER, FOR SOLUTION INTRAMUSCULAR; INTRAVENOUS 2 TIMES DAILY
Status: DISCONTINUED | OUTPATIENT
Start: 2019-05-28 | End: 2019-05-28 | Stop reason: SDUPTHER

## 2019-05-28 RX ORDER — SENNA AND DOCUSATE SODIUM 50; 8.6 MG/1; MG/1
2 TABLET, FILM COATED ORAL 2 TIMES DAILY
Status: DISCONTINUED | OUTPATIENT
Start: 2019-05-28 | End: 2019-06-03 | Stop reason: HOSPADM

## 2019-05-28 RX ORDER — HYDRALAZINE HYDROCHLORIDE 20 MG/ML
5 INJECTION INTRAMUSCULAR; INTRAVENOUS EVERY 6 HOURS PRN
Status: DISCONTINUED | OUTPATIENT
Start: 2019-05-28 | End: 2019-06-03 | Stop reason: HOSPADM

## 2019-05-28 RX ORDER — SODIUM CHLORIDE 0.9 % (FLUSH) 0.9 %
10 SYRINGE (ML) INJECTION PRN
Status: DISCONTINUED | OUTPATIENT
Start: 2019-05-28 | End: 2019-06-03 | Stop reason: HOSPADM

## 2019-05-28 RX ORDER — NICOTINE POLACRILEX 4 MG
15 LOZENGE BUCCAL PRN
Status: DISCONTINUED | OUTPATIENT
Start: 2019-05-28 | End: 2019-06-03 | Stop reason: HOSPADM

## 2019-05-28 RX ORDER — DEXTROSE MONOHYDRATE 50 MG/ML
100 INJECTION, SOLUTION INTRAVENOUS PRN
Status: DISCONTINUED | OUTPATIENT
Start: 2019-05-28 | End: 2019-06-03 | Stop reason: HOSPADM

## 2019-05-28 RX ORDER — HYDRALAZINE HYDROCHLORIDE 50 MG/1
50 TABLET, FILM COATED ORAL 3 TIMES DAILY
Status: ON HOLD | COMMUNITY
End: 2019-05-28 | Stop reason: CLARIF

## 2019-05-28 RX ORDER — SODIUM CHLORIDE 0.9 % (FLUSH) 0.9 %
10 SYRINGE (ML) INJECTION EVERY 12 HOURS SCHEDULED
Status: DISCONTINUED | OUTPATIENT
Start: 2019-05-28 | End: 2019-06-03 | Stop reason: HOSPADM

## 2019-05-28 RX ORDER — AMLODIPINE BESYLATE 2.5 MG/1
2.5 TABLET ORAL DAILY
Status: DISCONTINUED | OUTPATIENT
Start: 2019-05-29 | End: 2019-06-03 | Stop reason: HOSPADM

## 2019-05-28 RX ORDER — POLYETHYLENE GLYCOL 3350 17 G/17G
17 POWDER, FOR SOLUTION ORAL DAILY
COMMUNITY

## 2019-05-28 RX ORDER — LISINOPRIL 20 MG/1
30 TABLET ORAL DAILY
COMMUNITY

## 2019-05-28 RX ORDER — ACETAMINOPHEN 325 MG/1
650 TABLET ORAL 4 TIMES DAILY
COMMUNITY

## 2019-05-28 RX ORDER — AMOXICILLIN 250 MG
2 CAPSULE ORAL 2 TIMES DAILY
COMMUNITY

## 2019-05-28 RX ORDER — TAMSULOSIN HYDROCHLORIDE 0.4 MG/1
0.4 CAPSULE ORAL DAILY
Status: DISCONTINUED | OUTPATIENT
Start: 2019-05-29 | End: 2019-06-03 | Stop reason: HOSPADM

## 2019-05-28 RX ORDER — THIAMINE MONONITRATE (VIT B1) 100 MG
100 TABLET ORAL DAILY
Status: DISCONTINUED | OUTPATIENT
Start: 2019-05-29 | End: 2019-06-03 | Stop reason: HOSPADM

## 2019-05-28 RX ORDER — FENOFIBRATE 160 MG/1
160 TABLET ORAL DAILY
Status: DISCONTINUED | OUTPATIENT
Start: 2019-05-29 | End: 2019-06-03 | Stop reason: HOSPADM

## 2019-05-28 RX ORDER — DEXTROSE MONOHYDRATE 25 G/50ML
12.5 INJECTION, SOLUTION INTRAVENOUS PRN
Status: DISCONTINUED | OUTPATIENT
Start: 2019-05-28 | End: 2019-06-03 | Stop reason: HOSPADM

## 2019-05-28 RX ORDER — METHOCARBAMOL 750 MG/1
750 TABLET, FILM COATED ORAL 3 TIMES DAILY
Status: DISCONTINUED | OUTPATIENT
Start: 2019-05-28 | End: 2019-05-28

## 2019-05-28 RX ORDER — METHOCARBAMOL 500 MG/1
500 TABLET, FILM COATED ORAL 3 TIMES DAILY
Status: ON HOLD | COMMUNITY
End: 2019-05-31 | Stop reason: HOSPADM

## 2019-05-28 RX ORDER — OXYCODONE HYDROCHLORIDE 5 MG/1
10 TABLET ORAL EVERY 4 HOURS PRN
Status: DISCONTINUED | OUTPATIENT
Start: 2019-05-28 | End: 2019-06-03 | Stop reason: HOSPADM

## 2019-05-28 RX ORDER — CEFTRIAXONE 2 G/1
2 INJECTION, POWDER, FOR SOLUTION INTRAMUSCULAR; INTRAVENOUS 2 TIMES DAILY
COMMUNITY
End: 2019-09-16 | Stop reason: ALTCHOICE

## 2019-05-28 RX ORDER — FAMOTIDINE 20 MG/1
20 TABLET, FILM COATED ORAL 2 TIMES DAILY
Status: DISCONTINUED | OUTPATIENT
Start: 2019-05-28 | End: 2019-06-03 | Stop reason: HOSPADM

## 2019-05-28 RX ORDER — OXYCODONE HYDROCHLORIDE 5 MG/1
5-10 TABLET ORAL EVERY 4 HOURS PRN
Status: ON HOLD | COMMUNITY
End: 2019-06-03 | Stop reason: SDUPTHER

## 2019-05-28 RX ORDER — OXYCODONE HYDROCHLORIDE 5 MG/1
5 TABLET ORAL EVERY 4 HOURS PRN
Status: DISCONTINUED | OUTPATIENT
Start: 2019-05-28 | End: 2019-06-03 | Stop reason: HOSPADM

## 2019-05-28 RX ADMIN — POLYETHYLENE GLYCOL (3350) 17 G: 17 POWDER, FOR SOLUTION ORAL at 08:22

## 2019-05-28 RX ADMIN — OXYCODONE HYDROCHLORIDE 5 MG: 5 TABLET ORAL at 13:58

## 2019-05-28 RX ADMIN — FAMOTIDINE 20 MG: 20 TABLET ORAL at 21:02

## 2019-05-28 RX ADMIN — INSULIN LISPRO 2 UNITS: 100 INJECTION, SOLUTION INTRAVENOUS; SUBCUTANEOUS at 08:25

## 2019-05-28 RX ADMIN — METHOCARBAMOL TABLETS 500 MG: 500 TABLET, COATED ORAL at 08:23

## 2019-05-28 RX ADMIN — HYDROMORPHONE HYDROCHLORIDE 1 MG: 1 INJECTION, SOLUTION INTRAMUSCULAR; INTRAVENOUS; SUBCUTANEOUS at 03:27

## 2019-05-28 RX ADMIN — SENNOSIDES,DOCUSATE SODIUM 2 TABLET: 8.6; 5 TABLET, FILM COATED ORAL at 08:22

## 2019-05-28 RX ADMIN — INSULIN LISPRO 1 UNITS: 100 INJECTION, SOLUTION INTRAVENOUS; SUBCUTANEOUS at 21:03

## 2019-05-28 RX ADMIN — APIXABAN 5 MG: 5 TABLET, FILM COATED ORAL at 23:48

## 2019-05-28 RX ADMIN — TAMSULOSIN HYDROCHLORIDE 0.4 MG: 0.4 CAPSULE ORAL at 08:22

## 2019-05-28 RX ADMIN — INSULIN LISPRO 1 UNITS: 100 INJECTION, SOLUTION INTRAVENOUS; SUBCUTANEOUS at 18:43

## 2019-05-28 RX ADMIN — LISINOPRIL 30 MG: 20 TABLET ORAL at 08:22

## 2019-05-28 RX ADMIN — METHOCARBAMOL TABLETS 750 MG: 750 TABLET, COATED ORAL at 14:01

## 2019-05-28 RX ADMIN — FENOFIBRATE 160 MG: 160 TABLET ORAL at 08:23

## 2019-05-28 RX ADMIN — OXYCODONE HYDROCHLORIDE 10 MG: 5 TABLET ORAL at 23:48

## 2019-05-28 RX ADMIN — FAMOTIDINE 20 MG: 20 TABLET ORAL at 08:23

## 2019-05-28 RX ADMIN — Medication 10 ML: at 21:02

## 2019-05-28 RX ADMIN — AMLODIPINE BESYLATE 2.5 MG: 5 TABLET ORAL at 08:23

## 2019-05-28 RX ADMIN — ACETAMINOPHEN 650 MG: 325 TABLET ORAL at 08:24

## 2019-05-28 RX ADMIN — INSULIN LISPRO 4 UNITS: 100 INJECTION, SOLUTION INTRAVENOUS; SUBCUTANEOUS at 13:30

## 2019-05-28 RX ADMIN — ATORVASTATIN CALCIUM 20 MG: 20 TABLET, FILM COATED ORAL at 21:02

## 2019-05-28 RX ADMIN — APIXABAN 5 MG: 5 TABLET, FILM COATED ORAL at 17:29

## 2019-05-28 RX ADMIN — SENNOSIDES,DOCUSATE SODIUM 2 TABLET: 8.6; 5 TABLET, FILM COATED ORAL at 21:02

## 2019-05-28 RX ADMIN — OXYCODONE HYDROCHLORIDE 10 MG: 5 TABLET ORAL at 18:52

## 2019-05-28 RX ADMIN — Medication 100 MG: at 08:23

## 2019-05-28 RX ADMIN — OXYCODONE HYDROCHLORIDE 10 MG: 5 TABLET ORAL at 08:24

## 2019-05-28 RX ADMIN — METHOCARBAMOL TABLETS 500 MG: 500 TABLET, COATED ORAL at 21:02

## 2019-05-28 RX ADMIN — CEFTRIAXONE SODIUM 2 G: 2 INJECTION, POWDER, FOR SOLUTION INTRAMUSCULAR; INTRAVENOUS at 13:31

## 2019-05-28 ASSESSMENT — PAIN DESCRIPTION - ONSET
ONSET: ON-GOING

## 2019-05-28 ASSESSMENT — PAIN SCALES - GENERAL
PAINLEVEL_OUTOF10: 8
PAINLEVEL_OUTOF10: 0
PAINLEVEL_OUTOF10: 10
PAINLEVEL_OUTOF10: 10
PAINLEVEL_OUTOF10: 8
PAINLEVEL_OUTOF10: 7
PAINLEVEL_OUTOF10: 8

## 2019-05-28 ASSESSMENT — PAIN DESCRIPTION - DIRECTION
RADIATING_TOWARDS: LEGS
RADIATING_TOWARDS: BILATERAL LOWER LEGS
RADIATING_TOWARDS: N/
RADIATING_TOWARDS: LEG

## 2019-05-28 ASSESSMENT — PAIN DESCRIPTION - ORIENTATION
ORIENTATION: LOWER;MID
ORIENTATION: LOWER

## 2019-05-28 ASSESSMENT — PAIN - FUNCTIONAL ASSESSMENT
PAIN_FUNCTIONAL_ASSESSMENT: PREVENTS OR INTERFERES SOME ACTIVE ACTIVITIES AND ADLS
PAIN_FUNCTIONAL_ASSESSMENT: PREVENTS OR INTERFERES SOME ACTIVE ACTIVITIES AND ADLS
PAIN_FUNCTIONAL_ASSESSMENT: PREVENTS OR INTERFERES WITH MANY ACTIVE NOT PASSIVE ACTIVITIES
PAIN_FUNCTIONAL_ASSESSMENT: PREVENTS OR INTERFERES SOME ACTIVE ACTIVITIES AND ADLS

## 2019-05-28 ASSESSMENT — PAIN DESCRIPTION - PAIN TYPE
TYPE: ACUTE PAIN;SURGICAL PAIN

## 2019-05-28 ASSESSMENT — PAIN DESCRIPTION - FREQUENCY
FREQUENCY: CONTINUOUS

## 2019-05-28 ASSESSMENT — PAIN DESCRIPTION - PROGRESSION
CLINICAL_PROGRESSION: GRADUALLY WORSENING
CLINICAL_PROGRESSION: NOT CHANGED

## 2019-05-28 ASSESSMENT — PAIN DESCRIPTION - DESCRIPTORS
DESCRIPTORS: ACHING
DESCRIPTORS: THROBBING;SHOOTING
DESCRIPTORS: ACHING
DESCRIPTORS: ACHING;CONSTANT

## 2019-05-28 ASSESSMENT — PAIN DESCRIPTION - LOCATION
LOCATION: BACK

## 2019-05-28 NOTE — PLAN OF CARE
Problem: Pain:  Goal: Pain level will decrease  Description  Pain level will decrease  Outcome: Ongoing  Note:   Pain in lumbar back , 7 of 10 , pt repositioned and medicated with oxycodone 10 mg, able to get some sleep, no change in mental status

## 2019-05-28 NOTE — PROGRESS NOTES
Patient assessment is complete. Patient is A/Ox3 with intermittent confusion. Patient denies any needs at this time. Call light is within reach, bed is in the lowest position with alarm on. Will continue to monitor.

## 2019-05-28 NOTE — PROGRESS NOTES
Complaint of 10 of 10 pain to lower back and , legs ,general weakness,  , lower extremities cool , feet warm ,2 plus pedal pulse , dr up to see pt at bed side ,

## 2019-05-28 NOTE — PROGRESS NOTES
Called to give report; awaiting call back    10:05 report given to [facility], [facility].     10:40 patient transferred to [facility], with all belongings

## 2019-05-28 NOTE — PROGRESS NOTES
Physical Therapy  Facility/Department: [facility] ACUTE REHAB UNIT  Daily Treatment Note  NAME: oRsie Martinez  : 1950  MRN: [id number]    Date of Service: 2019    Discharge Recommendations:  Subacute/Skilled Nursing Facility   PT Equipment Recommendations  Equipment Needed: (defer)    Patient Diagnosis(es): The encounter diagnosis was Staphylococcus aureus infection. has a past medical history of Arthritis, Back pain, DDD (degenerative disc disease), cervical, Diabetes mellitus ([address] Edema of both legs, GERD (gastroesophageal reflux disease), Hyperlipidemia, Hypertension, MRSA (methicillin resistant staph aureus) culture positive, Nausea in adult, Neuropathy, Vitamin B 12 deficiency, and Walking troubles. has a past surgical history that includes back surgery; Prostate surgery; shoulder surgery; Cystoscopy; knee surgery; Hemorrhoid surgery; eye surgery; Tonsillectomy; Cholecystectomy; Lumbar spine surgery (N/A, 2019); and REPAIR DURAL / CSF LEAK (N/A, 2019). Restrictions  Position Activity Restriction  Other position/activity restrictions: up with assistance  Subjective   General  Chart Reviewed: Yes  Additional Pertinent Hx: Pt is a 68 y.o. male admitted to ARU on 19 from [facility]. Pt initially admitted to [facility] on  with complaints of low back pain, fever, and chills. Family / Caregiver Present: No  Referring Practitioner: Dr. Danielle Cadet  Subjective  Subjective: Pt supine in bed, lying flat. Pt more verbal today, though remains confused. C/O back pain throughout session. Pain Screening  Patient Currently in Pain: Yes(back pain,not rated.)  Vital Signs  Patient Currently in Pain: Yes(back pain,not rated.)               Objective   Bed mobility  Rolling to Left: Maximum assistance  Rolling to Right: Maximum assistance  Supine to Sit: Dependent/Total(max A x 2 with HOB max elevated.  attempted first with [facility] flat via logroll, however, pt unable to transfer from sidelying to seated despite max A x 2. )  Sit to Supine: Maximum assistance(HOB elevated)  Scooting: Stand by assistance(supine, pt using rails. verbal cues & extra time required, effortful.)  Transfers  Comment: pt not transfered OOB d/t leaving for acute floor soon per RN. requires lj lift. unsafe to attempt standing. Ambulation  Ambulation?: No(unsafe to attempt)     Balance  Comments: pt sat at EOB for 10 min with SBA. pt refused all seated UE or LE ther ex. c/o increased LE pain. \"Oh Peña Dieter, my legs are burning. \"  Exercises  Heelslides: x 10 bilat  Hip Abduction: x 10 bilat  Knee Short Arc Quad: x 10 bilat  Ankle Pumps: x 20 bilat  Comments: AAROM for all ther ex. pt required encouragement & cues to remain on task. Assessment   Body structures, Functions, Activity limitations: Decreased functional mobility ; Decreased strength;Decreased endurance;Decreased coordination;Decreased ROM; Decreased balance; Increased Pain  Assessment: Bed mobility & overall cooperation somewhat improved. Pt remains limited by pain. Continues to require use of lj lift for transfers. Unsafe for D/C home. Recommend cont inpt skilled PT to maximize function. Treatment Diagnosis: decreased functional mobility due to debility  Prognosis: Fair  Patient Education: importance of mobility  REQUIRES PT FOLLOW UP: Yes  Activity Tolerance  Activity Tolerance: Patient limited by pain; Patient limited by cognitive status                                                           Goals  Short term goals  Time Frame for Short term goals: 1-2 weeks -ongoing  Short term goal 1: Pt will perform all bed mobility with ModA  Short term goal 2: Pt will perform sit to/from stand with RW and MaxA  Short term goal 3: Pt will perform squat pivot with MaxA  Short term goal 4: Pt will ambulate 10' with RW and MaxA  Short term goal 5: Pt will propel w/c 150' with supervision  Long term goals  Time Frame for Long term goals : 2-3 weeks -ongoing  Long term goal 1: Pt will

## 2019-05-28 NOTE — CARE COORDINATION
Spoke with [facility] to update them on patient status.  Patient will likely transfer to acute unit of [facility] and then possible transfer to [facility] when stable, per family request.    Electronically signed by YOLI Sparrow, MARTÍNEZ on 5/28/2019 at 8:31 AM

## 2019-05-28 NOTE — PROGRESS NOTES
Clinical Pharmacy Progress Note    Admit date: 5/28/2019     Subjective/Objective:  Patient is a 70yr old male transferring to  from ARU. Assessment/Plan:    1. Home Medication information:    · Attempted to keep the Psychiatric Home Medication List as the patient's true home meds. Patient was on the following medications at home, but not receiving these meds upon transfer from ARU:  Left these on the Home Med List    - NPH insulin   - Metformin   - Jardiance   - Glimepiride   · Added these medications to the [address] List, as he was on these in the ARU:    - Ceftriaxone    - APAP scheduled   - Robaxin scheduled   - Oxycodone PRN   - Miralax   - Senna S   - Lisinopril 30mg daily (increased in the ARU from home dose of 20mg daily)         Thank you, please call with questions.    Ariadna Mccoy PharmD., [facility]   5/28/2019 12:39 PM  Wireless: [phone]

## 2019-05-28 NOTE — PROGRESS NOTES
Physical Therapy  Dom Wu    Chart reviewed. Patient transferring off unit. Plan to hold PT at this time.      Wendie Epps PT, DPT [id number]

## 2019-05-28 NOTE — H&P
Internal Medicine PGY- 1 Resident History & Physical      PCP: EMIGDIO HARRELL    Date of Admission: 5/28/2019    Chief Complaint:  AMS    History Of Present Illness:      Rosie Martinez is a 68 y.o. male w/ PMHX of IDDM, HTN, DVT who presented to [facility] from ARU d/t AMS    4/19/19 - L 3 & 4 lumbar laminectomy, L3/4 & L4/5 bilateral facetectomy , foraminotomies over L4 &5 nerve roots. 5/8/2019 - 5/17/2019: p/w Hpotension, tachycardia. MRI - fluid collection w/ gas and peripheral enhancement; dx: Epidural abscess, had evacuation of epidural abscess 5/12/2019. Pt discharged to ARU. His mentation was poor in ARU, recently started on Dilaudid for pain. Today, on hx taking his mentation appears to have improved after holding Dilaudid. AxOx3 and able to tell me why he is in the hospital. Endorsed 10/10 \"sharp, stabbing\" back pain, worse w/ movement, radiating to his ankles bilaterally. Stated that since cutting back on pain medications his pain has increased. Denied urinary/fecal incontinence or saddle anesthesia. He denied weakness in LE. Denied F/C, HA, Cp, Palpitations, N/V.        Past Medical History:        Diagnosis Date    Arthritis      left knee    Back pain     DDD (degenerative disc disease), cervical     Diabetes mellitus (HCC)     Edema of both legs     GERD (gastroesophageal reflux disease)     Hyperlipidemia     Hypertension     MRSA (methicillin resistant staph aureus) culture positive 05/09/2019    back incision    Nausea in adult     Neuropathy     idopathtic     Vitamin B 12 deficiency     Walking troubles        Past Surgical History:          Procedure Laterality Date    BACK SURGERY      x 2    CHOLECYSTECTOMY      CYSTOSCOPY      EYE SURGERY      retina repair    HEMORRHOID SURGERY      KNEE SURGERY      LUMBAR SPINE SURGERY N/A 4/19/2019    DECOMPRESSION LAMINECTOMY L3-4 POSSIBLY L5 performed by Rockie Ahumada, MD at [facility]       retracted into the skin 2 times daily (before meals)   Yes Historical Provider, MD   metFORMIN (GLUCOPHAGE) 1000 MG tablet Take 1,000 mg by mouth 2 times daily (with meals)   Yes Historical Provider, MD   tamsulosin (FLOMAX) 0.4 MG capsule Take 0.4 mg by mouth daily   Yes Historical Provider, MD       Allergies:  Patient has no known allergies. Social History:      TOBACCO:   reports that he has never smoked. He has never used smokeless tobacco.  ETOH:   reports that he drinks alcohol. Family History:          Problem Relation Age of Onset    Arthritis Mother     Stroke Father     High Blood Pressure Father     Heart Disease Father     Arthritis Father     Diabetes Father        REVIEW OF SYSTEMS:   Pertinentpositives as noted in the HPI. All other systems reviewed and negative. ROS: Review of Systems   ROS Negative except as above    PHYSICALEXAM PERFORMED:    BP (!) 152/84   Pulse 81   Temp 97.5 °F (36.4 °C) (Oral)   Resp 18   SpO2 94%     General appearance:  No apparent distress, appears stated age and cooperative. HEENT:  Normal cephalic, atraumaticwithout obvious deformity. Pupils equal, round, and reactive to light. Extra ocular muscles intact. Respiratory: Normal respiratory effort. Clear to auscultation, bilaterally without Rales/Wheezes/Rhonchi. Cardiovascular: Regular rate and rhythm with normal S1/S2 without murmurs, rubs or gallops. Back: Incision present in lower back which is clean, no pus drainage, no swelling, minimal erythema, appropriate tenderness to palpation. Abdomen: Soft, non-distended with normal bowel sounds. Musculoskeletal:  No clubbing, cyanosis or edema bilaterally. Full range of motion without deformity. Neurologic: AxOx3. Unable to appropriately assess LE strength d/t pain. Strength 5/5 in UE. Sensation intact in LE and UE.  Cranial nerves: II-XII intact, grossly non-focal.  Peripheral Pulses: +2 palpable, equal bilaterally     Labs:     Recent Labs     05/26/19  4480 05/27/19  0554 05/28/19  0610   WBC 6.3 6.0 5.1   HGB 10.7* 11.2* 11.0*   HCT 32.8* 34.5* 34.1*   * 678* 606*     Recent Labs     05/26/19  0445 05/27/19  0554 05/28/19  0610   * 131* 135*   K 3.9 4.0 4.3   CL 94* 95* 98*   CO2 25 24 24   BUN 17 17 25*   CREATININE 0.6* 0.6* 0.7*   CALCIUM 9.2 9.1 9.3     Recent Labs     05/28/19  0610   AST 14*   ALT 13   BILIDIR <0.2   BILITOT 0.3   ALKPHOS 89     No results for input(s): INR in the last 72 hours. No results for input(s): Hernan Aver in the last 72 hours. Urinalysis:   Lab Results   Component Value Date    NITRU Negative 05/09/2019    WBCUA 3-5 05/09/2019    BACTERIA 1+ 05/09/2019    RBCUA 0-2 05/09/2019    BLOODU SMALL 05/09/2019    SPECGRAV 1.025 05/09/2019    GLUCOSEU >=1000 05/09/2019       Radiology:     No orders to display       ASSESSMENT & PLAN:  Galileo Stephenson is a 68 y.o. male who p/w AMS    Acute Metabolic Encephalopathy: Likely 2/2 Medications (Opioids)  - Pt was recently started on Dilaudid  - Roxicodone pain panel  - f/u UA  - Pt mentation already improving after holding Dilaudid  - Continue Robaxin for now, will hold if mentation worsens    Low Back Pain: 2/2 Recent surgery.  Pt still c/o of significant low back pain, will obtain imaging  - Roxicodone pain panel  - Neurosurgery consult  - MRI Lumbar w/o Contrast    DVT: Dopplers (5/16/2019) - Acute to subacute partially occluding DVT involving one of the rt posterior tibial veins.  - Pt loaded with Eliquis 10mg PO (5/20 - 5/26)  - Continue Eliquis 5mg PO BID  - PT-INR    HTN:  - Norvasc 2.5mg PO Daily  - Lisinopril 30mg PO Daily  - Hydralazine PRN    IDDM: Pt takes NPH 20U BID at home, and PO medications, however in ARU BG well-controlled on Medium SSI  - Continue Medium SSI  - Hold home NPH for now  - Hypoglycemia protocol    DVT Prophylaxis: Already on Eliquis  Diet: DIET CARB CONTROL; Carb Control: 4 carb choices (60 gms)/meal  Code Status: Full Code    I will discuss thepatient with the senior resident and MD Pearl Noyola MD  Internal Medicine Resident, PGY-1  5/28/2019

## 2019-05-28 NOTE — PLAN OF CARE
Problem: Falls - Risk of:  Goal: Will remain free from falls  Description  Will remain free from falls  Outcome: Ongoing  Note:     Pt is a High fall risk. See Rodena Hoot Fall Score and ABCDS Injury Risk assessments. High Fall Risk per PARISI/ABCDS: Explained fall risk precautions to pt and family and rationale behind their use to keep the patient safe. Pt bed is in low position, side rails up, call light and belongings are in reach. Fall wristband applied and present on pts wrist.  Bed alarm on. Pt encouraged to call for assistance. Will continue with hourly rounds for PO intake, pain needs, toileting and repositioning as needed.

## 2019-05-28 NOTE — PROGRESS NOTES
Pt alert , oriented times 3 , Plan mri , Pic dressing clean , dry and  intact, blood return  ,stapes removed easily from lower mid line surgical incision,  , no drainage, incision with small crevasse, steri strips to area, griffin aware , no other orders .

## 2019-05-28 NOTE — H&P
Internal Medicine PGY-*** Resident History & Physical      PCP: EMIGDIO HARRELL    Date of Admission: 5/17/2019    Date of Service: Pt seen/examined on *** and Admitted to Inpatient with expected LOS greaterthan two midnights due to medical therapy. *** Placed in Observation. Chief Complaint:  ***      History Of Present Illness:  (***4+ of Location, Quality, Severity, Duration, Timing, Context, Modifying/Alleviating factors and Assoc Signs/Sxs***)    68 y.o. male  with PHx sig for HTN; DM; HLD; neurogenic claudication who presented to [facility] with back pain. He underwent a L3/4 laminectomy on 4/19/19 at OSH. On 5/8/19 he presented to [facility] with 2 days of back pain, and was noted to have purulent discharge from his incision site. He was noted to have an abscess, and was treated  after which a CT showed suspicion for abscess. He underwent washout on 5/12/19 and began receiving Rocephin Q12 through 6/26. He was initially transferred to [facility] on 5/8/19 after presenting to OSH where he was treated for severe sepsis. He is s/p L3/4 laminectomy on 4/19/19 and CT at OSH revealed suspicion for abscess. Upon arrival here he was taken to the OR on 5/12/19 and this was drained. He was seen by ID and recommendations for long-term antibiotics were made. He was found to have a LE DVT and started on Lovenox, transitioned to apixaban, which he remains on now. He was transferred to acute rehab on 5/17/19.       Past Medical History:          Diagnosis Date    Arthritis      left knee    Back pain     DDD (degenerative disc disease), cervical     Diabetes mellitus (HCC)     Edema of both legs     GERD (gastroesophageal reflux disease)     Hyperlipidemia     Hypertension     MRSA (methicillin resistant staph aureus) culture positive 05/09/2019    back incision    Nausea in adult     Neuropathy     idopathtic     Vitamin B 12 deficiency     Walking troubles        Past Surgical History:          Procedure 05/28/19  0610   * 131* 135*   K 3.9 4.0 4.3   CL 94* 95* 98*   CO2 25 24 24   BUN 17 17 25*   CREATININE 0.6* 0.6* 0.7*   CALCIUM 9.2 9.1 9.3     Recent Labs     05/28/19  0610   AST 14*   ALT 13   BILIDIR <0.2   BILITOT 0.3   ALKPHOS 89     No results for input(s): INR in the last 72 hours. No results for input(s): Georga Rout in the last 72 hours. Urinalysis:      Lab Results   Component Value Date    NITRU Negative 05/09/2019    WBCUA 3-5 05/09/2019    BACTERIA 1+ 05/09/2019    RBCUA 0-2 05/09/2019    BLOODU SMALL 05/09/2019    SPECGRAV 1.025 05/09/2019    GLUCOSEU >=1000 05/09/2019       Radiology:     CXR: I have reviewed the CXR with the following interpretation: ***  EKG:  I have reviewed the EKG with the following interpretation: ***    XR CHEST STANDARD (2 VW)   Final Result      1. Low lung volumes. 2. Elevation right hemidiaphragm. XR CHEST 1 VW   Final Result      No acute pulmonary disease.              ASSESSMENT & PLAN:  Sagrario Plummer is a 68 y.o. male w/ ***    Active Hospital Problems    Diagnosis Date Noted    Debility [R53.81] 05/17/2019    Bacteremia due to methicillin susceptible Staphylococcus aureus (MSSA) [R78.81] 05/13/2019    Surgical site infection [T81.49XA] 05/10/2019    Staphylococcus aureus infection [A49.01] 05/10/2019                 DVT Prophylaxis: ***  Diet: DIET CARB CONTROL; Daily Fluid Restriction: 1500 ml  Dietary Nutrition Supplements: Low Calorie High Protein Supplement  Code Status: Full Code    PT/OT Eval Status: ***    Dispo - ***       [employer]    I will discuss the patient with the senior resident and Gt Long MD    [employer], MD/DO  Internal Medicine Resident,PGY-***  Pager: [phone]

## 2019-05-28 NOTE — PROGRESS NOTES
shoulder surgery; Cystoscopy; knee surgery; Hemorrhoid surgery; eye surgery; Tonsillectomy; Cholecystectomy; Lumbar spine surgery (N/A, 4/19/2019); and REPAIR DURAL / CSF LEAK (N/A, 5/12/2019). Restrictions  Position Activity Restriction  Other position/activity restrictions: up with assistance  Subjective   General  Chart Reviewed: Yes  Patient assessed for rehabilitation services?: Yes  Additional Pertinent Hx: Admit 5/8 to ICU with sepsis vs back abcess       Neurosx consult - to OR 5/12 s/p I&D back wound with draining of abcess. [address] PMHX: recent  DECOMPRESSION LAMINECTOMY L3-4 POSSIBLY L5.  on 4/19 /19   OA, DDD, DM,edema BLE,HTN, HLD and neuropathy   Response to previous treatment: Patient with no complaints from previous session  Family / Caregiver Present: No  Referring Practitioner: Nilson  Diagnosis: Back abcess s/p I&D of back wound   Subjective  Subjective: Pt in bed upon arrival and reluctant to participate secondary to pain and fatigue. Pt requiring max encouragment for participation. General Comment  Comments: . Objective    ADL  Grooming: Maximum assistance(offered wash cloth for face with pt able to wipe mouth. Pt completed oral care in bed with HOB elevated requiring max encouragment for completion)  UE Dressing: Maximum assistance(Max A for donning button shirt seated EOB.  Pt declined buttoning shirt)  Toileting: Dependent/Total(brief changed supine in bed rolling side to side)        Balance  Sitting Balance: Stand by assistance(Pt seated EOB with SBA for 10 min with encouragment for remaining sitting)  Bed mobility  Rolling to Left: Maximum assistance(multiple trials for change brief and repositioning)  Rolling to Right: Maximum assistance(multiple trials for changing brief and repositioning)  Supine to Sit: Dependent/Total(Max Ax2 with max encouragment and 3 attempt for succes.)  Sit to Supine: Maximum assistance(Max A x 1 for support of legs with HOB elevated)  Scooting: Stand by assistance(extra time and effort for scooting up in bed with heavy use of BR)                Type of ROM/Therapeutic Exercise  Comment: Pt declining all UE exercise seated EOB            Plan  If pt discharges prior to next treatment, this note will serve as discharge summary  Plan  Times per week: 5x per week x90 minutes  Times per day: Daily  Current Treatment Recommendations: Strengthening, Pain Management, Wheelchair Mobility Training, Safety Education & Training, Balance Training, Patient/Caregiver Education & Training, Self-Care / ADL, Functional Mobility Training, Home Management Training, Endurance Training          Goals (as determined and assessed by primary OT)  Short term goals  Time Frame for Short term goals: 2 weeks  Short term goal 1: Pt will be MOD A with toilet transfer and toileting- progressing, continue  Short term goal 2: Pt will bed MOD A with LE dressing with use of adap equip as needed- progressing, continue  Short term goal 3: Pt will be MOD A with bathing with use of long handle sponge and adaptive techniques as needed- progressing, continue  Short term goal 4: Pt will tolerated standing x2 minutes to demo improved functional activity tolerance- progressing, continue  Short term goal 5: Pt will be MIN A with BUE HEP to increase strength to facilitate independence with ADLs and functional mobility- progressing, continue  Long term goals  Time Frame for Long term goals : 3 weeks  Long term goal 1: Pt will be SBA with toilet transfer and toileting - ongoing  Long term goal 2: Pt will bed SBA with LE dressing with use of adap equip as needed - ongoing  Long term goal 3: Pt will be SBA with bathing with use of long handle sponge and adaptive techniques as needed - ongoing  Long term goal 4: Pt will tolerated standing x5 minutes to demo improved functional activity tolerance - ongoing  Long term goal 5: Pt will be MOD I with BUE HEP to increase strength to facilitate independence with ADLs and functional mobility - ongoing  Patient Goals   Patient goals : Go home, return to prior level of functioning       Therapy Time   Individual Concurrent Group Co-treatment   Time In       0830   Time Out       0930   Minutes       60   Timed Code Treatment Minutes: 60 Minutes   Total Treatment Minutes 60    30 min missed this date secondary to pt preparing for discharge to acute floor    [address], [place]/L

## 2019-05-28 NOTE — H&P
Internal Medicine PGY-*** Resident History & Physical      PCP: EMIGDIO HARRELL    Date of Admission: 5/28/2019    Date of Service: Pt seen/examined on *** and Admitted to Inpatient with expected LOS greaterthan two midnights due to medical therapy. *** Placed in Observation. Chief Complaint:  ***      History Of Present Illness:  (***4+ of Location, Quality, Severity, Duration, Timing, Context, Modifying/Alleviating factors and Assoc Signs/Sxs***)    68 y.o. male  with PHx sig for HTN; DM; HLD; neurogenic claudication who [facility].     He underwent a L3/4 laminectomy on 4/19/19 at OSH. On 5/8/19 he presented to [facility] with 2 days of back pain, and was noted to have purulent discharge from his incision site. He was noted to have an abscess, and was treated  after which a CT showed suspicion for abscess. He underwent washout on 5/12/19 and began receiving Rocephin Q12 through 6/26. On 5/17/19 he was discharged to the acute rehab unit. Since admission to ARU he has has continual back pain. He has been minimally able to work with PT/OT due to pain. The patients family notes a cognitive decline throughout his admissions. They feel like he continues to worsen despite tapering of medications. On talking to the patient his major concern is his back pain. He rates it as 7-8/10 stabbing pain that radiates down both legs to the ankles. With movement, especially sitting up, the pain worsens to 10/10. He denies F/C/V, reports chronic nausea, denies diarrhea or changes to urinary habits. He has a cough that has been present for the duration of his hospital stays.      Past Medical History:          Diagnosis Date    Arthritis      left knee    Back pain     DDD (degenerative disc disease), cervical     Diabetes mellitus (HCC)     Edema of both legs     GERD (gastroesophageal reflux disease)     Hyperlipidemia     Hypertension     MRSA (methicillin resistant staph aureus) culture positive 05/09/2019    back incision    Nausea in adult     Neuropathy     idopathtic     Vitamin B 12 deficiency     Walking troubles        Past Surgical History:          Procedure Laterality Date    BACK SURGERY      x 2    CHOLECYSTECTOMY      CYSTOSCOPY      EYE SURGERY      retina repair    HEMORRHOID SURGERY      KNEE SURGERY      LUMBAR SPINE SURGERY N/A 4/19/2019    DECOMPRESSION LAMINECTOMY L3-4 POSSIBLY L5 performed by Lorena Arauz MD at [place] [address]       retracted penis    REPAIR DURAL / CSF LEAK N/A 5/12/2019    LUMBAR WOUND WASHOUT performed by Lorena Arauz MD at [address] [place] [address]         Medications Prior to Admission:      Prior to Admission medications    Medication Sig Start Date End Date Taking?  Authorizing Provider   famotidine (PEPCID) 20 MG tablet Take 1 tablet by mouth 2 times daily 5/17/19   Sharron Ma MD   vitamin B-1 100 MG tablet Take 1 tablet by mouth daily 5/18/19   Sharron Ma MD   amLODIPine (NORVASC) 2.5 MG tablet Take 2.5 mg by mouth daily    Historical Provider, MD   atorvastatin (LIPITOR) 20 MG tablet Take 20 mg by mouth daily    Historical Provider, MD   empagliflozin (JARDIANCE) 25 MG tablet Take 12.5 mg by mouth daily     Historical Provider, MD   fenofibrate (TRICOR) 145 MG tablet Take 145 mg by mouth daily    Historical Provider, MD   glimepiride (AMARYL) 4 MG tablet Take 8 mg by mouth every morning (before breakfast)     Historical Provider, MD   insulin NPH (HUMULIN N;NOVOLIN N) 100 UNIT/ML injection vial Inject 20 Units into the skin 2 times daily (before meals)    Historical Provider, MD   lisinopril (PRINIVIL;ZESTRIL) 20 MG tablet Take 20 mg by mouth daily    Historical Provider, MD   metFORMIN (GLUCOPHAGE) 1000 MG tablet Take 1,000 mg by mouth 2 times daily (with meals)    Historical Provider, MD   tamsulosin (FLOMAX) 0.4 MG capsule Take 0.4 mg by mouth daily    Historical Provider, MD Allergies:  Patient has no known allergies. Social History:      The patient currently lives ***    TOBACCO:   reports that he has never smoked. He has never used smokeless tobacco.  ETOH:  reports that he drinks alcohol. Family History:     *** Reviewed in detail and negative for DM, CAD, Cancer, CVA. Positive as follows:        Problem Relation Age of Onset    Arthritis Mother     Stroke Father     High Blood Pressure Father     Heart Disease Father     Arthritis Father     Diabetes Father        REVIEW OF SYSTEMS: Pertinent positives as noted in the HPI. All other systems reviewed and negative. ROS: Review of Systems    PHYSICAL EXAM PERFORMED:    There were no vitals taken for this visit. General appearance:  No apparent distress, appears stated age and cooperative. HEENT:  Normal cephalic,atraumatic without obvious deformity. Pupils equal, round, and reactive to light. Extra ocular muscles intact. Conjunctivae/corneas clear. Neck: Supple, with full range of motion. No jugular venous distention. Trachea midline. Respiratory:  Normal respiratory effort. Clear to auscultation, bilaterally without Rales/Wheezes/Rhonchi. Cardiovascular:  Regular rate and rhythm with normal S1/S2 without murmurs, rubs or gallops. Abdomen: Soft, non-tender, non-distended with normal bowel sounds. Musculoskeletal:  No clubbing, cyanosis oredema bilaterally. Full range of motion without deformity. Skin: Skin color, texture, turgor normal.  Norashes or lesions. Neurologic:  Neurovascularly intact without any focal sensory/motor deficits.  Cranialnerves: II-XII intact, grossly non-focal.  Psychiatric:  Alert and oriented, thought content appropriate,normal insight  Capillary Refill: Brisk,< 3 seconds   Peripheral Pulses: +2 palpable, equal bilaterally       Labs:     Recent Labs     05/26/19  0445 05/27/19  0554 05/28/19  0610   WBC 6.3 6.0 5.1   HGB 10.7* 11.2* 11.0*   HCT 32.8* 34.5* 34.1*   *

## 2019-05-29 ENCOUNTER — APPOINTMENT (OUTPATIENT)
Dept: MRI IMAGING | Age: 69
DRG: 092 | End: 2019-05-29
Attending: INTERNAL MEDICINE
Payer: MEDICARE

## 2019-05-29 LAB
ANION GAP SERPL CALCULATED.3IONS-SCNC: 13 MMOL/L (ref 3–16)
BASOPHILS ABSOLUTE: 0.1 K/UL (ref 0–0.2)
BASOPHILS RELATIVE PERCENT: 1.2 %
BILIRUBIN URINE: NEGATIVE
BLOOD, URINE: ABNORMAL
BUN BLDV-MCNC: 21 MG/DL (ref 7–20)
CALCIUM SERPL-MCNC: 9.2 MG/DL (ref 8.3–10.6)
CHLORIDE BLD-SCNC: 95 MMOL/L (ref 99–110)
CLARITY: CLEAR
CO2: 24 MMOL/L (ref 21–32)
COLOR: YELLOW
CREAT SERPL-MCNC: 0.6 MG/DL (ref 0.8–1.3)
EOSINOPHILS ABSOLUTE: 0.1 K/UL (ref 0–0.6)
EOSINOPHILS RELATIVE PERCENT: 2.6 %
EPITHELIAL CELLS, UA: ABNORMAL /HPF
GFR AFRICAN AMERICAN: >60
GFR NON-AFRICAN AMERICAN: >60
GLUCOSE BLD-MCNC: 184 MG/DL (ref 70–99)
GLUCOSE BLD-MCNC: 185 MG/DL (ref 70–99)
GLUCOSE BLD-MCNC: 199 MG/DL (ref 70–99)
GLUCOSE BLD-MCNC: 203 MG/DL (ref 70–99)
GLUCOSE BLD-MCNC: 210 MG/DL (ref 70–99)
GLUCOSE URINE: 250 MG/DL
HCT VFR BLD CALC: 35.2 % (ref 40.5–52.5)
HEMOGLOBIN: 11.5 G/DL (ref 13.5–17.5)
KETONES, URINE: NEGATIVE MG/DL
LEUKOCYTE ESTERASE, URINE: NEGATIVE
LYMPHOCYTES ABSOLUTE: 1.4 K/UL (ref 1–5.1)
LYMPHOCYTES RELATIVE PERCENT: 25.8 %
MCH RBC QN AUTO: 28.4 PG (ref 26–34)
MCHC RBC AUTO-ENTMCNC: 32.7 G/DL (ref 31–36)
MCV RBC AUTO: 86.8 FL (ref 80–100)
MICROSCOPIC EXAMINATION: YES
MONOCYTES ABSOLUTE: 0.6 K/UL (ref 0–1.3)
MONOCYTES RELATIVE PERCENT: 10.8 %
NEUTROPHILS ABSOLUTE: 3.3 K/UL (ref 1.7–7.7)
NEUTROPHILS RELATIVE PERCENT: 59.6 %
NITRITE, URINE: NEGATIVE
PDW BLD-RTO: 14.7 % (ref 12.4–15.4)
PERFORMED ON: ABNORMAL
PH UA: 5.5 (ref 5–8)
PLATELET # BLD: 571 K/UL (ref 135–450)
PMV BLD AUTO: 6.6 FL (ref 5–10.5)
POTASSIUM REFLEX MAGNESIUM: 4.1 MMOL/L (ref 3.5–5.1)
PROTEIN UA: ABNORMAL MG/DL
RBC # BLD: 4.06 M/UL (ref 4.2–5.9)
RBC UA: ABNORMAL /HPF (ref 0–2)
SODIUM BLD-SCNC: 132 MMOL/L (ref 136–145)
SPECIFIC GRAVITY UA: 1.02 (ref 1–1.03)
URINE REFLEX TO CULTURE: YES
URINE TYPE: ABNORMAL
UROBILINOGEN, URINE: 0.2 E.U./DL
WBC # BLD: 5.5 K/UL (ref 4–11)
WBC UA: ABNORMAL /HPF (ref 0–5)

## 2019-05-29 PROCEDURE — 6360000002 HC RX W HCPCS: Performed by: NURSE PRACTITIONER

## 2019-05-29 PROCEDURE — 87086 URINE CULTURE/COLONY COUNT: CPT

## 2019-05-29 PROCEDURE — 6370000000 HC RX 637 (ALT 250 FOR IP): Performed by: STUDENT IN AN ORGANIZED HEALTH CARE EDUCATION/TRAINING PROGRAM

## 2019-05-29 PROCEDURE — 1200000000 HC SEMI PRIVATE

## 2019-05-29 PROCEDURE — 2580000003 HC RX 258: Performed by: STUDENT IN AN ORGANIZED HEALTH CARE EDUCATION/TRAINING PROGRAM

## 2019-05-29 PROCEDURE — 80048 BASIC METABOLIC PNL TOTAL CA: CPT

## 2019-05-29 PROCEDURE — 6370000000 HC RX 637 (ALT 250 FOR IP)

## 2019-05-29 PROCEDURE — 72148 MRI LUMBAR SPINE W/O DYE: CPT

## 2019-05-29 PROCEDURE — 2580000003 HC RX 258: Performed by: NURSE PRACTITIONER

## 2019-05-29 PROCEDURE — 6360000002 HC RX W HCPCS: Performed by: STUDENT IN AN ORGANIZED HEALTH CARE EDUCATION/TRAINING PROGRAM

## 2019-05-29 PROCEDURE — 85025 COMPLETE CBC W/AUTO DIFF WBC: CPT

## 2019-05-29 PROCEDURE — 81001 URINALYSIS AUTO W/SCOPE: CPT

## 2019-05-29 PROCEDURE — 2580000003 HC RX 258

## 2019-05-29 RX ORDER — METHOCARBAMOL 500 MG/1
1000 TABLET, FILM COATED ORAL 4 TIMES DAILY
Status: DISCONTINUED | OUTPATIENT
Start: 2019-05-30 | End: 2019-06-03 | Stop reason: HOSPADM

## 2019-05-29 RX ORDER — SODIUM CHLORIDE 9 MG/ML
INJECTION, SOLUTION INTRAVENOUS
Status: COMPLETED
Start: 2019-05-29 | End: 2019-05-29

## 2019-05-29 RX ADMIN — TAMSULOSIN HYDROCHLORIDE 0.4 MG: 0.4 CAPSULE ORAL at 09:33

## 2019-05-29 RX ADMIN — Medication 10 ML: at 22:12

## 2019-05-29 RX ADMIN — METHOCARBAMOL 1000 MG: 100 INJECTION, SOLUTION INTRAMUSCULAR; INTRAVENOUS at 17:57

## 2019-05-29 RX ADMIN — FENOFIBRATE 160 MG: 160 TABLET ORAL at 09:32

## 2019-05-29 RX ADMIN — METHOCARBAMOL TABLETS 500 MG: 500 TABLET, COATED ORAL at 09:31

## 2019-05-29 RX ADMIN — SENNOSIDES,DOCUSATE SODIUM 2 TABLET: 8.6; 5 TABLET, FILM COATED ORAL at 09:31

## 2019-05-29 RX ADMIN — CEFTRIAXONE SODIUM 2 G: 2 INJECTION, POWDER, FOR SOLUTION INTRAMUSCULAR; INTRAVENOUS at 02:14

## 2019-05-29 RX ADMIN — INSULIN LISPRO 6 UNITS: 100 INJECTION, SOLUTION INTRAVENOUS; SUBCUTANEOUS at 12:24

## 2019-05-29 RX ADMIN — INSULIN LISPRO 2 UNITS: 100 INJECTION, SOLUTION INTRAVENOUS; SUBCUTANEOUS at 22:10

## 2019-05-29 RX ADMIN — CEFTRIAXONE SODIUM 2 G: 2 INJECTION, POWDER, FOR SOLUTION INTRAMUSCULAR; INTRAVENOUS at 15:02

## 2019-05-29 RX ADMIN — SENNOSIDES,DOCUSATE SODIUM 2 TABLET: 8.6; 5 TABLET, FILM COATED ORAL at 22:06

## 2019-05-29 RX ADMIN — ATORVASTATIN CALCIUM 20 MG: 20 TABLET, FILM COATED ORAL at 22:05

## 2019-05-29 RX ADMIN — SODIUM CHLORIDE 250 ML: 9 INJECTION, SOLUTION INTRAVENOUS at 02:14

## 2019-05-29 RX ADMIN — FAMOTIDINE 20 MG: 20 TABLET ORAL at 09:32

## 2019-05-29 RX ADMIN — OXYCODONE HYDROCHLORIDE 10 MG: 5 TABLET ORAL at 03:56

## 2019-05-29 RX ADMIN — OXYCODONE HYDROCHLORIDE 10 MG: 5 TABLET ORAL at 13:30

## 2019-05-29 RX ADMIN — OXYCODONE HYDROCHLORIDE 10 MG: 5 TABLET ORAL at 22:05

## 2019-05-29 RX ADMIN — INSULIN LISPRO 2 UNITS: 100 INJECTION, SOLUTION INTRAVENOUS; SUBCUTANEOUS at 09:35

## 2019-05-29 RX ADMIN — FAMOTIDINE 20 MG: 20 TABLET ORAL at 22:06

## 2019-05-29 RX ADMIN — APIXABAN 5 MG: 5 TABLET, FILM COATED ORAL at 22:05

## 2019-05-29 RX ADMIN — OXYCODONE HYDROCHLORIDE 10 MG: 5 TABLET ORAL at 09:32

## 2019-05-29 RX ADMIN — OXYCODONE HYDROCHLORIDE 10 MG: 5 TABLET ORAL at 17:59

## 2019-05-29 RX ADMIN — Medication 10 ML: at 09:34

## 2019-05-29 RX ADMIN — INSULIN LISPRO 3 UNITS: 100 INJECTION, SOLUTION INTRAVENOUS; SUBCUTANEOUS at 18:06

## 2019-05-29 RX ADMIN — METHOCARBAMOL 1000 MG: 100 INJECTION, SOLUTION INTRAMUSCULAR; INTRAVENOUS at 11:35

## 2019-05-29 RX ADMIN — LISINOPRIL 30 MG: 20 TABLET ORAL at 09:37

## 2019-05-29 RX ADMIN — APIXABAN 5 MG: 5 TABLET, FILM COATED ORAL at 09:31

## 2019-05-29 RX ADMIN — POLYETHYLENE GLYCOL (3350) 17 G: 17 POWDER, FOR SOLUTION ORAL at 09:33

## 2019-05-29 RX ADMIN — INSULIN GLARGINE 10 UNITS: 100 INJECTION, SOLUTION SUBCUTANEOUS at 23:01

## 2019-05-29 RX ADMIN — AMLODIPINE BESYLATE 2.5 MG: 2.5 TABLET ORAL at 09:32

## 2019-05-29 RX ADMIN — HYDROMORPHONE HYDROCHLORIDE 1 MG: 1 INJECTION, SOLUTION INTRAMUSCULAR; INTRAVENOUS; SUBCUTANEOUS at 14:00

## 2019-05-29 RX ADMIN — Medication 100 MG: at 09:32

## 2019-05-29 ASSESSMENT — PAIN DESCRIPTION - DIRECTION
RADIATING_TOWARDS: BLE
RADIATING_TOWARDS: BILATERAL LOWER LEGS
RADIATING_TOWARDS: BILATERAL LOWER LEGS
RADIATING_TOWARDS: BILATERAL LOWER LEGS AND FEET
RADIATING_TOWARDS: BLE

## 2019-05-29 ASSESSMENT — PAIN DESCRIPTION - PROGRESSION
CLINICAL_PROGRESSION: NOT CHANGED

## 2019-05-29 ASSESSMENT — PAIN DESCRIPTION - FREQUENCY
FREQUENCY: CONTINUOUS

## 2019-05-29 ASSESSMENT — PAIN DESCRIPTION - ONSET
ONSET: ON-GOING

## 2019-05-29 ASSESSMENT — PAIN SCALES - GENERAL
PAINLEVEL_OUTOF10: 6
PAINLEVEL_OUTOF10: 10
PAINLEVEL_OUTOF10: 7
PAINLEVEL_OUTOF10: 8
PAINLEVEL_OUTOF10: 9
PAINLEVEL_OUTOF10: 10
PAINLEVEL_OUTOF10: 6
PAINLEVEL_OUTOF10: 10
PAINLEVEL_OUTOF10: 8
PAINLEVEL_OUTOF10: 7
PAINLEVEL_OUTOF10: 9
PAINLEVEL_OUTOF10: 9

## 2019-05-29 ASSESSMENT — PAIN DESCRIPTION - ORIENTATION
ORIENTATION: MID;LOWER
ORIENTATION: LOWER
ORIENTATION: LOWER;MID
ORIENTATION: MID;LOWER
ORIENTATION: LOWER;MID

## 2019-05-29 ASSESSMENT — PAIN - FUNCTIONAL ASSESSMENT
PAIN_FUNCTIONAL_ASSESSMENT: PREVENTS OR INTERFERES SOME ACTIVE ACTIVITIES AND ADLS
PAIN_FUNCTIONAL_ASSESSMENT: PREVENTS OR INTERFERES WITH ALL ACTIVE AND SOME PASSIVE ACTIVITIES

## 2019-05-29 ASSESSMENT — PAIN DESCRIPTION - LOCATION
LOCATION: BACK

## 2019-05-29 ASSESSMENT — PAIN DESCRIPTION - DESCRIPTORS
DESCRIPTORS: SHARP;CONSTANT
DESCRIPTORS: SHARP;SHOOTING
DESCRIPTORS: CONSTANT;SHARP;SHOOTING
DESCRIPTORS: ACHING;CONSTANT
DESCRIPTORS: SHARP;SHOOTING;CONSTANT

## 2019-05-29 ASSESSMENT — PAIN DESCRIPTION - PAIN TYPE
TYPE: ACUTE PAIN;SURGICAL PAIN
TYPE: ACUTE PAIN;SURGICAL PAIN
TYPE: ACUTE PAIN
TYPE: ACUTE PAIN;SURGICAL PAIN
TYPE: ACUTE PAIN;SURGICAL PAIN
TYPE: ACUTE PAIN

## 2019-05-29 NOTE — PLAN OF CARE
Problem: Pain:  Goal: Pain level will decrease  Description  Pain level will decrease  Outcome: Ongoing  Note:   Pain treated with oral medication and repositioning. Will continue to monitor. Problem: Falls - Risk of:  Goal: Will remain free from falls  Description  Will remain free from falls  Outcome: Ongoing   Bed locked in lowest position. Bed alarm on. Call light/belongings within reach. Will continue to monitor. Problem: Risk for Impaired Skin Integrity  Goal: Tissue integrity - skin and mucous membranes  Description  Structural intactness and normal physiological function of skin and  mucous membranes. Outcome: Ongoing   Specialty mattress in place. Q2H turns. Sacral heart in place. Cream applied to excoriated areas. Will continue to monitor.

## 2019-05-29 NOTE — CONSULTS
NEUROSURGERY CONSULT NOTE    Raquel Leon  [id number]   1950 5/29/2019    Requesting physician: Sharon Ansari MD    Reason for consultation: Hx of laminectomy    History of present illness: Patient is a 68 y.o. male w/ PMH of s/p laminectomy w/post-op abscess, DM, HTN, DVT who presented on 5/28/2019 from [facility] ARU 2/2 AMS. His mentation was poor in ARU, recently started on Dilaudid for pain. Today, on hx taking his mentation appears to have improved after holding Dilaudid. AxOx3 and able to tell me why he is in the hospital. Endorsed 10/10 \"sharp, stabbing\" back pain, worse w/ movement, radiating to his ankles bilaterally. Stated that since cutting back on pain medications his pain has increased. Denied urinary/fecal incontinence or saddle anesthesia. He denied weakness in BLE.    ROS:   GENERAL:  Denies fever or recent illness.  Denies weight changes   EYES:  Denies vision change or diplopia  EARS:  Denies hearing loss  CARDIAC:  Denies chest pain  RESPIRATORY:  Denies shortness of breath  SKIN:  Denies rash or lesions   HEM:  Denies excessive bruising  PSYCH:  Denies anxiety or depression  NEURO:  Denies headache, numbness or tingling or lateralizing weakness   :  Denies urinary difficulty  GI: Denies nausea, vomiting, diarrhea or constipation  MUSCULOSKELETAL:  Endorses back pain radiating to BLE    No Known Allergies    Past Medical History:   Diagnosis Date    Arthritis      left knee    Back pain     DDD (degenerative disc disease), cervical     Diabetes mellitus (HCC)     Edema of both legs     GERD (gastroesophageal reflux disease)     Hyperlipidemia     Hypertension     MRSA (methicillin resistant staph aureus) culture positive 05/09/2019    back incision    Nausea in adult     Neuropathy     idopathtic     Vitamin B 12 deficiency     Walking troubles         Past Surgical History:   Procedure Laterality Date    BACK SURGERY      x 2    CHOLECYSTECTOMY      CYSTOSCOPY      EYE SURGERY retina repair    HEMORRHOID SURGERY      KNEE SURGERY      LUMBAR SPINE SURGERY N/A 4/19/2019    DECOMPRESSION LAMINECTOMY L3-4 POSSIBLY L5 performed by Everett Fuller MD at [place] [address]       retracted penis    REPAIR DURAL / CSF LEAK N/A 5/12/2019    LUMBAR WOUND WASHOUT performed by Everett Fuller MD at [address]         Social History     Occupational History    Not on file   Tobacco Use    Smoking status: Never Smoker    Smokeless tobacco: Never Used   Substance and Sexual Activity    Alcohol use: Yes     Comment: Rarely about 1 beer in 6 months    Drug use: Not Currently    Sexual activity: Not on file        Family History   Problem Relation Age of Onset    Arthritis Mother     Stroke Father     High Blood Pressure Father     Heart Disease Father     Arthritis Father     Diabetes Father         Outpatient Medications Marked as Taking for the 5/28/19 encounter [facility] Encounter)   Medication Sig Dispense Refill    acetaminophen (TYLENOL) 325 MG tablet Take 650 mg by mouth 4 times daily      cefTRIAXone (ROCEPHIN) 2 g injection Infuse 2 g intravenously 2 times daily      hydrochlorothiazide (MICROZIDE) 12.5 MG capsule Take 12.5 mg by mouth daily      lisinopril (PRINIVIL;ZESTRIL) 20 MG tablet Take 30 mg by mouth daily      methocarbamol (ROBAXIN) 500 MG tablet Take 500 mg by mouth 3 times daily      oxyCODONE (ROXICODONE) 5 MG immediate release tablet Take 5-10 mg by mouth every 4 hours as needed for Pain.       senna-docusate (PERICOLACE) 8.6-50 MG per tablet Take 2 tablets by mouth 2 times daily      polyethylene glycol (GLYCOLAX) packet Take 17 g by mouth daily      famotidine (PEPCID) 20 MG tablet Take 1 tablet by mouth 2 times daily 60 tablet 3    vitamin B-1 100 MG tablet Take 1 tablet by mouth daily 30 tablet 3    amLODIPine (NORVASC) 2.5 MG tablet Take 2.5 mg by mouth daily      atorvastatin (LIPITOR) 20 MG tablet Take 20 mg by mouth nightly       empagliflozin (JARDIANCE) 25 MG tablet Take 12.5 mg by mouth daily       fenofibrate (TRICOR) 145 MG tablet Take 145 mg by mouth daily      glimepiride (AMARYL) 4 MG tablet Take 4 mg by mouth every morning (before breakfast)       insulin NPH (HUMULIN N;NOVOLIN N) 100 UNIT/ML injection vial Inject 20 Units into the skin 2 times daily (before meals)      metFORMIN (GLUCOPHAGE) 1000 MG tablet Take 1,000 mg by mouth 2 times daily (with meals)      tamsulosin (FLOMAX) 0.4 MG capsule Take 0.4 mg by mouth daily          Current Facility-Administered Medications   Medication Dose Route Frequency Provider Last Rate Last Dose    insulin lispro (HUMALOG) injection pen 0-18 Units  0-18 Units Subcutaneous TID  Berenice Lehman MD        insulin lispro (HUMALOG) injection pen 0-9 Units  0-9 Units Subcutaneous Nightly Berenice Lehman MD        methocarbamol (ROBAXIN) 1,000 mg in dextrose 5 % 100 mL IVPB  1,000 mg Intravenous Q8H KALEB Albert - FELIPA        Followed by   Corbin Mello ON 5/30/2019] methocarbamol (ROBAXIN) tablet 1,000 mg  1,000 mg Oral 4x Daily KALEB Albert - FELIPA        atorvastatin (LIPITOR) tablet 20 mg  20 mg Oral Nightly Shanice Cherry MD   20 mg at 05/28/19 2102    famotidine (PEPCID) tablet 20 mg  20 mg Oral BID Shanice Cherry MD   20 mg at 05/29/19 0932    fenofibrate tablet 160 mg  160 mg Oral Daily Shanice Cherry MD   160 mg at 05/29/19 0932    lisinopril (PRINIVIL;ZESTRIL) tablet 30 mg  30 mg Oral Daily Shanice Cherry MD   30 mg at 05/29/19 5507    polyethylene glycol (GLYCOLAX) packet 17 g  17 g Oral Daily Shanice Cherry MD   17 g at 05/29/19 0933    sennosides-docusate sodium (SENOKOT-S) 8.6-50 MG tablet 2 tablet  2 tablet Oral BID Shanice Cherry MD   2 tablet at 05/29/19 0931    tamsulosin (FLOMAX) capsule 0.4 mg  0.4 mg Oral Daily Shanice Cherry MD   0.4 mg at 05/29/19 0933    vitamin B-1 (THIAMINE) tablet 100 mg  100 mg Oral or dysarthria noted  Sensation: Intact to all extremities to light touch  Coordination: Intact    Musculoskeletal:   Gait: Not tested   Assist devices: None   Tone: Normal BUE   Motor strength: Exam limited in BLE 2/2 pain/spasms   Right  Left    Right  Left    Deltoid  5 5  Hip Flex  3 3   Biceps  5 5  Knee Extensors  3 3   Triceps  5 5  Knee Flexors  3 3   Wrist Ext  5 5  Ankle Dorsiflex. 3 3   Wrist Flex  5 5  Ankle Plantarflex. 3 3   Handgrip  5 5  Ext Nixon Longus  3 3   Thumb Ext  5 5         Incision: CDI, staples removed 05/28/2019    Radiological Findings:Xr Chest Standard (2 Vw)  Mri Lumbar Spine W Wo Contrast  Result Date: 5/10/2019  1. Posterior fluid collection along the laminectomy bed of unclear significance. The fluid collection exerts complex layering signal with gas bubbles and peripheral enhancement. This may or present a seroma or postoperative hematoma. An infected fluid collection be difficult to exclude. A pseudomeningocele is not excluded. The fluid collection extensively laminectomy bed without significant mass effect. 2. Multifactorial multilevel mild canal narrowing as described in part related to extensive facet arthropathy resulting in a narrowing of the canal from a lateral dimension. 3. Tortuous bunched nerve roots in the upper cauda equina surrounding the conus presumably related to chronic stenosis. 4. Multifactorial multilevel foraminal narrowing with moderate or severe foraminal narrowing from L1-L2 through L5-S1.  5. Diffuse circumferential epidural enhancement without discrete epidural collection extending throughout the laminectomy site which may be reactive in nature. Infection would be difficult to exclude. 6. No convincing evidence of osteomyelitis or discitis. 7. Large posterior central disc protrusion at L5-S1 resulting in a moderate canal narrowing and mild/moderate impingement upon the exiting S1 nerve roots bilaterally.     Mri Lumbar Spine W Wo Contrast  Result

## 2019-05-29 NOTE — PLAN OF CARE
Problem: Pain:  Goal: Pain level will decrease  Description  Pain level will decrease  Outcome: Ongoing  Note:   Patient c/o of mid/lower back pain radiating down to bilateral lower extremities. Rated pain a 10 out of 10 on 0-10 pain rating scale. Given PRN oral medication. Ice bags applied to both legs. Upon reassessment, patient states minimal relief. Will continue to monitor. Problem: Falls - Risk of:  Goal: Will remain free from falls  Description  Will remain free from falls  Outcome: Ongoing  Note:   Patient will remain free from falls throughout shift. High fall risk. Up x2/3 with maxi move. Fall precautions in place. Non-skid socks on. Bed locked in lowest position with alarm on and 3/4 side rails up. Bedside table and call light within reach. Patient calling out appropriately when needing assistance. Hourly rounding in anticipation of patient needs. Floor clean and free from clutter. Room door open. Will continue to monitor. Problem: Risk for Impaired Skin Integrity  Goal: Tissue integrity - skin and mucous membranes  Description  Structural intactness and normal physiological function of skin and  mucous membranes. Outcome: Ongoing  Note:   Redness and excoriation to eric area and buttocks. Patient continent of bowel and bladder but voiding via urinal. Skin checks q4 hrs and patient changed when need be. Patient on special mattress. Needs help turning and repositioning self with pillow support q2 hrs. Elbows and heels elevated off of bed with pillows. Will continue to monitor.

## 2019-05-29 NOTE — PLAN OF CARE
Nutrition Problem: Inadequate oral intake  Intervention: Food and/or Nutrient Delivery: Continue current diet, Start ONS  Nutritional Goals: pt will tolerate and consume 50% or more of all meals and supplements offered

## 2019-05-29 NOTE — PROGRESS NOTES
Patient alert and oriented x4. Vital signs stable. Lower/mid back pain controlled with oral pain medication but patient states \"the medication does not last long enough\". Patient states having numbness/tingling to lower legs bilaterally. Clear breath sounds, active bowel sounds. Patient has decreased appetite and has not eaten much but is tolerating diet. Denies n/v. Fall precautions in place. Call light within reach. Patient calling out appropriately when needing help. Currently resting in bed with no further complaints. Will continue to monitor.

## 2019-05-29 NOTE — PROGRESS NOTES
Patient a/o x4. Vitals stable. Pain treated with oral medication. Patient states minimal relief. MD notified, no new orders placed. Q2H turns help alleviate pain. Will continue to monitor.

## 2019-05-29 NOTE — PROGRESS NOTES
Pupils equal, round, and reactive to light. Conjunctivae/corneas clear. Neck: Supple, with full range of motion. No jugular venous distention. Trachea midline. Respiratory:  Normal respiratory effort. Clear to auscultation, bilaterally without Rales/Wheezes/Rhonchi. Cardiovascular: Regular rate and rhythm with normal S1/S2 without murmurs, rubs or gallops. Abdomen: Soft, non-tender, non-distended with normal bowel sounds. Musculoskeletal: No clubbing, cyanosis or edema bilaterally. Tenderness to palpation of mid back. Full range of motion without deformity. Skin: Skin color, texture, turgor normal.  No rashes or lesions. Neurologic:  Neurovascularly intact without any focal sensory/motor deficits. Cranial nerves: II-XII intact, grossly non-focal.  Psychiatric: Alert and oriented, thought content appropriate, normal insight    Labs:   Recent Labs     05/27/19  0554 05/28/19  0610 05/29/19  0600   WBC 6.0 5.1 5.5   HGB 11.2* 11.0* 11.5*   HCT 34.5* 34.1* 35.2*   * 606* 571*     Recent Labs     05/27/19  0554 05/28/19  0610 05/29/19  0600   * 135* 132*   K 4.0 4.3 4.1   CL 95* 98* 95*   CO2 24 24 24   BUN 17 25* 21*   CREATININE 0.6* 0.7* 0.6*   CALCIUM 9.1 9.3 9.2     Recent Labs     05/28/19  0610   AST 14*   ALT 13   BILIDIR <0.2   BILITOT 0.3   ALKPHOS 89     Recent Labs     05/28/19  1715   INR 1.43*     No results for input(s): CKTOTAL, TROPONINI in the last 72 hours. Urinalysis:   Lab Results   Component Value Date    NITRU Negative 05/09/2019    WBCUA 3-5 05/09/2019    BACTERIA 1+ 05/09/2019    RBCUA 0-2 05/09/2019    BLOODU SMALL 05/09/2019    SPECGRAV 1.025 05/09/2019    GLUCOSEU >=1000 05/09/2019       Radiology:  MRI LUMBAR SPINE W WO CONTRAST    (Results Pending)         Assessment/Plan:  Jenn Jacobo, 69 yo male, with PMHx of DMII, HTN, DVT that was recently admitted for epidural abscess on 5/18/2019 after laminectomy a month prior presenting with progressive AMS.      C/Janet Cross 2567 Problems    Diagnosis Date Noted    Encephalopathy [G93.40] 05/28/2019     Acute Metabolic Encephalopathy likely 2/2 medications (opiods)  Family states patient has decreased mental status since being in hospital for epidural abscess. Patient was on dilaudid for pain while in ARU. Mental status improved when it was held. - Neurosurgery consulted - Neurocheck Q4  - Roxicodone pain panel, holding dilaudid   - continue Rocephin 2g IV BID  - UA pending      Lower Back pain 2/2 epidural abscess from recent L2-L4 laminectomy  Patient underwent laminectomy 4/19/19 with post-op epidural abscess. Still having sharp back pain that radiates to feet and ankles. - Neurosurgery consulted              - MRI lumbar to r/o acute disc herniation              - Robaxin for muscle spasm, hold if mental status worsens              - PT/OT  - Roxicodone pain panel     DVT   Dopplers 5/16/19 showing acute to subacute partially occluding DVT involving one of rt posterior tibial veins. Patient also having foot and ankle pain. Denies shortness of breath.   - continue Eliquis 5mg PO BID  - monitor PT & INR     HTN  - continue home Norvasc 2.5mg PO and Lisinopril 30mg PO  - Hydralazine PRN     DMII  - continue HDSSI  - start Lantus 10U    - hypoglycemia protocol     Code Status: Full code  Diet: Carb control  PPX: Eliquis  DISPO: from home, no services    I will discuss the patient with the senior resident and MD Sher Norton MD   Internal Medicine Resident PGY-1  Reach via [address] [place] [address]

## 2019-05-29 NOTE — CONSULTS
symptoms:  as evidenced by Intake 0-25%    Objective Information:  · Nutrition-Focused Physical Findings: +BM 5/28; pt confused    · Wound Type: Surgical Wound  · Current Nutrition Therapies:  · Oral Diet Orders: Carb Control 4 Carbs/Meal   · Oral Diet intake: 0%, 1-25%  · Oral Nutrition Supplement (ONS) Orders: None  · Anthropometric Measures:  · Ht: 6' 0.84\" (185 cm)   · Current Body Wt: 332 lb 3.7 oz (150.7 kg)  · Admission Body Wt: 350 lb 1.5 oz (158.8 kg)(initial admit to acute care )  · Usual Body Wt: 330 lb (149.7 kg)(per pt wife )  · % Weight Change:    5% loss x 1 month; GUME nutritional losses vs fluid   · Ideal Body Wt: 184 lb (83.5 kg)   · BMI Classification: BMI > or equal to 40.0 Obese Class III    Nutrition Interventions:   Continue current diet, Start ONS  Continued Inpatient Monitoring    Nutrition Evaluation:   · Evaluation: Goals set   · Goals: pt will tolerate and consume 50% or more of all meals and supplements offered     · Monitoring: Meal Intake, Supplement Intake, Diet Tolerance, Weight, Pertinent Labs, Nausea or Vomiting      Electronically signed by Irma Leigh RD, LD on 5/29/19 at 2:06 PM    PRINCESS SCOTT, LULU  Pager:  [phone]  Office:  [phone]

## 2019-05-29 NOTE — PROGRESS NOTES
results for input(s): NITRITE, COLORU, PHUR, LABCAST, WBCUA, RBCUA, MUCUS, TRICHOMONAS, YEAST, BACTERIA, CLARITYU, SPECGRAV, LEUKOCYTESUR, UROBILINOGEN, BILIRUBINUR, BLOODU, GLUCOSEU, AMORPHOUS in the last 72 hours. Invalid input(s): Seble Se[place]our    MRI LUMBAR SPINE W WO CONTRAST    (Results Pending)         ASSESSMENT AND PLAN:   En Roca, 67 yo male, with PMHx of DMII, HTN, DVT that was recently admitted for epidural abscess on 5/18/2019 after laminectomy a month prior presenting with progressive AMS. Acute Metabolic Encephalopathy likely 2/2 medications (opiods)  Family states patient has decreased mental status since being in hospital for epidural abscess. Patient was on dilaudid for pain while in ARU. Mental status improved when it was held. - Neurosurgery consulted - Neurocheck Q4  - Roxicodone pain panel, holding dilaudid   - continue Rocephin 2g IV BID  - UA pending     Lower Back pain 2/2 recent surgery   Patient underwent laminectomy 4/19/19 with post-op epidural abscess. Still having sharp back pain that radiates to feet and ankles. - Neurosurgery consulted   - MRI lumbar to r/o acute disc herniation today   - Robaxin for muscle spasm, hold if mental status worsens   - PT/OT  - Roxicodone pain panel    DVT   Dopplers 5/16/19 showing acute to subacute partially occluding DVT involving one of rt posterior tibial veins. Patient also having foot and ankle pain. Denies shortness of breath.   - continue Eliquis 5mg PO BID  - monitor PT & INR    HTN  - continue home Norvasc 2.5mg PO and Lisinopril 30mg PO  - Hydralazine PRN    DMII  - continue HDSSI  - start Lantus 10U    - hypoglycemia protocol    Code Status: Full code  Diet: Carb control  PPX: Eliquis  DISPO: from home, no services    [facility]   5/29/2019,  1:02 PM

## 2019-05-30 ENCOUNTER — TELEPHONE (OUTPATIENT)
Dept: INFECTIOUS DISEASES | Age: 69
End: 2019-05-30

## 2019-05-30 LAB
ANION GAP SERPL CALCULATED.3IONS-SCNC: 12 MMOL/L (ref 3–16)
BASOPHILS ABSOLUTE: 0.1 K/UL (ref 0–0.2)
BASOPHILS RELATIVE PERCENT: 0.7 %
BUN BLDV-MCNC: 23 MG/DL (ref 7–20)
C-REACTIVE PROTEIN: 35 MG/L (ref 0–5.1)
CALCIUM SERPL-MCNC: 9.4 MG/DL (ref 8.3–10.6)
CHLORIDE BLD-SCNC: 97 MMOL/L (ref 99–110)
CO2: 24 MMOL/L (ref 21–32)
CREAT SERPL-MCNC: 0.7 MG/DL (ref 0.8–1.3)
EOSINOPHILS ABSOLUTE: 0.2 K/UL (ref 0–0.6)
EOSINOPHILS RELATIVE PERCENT: 3 %
GFR AFRICAN AMERICAN: >60
GFR NON-AFRICAN AMERICAN: >60
GLUCOSE BLD-MCNC: 156 MG/DL (ref 70–99)
GLUCOSE BLD-MCNC: 178 MG/DL (ref 70–99)
GLUCOSE BLD-MCNC: 182 MG/DL (ref 70–99)
GLUCOSE BLD-MCNC: 187 MG/DL (ref 70–99)
GLUCOSE BLD-MCNC: 197 MG/DL (ref 70–99)
HCT VFR BLD CALC: 35.4 % (ref 40.5–52.5)
HEMOGLOBIN: 11.5 G/DL (ref 13.5–17.5)
LYMPHOCYTES ABSOLUTE: 1.5 K/UL (ref 1–5.1)
LYMPHOCYTES RELATIVE PERCENT: 21.2 %
MCH RBC QN AUTO: 28.4 PG (ref 26–34)
MCHC RBC AUTO-ENTMCNC: 32.6 G/DL (ref 31–36)
MCV RBC AUTO: 87.2 FL (ref 80–100)
MONOCYTES ABSOLUTE: 0.7 K/UL (ref 0–1.3)
MONOCYTES RELATIVE PERCENT: 9.6 %
NEUTROPHILS ABSOLUTE: 4.6 K/UL (ref 1.7–7.7)
NEUTROPHILS RELATIVE PERCENT: 65.5 %
PDW BLD-RTO: 15 % (ref 12.4–15.4)
PERFORMED ON: ABNORMAL
PLATELET # BLD: 530 K/UL (ref 135–450)
PMV BLD AUTO: 6.9 FL (ref 5–10.5)
POTASSIUM REFLEX MAGNESIUM: 4.3 MMOL/L (ref 3.5–5.1)
RBC # BLD: 4.06 M/UL (ref 4.2–5.9)
SEDIMENTATION RATE, ERYTHROCYTE: 81 MM/HR (ref 0–20)
SODIUM BLD-SCNC: 133 MMOL/L (ref 136–145)
WBC # BLD: 7 K/UL (ref 4–11)

## 2019-05-30 PROCEDURE — 86140 C-REACTIVE PROTEIN: CPT

## 2019-05-30 PROCEDURE — 6360000002 HC RX W HCPCS: Performed by: STUDENT IN AN ORGANIZED HEALTH CARE EDUCATION/TRAINING PROGRAM

## 2019-05-30 PROCEDURE — 85025 COMPLETE CBC W/AUTO DIFF WBC: CPT

## 2019-05-30 PROCEDURE — 36592 COLLECT BLOOD FROM PICC: CPT

## 2019-05-30 PROCEDURE — 1200000000 HC SEMI PRIVATE

## 2019-05-30 PROCEDURE — 6370000000 HC RX 637 (ALT 250 FOR IP): Performed by: STUDENT IN AN ORGANIZED HEALTH CARE EDUCATION/TRAINING PROGRAM

## 2019-05-30 PROCEDURE — 97530 THERAPEUTIC ACTIVITIES: CPT

## 2019-05-30 PROCEDURE — 6360000002 HC RX W HCPCS: Performed by: NURSE PRACTITIONER

## 2019-05-30 PROCEDURE — 85652 RBC SED RATE AUTOMATED: CPT

## 2019-05-30 PROCEDURE — 2580000003 HC RX 258: Performed by: NURSE PRACTITIONER

## 2019-05-30 PROCEDURE — 99233 SBSQ HOSP IP/OBS HIGH 50: CPT | Performed by: INTERNAL MEDICINE

## 2019-05-30 PROCEDURE — 80048 BASIC METABOLIC PNL TOTAL CA: CPT

## 2019-05-30 PROCEDURE — 97163 PT EVAL HIGH COMPLEX 45 MIN: CPT

## 2019-05-30 PROCEDURE — 97167 OT EVAL HIGH COMPLEX 60 MIN: CPT

## 2019-05-30 PROCEDURE — 6370000000 HC RX 637 (ALT 250 FOR IP): Performed by: NURSE PRACTITIONER

## 2019-05-30 PROCEDURE — 2580000003 HC RX 258

## 2019-05-30 PROCEDURE — 2580000003 HC RX 258: Performed by: STUDENT IN AN ORGANIZED HEALTH CARE EDUCATION/TRAINING PROGRAM

## 2019-05-30 RX ORDER — SODIUM CHLORIDE 9 MG/ML
INJECTION, SOLUTION INTRAVENOUS
Status: COMPLETED
Start: 2019-05-30 | End: 2019-05-30

## 2019-05-30 RX ADMIN — OXYCODONE HYDROCHLORIDE 10 MG: 5 TABLET ORAL at 08:40

## 2019-05-30 RX ADMIN — CEFTRIAXONE SODIUM 2 G: 2 INJECTION, POWDER, FOR SOLUTION INTRAMUSCULAR; INTRAVENOUS at 02:08

## 2019-05-30 RX ADMIN — FAMOTIDINE 20 MG: 20 TABLET ORAL at 20:40

## 2019-05-30 RX ADMIN — CEFTRIAXONE SODIUM 2 G: 2 INJECTION, POWDER, FOR SOLUTION INTRAMUSCULAR; INTRAVENOUS at 12:07

## 2019-05-30 RX ADMIN — INSULIN LISPRO 2 UNITS: 100 INJECTION, SOLUTION INTRAVENOUS; SUBCUTANEOUS at 20:41

## 2019-05-30 RX ADMIN — INSULIN LISPRO 3 UNITS: 100 INJECTION, SOLUTION INTRAVENOUS; SUBCUTANEOUS at 13:17

## 2019-05-30 RX ADMIN — OXYCODONE HYDROCHLORIDE 10 MG: 5 TABLET ORAL at 22:19

## 2019-05-30 RX ADMIN — ATORVASTATIN CALCIUM 20 MG: 20 TABLET, FILM COATED ORAL at 20:40

## 2019-05-30 RX ADMIN — SENNOSIDES,DOCUSATE SODIUM 2 TABLET: 8.6; 5 TABLET, FILM COATED ORAL at 08:40

## 2019-05-30 RX ADMIN — OXYCODONE HYDROCHLORIDE 10 MG: 5 TABLET ORAL at 13:16

## 2019-05-30 RX ADMIN — APIXABAN 5 MG: 5 TABLET, FILM COATED ORAL at 20:41

## 2019-05-30 RX ADMIN — METHOCARBAMOL TABLETS 1000 MG: 500 TABLET, COATED ORAL at 12:01

## 2019-05-30 RX ADMIN — LISINOPRIL 30 MG: 20 TABLET ORAL at 08:40

## 2019-05-30 RX ADMIN — FENOFIBRATE 160 MG: 160 TABLET ORAL at 08:41

## 2019-05-30 RX ADMIN — OXYCODONE HYDROCHLORIDE 10 MG: 5 TABLET ORAL at 17:59

## 2019-05-30 RX ADMIN — Medication 10 ML: at 08:42

## 2019-05-30 RX ADMIN — AMLODIPINE BESYLATE 2.5 MG: 2.5 TABLET ORAL at 08:41

## 2019-05-30 RX ADMIN — SODIUM CHLORIDE 250 ML: 9 INJECTION, SOLUTION INTRAVENOUS at 02:08

## 2019-05-30 RX ADMIN — Medication 100 MG: at 08:41

## 2019-05-30 RX ADMIN — INSULIN LISPRO 3 UNITS: 100 INJECTION, SOLUTION INTRAVENOUS; SUBCUTANEOUS at 08:46

## 2019-05-30 RX ADMIN — Medication 10 ML: at 20:46

## 2019-05-30 RX ADMIN — METHOCARBAMOL 1000 MG: 100 INJECTION, SOLUTION INTRAMUSCULAR; INTRAVENOUS at 02:39

## 2019-05-30 RX ADMIN — INSULIN LISPRO 3 UNITS: 100 INJECTION, SOLUTION INTRAVENOUS; SUBCUTANEOUS at 18:01

## 2019-05-30 RX ADMIN — METHOCARBAMOL TABLETS 1000 MG: 500 TABLET, COATED ORAL at 20:40

## 2019-05-30 RX ADMIN — OXYCODONE HYDROCHLORIDE 10 MG: 5 TABLET ORAL at 02:39

## 2019-05-30 RX ADMIN — METHOCARBAMOL TABLETS 1000 MG: 500 TABLET, COATED ORAL at 18:09

## 2019-05-30 RX ADMIN — APIXABAN 5 MG: 5 TABLET, FILM COATED ORAL at 08:41

## 2019-05-30 RX ADMIN — FAMOTIDINE 20 MG: 20 TABLET ORAL at 08:41

## 2019-05-30 RX ADMIN — POLYETHYLENE GLYCOL (3350) 17 G: 17 POWDER, FOR SOLUTION ORAL at 08:40

## 2019-05-30 RX ADMIN — TAMSULOSIN HYDROCHLORIDE 0.4 MG: 0.4 CAPSULE ORAL at 08:41

## 2019-05-30 ASSESSMENT — PAIN DESCRIPTION - ONSET
ONSET: ON-GOING

## 2019-05-30 ASSESSMENT — PAIN DESCRIPTION - PROGRESSION
CLINICAL_PROGRESSION: NOT CHANGED

## 2019-05-30 ASSESSMENT — PAIN SCALES - GENERAL
PAINLEVEL_OUTOF10: 8
PAINLEVEL_OUTOF10: 7
PAINLEVEL_OUTOF10: 6
PAINLEVEL_OUTOF10: 8
PAINLEVEL_OUTOF10: 6
PAINLEVEL_OUTOF10: 0
PAINLEVEL_OUTOF10: 0
PAINLEVEL_OUTOF10: 8
PAINLEVEL_OUTOF10: 9

## 2019-05-30 ASSESSMENT — PAIN DESCRIPTION - LOCATION
LOCATION: BACK

## 2019-05-30 ASSESSMENT — PAIN DESCRIPTION - PAIN TYPE
TYPE: ACUTE PAIN
TYPE: ACUTE PAIN;SURGICAL PAIN

## 2019-05-30 ASSESSMENT — PAIN DESCRIPTION - DIRECTION
RADIATING_TOWARDS: BLE

## 2019-05-30 ASSESSMENT — PAIN - FUNCTIONAL ASSESSMENT
PAIN_FUNCTIONAL_ASSESSMENT: PREVENTS OR INTERFERES SOME ACTIVE ACTIVITIES AND ADLS
PAIN_FUNCTIONAL_ASSESSMENT: PREVENTS OR INTERFERES WITH MANY ACTIVE NOT PASSIVE ACTIVITIES

## 2019-05-30 ASSESSMENT — PAIN DESCRIPTION - FREQUENCY
FREQUENCY: CONTINUOUS

## 2019-05-30 ASSESSMENT — PAIN DESCRIPTION - ORIENTATION
ORIENTATION: LOWER;MID

## 2019-05-30 ASSESSMENT — PAIN DESCRIPTION - DESCRIPTORS
DESCRIPTORS: CONSTANT;SHARP;SHOOTING
DESCRIPTORS: CONSTANT;SHARP;SHOOTING
DESCRIPTORS: SHOOTING;SHARP
DESCRIPTORS: CONSTANT;SHARP;SHOOTING

## 2019-05-30 NOTE — PROGRESS NOTES
I.V.:10]  Out: 450 [Urine:450]      General: Alert and Oriented, No acute distress, cooperative, appears stated age  Eye: PERRLA, EOMI, Normal Conjunctiva  HENT: Normocephalic, Normal Hearing, Moist oral mucosa, no pharyngeal edema  Neck: Supple, Non-tender, no carotid bruit, no JVD, no lymphadenopathy, no thyromegaly  Respiratory: Lungs clear to auscultation bilaterally, Non-labored respirations, BS equal, symmetrical expansion, no chest wall tenderness  Cardiovascular: Normal rate, regular rhythm, no murmurs, no gallops, good pulses in all extremities, normal peripheral perfusion, no edema  Gastrointestinal: Soft, Non-tender, non-distended, normal bowel sounds, no organomegaly  Musculoskeletal: Normal ROM, Normal strength, No tenderness, No swelling, No deformity   Feet: Normal by visual exam, Normal pulses, Sensation intact  Neurologic: Alert, Oriented, Normal sensory, Normal motor, No focal defects, CN II-XII intact, Normal DTRs  Psychiatric: Cooperative, appropriate mood and affect, normal judgment, non-suicidal        DATA:       Labs:  CBC:   Recent Labs     05/28/19  0610 05/29/19  0600 05/30/19  0635   WBC 5.1 5.5 7.0   HGB 11.0* 11.5* 11.5*   HCT 34.1* 35.2* 35.4*   * 571* 530*       BMP:   Recent Labs     05/28/19  0610 05/29/19  0600 05/30/19  0635   * 132* 133*   K 4.3 4.1 4.3   CL 98* 95* 97*   CO2 24 24 24   BUN 25* 21* 23*   CREATININE 0.7* 0.6* 0.7*   GLUCOSE 190* 210* 197*     LFT's:   Recent Labs     05/28/19  0610   AST 14*   ALT 13   BILITOT 0.3   ALKPHOS 89     Troponin: No results for input(s): TROPONINI in the last 72 hours. BNP: No results for input(s): BNP in the last 72 hours. ABGs: No results for input(s): PHART, ODH3HQY, PO2ART in the last 72 hours.   INR:   Recent Labs     05/28/19  1715   INR 1.43*       U/A:  Recent Labs     05/29/19  1915   COLORU Yellow   PHUR 5.5   WBCUA 6-10*   RBCUA 3-5*   CLARITYU Clear   SPECGRAV 1.025   LEUKOCYTESUR Negative   UROBILINOGEN 0.2 BILIRUBINUR Negative   BLOODU SMALL*   GLUCOSEU 250*       MRI LUMBAR SPINE WO CONTRAST   Final Result            ASSESSMENT AND PLAN:   Mary Gaming, 69 yo male, with PMHx of DMII, HTN, DVT that was recently admitted for epidural abscess on 5/18/2019 after laminectomy a month prior presenting with progressive AMS. Acute Metabolic Encephalopathy likely 2/2 medications (opiods)  Family states patient has decreased mental status since being in hospital for epidural abscess. Patient was on dilaudid for pain while in ARU. Mental status improved when it was held. MRI showed discitis and osteomyelitis in L2-L4 with no fractures. UA had WBC 6-10, glucose 250.   - Neurosurgery consulted - Neurocheck Q4  - Roxicodone pain panel, holding dilaudid   - continue Rocephin 2g IV BID     Lower Back pain 2/2 epidural abscess from recent L2-L4 laminectomy  Patient underwent laminectomy 4/19/19 with post-op epidural abscess. Still having sharp back pain that radiates to feet and ankles. MRI showed discitis and osteomyelitis in L2-L4 with no fractures. - Neurosurgery consulted              - Robaxin for muscle spasm, hold if mental status worsens              - PT/OT  - Roxicodone pain panel     DVT   Dopplers 5/16/19 showing acute to subacute partially occluding DVT involving one of rt posterior tibial veins. Patient also having foot and ankle pain. Denies shortness of breath.   - continue Eliquis 5mg PO BID  - monitor PT & INR     HTN  - continue home Norvasc 2.5mg PO and Lisinopril 30mg PO  - Hydralazine PRN     DMII  - continue HDSSI  - start Lantus 10U    - hypoglycemia protocol     Code Status: Full code  Diet: Carb control  PPX: Eliquis  DISPO: from home, no services      Code Status: Full code  FEN:   PPX: lovenox  DISPO: from home, no services    [address]

## 2019-05-30 NOTE — CARE COORDINATION
Faxed updated therapy evals to [facility].     Electronically signed by Jeramy Coe RN on 5/30/2019 at 2:51 PM  [phone]

## 2019-05-30 NOTE — PLAN OF CARE
Problem: Pain:  Goal: Control of acute pain  Description  Control of acute pain  Outcome: Ongoing   Pain treated with oral medication. Minimal relief stated. Will continue to monitor. Problem: Falls - Risk of:  Goal: Will remain free from falls  Description  Will remain free from falls  5/29/2019 2252 by Aline Juan RN  Outcome: Ongoing   Bed locked in lowest position. Bed alarm on. Call light/belongings within reach. Frequent rounds made. Will continue to monitor. Problem: Risk for Impaired Skin Integrity  Goal: Tissue integrity - skin and mucous membranes  Description  Structural intactness and normal physiological function of skin and  mucous membranes. 5/29/2019 2252 by Aline Juan RN  Outcome: Ongoing  Specialty mattress in place. Ointment applied to excoriated areas. Q2H turn. Will continue to monitor.

## 2019-05-30 NOTE — PROGRESS NOTES
Patient alert and oriented x4. VSS. Up the the chair for about 45 minutes this shift using jalen lift. Patient toleratedbeing in the chair  Poorly. BM this shift. On specialty mattress. Pain being managed with PO analgesics. Patient at risk for falls. Patient resting quietly in bed. Side rails up x 2. Bed locked in lowest position. Bed alarm on. Bedside table and call light within reach. Patient instructed to call for assistance. Patient verbalized understanding. Will continue to monitor.         Vitals:    05/30/19 1458   BP: 121/74   Pulse: 74   Resp: 16   Temp: 98.1 °F (36.7 °C)   SpO2: 92%

## 2019-05-30 NOTE — PLAN OF CARE
Problem: Pain:  Goal: Pain level will decrease  Description  Pain level will decrease  Outcome: Ongoing  Pain being controlled with PO analgesics. Times written on board to communicate next available dose. Patient napping after analgesic. Problem: Falls - Risk of:  Goal: Will remain free from falls  Description  Will remain free from falls  Outcome: Ongoing  Patient at risk for falls. Patient resting quietly in bed. Side rails up x 2. Bed locked in lowest position. Bed alarm on. Bedside table and call light within reach. Patient instructed to call for assistance. Patient verbalized understanding. Will continue to monitor. Problem: Risk for Impaired Skin Integrity  Goal: Tissue integrity - skin and mucous membranes  Description  Structural intactness and normal physiological function of skin and  mucous membranes. Outcome: Ongoing  Barrier creams for skin protection. Frequent checking of brief.

## 2019-05-30 NOTE — DISCHARGE SUMMARY
INTERNAL MEDICINE DEPARTMENT AT [address] [place] [address]  DISCHARGE SUMMARY    Patient ID: Minoo Nevarez                                             Discharge Date: 5/30/2019   Patient's PCP: [facility]                                          Discharge Physician: Edouard Diez MD  Admit Date: 5/28/2019   Admitting Physician: Velia Bailey MD    PROBLEMS DURING HOSPITALIZATION:  Patient Active Problem List   Diagnosis    Lumbar spinal stenosis    Degenerative lumbar spinal stenosis    Lumbar stenosis with neurogenic claudication    Back abscess    Sepsis ([address]    Surgical site infection    Staphylococcus aureus infection    Bacteremia due to methicillin susceptible Staphylococcus aureus (MSSA)    Debility    Low back pain    Encephalopathy       DISCHARGE DIAGNOSES:  HPI: Dom Wu, 69 yo male with PMHx of IDDM, HTN, DVT who was admitted from [place] [address] for AMS. Patient was admitted to [facility] on 5/18/2019 for epidural abscess post-op L3-4 lumbar laminectomy and facetectomy on 4/19/2019. Patient's family states during his hospitalization his mental status has declined. He was given dilaudid in ARU but his mentation improved after holding dilaudid. A&Ox3. Patient still had persistent sharp stabbing back pain that radiated to his feet and ankles. Denied shortness of breath, chest pain, palpitations, abdominal pain, N/V, or changes in urination or bowel habits. Hospital Course:  Patient was afebrile and hemodynamically stable. BMP WNLs with no leukocytosis. UA showed increased glucose and 6-10 WBC. Urine culture showed no growth to date. Neurosurgery was consulted. MRI of lumbar region showed discitis and osteomyelitis from L2-4 as expected. MRI unable to assess full lumbar status for central stenosis or epidural phlegmon/fluid collections due to patient not being able to cooperate. *** ID consulted to evaluate antibiotic treatment plan on discharge.      Physical Exam:  /74   Pulse 79   Temp 97.5 °F (36.4 °C) (Oral)   Resp 16   Ht 6' 0.84\" (1.85 m)   Wt (!) 332 lb 3.7 oz (150.7 kg)   SpO2 94%   BMI 44.03 kg/m²   General appearance: alert, appears stated age and cooperative  Head: Normocephalic, without obvious abnormality, atraumatic  Eyes: conjunctivae/corneas clear. PERRL, EOM's intact. Fundi benign. Ears: normal TM's and external ear canals both ears  Nose: Nares normal. Septum midline. Mucosa normal. No drainage or sinus tenderness.   Throat: lips, mucosa, and tongue normal; teeth and gums normal  Neck: no adenopathy, no carotid bruit, no JVD, supple, symmetrical, trachea midline and thyroid not enlarged, symmetric, no tenderness/mass/nodules  Lungs: clear to auscultation bilaterally  Heart: regular rate and rhythm, S1, S2 normal, no murmur, click, rub or gallop  Abdomen: soft, non-tender; bowel sounds normal; no masses,  no organomegaly  Extremities: extremities normal, atraumatic, no cyanosis or edema  Neurologic: Grossly normal    Consults: {consultation:88902}  Significant Diagnostic Studies:   MRI LUMBAR SPINE WO CONTRAST   Final Result        Disposition: {disposition:18248}  Discharged Condition: {STABLE/UNSTABLE:824611427}  Follow Up: Primary Care Physician in {Time; 1 week to 1 month:78651}    DISCHARGE MEDICATION:     Medication List      ASK your doctor about these medications    acetaminophen 325 MG tablet  Commonly known as:  TYLENOL     amLODIPine 2.5 MG tablet  Commonly known as:  NORVASC     atorvastatin 20 MG tablet  Commonly known as:  LIPITOR     cefTRIAXone 2 g injection  Commonly known as:  ROCEPHIN     empagliflozin 25 MG tablet  Commonly known as:  JARDIANCE     famotidine 20 MG tablet  Commonly known as:  PEPCID  Take 1 tablet by mouth 2 times daily     fenofibrate 145 MG tablet  Commonly known as:  TRICOR     glimepiride 4 MG tablet  Commonly known as:  AMARYL     hydrochlorothiazide 12.5 MG capsule  Commonly known as:  MICROZIDE     insulin  UNIT/ML injection vial  Commonly known as:   HUMULIN N;NOVOLIN N     lisinopril 20 MG tablet  Commonly known as:  PRINIVIL;ZESTRIL     metFORMIN 1000 MG tablet  Commonly known as:  GLUCOPHAGE     methocarbamol 500 MG tablet  Commonly known as:  ROBAXIN     oxyCODONE 5 MG immediate release tablet  Commonly known as:  ROXICODONE     polyethylene glycol packet  Commonly known as:  GLYCOLAX     senna-docusate 8.6-50 MG per tablet  Commonly known as:  PERICOLACE     tamsulosin 0.4 MG capsule  Commonly known as:  FLOMAX     thiamine 100 MG tablet  Take 1 tablet by mouth daily          Activity: {discharge activity:05089}  Diet: {diet:09213}  Wound Care: {wound care:47287}    Time Spent on discharge is more than {Time; 15 min - 8 hours:63542}    Signed:  Jacob Boyce MD   5/30/2019

## 2019-05-30 NOTE — PROGRESS NOTES
Physical Therapy    Facility/Department: [facility] 5T ORTHO/NEURO  Initial Assessment    NAME: Vasu Talley  : 1950  MRN: [id number]    Date of Service: 2019    Discharge Recommendations:Dom Wu scored a 7/24 on the AM-PAC short mobility form. Current research shows that an AM-PAC score of 17 or less is typically not associated with a discharge to the patient's home setting. Based on the patients AM-PAC score and their current functional mobility deficits, it is recommended that the patient have 3-5 sessions per week of Physical Therapy at d/c to increase the patients independence. PT Equipment Recommendations  Equipment Needed: (defer)    Assessment   Body structures, Functions, Activity limitations: Decreased functional mobility   Assessment: 69 yo readmitted from ARU for encephaopathy and now pending d/c to SNF when medically ready. Pt demo mobility below his reported baseline of ambulatory prior to April laminectomy. Pt not safe to return home at this time. Pt would benefit from continued skilled IP PT at discharge. Recommend nursing utilize jalen lift to assist pt OOB PRN and often. Treatment Diagnosis: decreased functional mobility due to encephalopathy  Decision Making: High Complexity  Patient Education: Pt educated on PT role, need to call for assist to get up, importance of OOB mobility and he verbalized partial understanding. Activity Tolerance  Activity Tolerance: Patient limited by pain; Patient limited by cognitive status       Patient Diagnosis(es): There were no encounter diagnoses. has a past medical history of Arthritis, Back pain, DDD (degenerative disc disease), cervical, Diabetes mellitus ([address] Edema of both legs, GERD (gastroesophageal reflux disease), Hyperlipidemia, Hypertension, MRSA (methicillin resistant staph aureus) culture positive, Nausea in adult, Neuropathy, Vitamin B 12 deficiency, and Walking troubles.    has a past surgical history that includes back surgery; Prostate surgery; shoulder surgery; Cystoscopy; knee surgery; Hemorrhoid surgery; eye surgery; Tonsillectomy; Cholecystectomy; Lumbar spine surgery (N/A, 4/19/2019); and REPAIR DURAL / CSF LEAK (N/A, 5/12/2019). Restrictions  Position Activity Restriction  Other position/activity restrictions: up with assistance     Vision/Hearing  Vision: Impaired  Vision Exceptions: Wears glasses at all times  Hearing: Within functional limits       Subjective  General  Chart Reviewed: Yes  Additional Pertinent Hx: Pt is a 68 y.o. male admitted back to hospital 5/28/19 from ARU for AMS. Pt initially admitted to [facility] on 5/8 with complaints of low back pain, fever, and chills. Pmhx: DM, L2-4  laminectomy 04/19, epidural abcess post laminectomy with washout/dural tear repair/CSF leak 5/12/19, DM, HTN. Family / Caregiver Present: No  Diagnosis: encephalopathy  Follows Commands: Impaired  Subjective  Subjective: Pt found supine in bed and agreeable to PT with encouragement. Pt very verbal but confused. \" I haven't been able to use my left side for a long time. \"  Pain Screening  Patient Currently in Pain: Yes(increased c/o pain w/ movement - requesting pain medications and nurse informed)          Orientation  Orientation  Overall Orientation Status: (oriented to name, \"hospital\" and partially situation)     Social/Functional History  Social/Functional History  Lives With: Spouse  Type of Home: House  Home Layout: One level  Home Access: Stairs to enter with rails  Entrance Stairs - Number of Steps: 2 [place]  Entrance Stairs - Rails: Both  Bathroom Shower/Tub: Walk-in shower  Bathroom Toilet: Handicap height  Bathroom Equipment: Shower chair, Grab bars in shower, Grab bars around toilet  Home Equipment: 4 wheeled walker(walking stick)  ADL Assistance: Needs assistance(assist from wife to bathe back occasionally)  Homemaking Assistance: Needs assistance(shares w/ wife; pt does yard work)  Ambulation Assistance: Independent(w/ rollator)  Transfer Assistance: Independent  Active : Yes  Occupation: On disability  Type of occupation:   Leisure & Hobbies: gardening, fishing, spending time with grandchildren  Additional Comments: Pt states that his family can provide 24 hr supervision/assist at d/c if necessary. NOTE: above information obtained from previous interview          Objective          PROM RLE (degrees)  RLE PROM: WFL  AROM RLE (degrees)  RLE General AROM: grossly decreased due to pain and weakness  PROM LLE (degrees)  LLE PROM: WFL  AROM LLE (degrees)  LLE General AROM: grossly decreased due to pain and weakness     Strength RLE  Comment: pt able to perform ankle pumps and partial heel slide with increased effort supine in bed  Strength LLE  Comment: pt able to perform ankle pumps and partial heel slide with increased effort supine in bed  Strength Other  Other: Pt demonstrates difficulty following commands and staying on task for formal LE MMT. LEs grossly weak per observation during functional mobility     Sensation  Overall Sensation Status: (grossly intact B LE)     Bed mobility  Rolling to Left: (able to initiate log roll by bending knee and reaching for rail; slow and effortful; x6  trials)  Rolling to Right: Maximum assistance(able to initiate log roll by partially bending knee and reaching for bed rail; slow and effortful; max vc to stay on task; x6 trials)     Transfers  Bed to Chair: Dependent/Total(via jalen lift with assist x2)        Balance  Sitting - Static: Fair  Sitting - Dynamic: Fair  Comments: Pt able to sit upright supported in chair x5 min then c/o increased back and LE pain.          Plan   Plan  Times per week: 2-5  Current Treatment Recommendations: Strengthening, Transfer Training, Endurance Training, Functional Mobility Training, Safety Education & Training  Safety Devices  Type of devices: Nurse notified, Call light within reach, Left in chair, Chair alarm in place(pt

## 2019-05-30 NOTE — PROGRESS NOTES
Internal Medicine PGY-1 Resident Progress Note        PCP: EMIGDIO MESSER PAGE    Date of Admission: 5/28/2019    Chief Complaint: AMS    Subjective: No acute events overnight. Patient seen at bedside. A&Ox4. Says pain is improved and feels somewhat better but still has occasional tingling in bilateral lower extremities    Medications:  Reviewed    Infusion Medications    dextrose       Scheduled Medications    insulin glargine  20 Units Subcutaneous Nightly    insulin lispro  0-18 Units Subcutaneous TID WC    insulin lispro  0-9 Units Subcutaneous Nightly    methocarbamol  1,000 mg Oral 4x Daily    atorvastatin  20 mg Oral Nightly    famotidine  20 mg Oral BID    fenofibrate  160 mg Oral Daily    lisinopril  30 mg Oral Daily    polyethylene glycol  17 g Oral Daily    sennosides-docusate sodium  2 tablet Oral BID    tamsulosin  0.4 mg Oral Daily    thiamine  100 mg Oral Daily    sodium chloride flush  10 mL Intravenous 2 times per day    cefTRIAXone (ROCEPHIN) IV  2 g Intravenous Q12H    amLODIPine  2.5 mg Oral Daily    apixaban  5 mg Oral BID     PRN Meds: oxyCODONE **OR** oxyCODONE, glucose, dextrose, glucagon (rDNA), dextrose, sodium chloride flush, hydrALAZINE      Intake/Output Summary (Last 24 hours) at 5/30/2019 1150  Last data filed at 5/29/2019 2350  Gross per 24 hour   Intake 170 ml   Output 450 ml   Net -280 ml       Physical Exam Performed:  /74   Pulse 79   Temp 97.5 °F (36.4 °C) (Oral)   Resp 16   Ht 6' 0.84\" (1.85 m)   Wt (!) 332 lb 3.7 oz (150.7 kg)   SpO2 94%   BMI 44.03 kg/m²     General appearance: No apparent distress, appears stated age and cooperative. HEENT: Pupils equal, round, and reactive to light. Conjunctivae/corneas clear. Neck: Supple, with full range of motion. No jugular venous distention. Trachea midline. Respiratory:  Normal respiratory effort. Clear to auscultation, bilaterally without Rales/Wheezes/Rhonchi.   Cardiovascular: Regular rate and rhythm with abscess from recent L2-L4 laminectomy  - Neurosurgery consulted  - MRI could not be completed d/t pt moving from pain.  What was done was similar to previous MRI  - Robaxin for muscle spasm, hold if mental status worsens  - PT/OT  - Roxicodone pain panel  - ID consulted, appreciate recs     DVT, stable  - continue Eliquis 5mg PO BID  - monitor PT & INR     HTN  - continue home Norvasc 2.5mg PO and Lisinopril 30mg PO  - Hydralazine PRN     DMII  - continue HDSSI  - Increased to Lantus 20U    - hypoglycemia protocol     Code Status: Full code  Diet: Carb control  PPX: Eliquis  DISPO: from home, no services    I will discuss the patient with the senior resident and MD Ama Loera MD   Internal Medicine Resident PGY-1  Reach via [address] [place] [address]

## 2019-05-30 NOTE — PROGRESS NOTES
NEUROSURGERY PROGRESS NOTE    5/30/2019 9:47 AM                               Dom Wu                      LOS: 2 days     Subjective:  No acute events overnight. Patient continues to c/o back pain and having numbness/tingling to lower legs bilaterally. Physical Exam:  Patient seen and examined    Vitals:    05/30/19 0830   BP: 131/74   Pulse: 79   Resp: 16   Temp: 97.5 °F (36.4 °C)   SpO2: 94%     GCS:  4 - Opens eyes on own  5 - Alert and oriented  6 - Follows simple motor commands  General: Well developed. Alert and cooperative in no acute distress.     HENT: atraumatic, neck supple  Eyes: Optic discs: Not tested  Pulmonary: unlabored respiratory effort  Cardiovascular:  Warm well perfused. No peripheral edema  Gastrointestinal: abdomen soft, NT, ND     Neurological:  Mental Status: Awake, alert, oriented x 4, speech clear and appropriate  Attention: Intact  Language: No aphasia or dysarthria noted  Sensation: Numbness/tingling BLE  Coordination: Intact     Musculoskeletal:   Gait: Not tested   Assist devices: None   Tone: Normal BUE   Motor strength: Exam limited in BLE 2/2 pain/spasms    Right  Left      Right  Left    Deltoid  5 5   Hip Flex  3 3   Biceps  5 5   Knee Extensors  3 3   Triceps  5 5   Knee Flexors  3 3   Wrist Ext  5 5   Ankle Dorsiflex. 3 3   Wrist Flex  5 5   Ankle Plantarflex. 3 3   Handgrip  5 5   Ext Nixon Longus  3 3   Thumb Ext  5 5              Incision: CDI     Radiological Findings:Xr Chest Standard (2 Vw)  Mri Lumbar Spine W Wo Contrast  Result Date: 5/10/2019  1. Posterior fluid collection along the laminectomy bed of unclear significance. The fluid collection exerts complex layering signal with gas bubbles and peripheral enhancement. This may or present a seroma or postoperative hematoma. An infected fluid collection be difficult to exclude. A pseudomeningocele is not excluded. The fluid collection extensively laminectomy bed without significant mass effect.   2. Multifactorial multilevel mild canal narrowing as described in part related to extensive facet arthropathy resulting in a narrowing of the canal from a lateral dimension. 3. Tortuous bunched nerve roots in the upper cauda equina surrounding the conus presumably related to chronic stenosis. 4. Multifactorial multilevel foraminal narrowing with moderate or severe foraminal narrowing from L1-L2 through L5-S1.  5. Diffuse circumferential epidural enhancement without discrete epidural collection extending throughout the laminectomy site which may be reactive in nature. Infection would be difficult to exclude. 6. No convincing evidence of osteomyelitis or discitis. 7. Large posterior central disc protrusion at L5-S1 resulting in a moderate canal narrowing and mild/moderate impingement upon the exiting S1 nerve roots bilaterally.     Mri Lumbar Spine Wo Contrast  Result Date: 5/29/2019  MR imaging findings are compatible with discitis at L2-L3 and L3-L4 with osteomyelitis at L2, L3 and L4 vertebral bodies. Findings are also suggestive of possible additional discitis at L1-L2. At this time, there is no pathologic fracture or loss of vertebral body height. As noted above, the patient was unable to cooperate with a complete MRI examination of the lumbar spine. Specifically, axial T1 and T2 images, as well as sagittal T1 images, cannot be obtained. In this setting, the status of the spinal canal cannot be accurately assessed, in terms of evaluating the presence of central stenosis or epidural phlegmon/fluid collections.       Labs:  Recent Labs     05/30/19  0635   WBC 7.0   HGB 11.5*   HCT 35.4*   *       Recent Labs     05/30/19  0635   *   K 4.3   CL 97*   CO2 24   BUN 23*   CREATININE 0.7*   GLUCOSE 197*   CALCIUM 9.4       Recent Labs     05/28/19  1715   PROTIME 16.3*   INR 1.43*       Patient Active Problem List    Diagnosis Date Noted    Low back pain 05/28/2019    Encephalopathy 05/28/2019    Debility

## 2019-05-30 NOTE — PROGRESS NOTES
Occupational Therapy   Occupational Therapy Initial Assessment and Treatment  Date: 2019   Patient Name: Jenn Jacobo  MRN: [id number]     : 1950    Date of Service: 2019    Discharge Recommendations:  Jenn Jacobo scored a 14/24 on the AM-PAC ADL Inpatient form. Current research shows that an AM-PAC score of 17 or less is typically not associated with a discharge to the patient's home setting. Based on the patients AM-PAC score and their current ADL deficits, it is recommended that the patient have 3-5 sessions per week of Occupational Therapy at d/c to increase the patients independence. OT Equipment Recommendations  Other: defer to next level of care    Assessment   Performance deficits / Impairments: Decreased functional mobility ; Decreased high-level IADLs;Decreased ADL status; Decreased endurance;Decreased strength;Decreased balance  Assessment: Pt transferred from ARU to [facility] 2* AMS. Pt was pleasant and cooperative - some confusion noted at times during conversation. Pt requiring Max A w/ log rolling in bed and requiring use of lift for OOB at this time. Pt pleasant and cooperative. Pt will need further inpt OT at discharge. Continue per POC. Treatment Diagnosis: impaired ADLs /functional transfers /decreased endurance 2/2 back infection/ sepsis   Decision Making: High Complexity  Patient Education: role of OT -stated understanding; activity promotion - stated understanding  REQUIRES OT FOLLOW UP: Yes  Activity Tolerance  Activity Tolerance: Patient limited by fatigue;Patient limited by pain  Safety Devices  Safety Devices in place: Yes  Type of devices: Call light within reach;Nurse notified; Left in bed;Bed alarm in place(communicated w/ nurse about need to utilize [facility] for return to bed)           Patient Diagnosis(es): There were no encounter diagnoses.      has a past medical history of Arthritis, Back pain, DDD (degenerative disc disease), cervical, Diabetes [facility] ([facility] Edema of both legs, GERD (gastroesophageal reflux disease), Hyperlipidemia, Hypertension, MRSA (methicillin resistant staph aureus) culture positive, Nausea in adult, Neuropathy, Vitamin B 12 deficiency, and Walking troubles. has a past surgical history that includes back surgery; Prostate surgery; shoulder surgery; Cystoscopy; knee surgery; Hemorrhoid surgery; eye surgery; Tonsillectomy; Cholecystectomy; Lumbar spine surgery (N/A, 4/19/2019); and REPAIR DURAL / CSF LEAK (N/A, 5/12/2019). Treatment Diagnosis: impaired ADLs /functional transfers /decreased endurance 2/2 back infection/ sepsis       Restrictions  Position Activity Restriction  Other position/activity restrictions: up with assistance    Subjective   General  Chart Reviewed: Yes  Additional Pertinent Hx: 68 y.o. M transferred to  5/28 from [address] w/ AMS. Hospital Course: MRI L-spine: findings are compatible with discitis at L2-L3 and L3-L4 with osteomyelitis at L2, L3 and L4 vertebral bodies. Findings are also suggestive of possible additional discitis at L1-L2; neurosurgery consult: No emergent neurosurgical intervention indicated. PMH:  Decompression Laminectomy L3-4 (4/19/19), evacuation of epidural abscess 5/12/2019, OA, DDD, DM, Edema BLE, HTN, Hyperlipidemia, Neuropathy. Family / Caregiver Present: No  Referring Practitioner: Dr. Adria Hernández  Diagnosis: Encephalopathy    Subjective  Subjective: Supine in bed on entry. \"I think my bowels need to move. My stomach has been making noises all morning. \"    Pain: back pain and shooting pain BLEs - nurse notified of request for pain medication    Social/Functional History  Social/Functional History  Lives With: Spouse  Type of Home: House  Home Layout: One level  Home Access: Stairs to enter with rails  Entrance Stairs - Number of Steps: 2 [place]  Entrance Stairs - Rails: Both  Bathroom Shower/Tub: Walk-in shower  Bathroom Toilet: Handicap height  Bathroom Equipment: Shower chair, Grab bars in shower, Grab bars around toilet  Home Equipment: 4 wheeled walker(walking stick)  ADL Assistance: Needs assistance(assist from wife to bathe back occasionally)  Homemaking Assistance: Needs assistance(shares w/ wife; pt does yard work)  Ambulation Assistance: Independent(w/ rollator)  Transfer Assistance: Independent  Active : Yes  Occupation: On disability  Type of occupation:   Leisure & Hobbies: gardening, fishing, spending time with grandchildren  Additional Comments: Pt states that his family can provide 24 hr supervision/assist at d/c if necessary. NOTE: above information obtained from previous interview        Objective   Vision: Impaired  Vision Exceptions: Wears glasses at all times  Hearing: Within functional limits      Orientation  Overall Orientation Status: Impaired  Orientation Level: Oriented to person;Oriented to situation;Disoriented to time(oriented to [employer]")        ADL  LE Dressing: Dependent/Total  Toileting: Dependent/Total(pt able to indicate need to have BM - assisted onto bedpan (+) BM; dependent assist for pericare hygiene and application of clean brief; barrier ointment applied to bottom)     Tone RUE  RUE Tone: Normotonic  Tone LUE  LUE Tone: Normotonic  Coordination  Movements Are Fluid And Coordinated: Yes     Bed mobility  Rolling to Left: Maximum assistance(able to initiate log roll (bending knee and reaching; assist to fully roll onto side); x 2 times (placement/removal of bedpan; placement of lj pad))  Rolling to Right: Maximum assistance(able to initiate log roll (bending knee and reaching; assist to fully roll onto side); x 2 times (placement/removal of bedpan; placement of lj pad))  Comment: bed to chair via [employer]ens Lift lj        Cognition  Overall Cognitive Status: Exceptions  Arousal/Alertness: Appropriate responses to stimuli  Following Commands:  Follows one step commands with repetition  Attention Span: Attends with cues to redirect  Memory: (aware he had his staples removed yesterday)  Safety Judgement: Decreased awareness of need for safety;Decreased awareness of need for assistance  Initiation: Requires cues for some  Cognition Comment: pleasant; cooperative; at times conversation appropriate and other times conversation not making sense        Sensation  Overall Sensation Status: (grossly intact B LE)        LUE AROM (degrees)  LUE AROM : WFL  LUE General AROM: able to reach to [facility] and pull body up in bed  Left Hand AROM (degrees)  Left Hand AROM: WFL  RUE AROM (degrees)  RUE AROM : WFL  RUE General AROM: able to reach to [facility] and pull body up in bed  Right Hand AROM (degrees)  Right Hand AROM: WFL  LUE Strength  Gross LUE Strength: WFL  RUE Strength  Gross RUE Strength: WFL               Pt seen by OT for eval and treat. Treatment included:  Bed mobility, pt education         Plan  This note will serve as a discharge summary in the event the patient is discharged prior to next treatment session.     Plan  Times per week: 2-5  Times per day: Daily  Current Treatment Recommendations: Strengthening, Pain Management, Safety Education & Training, Balance Training, Patient/Caregiver Education & Training, Self-Care / ADL, Functional Mobility Training, Home Management Training, Endurance Training                                                      AM-PAC Score        AM-[facility] Inpatient Daily Activity Raw Score: 14  AM-PAC Inpatient ADL T-Scale Score : 33.39  ADL Inpatient CMS 0-100% Score: 59.67  ADL Inpatient CMS G-Code Modifier : CK    Goals  Short term goals  Time Frame for Short term goals: Discharge  Short term goal 1: rolling in bed w/ Mod A to each side  Short term goal 2: Pt will bed MOD A with LE dressing with use of adap equip as needed  Short term goal 3: LB dressing w/ AE and Mod A (socks)  Short term goal 4: Pt will be MIN A with BUE HEP to increase strength to facilitate independence with ADLs and functional mobility  Short term goal 5:      Patient Goals   Patient goals : Go home, return to prior level of functioning       Therapy Time   Individual Concurrent Group Co-treatment   Time In 1120         Time Out 1158         Minutes 38           Timed Code Treatment Minutes:   23    Total Treatment Minutes:  [facility] OTR/L #[id number]

## 2019-05-31 LAB
ANION GAP SERPL CALCULATED.3IONS-SCNC: 13 MMOL/L (ref 3–16)
BASOPHILS ABSOLUTE: 0.1 K/UL (ref 0–0.2)
BASOPHILS RELATIVE PERCENT: 1 %
BUN BLDV-MCNC: 24 MG/DL (ref 7–20)
CALCIUM SERPL-MCNC: 9.4 MG/DL (ref 8.3–10.6)
CHLORIDE BLD-SCNC: 97 MMOL/L (ref 99–110)
CO2: 23 MMOL/L (ref 21–32)
CREAT SERPL-MCNC: 0.7 MG/DL (ref 0.8–1.3)
EOSINOPHILS ABSOLUTE: 0.2 K/UL (ref 0–0.6)
EOSINOPHILS RELATIVE PERCENT: 3.4 %
GFR AFRICAN AMERICAN: >60
GFR NON-AFRICAN AMERICAN: >60
GLUCOSE BLD-MCNC: 139 MG/DL (ref 70–99)
GLUCOSE BLD-MCNC: 175 MG/DL (ref 70–99)
GLUCOSE BLD-MCNC: 182 MG/DL (ref 70–99)
GLUCOSE BLD-MCNC: 188 MG/DL (ref 70–99)
GLUCOSE BLD-MCNC: 192 MG/DL (ref 70–99)
HCT VFR BLD CALC: 34 % (ref 40.5–52.5)
HEMOGLOBIN: 11.2 G/DL (ref 13.5–17.5)
LYMPHOCYTES ABSOLUTE: 1.4 K/UL (ref 1–5.1)
LYMPHOCYTES RELATIVE PERCENT: 24.5 %
MCH RBC QN AUTO: 28.7 PG (ref 26–34)
MCHC RBC AUTO-ENTMCNC: 33 G/DL (ref 31–36)
MCV RBC AUTO: 87.1 FL (ref 80–100)
MONOCYTES ABSOLUTE: 0.6 K/UL (ref 0–1.3)
MONOCYTES RELATIVE PERCENT: 10.9 %
NEUTROPHILS ABSOLUTE: 3.5 K/UL (ref 1.7–7.7)
NEUTROPHILS RELATIVE PERCENT: 60.2 %
PDW BLD-RTO: 15.3 % (ref 12.4–15.4)
PERFORMED ON: ABNORMAL
PLATELET # BLD: 496 K/UL (ref 135–450)
PMV BLD AUTO: 6.7 FL (ref 5–10.5)
POTASSIUM REFLEX MAGNESIUM: 4 MMOL/L (ref 3.5–5.1)
RBC # BLD: 3.9 M/UL (ref 4.2–5.9)
SODIUM BLD-SCNC: 133 MMOL/L (ref 136–145)
URINE CULTURE, ROUTINE: NORMAL
WBC # BLD: 5.7 K/UL (ref 4–11)

## 2019-05-31 PROCEDURE — 80048 BASIC METABOLIC PNL TOTAL CA: CPT

## 2019-05-31 PROCEDURE — 6370000000 HC RX 637 (ALT 250 FOR IP): Performed by: NURSE PRACTITIONER

## 2019-05-31 PROCEDURE — 6370000000 HC RX 637 (ALT 250 FOR IP): Performed by: STUDENT IN AN ORGANIZED HEALTH CARE EDUCATION/TRAINING PROGRAM

## 2019-05-31 PROCEDURE — 6360000002 HC RX W HCPCS: Performed by: STUDENT IN AN ORGANIZED HEALTH CARE EDUCATION/TRAINING PROGRAM

## 2019-05-31 PROCEDURE — 2580000003 HC RX 258: Performed by: STUDENT IN AN ORGANIZED HEALTH CARE EDUCATION/TRAINING PROGRAM

## 2019-05-31 PROCEDURE — 85025 COMPLETE CBC W/AUTO DIFF WBC: CPT

## 2019-05-31 PROCEDURE — 1200000000 HC SEMI PRIVATE

## 2019-05-31 PROCEDURE — 99232 SBSQ HOSP IP/OBS MODERATE 35: CPT | Performed by: INTERNAL MEDICINE

## 2019-05-31 RX ORDER — METHOCARBAMOL 500 MG/1
1000 TABLET, FILM COATED ORAL 4 TIMES DAILY
Qty: 80 TABLET | Refills: 0 | Status: SHIPPED | OUTPATIENT
Start: 2019-05-31 | End: 2019-06-10

## 2019-05-31 RX ADMIN — POLYETHYLENE GLYCOL (3350) 17 G: 17 POWDER, FOR SOLUTION ORAL at 08:58

## 2019-05-31 RX ADMIN — TAMSULOSIN HYDROCHLORIDE 0.4 MG: 0.4 CAPSULE ORAL at 08:57

## 2019-05-31 RX ADMIN — CEFTRIAXONE SODIUM 2 G: 2 INJECTION, POWDER, FOR SOLUTION INTRAMUSCULAR; INTRAVENOUS at 13:45

## 2019-05-31 RX ADMIN — INSULIN LISPRO 3 UNITS: 100 INJECTION, SOLUTION INTRAVENOUS; SUBCUTANEOUS at 07:44

## 2019-05-31 RX ADMIN — OXYCODONE HYDROCHLORIDE 10 MG: 5 TABLET ORAL at 07:43

## 2019-05-31 RX ADMIN — LISINOPRIL 30 MG: 20 TABLET ORAL at 08:58

## 2019-05-31 RX ADMIN — METHOCARBAMOL TABLETS 1000 MG: 500 TABLET, COATED ORAL at 20:01

## 2019-05-31 RX ADMIN — APIXABAN 5 MG: 5 TABLET, FILM COATED ORAL at 08:58

## 2019-05-31 RX ADMIN — METHOCARBAMOL TABLETS 1000 MG: 500 TABLET, COATED ORAL at 16:59

## 2019-05-31 RX ADMIN — METHOCARBAMOL TABLETS 1000 MG: 500 TABLET, COATED ORAL at 08:57

## 2019-05-31 RX ADMIN — Medication 100 MG: at 08:57

## 2019-05-31 RX ADMIN — ATORVASTATIN CALCIUM 20 MG: 20 TABLET, FILM COATED ORAL at 20:01

## 2019-05-31 RX ADMIN — FAMOTIDINE 20 MG: 20 TABLET ORAL at 08:57

## 2019-05-31 RX ADMIN — FENOFIBRATE 160 MG: 160 TABLET ORAL at 08:58

## 2019-05-31 RX ADMIN — METHOCARBAMOL TABLETS 1000 MG: 500 TABLET, COATED ORAL at 13:46

## 2019-05-31 RX ADMIN — SENNOSIDES,DOCUSATE SODIUM 2 TABLET: 8.6; 5 TABLET, FILM COATED ORAL at 09:14

## 2019-05-31 RX ADMIN — OXYCODONE HYDROCHLORIDE 10 MG: 5 TABLET ORAL at 22:56

## 2019-05-31 RX ADMIN — Medication 10 ML: at 09:06

## 2019-05-31 RX ADMIN — INSULIN LISPRO 2 UNITS: 100 INJECTION, SOLUTION INTRAVENOUS; SUBCUTANEOUS at 20:11

## 2019-05-31 RX ADMIN — OXYCODONE HYDROCHLORIDE 10 MG: 5 TABLET ORAL at 13:45

## 2019-05-31 RX ADMIN — INSULIN LISPRO 3 UNITS: 100 INJECTION, SOLUTION INTRAVENOUS; SUBCUTANEOUS at 11:39

## 2019-05-31 RX ADMIN — Medication 10 ML: at 20:01

## 2019-05-31 RX ADMIN — FAMOTIDINE 20 MG: 20 TABLET ORAL at 20:01

## 2019-05-31 RX ADMIN — APIXABAN 5 MG: 5 TABLET, FILM COATED ORAL at 20:01

## 2019-05-31 RX ADMIN — AMLODIPINE BESYLATE 2.5 MG: 2.5 TABLET ORAL at 08:57

## 2019-05-31 RX ADMIN — CEFTRIAXONE SODIUM 2 G: 2 INJECTION, POWDER, FOR SOLUTION INTRAMUSCULAR; INTRAVENOUS at 02:13

## 2019-05-31 RX ADMIN — OXYCODONE HYDROCHLORIDE 10 MG: 5 TABLET ORAL at 02:59

## 2019-05-31 ASSESSMENT — PAIN DESCRIPTION - ONSET
ONSET: ON-GOING

## 2019-05-31 ASSESSMENT — PAIN DESCRIPTION - PROGRESSION
CLINICAL_PROGRESSION: NOT CHANGED

## 2019-05-31 ASSESSMENT — PAIN SCALES - GENERAL
PAINLEVEL_OUTOF10: 10
PAINLEVEL_OUTOF10: 5
PAINLEVEL_OUTOF10: 8
PAINLEVEL_OUTOF10: 8
PAINLEVEL_OUTOF10: 0
PAINLEVEL_OUTOF10: 8

## 2019-05-31 ASSESSMENT — PAIN DESCRIPTION - DESCRIPTORS
DESCRIPTORS: ACHING;CONSTANT
DESCRIPTORS: ACHING
DESCRIPTORS: SHOOTING;SHARP
DESCRIPTORS: SHARP;SHOOTING
DESCRIPTORS: ACHING

## 2019-05-31 ASSESSMENT — PAIN DESCRIPTION - FREQUENCY
FREQUENCY: CONTINUOUS

## 2019-05-31 ASSESSMENT — PAIN DESCRIPTION - PAIN TYPE
TYPE: ACUTE PAIN

## 2019-05-31 ASSESSMENT — PAIN DESCRIPTION - LOCATION
LOCATION: BACK

## 2019-05-31 ASSESSMENT — PAIN DESCRIPTION - ORIENTATION
ORIENTATION: LOWER;MID
ORIENTATION: LOWER
ORIENTATION: LOWER;MID
ORIENTATION: LOWER
ORIENTATION: LOWER

## 2019-05-31 ASSESSMENT — PAIN DESCRIPTION - DIRECTION: RADIATING_TOWARDS: BLE

## 2019-05-31 NOTE — PROGRESS NOTES
Nutrition Assessment    Type and Reason for Visit: Reassess    Nutrition Recommendations:  1. Continue ONS. 2. Continue current diet. Enourage PO intake. Nutrition Assessment: Nutrition follow up for improved po intake. Pt remains nutritionally compromised as not consuming greater than 75% of meals, but nutrition has improved since previous eval, with pt PO intake improved to 51-75% of meals. RD will continue ONS and monitor for continued improving nutrition status through d/c. Malnutrition Assessment:  · Malnutrition Status: At risk for malnutrition  · Context: Acute illness or injury  · Findings of the 6 clinical characteristics of malnutrition (Minimum of 2 out of 6 clinical characteristics is required to make the diagnosis of moderate or severe Protein Calorie Malnutrition based on AND/ASPEN Guidelines):  1. Energy Intake-Less than or equal to 75% of estimated energy requirement, (x 2 days)    2. Weight Loss-No significant weight loss,    3. Fat Loss-Unable to assess(difficult to assess r/t body habitus ),    4. Muscle Loss-No significant muscle mass loss,    5. Fluid Accumulation-No significant fluid accumulation,    6.  Strength-Not measured    Nutrition Risk Level:  Moderate    Nutrient Needs:  · Estimated Daily Total Kcal: 4161-3028  · Estimated Daily Protein (g): 108-122  · Estimated Daily Total Fluid (ml/day): 3368-3819    Nutrition Diagnosis:   · Problem: Inadequate oral intake  · Etiology: related to Cognitive or neurological impairment     Signs and symptoms:  as evidenced by Intake 25-50%, Intake 50-75%    Objective Information:  · Nutrition-Focused Physical Findings: confusion mostly resolved;   · Wound Type: Surgical Wound  · Current Nutrition Therapies:  · Oral Diet Orders: Carb Control 4 Carbs/Meal   · Oral Diet intake: 51-75%  · Oral Nutrition Supplement (ONS) Orders: Diabetic Oral Supplement  · Anthropometric Measures:  · Ht: 6' 0.84\" (185 cm)   · Current Body Wt: 332 lb 3.7 oz (150.7 kg)  · Admission Body Wt: 350 lb 1.5 oz (158.8 kg)(initial admit to acute care )  · Usual Body Wt: 330 lb (149.7 kg)(per pt wife )  · % Weight Change:  ,  5% loss x 1 month; GUME nutritional losses vs fluid   · Ideal Body Wt: 184 lb (83.5 kg)   · BMI Classification: BMI > or equal to 40.0 Obese Class III    Nutrition Interventions:   Continue current diet, Continue current ONS  Continued Inpatient Monitoring    Nutrition Evaluation:   · Evaluation: Progressing toward goals   · Goals: pt will tolerate and consume 50% or more of all meals and supplements offered     · Monitoring: Meal Intake, Supplement Intake      Electronically signed by Laquita Brown RD, LD on 5/31/19 at 2:06 PM    Contact Number: [phone]

## 2019-05-31 NOTE — DISCHARGE SUMMARY
INTERNAL MEDICINE DEPARTMENT AT [address] [place] [address]  DISCHARGE SUMMARY    Patient ID: Rafi Holloway                                             Discharge Date: 5/31/2019   Patient's PCP: [facility]                                          Discharge Physician: Ama Graff MD  Admit Date: 5/28/2019   Admitting Physician: Steven Aly MD    DISCHARGE DIAGNOSES:   Lumbar spinal stenosis    Degenerative lumbar spinal stenosis    Lumbar stenosis with neurogenic claudication    Back abscess    Sepsis ([address]    Surgical site infection    Staphylococcus aureus infection    Bacteremia due to methicillin susceptible Staphylococcus aureus (MSSA)    Debility    Low back pain   Eva Lit Encephalopathy       Hospital Course:    HPI: Dom Wu, 69 yo male with PMHx of IDDM, HTN, DVT who was admitted from ARU for AMS. Patient was admitted to [facility] on 5/18/2019 for epidural abscess post-op L3-4 lumbar laminectomy and facetectomy on 4/19/2019. Patient's family states during his hospitalization his mental status has declined. He was given dilaudid in ARU but his mentation improved after holding dilaudid. A&Ox3. Patient still had persistent sharp stabbing back pain that radiated to his feet and ankles. Denied shortness of breath, chest pain, palpitations, abdominal pain, N/V, or changes in urination or bowel habits.      Hospital Course:  Patient was afebrile and hemodynamically stable. BMP WNLs with no leukocytosis. UA showed increased glucose and 6-10 WBC. Urine culture showed no growth to date. Mentation improved to baseline during admission. Neurosurgery was consulted for back pain due to prior epidural abscess after laminectomy. MRI of lumbar region showed discitis and osteomyelitis from L2-4 as expected but does not show clear worsening or progression from prior MRI. MRI unable to assess full lumbar status for central stenosis or epidural phlegmon/fluid collections due to patient not being able to cooperate.  ID recommended 2mg g injection  Commonly known as:  ROCEPHIN     empagliflozin 25 MG tablet  Commonly known as:  JARDIANCE     famotidine 20 MG tablet  Commonly known as:  PEPCID  Take 1 tablet by mouth 2 times daily     fenofibrate 145 MG tablet  Commonly known as:  TRICOR     glimepiride 4 MG tablet  Commonly known as:  AMARYL     hydrochlorothiazide 12.5 MG capsule  Commonly known as:  MICROZIDE     insulin  UNIT/ML injection vial  Commonly known as:  HUMULIN N;NOVOLIN N     lisinopril 20 MG tablet  Commonly known as:  PRINIVIL;ZESTRIL     metFORMIN 1000 MG tablet  Commonly known as:  GLUCOPHAGE     methocarbamol 500 MG tablet  Commonly known as:  ROBAXIN     oxyCODONE 5 MG immediate release tablet  Commonly known as:  ROXICODONE     polyethylene glycol packet  Commonly known as:  GLYCOLAX     senna-docusate 8.6-50 MG per tablet  Commonly known as:  PERICOLACE     tamsulosin 0.4 MG capsule  Commonly known as:  FLOMAX     thiamine 100 MG tablet  Take 1 tablet by mouth daily          Activity: activity as tolerated  Diet: regular diet  Wound Care: none needed    Time Spent on discharge is more than 30 minutes    Signed:  Lydia Ortega MD   Internal Medicine, PGY1  5/31/2019

## 2019-05-31 NOTE — DISCHARGE SUMMARY
Physical Medicine & Rehabilitation  Discharge Summary     Patient Identification:  Gertrudis Newman  : 1950  Admit date: 2019  Discharge date: 2019  Attending provider: No att. providers found        Primary care provider: EMIGDIO HARRELL     Discharge Diagnoses:   Patient Active Problem List   Diagnosis    Lumbar spinal stenosis    Degenerative lumbar spinal stenosis    Lumbar stenosis with neurogenic claudication    Back abscess    Sepsis ([address]    Surgical site infection    Staphylococcus aureus infection    Bacteremia due to methicillin susceptible Staphylococcus aureus (MSSA)    Debility    Low back pain    Encephalopathy       Discharge Functional Status:    Physical therapy:  Bed Mobility: Scooting: Stand by assistance(supine, pt using rails. verbal cues & extra time required, effortful.)  Transfers: Sit to Stand: Dependent/Total(x2 attempt from elevated EOB to cielo steady, unsuccessful. x1 attempt MaxAx3 from elevated EOB to cielo steady, unsuccessful.)  Stand to sit: Dependent/Total(MaxAx2)  Bed to Chair: Dependent/Total(bed<->w/c with lj lift and A of 2 persons)  Comment: Session 1: Pt log rolling with total A to don pants and to place lj pad. Pt transported from room to therapy gym with therapist pushing w/c. Once in therapy gym,  feet lowered and placed on stool. Pt reaching for 5 socks directly in front of him with maximal verbal cuing and tactile prompts on hand/arm to reach for sock and to then place in basket. Pt requiring total A to scoot/position in bed at EOS. 2nd session: Pt supine in bed with daughter and wife present. Pt appeared to be more alert. Pt requiring total A for rolling towards left and then to sit up on EOB. Once sitting on EOB, pt assisted in scooting his hips forward but required max A to do so. Pt sitting EOB with SBA for balance reaching for cones and placing on table positioned in front of him.  Pt required frequent prompting and encouragement to participate in task. Required total assist x2 for sit to supine. Pt scooting himself up in bed by grabbing ahold of rails over head and pulling up in 2 separate attempts. AAROM (B) ankle df/pf and heel slides x 10. Pt left supine in bed with all needs within reach, family present, and bed alarm on. ,  ,    Mobility: Bed, Chair, Wheel Chair: 1 - Requires >75% assitance to transfer  Walk: 1 - Total Assistance Walks < 50 feet OR requires two or more people OR patient performs < 25% of locomotion effort(pt would be totalA at this time)  Wheel Chair: 1 - Total Assistance Operates wheelchair < 50 feet OR requires two or more people OR patient performs < 25% of locomotion effort  Distance Traveled in Wheel Chair: 125'  Stairs: 1- Total Assistance perfoms less than 25% of the effort, or requirs the assistance of two people, or goes up and down fewer than 4 stairs(pt would be totalA at this time), PT Equipment Recommendations  Equipment Needed: (defer)  Other: defer, Assessment: Bed mobility & overall cooperation somewhat improved. Pt remains limited by pain. Continues to require use of lj lift for transfers. Unsafe for D/C home. Recommend cont inpt skilled PT to maximize function. Occupational therapy: Eatin - Feeds self with setup/supervision/cues and/or requires only setup/supervision/cues to perform tube feedings(per BERTIN)  Groomin - Able to perform 1-2 tasks (max assist)  Bathin - Able to bathe 3-4 areas(per BERTIN )  Dressing-Upper: 1 - Requires assist with 4 tasks/total assist  Dressing-Lower: 1 - Total assist with lower body dressing  Toiletin - Total assist  Toilet Transfer: 1 - Requires > 75% assist getting on & off toilet  Shower Transfer: 1 - Total assistance, pt. expends less than 25% effort(pt refused, however pt would be dependent if attempted),  , Assessment: Pt with limited ability to participate secondary to pain, confusion and fatigue.  Pt demonstated bed mobiltiy rolling side to side multipe times with Max A and Supine to sit with Max A x2. Pt was able to maintain sitting EOB with SBA for 10 min. Continue OT per POC    Speech therapy:  Comprehension: 2 - Patient understands basic needs 25-49% of the time  Expression: 2 - Expresses basic ideas/needs 25-49% of the time  Social Interaction: 2 - Patient appropriate  25%-49% of the time  Problem Solvin - Patient solves simple/routine tasks < 25%  Memory: 4 - Patient remembers 75-90%+ of the time    Inpatient Rehabilitation Course:   Jenn Jacobo is a 68 y.o. male admitted to inpatient rehabilitation on 2019 s/p Debility. The patient participated in an aggressive multidisciplinary inpatient rehabilitation program involving 3 hours of therapy per day, at least 5 days per week. The below items were addressed during her time on the ARU:    Lumbar epidural abscess: s/p washout . Rocephin 2 Q12 thru . Weekly labs per ID recs. PT/OT     Encephalopathy. Unclear etiology. No evidence of infection; electrolytes normal; TSH, ammonia normal. Neuro consulted as well. Opiates weaned while on ARU, yet encephalopathy persisted.      Lumbar spondylosis/radiculopathy: Pain control. PT/OT     Hyponatremia. Na has been somewhat variable. Follow.        Discharge Exam:  Constitutional: Pleasant, no distress  Head: Normocephalic  Eyes: Conjunctiva noninjected  Pulm: CTA bilat  CV: No murmurs noted  Abd: Soft, nontender  Ext: No edema  Neuro: Confused; follows commands  MSK: No joint abnormalities noted     Significant Diagnostics:   Lab Results   Component Value Date    CREATININE 0.7 (L) 2019    BUN 24 (H) 2019     (L) 2019    K 4.0 2019    CL 97 (L) 2019    CO2 23 2019       Lab Results   Component Value Date    WBC 5.7 2019    HGB 11.2 (L) 2019    HCT 34.0 (L) 2019    MCV 87.1 2019     (H) 2019       Disposition:  Acute care for further evaluation and management    Follow-up:  See after visit summary from hospitalization    Discharge Medications:     Medication List      ASK your doctor about these medications    amLODIPine 2.5 MG tablet  Commonly known as:  NORVASC     atorvastatin 20 MG tablet  Commonly known as:  LIPITOR     empagliflozin 25 MG tablet  Commonly known as:  JARDIANCE     enoxaparin 150 MG/ML injection  Commonly known as:  LOVENOX  Inject 1.06 mLs into the skin every 12 hours for 7 days  Ask about: Should I take this medication?     famotidine 20 MG tablet  Commonly known as:  PEPCID  Take 1 tablet by mouth 2 times daily     fenofibrate 145 MG tablet  Commonly known as:  TRICOR     glimepiride 4 MG tablet  Commonly known as:  AMARYL     insulin  UNIT/ML injection vial  Commonly known as:  HUMULIN N;NOVOLIN N     metFORMIN 1000 MG tablet  Commonly known as:  GLUCOPHAGE     methocarbamol 750 MG tablet  Commonly known as:  ROBAXIN  Take 2 tablets by mouth 3 times daily for 10 days  Ask about: Should I take this medication?     oxyCODONE 5 MG immediate release tablet  Commonly known as:  ROXICODONE  Take 1 tablet by mouth every 4 hours as needed for Pain for up to 3 days. Ask about: Should I take this medication?     oxyCODONE-acetaminophen 5-325 MG per tablet  Commonly known as:  PERCOCET  Take 1 tablet by mouth every 4 hours as needed for Pain for up to 3 days. Ask about: Should I take this medication?     tamsulosin 0.4 MG capsule  Commonly known as:  FLOMAX     thiamine 100 MG tablet  Take 1 tablet by mouth daily           Where to Get Your Medications      You can get these medications from any pharmacy    Bring a paper prescription for each of these medications  · oxyCODONE 5 MG immediate release tablet         I spent over 35 minutes on this discharge encounter between counseling, coordination of care, and medication reconciliation.   To comply with Jesnen ayala R.II.4.1: Discharge order placed in advance to facilitate discharge

## 2019-05-31 NOTE — CONSULTS
Infectious Diseases Inpatient Consult Note    Reason for Consult:   Lumbar SSI  Requesting Physician:   Dr Blaze Bartlett  Primary Care Physician:  EMIGDIO HARRELL  History Obtained From:   Pt, EPIC    Admit Date: 5/28/2019  Hospital Day: 4    CHIEF COMPLAINT:     Complicated back infection    HISTORY OF PRESENT ILLNESS:      67 yo man with obesity, DM, HTN, lumbar stenosis     4/19/19 - L 3 & 4 lumbar laminectomy, L3/4 & L4/5 bilateral facetectomy , foraminotomies over L4 &5 nerve roots.    5/8/19 -  LBP. Presented to [facility] ED. CT with air and fluid in ST / laminectomy site     5/8/19 -  Transferred and admitted to [facility] 5/8. Wound with purulent drainage  MRI with fluid collection with gas and peripheral enhancement, see report  BC x 2  MSSA  Wound cult with mod growth S aureus      OR 5/12 - epidural abscess.  Per OR note, \"large amount of liquid pus', extended to deep surg space, epidural space. Surg cult + MSSA    Transferred to ARU 5/17. In Rehab, pt had confusion, back pain. Meds adjusted. Pt had worsening pain and transferred to hosp. Re-admit 5/28. On 5/29, repeat MRI done. Reading with new disk and vertebrae infection  CRP 35. ESR 81     Today 5/31 - awake and alert (not confused). Afebrile, WBC 5.7. CRP 35.  ESR 81      Past Medical History:    Past Medical History:   Diagnosis Date    Arthritis      left knee    Back pain     DDD (degenerative disc disease), cervical     Diabetes mellitus (HCC)     Edema of both legs     GERD (gastroesophageal reflux disease)     Hyperlipidemia     Hypertension     MRSA (methicillin resistant staph aureus) culture positive 05/09/2019    back incision    Nausea in adult     Neuropathy     idopathtic     Vitamin B 12 deficiency     Walking troubles        Past Surgical History:    Past Surgical History:   Procedure Laterality Date    BACK SURGERY      x 2    CHOLECYSTECTOMY      CYSTOSCOPY      EYE SURGERY      retina repair    HEMORRHOID SURGERY  KNEE SURGERY      LUMBAR SPINE SURGERY N/A 4/19/2019    DECOMPRESSION LAMINECTOMY L3-4 POSSIBLY L5 performed by Lori Campos MD at [place] [address]       retracted penis    REPAIR DURAL / CSF LEAK N/A 5/12/2019    LUMBAR WOUND WASHOUT performed by Lori [facility]pos MD at [facility]      TONSILLECTOMY         Current Medications:     insulin glargine  20 Units Subcutaneous Nightly    insulin lispro  0-18 Units Subcutaneous TID WC    insulin lispro  0-9 Units Subcutaneous Nightly    methocarbamol  1,000 mg Oral 4x Daily    atorvastatin  20 mg Oral Nightly    famotidine  20 mg Oral BID    fenofibrate  160 mg Oral Daily    lisinopril  30 mg Oral Daily    polyethylene glycol  17 g Oral Daily    sennosides-docusate sodium  2 tablet Oral BID    tamsulosin  0.4 mg Oral Daily    thiamine  100 mg Oral Daily    sodium chloride flush  10 mL Intravenous 2 times per day    cefTRIAXone (ROCEPHIN) IV  2 g Intravenous Q12H    amLODIPine  2.5 mg Oral Daily    apixaban  5 mg Oral BID       Allergies:  Patient has no known allergies. Social History:    TOBACCO:    None   ETOH:    Infrequent   DRUGS:   None   MARITAL STATUS:      OCCUPATION:   Retired    Family History:   No immunodeficiency    REVIEW OF SYSTEMS:    No fever / chills / sweats. No weight loss. No visual change, eye pain, eye discharge. No oral lesion, sore throat, dysphagia. Denies cough / sputum. Denies chest pain, palpitations. Denies n / v / abd pain. No diarrhea. Denies dysuria or change in urinary function. Denies joint swelling or pain.   No myalgia, arthralgia. + back pain  Denies skin changes, itching  Denies focal weakness, sensory change or other neurologic symptom    Denies new / worse depression, psychiatric symptoms  Denies any Endocrine or Hematologic symptoms    PHYSICAL EXAM:      Vitals:    /82   Pulse 75   Temp 97.5 °F (36.4 °C) (Oral)   Resp 16   Ht 6' 0.84\" (1.85 m)   Wt (!) 332 lb 3.7 oz (150.7 kg)   SpO2 96%   BMI 44.03 kg/m²     GENERAL: No apparent distress. HEENT: Membranes moist, no oral lesion  NECK:  Supple  LUNGS: Clear b/l, no rales, no dullness  CARDIAC: RRR, no murmur appreciated  ABD:  + BS, soft / NT  EXT:  No rash, no edema, no lesions  NEURO: No focal neurologic findings  PSYCH: Orientation, sensorium, mood normal  LINES:  Peripheral iv    DATA:    Lab Results   Component Value Date    WBC 5.7 2019    HGB 11.2 (L) 2019    HCT 34.0 (L) 2019    MCV 87.1 2019     (H) 2019     Lab Results   Component Value Date    CREATININE 0.7 (L) 2019    BUN 24 (H) 2019     (L) 2019    K 4.0 2019    CL 97 (L) 2019    CO2 23 2019       Hepatic Function Panel:   Lab Results   Component Value Date    ALKPHOS 89 2019    ALT 13 2019    AST 14 2019    PROT 7.3 2019    BILITOT 0.3 2019    BILIDIR <0.2 2019    IBILI see below 2019    LABALBU 3.4 2019       Micro:   Surg cult - heavy MSSA, R clinda   BC no growth     Surg cult+ mod MSSA (corrected report)      BC x 2 + MSSA  Staphylococcus aureus  Antibiotic Interpretation KASSY Status     ceFAZolin Sensitive <=2 mcg/mL       clindamycin Resistant <=0.5 mcg/mL       erythromycin Resistant >4 mcg/mL       oxacillin Sensitive 0.5 mcg/mL       tetracycline Sensitive <=0.5 mcg/mL       trimethoprim-sulfamethoxazole Sensitive <=0.5/9.5 mcg/mL         IMAGIN/29 Lumbar MRI w/wo contrast  MR imaging findings are compatible with discitis at L2-L3 and L3-L4 with osteomyelitis at L2, L3 and L4 vertebral bodies. Findings are also suggestive of possible additional discitis at L1-L2.  At this time, there is no pathologic fracture or loss of    vertebral body height. As noted above, the patient was unable to cooperate with a complete MRI examination of the lumbar spine.  Specifically, axial T1 and T2 images, as well as sagittal T1 images, cannot be obtained. In this setting, the status of the    spinal canal cannot be accurately assessed, in terms of evaluating the presence of central stenosis or epidural phlegmon/fluid collections     5/9 Lumbar MRI w/wo contrast:  1. Posterior fluid collection along the laminectomy bed of unclear significance. The fluid collection exerts complex layering signal with gas bubbles and peripheral enhancement. This may or present a seroma or postoperative hematoma. An infected fluid    collection be difficult to exclude. A pseudomeningocele is not excluded. The fluid collection extensively laminectomy bed without significant mass effect. 2. Multifactorial multilevel mild canal narrowing as described in part related to extensive facet arthropathy resulting in a narrowing of the canal from a lateral dimension. 3. Tortuous bunched nerve roots in the upper cauda equina surrounding the conus presumably related to chronic stenosis. 4. Multifactorial multilevel foraminal narrowing with moderate or severe foraminal narrowing from L1-L2 through L5-S1.   5. Diffuse circumferential epidural enhancement without discrete epidural collection extending throughout the laminectomy site which may be reactive in nature. Infection would be difficult to exclude. 6. No convincing evidence of osteomyelitis or discitis.    7. Large posterior central disc protrusion at L5-S1 resulting in a moderate canal narrowing and mild/moderate impingement upon the exiting S1 nerve roots bilaterally       IMPRESSION:      Patient Active Problem List   Diagnosis    Lumbar spinal stenosis    Degenerative lumbar spinal stenosis    Lumbar stenosis with neurogenic claudication    Back abscess    Sepsis ([address]    Surgical site infection    Staphylococcus aureus infection    Bacteremia due to methicillin susceptible Staphylococcus aureus (MSSA)    Debility    Low back pain    Encephalopathy       Lumbar

## 2019-05-31 NOTE — PROGRESS NOTES
Patient alert and oriented x4. VSS. Neuro assessment remains stable. Tolerating diet, denies n/v. Pain controlled with oral prn pain medications per MAR. Voiding adequately per urinal, no episodes of incontinence. No bm this shift. In bed with alarm on and call light within reach. Will continue to monitor.

## 2019-05-31 NOTE — CARE COORDINATION
Awaiting precert for this patient but hope to get it today. Arranged for transport for 1600 today and left a message with pt's wife with this information as well. Will update her as I get more information throughout the day.      Electronically signed by Cuca Harris RN on 5/31/2019 at 10:29 AM  [phone]

## 2019-05-31 NOTE — CONSULTS
Infectious Diseases Inpatient Consult Note    Reason for Consult:   Lumbar SSI  Requesting Physician:   Dr Mile Murphy  Primary Care Physician:  EMIGDIO HARRELL  History Obtained From:   Pt, EPIC    Admit Date: 5/28/2019  Hospital Day: 3    CHIEF COMPLAINT:     Complicated back infection    HISTORY OF PRESENT ILLNESS:      69 yo man with obesity, DM, HTN, lumbar stenosis     4/19/19 - L 3 & 4 lumbar laminectomy, L3/4 & L4/5 bilateral facetectomy , foraminotomies over L4 &5 nerve roots.    5/8/19 -  LBP. Presented to [facility] ED. CT with air and fluid in ST / laminectomy site     5/8/19 -  Transferred and admitted to [facility] 5/8. Wound with purulent drainage  MRI with fluid collection with gas and peripheral enhancement, see report  BC x 2  MSSA  Wound cult with mod growth S aureus      OR 5/12 - epidural abscess.  Per OR note, \"large amount of liquid pus', extended to deep surg space, epidural space. Surg cult + MSSA    Transferred to ARU 5/17. In Rehab, pt had confusion, back pain. Meds adjusted. Pt had worsening pain and transferred to hosp. Re-admit 5/28. On 5/29, repeat MRI done. Reading with new disk and vertebrae infection     Today 5/30 - confused. Afebrile, WBC 7. CRP 35.  ESR 81      Past Medical History:    Past Medical History:   Diagnosis Date    Arthritis      left knee    Back pain     DDD (degenerative disc disease), cervical     Diabetes mellitus (HCC)     Edema of both legs     GERD (gastroesophageal reflux disease)     Hyperlipidemia     Hypertension     MRSA (methicillin resistant staph aureus) culture positive 05/09/2019    back incision    Nausea in adult     Neuropathy     idopathtic     Vitamin B 12 deficiency     Walking troubles        Past Surgical History:    Past Surgical History:   Procedure Laterality Date    BACK SURGERY      x 2    CHOLECYSTECTOMY      CYSTOSCOPY      EYE SURGERY      retina repair    HEMORRHOID SURGERY      KNEE SURGERY      LUMBAR SPINE SURGERY N/A 4/19/2019    DECOMPRESSION LAMINECTOMY L3-4 POSSIBLY L5 performed by Shubham pAaricio MD at [place] [address]       retracted penis    REPAIR DURAL / CSF LEAK N/A 5/12/2019    LUMBAR WOUND WASHOUT performed by Shubham Aparicio MD at [address] [facility]      TONSILLECTOMY         Current Medications:     insulin glargine  20 Units Subcutaneous Nightly    insulin lispro  0-18 Units Subcutaneous TID WC    insulin lispro  0-9 Units Subcutaneous Nightly    methocarbamol  1,000 mg Oral 4x Daily    atorvastatin  20 mg Oral Nightly    famotidine  20 mg Oral BID    fenofibrate  160 mg Oral Daily    lisinopril  30 mg Oral Daily    polyethylene glycol  17 g Oral Daily    sennosides-docusate sodium  2 tablet Oral BID    tamsulosin  0.4 mg Oral Daily    thiamine  100 mg Oral Daily    sodium chloride flush  10 mL Intravenous 2 times per day    cefTRIAXone (ROCEPHIN) IV  2 g Intravenous Q12H    amLODIPine  2.5 mg Oral Daily    apixaban  5 mg Oral BID       Allergies:  Patient has no known allergies. Social History:    TOBACCO:    None   ETOH:    Infrequent   DRUGS:   None   MARITAL STATUS:      OCCUPATION:   Retired    Family History:   No immunodeficiency    REVIEW OF SYSTEMS:    No fever / chills / sweats. No weight loss. No visual change, eye pain, eye discharge. No oral lesion, sore throat, dysphagia. Denies cough / sputum. Denies chest pain, palpitations. Denies n / v / abd pain. No diarrhea. Denies dysuria or change in urinary function. Denies joint swelling or pain.   No myalgia, arthralgia. + back pain  Denies skin changes, itching  Denies focal weakness, sensory change or other neurologic symptom    Denies new / worse depression, psychiatric symptoms  Denies any Endocrine or Hematologic symptoms    PHYSICAL EXAM:      Vitals:    /74   Pulse 74   Temp 98.1 °F (36.7 °C) (Oral)   Resp 16   Ht 6' 0.84\" (1.85 m)   Wt (!) 332 lb 3.7 oz (150.7 kg)   SpO2 92%   BMI 44.03 kg/m²     GENERAL: No apparent distress. HEENT: Membranes moist, no oral lesion  NECK:  Supple  LUNGS: Clear b/l, no rales, no dullness  CARDIAC: RRR, no murmur appreciated  ABD:  + BS, soft / NT  EXT:  No rash, no edema, no lesions  NEURO: No focal neurologic findings  PSYCH: Orientation, sensorium, mood normal  LINES:  Peripheral iv    DATA:    Lab Results   Component Value Date    WBC 7.0 2019    HGB 11.5 (L) 2019    HCT 35.4 (L) 2019    MCV 87.2 2019     (H) 2019     Lab Results   Component Value Date    CREATININE 0.7 (L) 2019    BUN 23 (H) 2019     (L) 2019    K 4.3 2019    CL 97 (L) 2019    CO2 24 2019       Hepatic Function Panel:   Lab Results   Component Value Date    ALKPHOS 89 2019    ALT 13 2019    AST 14 2019    PROT 7.3 2019    BILITOT 0.3 2019    BILIDIR <0.2 2019    IBILI see below 2019    LABALBU 3.4 2019       Micro:   Surg cult - heavy MSSA, R clinda   BC no growth     Surg cult+ mod MSSA (corrected report)      BC x 2 + MSSA  Staphylococcus aureus  Antibiotic Interpretation KASSY Status     ceFAZolin Sensitive <=2 mcg/mL       clindamycin Resistant <=0.5 mcg/mL       erythromycin Resistant >4 mcg/mL       oxacillin Sensitive 0.5 mcg/mL       tetracycline Sensitive <=0.5 mcg/mL       trimethoprim-sulfamethoxazole Sensitive <=0.5/9.5 mcg/mL         IMAGIN/29 Lumbar MRI w/wo contrast  MR imaging findings are compatible with discitis at L2-L3 and L3-L4 with osteomyelitis at L2, L3 and L4 vertebral bodies. Findings are also suggestive of possible additional discitis at L1-L2.  At this time, there is no pathologic fracture or loss of    vertebral body height. As noted above, the patient was unable to cooperate with a complete MRI examination of the lumbar spine.  Specifically, axial T1 and T2 images, as well as sagittal T1 images, cannot be obtained. In this setting, the status of the    spinal canal cannot be accurately assessed, in terms of evaluating the presence of central stenosis or epidural phlegmon/fluid collections     5/9 Lumbar MRI w/wo contrast:  1. Posterior fluid collection along the laminectomy bed of unclear significance. The fluid collection exerts complex layering signal with gas bubbles and peripheral enhancement. This may or present a seroma or postoperative hematoma. An infected fluid    collection be difficult to exclude. A pseudomeningocele is not excluded. The fluid collection extensively laminectomy bed without significant mass effect. 2. Multifactorial multilevel mild canal narrowing as described in part related to extensive facet arthropathy resulting in a narrowing of the canal from a lateral dimension. 3. Tortuous bunched nerve roots in the upper cauda equina surrounding the conus presumably related to chronic stenosis. 4. Multifactorial multilevel foraminal narrowing with moderate or severe foraminal narrowing from L1-L2 through L5-S1.   5. Diffuse circumferential epidural enhancement without discrete epidural collection extending throughout the laminectomy site which may be reactive in nature. Infection would be difficult to exclude. 6. No convincing evidence of osteomyelitis or discitis.    7. Large posterior central disc protrusion at L5-S1 resulting in a moderate canal narrowing and mild/moderate impingement upon the exiting S1 nerve roots bilaterally       IMPRESSION:      Patient Active Problem List   Diagnosis    Lumbar spinal stenosis    Degenerative lumbar spinal stenosis    Lumbar stenosis with neurogenic claudication    Back abscess    Sepsis ([address]    Surgical site infection    Staphylococcus aureus infection    Bacteremia due to methicillin susceptible Staphylococcus aureus (MSSA)    Debility    Low back pain    Encephalopathy       Lumbar SSI   OR 5/12  I&D to deep space / epidural abscess, surg cult MSSA   S aureus / MSSA bacteremia, f/u BC no growth  Leukocytosis - resolved  R leg DVT    Repeat MRI with new edema of L 2/3/4. Reviewed MRI with radiologist - no new disk signal, seen before. Vertebral edema new and mild. There is some changes at inferior L2 endplate. It is NOT clear this is worse or progressive infection.   May have some evolution of radiologic findings    RECOMMENDATIONS:    Cont iv ceftriaxone (dose 2 gm iv q 12)  Will discuss with Neurosurg    Complete prolonged course - 8 weeks from surg, through 7/7/19  See Yoni Dillon    Discussed with pt, wife, Neurosurg NP  Landon Reinoso MD    INFUSION ORDERS:  Ceftriaxone 2 gm iv q 12 through 7/7/19  - First dose given in hospital  - Disposition / date discharge - [facility] [phone]  - Check CBC w diff, CMP, ESR, CRP, vancomycin trough every Mon or Tue - FAX result to [phone]  - Call with antibiotic / infusion issues, [phone]  - Facility - any change in status, transfer out of facility or to hospital - call [phone]  - No f/u in outpatient ID office necessary - Neurosurg, Dr Loli Johnson

## 2019-05-31 NOTE — DISCHARGE SUMMARY
INTERNAL MEDICINE DEPARTMENT AT [address] [place] [address]  DISCHARGE SUMMARY    Patient ID: Gertrudis Newman                                             Discharge Date: 5/31/2019   Patient's PCP: Shine Cooper                                          Discharge Physician:  [id number] Date: 5/28/2019   Admitting Physician: Brendon Dotson MD    PROBLEMS DURING HOSPITALIZATION:  Patient Active Problem List   Diagnosis    Lumbar spinal stenosis    Degenerative lumbar spinal stenosis    Lumbar stenosis with neurogenic claudication    Back abscess    Sepsis ([address]    Surgical site infection    Staphylococcus aureus infection    Bacteremia due to methicillin susceptible Staphylococcus aureus (MSSA)    Debility    Low back pain    Encephalopathy       DISCHARGE DIAGNOSES:  HPI: Dom Wu, 69 yo male with PMHx of IDDM, HTN, DVT who was admitted from ARU for AMS. Patient was admitted to [facility] on 5/18/2019 for epidural abscess post-op L3-4 lumbar laminectomy and facetectomy on 4/19/2019. Patient's family states during his hospitalization his mental status has declined. He was given dilaudid in ARU but his mentation improved after holding dilaudid. A&Ox3. Patient still had persistent sharp stabbing back pain that radiated to his feet and ankles. Denied shortness of breath, chest pain, palpitations, abdominal pain, N/V, or changes in urination or bowel habits.      Hospital Course:  Patient was afebrile and hemodynamically stable. BMP WNLs with no leukocytosis. UA showed increased glucose and 6-10 WBC. Urine culture showed no growth to date. Mentation improved to baseline during admission. Neurosurgery was consulted for back pain due to prior epidural abscess after laminectomy. MRI of lumbar region showed discitis and osteomyelitis from L2-4 as expected but does not show clear worsening or progression from prior MRI.  MRI unable to assess full lumbar status for central stenosis or epidural phlegmon/fluid collections due to patient not being able to cooperate. ID recommended 2mg IV BID for 8 weeks outpatient. Stable DVT was managed with Eliquis 5mg PO BID. HTN managed with home medications. Patient going to [facility]. Physical Exam:  /82   Pulse 75   Temp 97.5 °F (36.4 °C) (Oral)   Resp 16   Ht 6' 0.84\" (1.85 m)   Wt (!) 332 lb 3.7 oz (150.7 kg)   SpO2 96%   BMI 44.03 kg/m²   General appearance: alert, appears stated age and cooperative  Head: Normocephalic, without obvious abnormality, atraumatic  Eyes: conjunctivae/corneas clear. PERRL, EOM's intact. Fundi benign. Ears: normal TM's and external ear canals both ears  Nose: Nares normal. Septum midline. Mucosa normal. No drainage or sinus tenderness. Throat: lips, mucosa, and tongue normal; teeth and gums normal  Neck: no adenopathy, no carotid bruit, no JVD, supple, symmetrical, trachea midline and thyroid not enlarged, symmetric, no tenderness/mass/nodules  Lungs: clear to auscultation bilaterally  Heart: regular rate and rhythm, S1, S2 normal, no murmur, click, rub or gallop  Abdomen: soft, non-tender; bowel sounds normal; no masses,  no organomegaly  MSK: extremities normal, atraumatic, no cyanosis or edema. Tenderness to palpation of mid back.    Neurologic: Grossly normal.    Consults: ID, Neurosurgery   Significant Diagnostic Studies:   MRI LUMBAR SPINE WO CONTRAST   Final Result        Disposition: long term care facility  Discharged Condition: Stable  Follow Up: Primary Care Physician in two weeks    DISCHARGE MEDICATION:     Medication List      ASK your doctor about these medications    acetaminophen 325 MG tablet  Commonly known as:  TYLENOL     amLODIPine 2.5 MG tablet  Commonly known as:  NORVASC     atorvastatin 20 MG tablet  Commonly known as:  LIPITOR     cefTRIAXone 2 g injection  Commonly known as:  ROCEPHIN     empagliflozin 25 MG tablet  Commonly known as:  JARDIANCE     famotidine 20 MG tablet  Commonly known as:  PEPCID  Take 1 tablet by mouth 2 times daily     fenofibrate 145 MG tablet  Commonly known as:  TRICOR     glimepiride 4 MG tablet  Commonly known as:  AMARYL     hydrochlorothiazide 12.5 MG capsule  Commonly known as:  MICROZIDE     insulin  UNIT/ML injection vial  Commonly known as:  HUMULIN N;NOVOLIN N     lisinopril 20 MG tablet  Commonly known as:  PRINIVIL;ZESTRIL     metFORMIN 1000 MG tablet  Commonly known as:  GLUCOPHAGE     methocarbamol 500 MG tablet  Commonly known as:  ROBAXIN     oxyCODONE 5 MG immediate release tablet  Commonly known as:  ROXICODONE     polyethylene glycol packet  Commonly known as:  GLYCOLAX     senna-docusate 8.6-50 MG per tablet  Commonly known as:  PERICOLACE     tamsulosin 0.4 MG capsule  Commonly known as:  FLOMAX     thiamine 100 MG tablet  Take 1 tablet by mouth daily          Activity: activity as tolerated  Diet: regular diet  Wound Care: none needed    Time Spent on discharge is more than 30 minutes    Signed:  [address] MD   5/31/2019

## 2019-05-31 NOTE — PROGRESS NOTES
NEUROSURGERY PROGRESS NOTE    5/31/2019 3:07 PM                               Dom Wu                      LOS: 3 days     Subjective:  No acute events overnight. Physical Exam:  Patient seen and examined    Vitals:    05/31/19 1142   BP: 100/64   Pulse: 75   Resp: 16   Temp: 97.7 °F (36.5 °C)   SpO2: 93%     GCS:  4 - Opens eyes on own  5 - Alert and oriented  6 - Follows simple motor commands  General: Well developed. Alert and cooperative in no acute distress.     HENT: atraumatic, neck supple  Eyes: Optic discs: Not tested  Pulmonary: unlabored respiratory effort  Cardiovascular:  Warm well perfused. No peripheral edema  Gastrointestinal: abdomen soft, NT, ND     Neurological:  Mental Status: Awake, alert, oriented x 4, speech clear and appropriate  Attention: Intact  Language: No aphasia or dysarthria noted  Sensation: Numbness/tingling BLE  Coordination: Intact     Musculoskeletal:   Gait: Not tested   Assist devices: None   Tone: Normal BUE   Motor strength: Exam limited in BLE 2/2 pain/spasms    Right  Left      Right  Left    Deltoid  5 5   Hip Flex  3 3   Biceps  5 5   Knee Extensors  3 3   Triceps  5 5   Knee Flexors  3 3   Wrist Ext  5 5   Ankle Dorsiflex. 3 3   Wrist Flex  5 5   Ankle Plantarflex. 3 3   Handgrip  5 5   Ext Nixon Longus  3 3   Thumb Ext  5 5              Incision: CDI     Radiological Findings:Xr Chest Standard (2 Vw)  Mri Lumbar Spine W Wo Contrast  Result Date: 5/10/2019  1. Posterior fluid collection along the laminectomy bed of unclear significance. The fluid collection exerts complex layering signal with gas bubbles and peripheral enhancement. This may or present a seroma or postoperative hematoma. An infected fluid collection be difficult to exclude. A pseudomeningocele is not excluded. The fluid collection extensively laminectomy bed without significant mass effect.   2. Multifactorial multilevel mild canal narrowing as described in part related to extensive facet arthropathy resulting in a narrowing of the canal from a lateral dimension. 3. Tortuous bunched nerve roots in the upper cauda equina surrounding the conus presumably related to chronic stenosis. 4. Multifactorial multilevel foraminal narrowing with moderate or severe foraminal narrowing from L1-L2 through L5-S1.  5. Diffuse circumferential epidural enhancement without discrete epidural collection extending throughout the laminectomy site which may be reactive in nature. Infection would be difficult to exclude. 6. No convincing evidence of osteomyelitis or discitis. 7. Large posterior central disc protrusion at L5-S1 resulting in a moderate canal narrowing and mild/moderate impingement upon the exiting S1 nerve roots bilaterally.     Mri Lumbar Spine Wo Contrast  Result Date: 5/29/2019  MR imaging findings are compatible with discitis at L2-L3 and L3-L4 with osteomyelitis at L2, L3 and L4 vertebral bodies. Findings are also suggestive of possible additional discitis at L1-L2. At this time, there is no pathologic fracture or loss of vertebral body height. As noted above, the patient was unable to cooperate with a complete MRI examination of the lumbar spine. Specifically, axial T1 and T2 images, as well as sagittal T1 images, cannot be obtained. In this setting, the status of the spinal canal cannot be accurately assessed, in terms of evaluating the presence of central stenosis or epidural phlegmon/fluid collections.       Labs:  Recent Labs     05/31/19  0530   WBC 5.7   HGB 11.2*   HCT 34.0*   *       Recent Labs     05/31/19  0530   *   K 4.0   CL 97*   CO2 23   BUN 24*   CREATININE 0.7*   GLUCOSE 182*   CALCIUM 9.4       Recent Labs     05/28/19  1715   PROTIME 16.3*   INR 1.43*       Patient Active Problem List    Diagnosis Date Noted    Low back pain 05/28/2019    Encephalopathy 05/28/2019    Debility 05/17/2019    Bacteremia due to methicillin susceptible Staphylococcus aureus (MSSA) 05/13/2019  Surgical site infection 05/10/2019    Staphylococcus aureus infection 05/10/2019    Back abscess 05/08/2019    Sepsis ([facility] [address] 05/08/2019    Lumbar stenosis with neurogenic claudication 04/20/2019    Lumbar spinal stenosis 04/19/2019    Degenerative lumbar spinal stenosis 04/19/2019     Assessment:  Patient is a 68 y.o. male s/p washout on 05/12/2019 2/2 large post-op fluid collection in paraspinal soft tissue showing s/s of infection. Patient was in [facility] ARU after procedure, but was readmitted 2/2 AMS.    Plan:  1. Neurologically stable  2. Neurologic exams frequency:  - Floor: Q4H  3. For change in exam MUST contact neurosurgery team along with critical care or primary team  4. MRI Lumbar revealed discitis/osteomyelitis  5. ID consulted to reevaluate abx treatment plan  6. Muscle spasms: Robaxin  7. Pain control: Managed by medical team  8. PT/OT consulted, appreciate recs  9. Advance diet / activity per primary team  10. Follow up w/Dr. José Miguel Ang at next scheduled appointment  11. Will follow inpatient.  Please call with any questions or decline in neurological status     DISPO: OK to DC to SNF from neurosurgery standpoint. Dispo timing to be determined by primary team once patient is medically stable for discharge.     Patient was discussed with Dr. José Miguel Ang who agrees with above assessment and plan.      Electronically signed by: ALYSIA Canchola, 5/31/2019 3:07 PM  [phone]

## 2019-05-31 NOTE — DISCHARGE INSTR - COC
Continuity of Care Form    Patient Name: Chao Araya   :  1950  MRN:  [id number]    Admit date:  2019  Discharge date:  6/3/19      Code Status Order: Full Code   Advance Directives:   [facility] Documentation     Date/Time Healthcare Directive Type of Healthcare Directive Copy in [address] Agent's Name Healthcare Agent's Phone Number    [phone]  Yes, patient has an advance directive for healthcare treatment  Durable power of  for health care  No, copy requested from family  Spouse  --  --          Admitting Physician:  Ruthann Chung MD  PCP: EMIGDIO HARRELL    Discharging Nurse: [facility] Unit/Room#: 2680/2486-13  Discharging Unit Phone Number: [phone]    Emergency Contact:   Extended Emergency Contact Information  Primary Emergency Contact: kishor teague  [place] Phone: [phone]  Mobile Phone: [phone]  Relation: Spouse  Secondary Emergency Contact: Mary Infante  Mobile Phone: [phone]  Relation: Child    Past Surgical History:  Past Surgical History:   Procedure Laterality Date    BACK SURGERY      x 2    CHOLECYSTECTOMY      CYSTOSCOPY      EYE SURGERY      retina repair    HEMORRHOID SURGERY      KNEE SURGERY      LUMBAR SPINE SURGERY N/A 2019    DECOMPRESSION LAMINECTOMY L3-4 POSSIBLY L5 performed by Shaquille Dan MD at [place] [address]       retracted penis    [address] / CSF LEAK N/A 2019    LUMBAR WOUND WASHOUT performed by Shaquille Dan MD at [address] [place] [address]         Immunization History:   Immunization History   Administered Date(s) Administered    Pneumococcal 13-valent Conjugate (Iggcvvt20) 2014    Pneumococcal Polysaccharide ([id number]) 10/01/2010       Active Problems:  Patient Active Problem List   Diagnosis Code    Lumbar spinal stenosis M48.061    Degenerative lumbar spinal stenosis M48.061    Lumbar stenosis with hours) at 5/31/2019 1008  Last data filed at 5/31/2019 2586  Gross per 24 hour   Intake 1080 ml   Output 650 ml   Net 430 ml     I/O last 3 completed shifts: In: 1340 [P.O.:1340]  Out: 650 [Urine:650]    Safety Concerns: At Risk for Falls    Impairments/Disabilities:      None    Nutrition Therapy:  Current Nutrition Therapy:   - Oral Diet:  General    Routes of Feeding: Oral  Liquids: No Restrictions  Daily Fluid Restriction: no  Last Modified Barium Swallow with Video (Video Swallowing Test): not done    Treatments at the Time of Hospital Discharge:   Respiratory Treatments: N/A   Oxygen Therapy:  is not on home oxygen therapy. Ventilator:    - No ventilator support    Rehab Therapies: Physical Therapy and Occupational Therapy  Weight Bearing Status/Restrictions: No weight bearing restirctions  Other Medical Equipment (for information only, NOT a DME order): Max Assist/Rubens Lift   Other Treatments: N/A     Patient's personal belongings (please select all that are sent with patient):  Renae    RN SIGNATURE:  Electronically signed by Alison Winchester RN on 6/3/2019 at 10:41 AM    CASE MANAGEMENT/SOCIAL WORK SECTION    Inpatient Status Date: 05/28/2019    Readmission Risk Assessment Score:  Readmission Risk              Risk of Unplanned Readmission:        19           Discharging to Facility/ Agency   · Name: [facility]   · Address:[address] [place] [address]  · [address] [place] [address] [phone] and ask for the [address] [place] [address] nurse  · Fax:[phone]    ·     / signature: Electronically signed by Catia Lewis RN on 6/3/19 at 11:30 AM    PHYSICIAN SECTION    Prognosis: Fair    Condition at Discharge: Stable    Rehab Potential (if transferring to Rehab):  Fair    Recommended Labs or Other Treatments After Discharge:   INFUSION ORDERS:  Ceftriaxone 2 gm iv q 12 through 7/7/19  - First dose given in hospital  - Disposition / date discharge - [facility] [phone]  - Check CBC w diff, CMP, ESR, CRP, vancomycin trough every Mon or Tue - FAX result to [phone]  - Call with antibiotic / infusion issues, [phone]  - Facility - any change in status, transfer out of facility or to hospital - call [phone]    Physician Certification: I certify the above information and transfer of Maki Marin  is necessary for the continuing treatment of the diagnosis listed and that he requires [facility] for greater 30 days.      Update Admission H&P: No change in H&P    PHYSICIAN SIGNATURE:  Electronically signed by Terri Barrow MD and Seble Forman MD on 5/31/19 at 1:44 PM

## 2019-05-31 NOTE — PLAN OF CARE
Problem: Pain:  Goal: Pain level will decrease  Description  Pain level will decrease  Outcome: Ongoing  Note:   Patient continues to complain of pain in lower mid back. Being medicated per MAR. Upon reassessment patient resting with eyes closed. Will continue to monitor patient's pain level. Problem: Falls - Risk of:  Goal: Will remain free from falls  Description  Will remain free from falls  Outcome: Ongoing  Note:   Remains free from falls this shift. In bed with alarm on and call light within reach. Will continue to monitor. Problem: Risk for Impaired Skin Integrity  Goal: Tissue integrity - skin and mucous membranes  Description  Structural intactness and normal physiological function of skin and  mucous membranes. Outcome: Ongoing  Note:   Patient's skin remains intact. Blanchable redness noted to buttocks and eric area. Will continue to monitor.

## 2019-05-31 NOTE — PROGRESS NOTES
Patient alert and oriented x4. VSS. Neuro assessment remains stable. Incision to mid lower back with steri strips CDI. Tolerating diet, denies n/v. Pain controlled with oral prn pain medications per MAR. Voiding adequately per urinal, no episodes of incontinence. No bm this shift. In bed with alarm on and call light within reach. Will continue to monitor.

## 2019-06-01 LAB
ANION GAP SERPL CALCULATED.3IONS-SCNC: 12 MMOL/L (ref 3–16)
BASOPHILS ABSOLUTE: 0.1 K/UL (ref 0–0.2)
BASOPHILS RELATIVE PERCENT: 1.2 %
BUN BLDV-MCNC: 20 MG/DL (ref 7–20)
CALCIUM SERPL-MCNC: 9.2 MG/DL (ref 8.3–10.6)
CHLORIDE BLD-SCNC: 96 MMOL/L (ref 99–110)
CO2: 24 MMOL/L (ref 21–32)
CREAT SERPL-MCNC: 0.6 MG/DL (ref 0.8–1.3)
EOSINOPHILS ABSOLUTE: 0.2 K/UL (ref 0–0.6)
EOSINOPHILS RELATIVE PERCENT: 4.4 %
GFR AFRICAN AMERICAN: >60
GFR NON-AFRICAN AMERICAN: >60
GLUCOSE BLD-MCNC: 135 MG/DL (ref 70–99)
GLUCOSE BLD-MCNC: 177 MG/DL (ref 70–99)
GLUCOSE BLD-MCNC: 179 MG/DL (ref 70–99)
GLUCOSE BLD-MCNC: 182 MG/DL (ref 70–99)
GLUCOSE BLD-MCNC: 209 MG/DL (ref 70–99)
HCT VFR BLD CALC: 35.6 % (ref 40.5–52.5)
HEMOGLOBIN: 11.6 G/DL (ref 13.5–17.5)
LYMPHOCYTES ABSOLUTE: 1 K/UL (ref 1–5.1)
LYMPHOCYTES RELATIVE PERCENT: 21.5 %
MCH RBC QN AUTO: 28.4 PG (ref 26–34)
MCHC RBC AUTO-ENTMCNC: 32.7 G/DL (ref 31–36)
MCV RBC AUTO: 86.9 FL (ref 80–100)
MONOCYTES ABSOLUTE: 0.6 K/UL (ref 0–1.3)
MONOCYTES RELATIVE PERCENT: 11.7 %
NEUTROPHILS ABSOLUTE: 2.9 K/UL (ref 1.7–7.7)
NEUTROPHILS RELATIVE PERCENT: 61.2 %
PDW BLD-RTO: 14.7 % (ref 12.4–15.4)
PERFORMED ON: ABNORMAL
PLATELET # BLD: 467 K/UL (ref 135–450)
PMV BLD AUTO: 6.8 FL (ref 5–10.5)
POTASSIUM REFLEX MAGNESIUM: 4.2 MMOL/L (ref 3.5–5.1)
RBC # BLD: 4.1 M/UL (ref 4.2–5.9)
SODIUM BLD-SCNC: 132 MMOL/L (ref 136–145)
WBC # BLD: 4.8 K/UL (ref 4–11)

## 2019-06-01 PROCEDURE — 6370000000 HC RX 637 (ALT 250 FOR IP): Performed by: NURSE PRACTITIONER

## 2019-06-01 PROCEDURE — 6360000002 HC RX W HCPCS: Performed by: STUDENT IN AN ORGANIZED HEALTH CARE EDUCATION/TRAINING PROGRAM

## 2019-06-01 PROCEDURE — 1200000000 HC SEMI PRIVATE

## 2019-06-01 PROCEDURE — 85025 COMPLETE CBC W/AUTO DIFF WBC: CPT

## 2019-06-01 PROCEDURE — 6370000000 HC RX 637 (ALT 250 FOR IP): Performed by: STUDENT IN AN ORGANIZED HEALTH CARE EDUCATION/TRAINING PROGRAM

## 2019-06-01 PROCEDURE — 80048 BASIC METABOLIC PNL TOTAL CA: CPT

## 2019-06-01 PROCEDURE — 2580000003 HC RX 258: Performed by: STUDENT IN AN ORGANIZED HEALTH CARE EDUCATION/TRAINING PROGRAM

## 2019-06-01 RX ADMIN — FENOFIBRATE 160 MG: 160 TABLET ORAL at 09:16

## 2019-06-01 RX ADMIN — ATORVASTATIN CALCIUM 20 MG: 20 TABLET, FILM COATED ORAL at 21:44

## 2019-06-01 RX ADMIN — APIXABAN 5 MG: 5 TABLET, FILM COATED ORAL at 21:44

## 2019-06-01 RX ADMIN — LISINOPRIL 30 MG: 20 TABLET ORAL at 09:17

## 2019-06-01 RX ADMIN — FAMOTIDINE 20 MG: 20 TABLET ORAL at 21:44

## 2019-06-01 RX ADMIN — Medication 10 ML: at 09:17

## 2019-06-01 RX ADMIN — OXYCODONE HYDROCHLORIDE 10 MG: 5 TABLET ORAL at 09:18

## 2019-06-01 RX ADMIN — INSULIN LISPRO 3 UNITS: 100 INJECTION, SOLUTION INTRAVENOUS; SUBCUTANEOUS at 13:33

## 2019-06-01 RX ADMIN — FAMOTIDINE 20 MG: 20 TABLET ORAL at 09:17

## 2019-06-01 RX ADMIN — OXYCODONE HYDROCHLORIDE 10 MG: 5 TABLET ORAL at 13:38

## 2019-06-01 RX ADMIN — AMLODIPINE BESYLATE 2.5 MG: 2.5 TABLET ORAL at 09:16

## 2019-06-01 RX ADMIN — TAMSULOSIN HYDROCHLORIDE 0.4 MG: 0.4 CAPSULE ORAL at 09:16

## 2019-06-01 RX ADMIN — INSULIN LISPRO 6 UNITS: 100 INJECTION, SOLUTION INTRAVENOUS; SUBCUTANEOUS at 17:35

## 2019-06-01 RX ADMIN — Medication 10 ML: at 21:44

## 2019-06-01 RX ADMIN — OXYCODONE HYDROCHLORIDE 10 MG: 5 TABLET ORAL at 21:44

## 2019-06-01 RX ADMIN — METHOCARBAMOL TABLETS 1000 MG: 500 TABLET, COATED ORAL at 18:09

## 2019-06-01 RX ADMIN — METHOCARBAMOL TABLETS 1000 MG: 500 TABLET, COATED ORAL at 09:17

## 2019-06-01 RX ADMIN — METHOCARBAMOL TABLETS 1000 MG: 500 TABLET, COATED ORAL at 21:43

## 2019-06-01 RX ADMIN — METHOCARBAMOL TABLETS 1000 MG: 500 TABLET, COATED ORAL at 13:38

## 2019-06-01 RX ADMIN — CEFTRIAXONE SODIUM 2 G: 2 INJECTION, POWDER, FOR SOLUTION INTRAMUSCULAR; INTRAVENOUS at 02:17

## 2019-06-01 RX ADMIN — CEFTRIAXONE SODIUM 2 G: 2 INJECTION, POWDER, FOR SOLUTION INTRAMUSCULAR; INTRAVENOUS at 13:38

## 2019-06-01 RX ADMIN — INSULIN LISPRO 3 UNITS: 100 INJECTION, SOLUTION INTRAVENOUS; SUBCUTANEOUS at 09:26

## 2019-06-01 RX ADMIN — Medication 100 MG: at 09:17

## 2019-06-01 RX ADMIN — APIXABAN 5 MG: 5 TABLET, FILM COATED ORAL at 09:16

## 2019-06-01 RX ADMIN — SENNOSIDES,DOCUSATE SODIUM 2 TABLET: 8.6; 5 TABLET, FILM COATED ORAL at 21:43

## 2019-06-01 ASSESSMENT — PAIN DESCRIPTION - FREQUENCY
FREQUENCY: CONTINUOUS

## 2019-06-01 ASSESSMENT — PAIN DESCRIPTION - DIRECTION: RADIATING_TOWARDS: LEGS

## 2019-06-01 ASSESSMENT — PAIN DESCRIPTION - DESCRIPTORS
DESCRIPTORS: THROBBING

## 2019-06-01 ASSESSMENT — PAIN DESCRIPTION - ONSET
ONSET: ON-GOING

## 2019-06-01 ASSESSMENT — PAIN DESCRIPTION - ORIENTATION
ORIENTATION: LOWER
ORIENTATION: LOWER
ORIENTATION: LOWER;RIGHT;LEFT

## 2019-06-01 ASSESSMENT — PAIN SCALES - GENERAL
PAINLEVEL_OUTOF10: 6
PAINLEVEL_OUTOF10: 10
PAINLEVEL_OUTOF10: 10
PAINLEVEL_OUTOF10: 6
PAINLEVEL_OUTOF10: 8
PAINLEVEL_OUTOF10: 0

## 2019-06-01 ASSESSMENT — PAIN DESCRIPTION - PAIN TYPE
TYPE: ACUTE PAIN
TYPE: ACUTE PAIN;SURGICAL PAIN
TYPE: ACUTE PAIN;SURGICAL PAIN

## 2019-06-01 ASSESSMENT — PAIN - FUNCTIONAL ASSESSMENT
PAIN_FUNCTIONAL_ASSESSMENT: PREVENTS OR INTERFERES SOME ACTIVE ACTIVITIES AND ADLS
PAIN_FUNCTIONAL_ASSESSMENT: PREVENTS OR INTERFERES WITH MANY ACTIVE NOT PASSIVE ACTIVITIES
PAIN_FUNCTIONAL_ASSESSMENT: PREVENTS OR INTERFERES SOME ACTIVE ACTIVITIES AND ADLS

## 2019-06-01 ASSESSMENT — PAIN DESCRIPTION - PROGRESSION
CLINICAL_PROGRESSION: GRADUALLY WORSENING

## 2019-06-01 ASSESSMENT — PAIN DESCRIPTION - LOCATION
LOCATION: BACK
LOCATION: BACK
LOCATION: BACK;KNEE

## 2019-06-01 NOTE — PROGRESS NOTES
Progress Note  PGY-3    Hospital Day: 5                                                           Code:Full Code  Admit Date: 5/28/2019                                             PCP: EMIGDIO MESSER PAGE                                SUBJECTIVE:   Interval Hx: Pt seen and examined at the bedside. No acute overnight events. Did not get precert yesterday so pt stayed in the hospital.    MEDICATIONS:   Scheduled Meds:   insulin glargine  20 Units Subcutaneous Nightly    insulin lispro  0-18 Units Subcutaneous TID WC    insulin lispro  0-9 Units Subcutaneous Nightly    methocarbamol  1,000 mg Oral 4x Daily    atorvastatin  20 mg Oral Nightly    famotidine  20 mg Oral BID    fenofibrate  160 mg Oral Daily    lisinopril  30 mg Oral Daily    polyethylene glycol  17 g Oral Daily    sennosides-docusate sodium  2 tablet Oral BID    tamsulosin  0.4 mg Oral Daily    thiamine  100 mg Oral Daily    sodium chloride flush  10 mL Intravenous 2 times per day    cefTRIAXone (ROCEPHIN) IV  2 g Intravenous Q12H    amLODIPine  2.5 mg Oral Daily    apixaban  5 mg Oral BID      Continuous Infusions:   dextrose       PRN Meds:oxyCODONE **OR** oxyCODONE, glucose, dextrose, glucagon (rDNA), dextrose, sodium chloride flush, hydrALAZINE    Allergies: No Known Allergies  Antibiotics:    PHYSICAL EXAM:       Vitals: /77   Pulse 83   Temp 98.2 °F (36.8 °C) (Oral)   Resp 16   Ht 6' 0.84\" (1.85 m)   Wt (!) 332 lb 3.7 oz (150.7 kg)   SpO2 95%   BMI 44.03 kg/m²     I/O:      Intake/Output Summary (Last 24 hours) at 6/1/2019 1426  Last data filed at 6/1/2019 1200  Gross per 24 hour   Intake 970 ml   Output 551 ml   Net 419 ml     I/O this shift: In: 560 [P.O.:560]  Out: -   I/O last 3 completed shifts:   In: 650 [P.O.:650]  Out: 551 [Urine:551]    Physical Examination:   · General appearance: alert, appears stated age and cooperative, lying in bed flat  · Skin: Skin color, texture, turgor normal.   · HEENT: EOMI: Normocephalic  · Lungs: clear to auscultation bilaterally  · Heart: regular rate and rhythm, S1, S2 normal, no murmur, click, rub or gallop  · Abdomen: soft, non-tender; bowel sounds normal; no masses,  no organomegaly  · MSK: extremities normal, atraumatic, no cyanosis or edema  · Neurologic:  Mental status: Alert, oriented, thought content appropriate  · Lines:   DATA:       Labs:  CBC:   Recent Labs     05/30/19  0635 05/31/19  0530 06/01/19  0620   WBC 7.0 5.7 4.8   HGB 11.5* 11.2* 11.6*   HCT 35.4* 34.0* 35.6*   * 496* 467*       BMP:   Recent Labs     05/30/19  0635 05/31/19  0530 06/01/19  0620   * 133* 132*   K 4.3 4.0 4.2   CL 97* 97* 96*   CO2 24 23 24   BUN 23* 24* 20   CREATININE 0.7* 0.7* 0.6*   GLUCOSE 197* 182* 177*     LFT's: No results for input(s): AST, ALT, ALB, BILITOT, ALKPHOS in the last 72 hours. Troponin: No results for input(s): TROPONINI in the last 72 hours. BNP: No results for input(s): BNP in the last 72 hours. ABGs: No results for input(s): PHART, JDV4QGP, PO2ART in the last 72 hours. INR: No results for input(s): INR in the last 72 hours. Lipids: No results for input(s): CHOL, HDL in the last 72 hours. Invalid input(s): LDLCALCU    U/A:  Recent Labs     05/29/19  1915   COLORU Yellow   PHUR 5.5   WBCUA 6-10*   RBCUA 3-5*   CLARITYU Clear   SPECGRAV 1.025   LEUKOCYTESUR Negative   UROBILINOGEN 0.2   BILIRUBINUR Negative   BLOODU SMALL*   GLUCOSEU 250*        Micro:   ASSESSMENT AND PLAN:   Rafi Holloway, 69 yo male, with PMHx of DMII, HTN, DVT that was recently admitted for epidural abscess on 5/18/2019 after laminectomy a month prior presenting with progressive AMS.            Active Hospital Problems     Diagnosis Date Noted    Encephalopathy [G93.40] 05/28/2019     Briefly, awaiting pre-cert prior to discharge.      Acute Metabolic Encephalopathy likely 2/2 medications (opiates), resolved  - Neurosurgery consulted - Neurocheck Q4  - Roxicodone pain panel, holding dilaudid   - continue Rocephin 2g IV BID     Lower Back pain 2/2 epidural abscess from recent L2-L4 laminectomy  - Neurosurgery consulted  - MRI could not be completed d/t pt moving from pain. What was done was similar to previous MRI  - Robaxin for muscle spasm, hold if mental status worsens  - PT/OT  - Roxicodone pain panel  - ID consulted, appreciate recs     DVT, stable  - continue Eliquis 5mg PO BID  - monitor PT & INR     HTN  - continue home Norvasc 2.5mg PO and Lisinopril 30mg PO  - Hydralazine PRN     DMII  - continue HDSSI  - Increased to Lantus 20U    - hypoglycemia protocol     Code Status: Full code  Diet: Carb control  PPX: Eliquis  DISPO: from home, no services    Discussed with attending physician,  Kobe Fabian MD   -----------------------------  Pako Richardson M.D. PGY-3, 2:26 PM,      I have personally seen and evaluated this patient.  I discussed findings and management with the resident, on 06/01/2019  and agree as documented in this note     Ambrosioernesto Rothman [facility]  Attending Physician

## 2019-06-01 NOTE — PROGRESS NOTES
Up in chair with 2 person lj lift , 2 person transfer, 30 minutes in chair then back to bed, steri strips remain in place in back , moves all extremities legs weak, continues to have back pian down legs,

## 2019-06-01 NOTE — PROGRESS NOTES
Patient a/o x4. Vitals stable. Pain treated with oral medication. No significant changes overnight. Waiting on placement. Will continue to monitor.

## 2019-06-01 NOTE — PLAN OF CARE
Problem: Pain:  Goal: Pain level will decrease  Description  Pain level will decrease  Outcome: Ongoing  Note:   Pt states pain gets down to 6 of 10 , continue to receive oxycodone    , and robaxin for pain, alert oriented times 4

## 2019-06-02 LAB
ANION GAP SERPL CALCULATED.3IONS-SCNC: 12 MMOL/L (ref 3–16)
BASOPHILS ABSOLUTE: 0.1 K/UL (ref 0–0.2)
BASOPHILS RELATIVE PERCENT: 1.3 %
BUN BLDV-MCNC: 23 MG/DL (ref 7–20)
CALCIUM SERPL-MCNC: 9.4 MG/DL (ref 8.3–10.6)
CHLORIDE BLD-SCNC: 98 MMOL/L (ref 99–110)
CO2: 24 MMOL/L (ref 21–32)
CREAT SERPL-MCNC: 0.7 MG/DL (ref 0.8–1.3)
EOSINOPHILS ABSOLUTE: 0.3 K/UL (ref 0–0.6)
EOSINOPHILS RELATIVE PERCENT: 5.9 %
GFR AFRICAN AMERICAN: >60
GFR NON-AFRICAN AMERICAN: >60
GLUCOSE BLD-MCNC: 131 MG/DL (ref 70–99)
GLUCOSE BLD-MCNC: 142 MG/DL (ref 70–99)
GLUCOSE BLD-MCNC: 154 MG/DL (ref 70–99)
GLUCOSE BLD-MCNC: 164 MG/DL (ref 70–99)
GLUCOSE BLD-MCNC: 165 MG/DL (ref 70–99)
HCT VFR BLD CALC: 34.1 % (ref 40.5–52.5)
HEMOGLOBIN: 11.3 G/DL (ref 13.5–17.5)
LYMPHOCYTES ABSOLUTE: 1.3 K/UL (ref 1–5.1)
LYMPHOCYTES RELATIVE PERCENT: 27.9 %
MCH RBC QN AUTO: 28.5 PG (ref 26–34)
MCHC RBC AUTO-ENTMCNC: 33.2 G/DL (ref 31–36)
MCV RBC AUTO: 85.7 FL (ref 80–100)
MONOCYTES ABSOLUTE: 0.5 K/UL (ref 0–1.3)
MONOCYTES RELATIVE PERCENT: 11.8 %
NEUTROPHILS ABSOLUTE: 2.4 K/UL (ref 1.7–7.7)
NEUTROPHILS RELATIVE PERCENT: 53.1 %
PDW BLD-RTO: 15 % (ref 12.4–15.4)
PERFORMED ON: ABNORMAL
PLATELET # BLD: 443 K/UL (ref 135–450)
PMV BLD AUTO: 7.2 FL (ref 5–10.5)
POTASSIUM REFLEX MAGNESIUM: 4 MMOL/L (ref 3.5–5.1)
RBC # BLD: 3.98 M/UL (ref 4.2–5.9)
SODIUM BLD-SCNC: 134 MMOL/L (ref 136–145)
WBC # BLD: 4.6 K/UL (ref 4–11)

## 2019-06-02 PROCEDURE — 2580000003 HC RX 258: Performed by: STUDENT IN AN ORGANIZED HEALTH CARE EDUCATION/TRAINING PROGRAM

## 2019-06-02 PROCEDURE — 1200000000 HC SEMI PRIVATE

## 2019-06-02 PROCEDURE — 6370000000 HC RX 637 (ALT 250 FOR IP): Performed by: STUDENT IN AN ORGANIZED HEALTH CARE EDUCATION/TRAINING PROGRAM

## 2019-06-02 PROCEDURE — 99232 SBSQ HOSP IP/OBS MODERATE 35: CPT | Performed by: INTERNAL MEDICINE

## 2019-06-02 PROCEDURE — 6370000000 HC RX 637 (ALT 250 FOR IP): Performed by: NURSE PRACTITIONER

## 2019-06-02 PROCEDURE — 85025 COMPLETE CBC W/AUTO DIFF WBC: CPT

## 2019-06-02 PROCEDURE — 6360000002 HC RX W HCPCS: Performed by: STUDENT IN AN ORGANIZED HEALTH CARE EDUCATION/TRAINING PROGRAM

## 2019-06-02 PROCEDURE — 80048 BASIC METABOLIC PNL TOTAL CA: CPT

## 2019-06-02 RX ADMIN — METHOCARBAMOL TABLETS 1000 MG: 500 TABLET, COATED ORAL at 13:14

## 2019-06-02 RX ADMIN — Medication 10 ML: at 20:16

## 2019-06-02 RX ADMIN — INSULIN LISPRO 3 UNITS: 100 INJECTION, SOLUTION INTRAVENOUS; SUBCUTANEOUS at 13:06

## 2019-06-02 RX ADMIN — CEFTRIAXONE SODIUM 2 G: 2 INJECTION, POWDER, FOR SOLUTION INTRAMUSCULAR; INTRAVENOUS at 13:14

## 2019-06-02 RX ADMIN — SENNOSIDES,DOCUSATE SODIUM 2 TABLET: 8.6; 5 TABLET, FILM COATED ORAL at 08:26

## 2019-06-02 RX ADMIN — FAMOTIDINE 20 MG: 20 TABLET ORAL at 20:11

## 2019-06-02 RX ADMIN — METHOCARBAMOL TABLETS 1000 MG: 500 TABLET, COATED ORAL at 17:47

## 2019-06-02 RX ADMIN — Medication 10 ML: at 08:27

## 2019-06-02 RX ADMIN — METHOCARBAMOL TABLETS 1000 MG: 500 TABLET, COATED ORAL at 20:11

## 2019-06-02 RX ADMIN — AMLODIPINE BESYLATE 2.5 MG: 2.5 TABLET ORAL at 08:26

## 2019-06-02 RX ADMIN — APIXABAN 5 MG: 5 TABLET, FILM COATED ORAL at 20:12

## 2019-06-02 RX ADMIN — ATORVASTATIN CALCIUM 20 MG: 20 TABLET, FILM COATED ORAL at 20:11

## 2019-06-02 RX ADMIN — INSULIN LISPRO 3 UNITS: 100 INJECTION, SOLUTION INTRAVENOUS; SUBCUTANEOUS at 08:29

## 2019-06-02 RX ADMIN — TAMSULOSIN HYDROCHLORIDE 0.4 MG: 0.4 CAPSULE ORAL at 08:25

## 2019-06-02 RX ADMIN — OXYCODONE HYDROCHLORIDE 10 MG: 5 TABLET ORAL at 17:47

## 2019-06-02 RX ADMIN — OXYCODONE HYDROCHLORIDE 10 MG: 5 TABLET ORAL at 08:26

## 2019-06-02 RX ADMIN — OXYCODONE HYDROCHLORIDE 10 MG: 5 TABLET ORAL at 13:14

## 2019-06-02 RX ADMIN — FENOFIBRATE 160 MG: 160 TABLET ORAL at 08:26

## 2019-06-02 RX ADMIN — SENNOSIDES,DOCUSATE SODIUM 2 TABLET: 8.6; 5 TABLET, FILM COATED ORAL at 20:11

## 2019-06-02 RX ADMIN — OXYCODONE HYDROCHLORIDE 10 MG: 5 TABLET ORAL at 23:12

## 2019-06-02 RX ADMIN — CEFTRIAXONE SODIUM 2 G: 2 INJECTION, POWDER, FOR SOLUTION INTRAMUSCULAR; INTRAVENOUS at 01:57

## 2019-06-02 RX ADMIN — APIXABAN 5 MG: 5 TABLET, FILM COATED ORAL at 08:25

## 2019-06-02 RX ADMIN — FAMOTIDINE 20 MG: 20 TABLET ORAL at 08:26

## 2019-06-02 RX ADMIN — INSULIN LISPRO 2 UNITS: 100 INJECTION, SOLUTION INTRAVENOUS; SUBCUTANEOUS at 20:13

## 2019-06-02 RX ADMIN — LISINOPRIL 30 MG: 20 TABLET ORAL at 08:26

## 2019-06-02 RX ADMIN — METHOCARBAMOL TABLETS 1000 MG: 500 TABLET, COATED ORAL at 08:25

## 2019-06-02 RX ADMIN — Medication 100 MG: at 08:25

## 2019-06-02 RX ADMIN — OXYCODONE HYDROCHLORIDE 10 MG: 5 TABLET ORAL at 02:00

## 2019-06-02 ASSESSMENT — PAIN DESCRIPTION - LOCATION
LOCATION: BACK;KNEE;LEG
LOCATION: BACK

## 2019-06-02 ASSESSMENT — PAIN DESCRIPTION - DESCRIPTORS
DESCRIPTORS: ACHING
DESCRIPTORS: ACHING
DESCRIPTORS: ACHING;THROBBING
DESCRIPTORS: SHOOTING;THROBBING

## 2019-06-02 ASSESSMENT — PAIN DESCRIPTION - FREQUENCY
FREQUENCY: CONTINUOUS

## 2019-06-02 ASSESSMENT — PAIN DESCRIPTION - ORIENTATION
ORIENTATION: LOWER
ORIENTATION: LOWER
ORIENTATION: LOWER;LEFT;RIGHT
ORIENTATION: MID;LOWER

## 2019-06-02 ASSESSMENT — PAIN DESCRIPTION - ONSET
ONSET: ON-GOING

## 2019-06-02 ASSESSMENT — PAIN DESCRIPTION - DIRECTION
RADIATING_TOWARDS: LEGS
RADIATING_TOWARDS: LEGS

## 2019-06-02 ASSESSMENT — PAIN SCALES - GENERAL
PAINLEVEL_OUTOF10: 9
PAINLEVEL_OUTOF10: 8
PAINLEVEL_OUTOF10: 0
PAINLEVEL_OUTOF10: 0
PAINLEVEL_OUTOF10: 8
PAINLEVEL_OUTOF10: 5
PAINLEVEL_OUTOF10: 0
PAINLEVEL_OUTOF10: 8
PAINLEVEL_OUTOF10: 7

## 2019-06-02 ASSESSMENT — PAIN DESCRIPTION - PAIN TYPE
TYPE: ACUTE PAIN;SURGICAL PAIN
TYPE: ACUTE PAIN
TYPE: ACUTE PAIN;SURGICAL PAIN
TYPE: ACUTE PAIN;SURGICAL PAIN

## 2019-06-02 ASSESSMENT — PAIN - FUNCTIONAL ASSESSMENT
PAIN_FUNCTIONAL_ASSESSMENT: PREVENTS OR INTERFERES SOME ACTIVE ACTIVITIES AND ADLS
PAIN_FUNCTIONAL_ASSESSMENT: PREVENTS OR INTERFERES SOME ACTIVE ACTIVITIES AND ADLS
PAIN_FUNCTIONAL_ASSESSMENT: PREVENTS OR INTERFERES WITH MANY ACTIVE NOT PASSIVE ACTIVITIES
PAIN_FUNCTIONAL_ASSESSMENT: PREVENTS OR INTERFERES WITH MANY ACTIVE NOT PASSIVE ACTIVITIES

## 2019-06-02 ASSESSMENT — PAIN DESCRIPTION - PROGRESSION
CLINICAL_PROGRESSION: GRADUALLY WORSENING

## 2019-06-02 NOTE — PROGRESS NOTES
Feel much more pain when in sitting position, feeling sharp pain lower back up to a 10,  6 to 7 while lying on back ,reposioted and medicated with pain pills , will make dr aware, pt is able to log roll in bed per self today

## 2019-06-02 NOTE — PLAN OF CARE
Problem: Pain:  Goal: Pain level will decrease  Description  Pain level will decrease  6/2/2019 1322 by Melanie Pal, RN  Outcome: Ongoing  Note:   Pain , increasing with elevation toward sitting position, unable to sit in chair, repositioned and medicated with medication , will make dr mayorga,

## 2019-06-02 NOTE — PLAN OF CARE
Problem: Pain:  Goal: Pain level will decrease  Description  Pain level will decrease  Outcome: Ongoing  Note:   Patient's pain level controlled with oral prn medications per MAR. Verbalizes a decrease in pain level upon reassessment of medication administration. Will continue to monitor. Problem: Falls - Risk of:  Goal: Will remain free from falls  Description  Will remain free from falls  Outcome: Ongoing  Note:   Patient remains free from falls. In bed with alarm on and call light within reach. All fall precautions in place.

## 2019-06-02 NOTE — PROGRESS NOTES
Patient alert and oriented x4. VSS. Pain controlled with prn medications. Tolerating diet, denies n/v. Incision BERTIN, CDI. Moving all extremities weak. Neuro assessment remains stable. Will continue to monitor.

## 2019-06-02 NOTE — PROGRESS NOTES
ID Follow-up NOTE    CC:   S aureus bacteremia, lumbar surgical site infection  Antibiotics: Ceftriaxone    Admit Date: 5/28/2019  Hospital Day: 6    Subjective:     Awake, alert, conversant  LBP with movement, getting up, better controlled than on Thur / Fri      Objective:     Patient Vitals for the past 8 hrs:   BP Temp Temp src Pulse Resp SpO2   06/02/19 0800 122/70 97.7 °F (36.5 °C) Oral 77 16 92 %   06/02/19 0420 (!) 97/59 97.7 °F (36.5 °C) Oral 66 16 93 %     I/O last 3 completed shifts: In: 1160 [P.O.:1160]  Out: 500 [Urine:500]  No intake/output data recorded. EXAM:  GENERAL: No apparent distress.   RA  HEENT: Membranes moist, no oral lesion  NECK:  Supple  LUNGS: Clear b/l, no rales, no dullness  CARDIAC: RRR, no murmur appreciated  ABD:  + BS, soft / NT  EXT:  No rash, no edema, no lesions  BACK with sterry strips, no drainage   NEURO: No focal neurologic findings  PSYCH: Orientation, sensorium, mood normal  LINES:  R PICC in place       Data Review:  Lab Results   Component Value Date    WBC 4.6 06/02/2019    HGB 11.3 (L) 06/02/2019    HCT 34.1 (L) 06/02/2019    MCV 85.7 06/02/2019     06/02/2019     Lab Results   Component Value Date    CREATININE 0.7 (L) 06/02/2019    BUN 23 (H) 06/02/2019     (L) 06/02/2019    K 4.0 06/02/2019    CL 98 (L) 06/02/2019    CO2 24 06/02/2019       Hepatic Function Panel:   Lab Results   Component Value Date    ALKPHOS 89 05/28/2019    ALT 13 05/28/2019    AST 14 05/28/2019    PROT 7.3 05/28/2019    BILITOT 0.3 05/28/2019    BILIDIR <0.2 05/28/2019    IBILI see below 05/28/2019    LABALBU 3.4 05/28/2019       MICRO:  5/12 Surg cult - heavy MSSA, R clinda  5/12 BC no growth  5/9   Surg cult+ mod MSSA (corrected report)    5/8 BC x 2 + MSSA  Staphylococcus aureus (2)   Antibiotic Interpretation KASSY   ceFAZolin Sensitive <=2 mcg/mL   clindamycin Resistant <=0.5 mcg/mL   erythromycin Resistant >4 mcg/mL   oxacillin Sensitive 0.5 mcg/mL   tetracycline Sensitive resulting in a moderate canal narrowing and mild/moderate impingement upon the exiting S1 nerve roots bilaterally. Scheduled Meds:   insulin glargine  20 Units Subcutaneous Nightly    insulin lispro  0-18 Units Subcutaneous TID WC    insulin lispro  0-9 Units Subcutaneous Nightly    methocarbamol  1,000 mg Oral 4x Daily    atorvastatin  20 mg Oral Nightly    famotidine  20 mg Oral BID    fenofibrate  160 mg Oral Daily    lisinopril  30 mg Oral Daily    polyethylene glycol  17 g Oral Daily    sennosides-docusate sodium  2 tablet Oral BID    tamsulosin  0.4 mg Oral Daily    thiamine  100 mg Oral Daily    sodium chloride flush  10 mL Intravenous 2 times per day    cefTRIAXone (ROCEPHIN) IV  2 g Intravenous Q12H    amLODIPine  2.5 mg Oral Daily    apixaban  5 mg Oral BID       Continuous Infusions:   dextrose         PRN Meds:  oxyCODONE **OR** oxyCODONE, glucose, dextrose, glucagon (rDNA), dextrose, sodium chloride flush, hydrALAZINE      Assessment:     69 yo man with obesity, DM, HTN, lumbar stenosis     4/19/19 - L 3 & 4 lumbar laminectomy, L3/4 & L4/5 bilateral facetectomy , foraminotomies over L4 &5 nerve roots.    5/8/19 -  LBP. Presented to [facility] ED. CT with air and fluid in ST / laminectomy site    5/8/19 -  Transferred and admitted to [facility] 5/8. Wound with purulent drainage  MRI with fluid collection with gas and peripheral enhancement, see report  BC x 2  MSSA  Wound cult with mod growth S aureus     OR 5/12 - epidural abscess.  Per OR note, \"large amount of liquid pus', extended to deep surg space, epidural space. Surg cult + MSSA     IMP/  Lumbar SSI   OR 5/12  I&D to deep space / epidural abscess, surg cult MSSA   S aureus / MSSA bacteremia, f/u BC no growth  Leukocytosis - resolved  R leg DVT    Repeat MRI with new edema of L 2/3/4. Reviewed MRI with radiologist - no new disk signal, seen before. Vertebral edema new and mild. There is some changes at inferior L2 endplate.   It is NOT clear this is worse or progressive infection.   May have some evolution of radiologic findings    Plan:     Cont iv ceftriaxone (dose 2 gm iv q 12)     Complete prolonged course - 8 weeks from surg, through 7/7/19  See Yoni Dillon     Discussed with pt, RN  Discussed with wife, Neurosurg NP on 5/31  Jg Mckeon MD     INFUSION ORDERS:  Ceftriaxone 2 gm iv q 12 through 7/7/19  - First dose given in hospital  - Disposition / date discharge - [facility] [phone]  - Check CBC w diff, CMP, ESR, CRP, vancomycin trough every Mon or Tue - FAX result to [phone]  - Call with antibiotic / infusion issues, [phone]  - Facility - any change in status, transfer out of facility or to hospital - call [phone]  - No f/u in outpatient ID office necessary; f/u Neurosurg, Dr Claudell Medico

## 2019-06-02 NOTE — PROGRESS NOTES
Progress Note  PGY-1    Hospital Day: 6                                                           Code:Full Code  Admit Date: 5/28/2019                                             PCP: EMIGDIO MESSER PAGE                                SUBJECTIVE:   Interval Hx: Pt seen and examined at the bedside. No acute overnight events. Patient sat up in bed yesterday but had excruciating back pain after a bit. No pain when lying flat in bed. This morning has no complaints, will try to exercise and move around. MEDICATIONS:   Scheduled Meds:   insulin glargine  20 Units Subcutaneous Nightly    insulin lispro  0-18 Units Subcutaneous TID WC    insulin lispro  0-9 Units Subcutaneous Nightly    methocarbamol  1,000 mg Oral 4x Daily    atorvastatin  20 mg Oral Nightly    famotidine  20 mg Oral BID    fenofibrate  160 mg Oral Daily    lisinopril  30 mg Oral Daily    polyethylene glycol  17 g Oral Daily    sennosides-docusate sodium  2 tablet Oral BID    tamsulosin  0.4 mg Oral Daily    thiamine  100 mg Oral Daily    sodium chloride flush  10 mL Intravenous 2 times per day    cefTRIAXone (ROCEPHIN) IV  2 g Intravenous Q12H    amLODIPine  2.5 mg Oral Daily    apixaban  5 mg Oral BID      Continuous Infusions:   dextrose       PRN Meds:oxyCODONE **OR** oxyCODONE, glucose, dextrose, glucagon (rDNA), dextrose, sodium chloride flush, hydrALAZINE    Allergies: No Known Allergies  Antibiotics:    PHYSICAL EXAM:       Vitals: BP (!) 97/59   Pulse 66   Temp 97.7 °F (36.5 °C) (Oral)   Resp 16   Ht 6' 0.84\" (1.85 m)   Wt (!) 332 lb 3.7 oz (150.7 kg)   SpO2 93%   BMI 44.03 kg/m²     I/O:      Intake/Output Summary (Last 24 hours) at 6/2/2019 0730  Last data filed at 6/2/2019 0420  Gross per 24 hour   Intake 1160 ml   Output 500 ml   Net 660 ml     No intake/output data recorded. I/O last 3 completed shifts:   In: 1160 [P.O.:1160]  Out: 500 [Urine:500]    Physical Examination:   · General appearance: alert, appears stated age and cooperative, lying in bed flat  · Skin: Skin color, texture, turgor normal.   · HEENT: EOMI: Normocephalic  · Lungs: clear to auscultation bilaterally  · Heart: regular rate and rhythm, S1, S2 normal, no murmur, click, rub or gallop  · Abdomen: soft, non-tender; bowel sounds normal; no masses,  no organomegaly  · MSK: extremities normal, atraumatic, no cyanosis or edema  · Neurologic:  Mental status: Alert, oriented, thought content appropriate    DATA:       Labs:  CBC:   Recent Labs     05/31/19  0530 06/01/19  0620 06/02/19 0601   WBC 5.7 4.8 4.6   HGB 11.2* 11.6* 11.3*   HCT 34.0* 35.6* 34.1*   * 467* 443       BMP:   Recent Labs     05/31/19 0530 06/01/19 0620 06/02/19 0601   * 132* 134*   K 4.0 4.2 4.0   CL 97* 96* 98*   CO2 23 24 24   BUN 24* 20 23*   CREATININE 0.7* 0.6* 0.7*   GLUCOSE 182* 177* 165*     LFT's: No results for input(s): AST, ALT, ALB, BILITOT, ALKPHOS in the last 72 hours. Troponin: No results for input(s): TROPONINI in the last 72 hours. BNP: No results for input(s): BNP in the last 72 hours. ABGs: No results for input(s): PHART, LLW8NTP, PO2ART in the last 72 hours. INR: No results for input(s): INR in the last 72 hours. Lipids: No results for input(s): CHOL, HDL in the last 72 hours. Invalid input(s): LDLCALCU    U/A:  No results for input(s): NITRITE, COLORU, PHUR, LABCAST, WBCUA, RBCUA, MUCUS, TRICHOMONAS, YEAST, BACTERIA, CLARITYU, SPECGRAV, LEUKOCYTESUR, UROBILINOGEN, BILIRUBINUR, BLOODU, GLUCOSEU, AMORPHOUS in the last 72 hours. Invalid input(s): Kin Sale     Micro:   ASSESSMENT AND PLAN:   Soham Colin, 69 yo male, with PMHx of DMII, HTN, DVT that was recently admitted for epidural abscess on 5/18/2019 after laminectomy a month prior presenting with progressive AMS.  Briefly, awaiting pre-cert prior to discharge.      Acute Metabolic Encephalopathy likely 2/2 medications (opiates), resolved  - Neurosurgery consulted - OK to discharge  - Roxicodone pain

## 2019-06-03 VITALS
BODY MASS INDEX: 41.75 KG/M2 | HEIGHT: 73 IN | HEART RATE: 67 BPM | DIASTOLIC BLOOD PRESSURE: 74 MMHG | WEIGHT: 315 LBS | OXYGEN SATURATION: 96 % | TEMPERATURE: 97.6 F | SYSTOLIC BLOOD PRESSURE: 114 MMHG | RESPIRATION RATE: 16 BRPM

## 2019-06-03 LAB
ANION GAP SERPL CALCULATED.3IONS-SCNC: 12 MMOL/L (ref 3–16)
BASOPHILS ABSOLUTE: 0.1 K/UL (ref 0–0.2)
BASOPHILS RELATIVE PERCENT: 1 %
BUN BLDV-MCNC: 19 MG/DL (ref 7–20)
CALCIUM SERPL-MCNC: 9.3 MG/DL (ref 8.3–10.6)
CHLORIDE BLD-SCNC: 97 MMOL/L (ref 99–110)
CO2: 24 MMOL/L (ref 21–32)
CREAT SERPL-MCNC: 0.7 MG/DL (ref 0.8–1.3)
EOSINOPHILS ABSOLUTE: 0.3 K/UL (ref 0–0.6)
EOSINOPHILS RELATIVE PERCENT: 6.7 %
GFR AFRICAN AMERICAN: >60
GFR NON-AFRICAN AMERICAN: >60
GLUCOSE BLD-MCNC: 127 MG/DL (ref 70–99)
GLUCOSE BLD-MCNC: 140 MG/DL (ref 70–99)
GLUCOSE BLD-MCNC: 151 MG/DL (ref 70–99)
HCT VFR BLD CALC: 35.3 % (ref 40.5–52.5)
HEMOGLOBIN: 11.7 G/DL (ref 13.5–17.5)
LYMPHOCYTES ABSOLUTE: 1.5 K/UL (ref 1–5.1)
LYMPHOCYTES RELATIVE PERCENT: 28.2 %
MCH RBC QN AUTO: 28.6 PG (ref 26–34)
MCHC RBC AUTO-ENTMCNC: 33.1 G/DL (ref 31–36)
MCV RBC AUTO: 86.3 FL (ref 80–100)
MONOCYTES ABSOLUTE: 0.6 K/UL (ref 0–1.3)
MONOCYTES RELATIVE PERCENT: 12.4 %
NEUTROPHILS ABSOLUTE: 2.7 K/UL (ref 1.7–7.7)
NEUTROPHILS RELATIVE PERCENT: 51.7 %
PDW BLD-RTO: 15 % (ref 12.4–15.4)
PERFORMED ON: ABNORMAL
PERFORMED ON: ABNORMAL
PLATELET # BLD: 428 K/UL (ref 135–450)
PMV BLD AUTO: 7.1 FL (ref 5–10.5)
POTASSIUM REFLEX MAGNESIUM: 4.2 MMOL/L (ref 3.5–5.1)
RBC # BLD: 4.1 M/UL (ref 4.2–5.9)
SODIUM BLD-SCNC: 133 MMOL/L (ref 136–145)
WBC # BLD: 5.2 K/UL (ref 4–11)

## 2019-06-03 PROCEDURE — 2580000003 HC RX 258: Performed by: STUDENT IN AN ORGANIZED HEALTH CARE EDUCATION/TRAINING PROGRAM

## 2019-06-03 PROCEDURE — 6370000000 HC RX 637 (ALT 250 FOR IP): Performed by: NURSE PRACTITIONER

## 2019-06-03 PROCEDURE — 6370000000 HC RX 637 (ALT 250 FOR IP): Performed by: STUDENT IN AN ORGANIZED HEALTH CARE EDUCATION/TRAINING PROGRAM

## 2019-06-03 PROCEDURE — 85025 COMPLETE CBC W/AUTO DIFF WBC: CPT

## 2019-06-03 PROCEDURE — 80048 BASIC METABOLIC PNL TOTAL CA: CPT

## 2019-06-03 PROCEDURE — 6360000002 HC RX W HCPCS: Performed by: STUDENT IN AN ORGANIZED HEALTH CARE EDUCATION/TRAINING PROGRAM

## 2019-06-03 RX ORDER — OXYCODONE HYDROCHLORIDE 5 MG/1
5-10 TABLET ORAL EVERY 6 HOURS PRN
Qty: 12 TABLET | Refills: 0 | Status: SHIPPED | OUTPATIENT
Start: 2019-06-03 | End: 2019-06-06

## 2019-06-03 RX ADMIN — FAMOTIDINE 20 MG: 20 TABLET ORAL at 08:24

## 2019-06-03 RX ADMIN — METHOCARBAMOL TABLETS 1000 MG: 500 TABLET, COATED ORAL at 08:24

## 2019-06-03 RX ADMIN — Medication 10 ML: at 08:25

## 2019-06-03 RX ADMIN — INSULIN LISPRO 3 UNITS: 100 INJECTION, SOLUTION INTRAVENOUS; SUBCUTANEOUS at 07:43

## 2019-06-03 RX ADMIN — LISINOPRIL 30 MG: 20 TABLET ORAL at 08:24

## 2019-06-03 RX ADMIN — FENOFIBRATE 160 MG: 160 TABLET ORAL at 08:24

## 2019-06-03 RX ADMIN — OXYCODONE HYDROCHLORIDE 10 MG: 5 TABLET ORAL at 11:32

## 2019-06-03 RX ADMIN — POLYETHYLENE GLYCOL (3350) 17 G: 17 POWDER, FOR SOLUTION ORAL at 08:24

## 2019-06-03 RX ADMIN — OXYCODONE HYDROCHLORIDE 10 MG: 5 TABLET ORAL at 06:26

## 2019-06-03 RX ADMIN — AMLODIPINE BESYLATE 2.5 MG: 2.5 TABLET ORAL at 08:25

## 2019-06-03 RX ADMIN — CEFTRIAXONE SODIUM 2 G: 2 INJECTION, POWDER, FOR SOLUTION INTRAMUSCULAR; INTRAVENOUS at 01:48

## 2019-06-03 RX ADMIN — TAMSULOSIN HYDROCHLORIDE 0.4 MG: 0.4 CAPSULE ORAL at 08:25

## 2019-06-03 RX ADMIN — Medication 100 MG: at 08:25

## 2019-06-03 RX ADMIN — SENNOSIDES,DOCUSATE SODIUM 2 TABLET: 8.6; 5 TABLET, FILM COATED ORAL at 08:24

## 2019-06-03 RX ADMIN — APIXABAN 5 MG: 5 TABLET, FILM COATED ORAL at 08:24

## 2019-06-03 ASSESSMENT — PAIN DESCRIPTION - PROGRESSION
CLINICAL_PROGRESSION: NOT CHANGED
CLINICAL_PROGRESSION: NOT CHANGED

## 2019-06-03 ASSESSMENT — PAIN SCALES - GENERAL
PAINLEVEL_OUTOF10: 8
PAINLEVEL_OUTOF10: 9

## 2019-06-03 ASSESSMENT — PAIN DESCRIPTION - ONSET
ONSET: ON-GOING
ONSET: ON-GOING

## 2019-06-03 ASSESSMENT — PAIN DESCRIPTION - LOCATION
LOCATION: BACK
LOCATION: BACK

## 2019-06-03 ASSESSMENT — PAIN DESCRIPTION - DESCRIPTORS
DESCRIPTORS: ACHING
DESCRIPTORS: ACHING

## 2019-06-03 ASSESSMENT — PAIN DESCRIPTION - ORIENTATION
ORIENTATION: LOWER
ORIENTATION: MID;LOWER

## 2019-06-03 ASSESSMENT — PAIN DESCRIPTION - PAIN TYPE
TYPE: ACUTE PAIN
TYPE: ACUTE PAIN;SURGICAL PAIN

## 2019-06-03 ASSESSMENT — PAIN DESCRIPTION - FREQUENCY
FREQUENCY: CONTINUOUS
FREQUENCY: CONTINUOUS

## 2019-06-03 NOTE — PROGRESS NOTES
Patient alert and oriented x4. VSS. Pain controlled with prn medications. Tolerating diet, denies n/v. Incision with steri strips, CDI. Moving all extremities. Neuro assessment remains stable.  Will continue to monitor

## 2019-06-03 NOTE — CARE COORDINATION
Case Management Discharge Assessment    6/3/2019  [facility] ([facility])  Clinical Case Management Department    Called wife to inform her of discharge plan and daughter was present as well. Pt is ready for discharge but would like some pain medication prior to d/c. Patient: Brissa Monge  MRN: [id number] / : 1950  ACCT: [de-identified]     Emergency Contacts  Healthcare Agent Appointed: Spouse    Admission Documentation  Attending Provider: Beena Castano MD  Admit date/time: 2019 11:13 AM  Status: Inpatient [101]  Diagnosis: Encephalopathy     Readmission within last 30 days:  no     Living Situation  Discharge Planning  Living Arrangements: Spouse/Significant Other  Support Systems: Spouse/Significant Other, Family Members  Potential Assistance Needed: Skilled Nursing Facility  Type of Home Care Services: Aide Services, [place], PT, Nursing Services  Patient expects to be discharged to[de-identified] SNF  Expected Discharge Date: 19    Service Assessment:       Values / Beliefs  Do you have any ethnic, cultural, sacramental, or spiritual Adventism needs you would like us to be aware of while you are in the hospital?: No    Advance Directives (For Healthcare)  Pre-existing DNR Comfort Care/DNR Arrest/DNI Order: No  Healthcare Directive: Yes, patient has an advance directive for healthcare treatment  Type of Healthcare Directive: Durable power of  for health care  Copy in Chart: No, copy requested from family  Healthcare Agent Appointed: Spouse  If you are unable to speak for yourself, does your Healthcare Agent or Legal Spokesperson know your healthcare wishes?: Yes                        Pharmacy: [facility] Medications:  No  Does patient want to participate in local refill/meds to beds program?: No    Notification completed in HENS/PAS?  Yes     IMM  Yes       Discharge disposition: [id number] [facility] Phone: [phone] Fax: [phone]     [facility]  Name of Venu Goldman [facility] of Facility [phone]  Fax of Facility [phone]    Mode of Transport medical transport  Name of [address]  Number of Transport [phone]  Time of Transport [phone] and his family were provided with choice of provider; he and his family are in agreement with the discharge plan.       Care Transitions patient: Sonal Wells RN  [facility]  Case Management Department  Ph: [phone] Fax: [phone]

## 2019-06-03 NOTE — DISCHARGE SUMMARY
INTERNAL MEDICINE DEPARTMENT AT [address] [place] [address]  DISCHARGE SUMMARY    Patient ID: Vasile Santiago                                             Discharge Date: 6/3/2019   [facility]                                          Discharge Physician: Janina Estrada MD  Admit Date: 5/28/2019   Admitting Physician: No admitting provider for patient encounter. DISCHARGE DIAGNOSES:   Lumbar spinal stenosis    Degenerative lumbar spinal stenosis    Lumbar stenosis with neurogenic claudication    Back abscess    Sepsis ([address]    Surgical site infection    Staphylococcus aureus infection    Bacteremia due to methicillin susceptible Staphylococcus aureus (MSSA)    Debility    Low back pain    Encephalopathy     HPI: Vasile Santiago, 69 yo male with PMHx of IDDM, HTN, DVT who was admitted from ARU for AMS. Patient was admitted to [facility] on 5/18/2019 for epidural abscess post-op L3-4 lumbar laminectomy and facetectomy on 4/19/2019. Patient's family states during his hospitalization his mental status has declined. He was given dilaudid in ARU but his mentation improved after holding dilaudid. A&Ox3. Patient still had persistent sharp stabbing back pain that radiated to his feet and ankles. Denied shortness of breath, chest pain, palpitations, abdominal pain, N/V, or changes in urination or bowel habits.      Hospital Course:  Patient was afebrile and hemodynamically stable. BMP WNLs with no leukocytosis. UA showed increased glucose and 6-10 WBC. Urine culture showed no growth to date. Mentation improved to baseline during admission. Neurosurgery was consulted for back pain due to prior epidural abscess after laminectomy. MRI of lumbar region showed discitis and osteomyelitis from L2-4 as expected but does not show clear worsening or progression from prior MRI. MRI unable to assess full lumbar status for central stenosis or epidural phlegmon/fluid collections due to patient not being able to cooperate.  ID to patient:  Side effects: Drowsiness, lightheadedness        oxyCODONE 5 MG immediate release tablet  Commonly known as:  ROXICODONE  Take 1-2 tablets by mouth every 6 hours as needed for Pain for up to 3 days. What changed:  when to take this        CONTINUE taking these medications    acetaminophen 325 MG tablet  Commonly known as:  TYLENOL     amLODIPine 2.5 MG tablet  Commonly known as:  NORVASC     atorvastatin 20 MG tablet  Commonly known as:  LIPITOR     cefTRIAXone 2 g injection  Commonly known as:  ROCEPHIN     empagliflozin 25 MG tablet  Commonly known as:  JARDIANCE     famotidine 20 MG tablet  Commonly known as:  PEPCID  Take 1 tablet by mouth 2 times daily     fenofibrate 145 MG tablet  Commonly known as:  TRICOR     glimepiride 4 MG tablet  Commonly known as:  AMARYL     hydrochlorothiazide 12.5 MG capsule  Commonly known as:  MICROZIDE     insulin  UNIT/ML injection vial  Commonly known as:  HUMULIN N;NOVOLIN N     lisinopril 20 MG tablet  Commonly known as:  PRINIVIL;ZESTRIL     metFORMIN 1000 MG tablet  Commonly known as:  GLUCOPHAGE     polyethylene glycol packet  Commonly known as:  GLYCOLAX     senna-docusate 8.6-50 MG per tablet  Commonly known as:  PERICOLACE     tamsulosin 0.4 MG capsule  Commonly known as:  FLOMAX     thiamine 100 MG tablet  Take 1 tablet by mouth daily           Where to Get Your Medications      You can get these medications from any pharmacy    Bring a paper prescription for each of these medications  · methocarbamol 500 MG tablet  · oxyCODONE 5 MG immediate release tablet       Activity: activity as tolerated  Diet: regular diet  Wound Care: as directed    Time Spent on discharge is more than 30 minutes    Signed:  Ingris Jiménez MD   Internal Medicine, PGY1  6/3/2019    Patient seen and examined, labs and imaging studies reviewed, agree with assessment and plan as outlined above.   Continue with discharge planning epidural abscess present on admission cause of backpain, asked to monitor for recurrence of symptoms or new symptoms including but not limited to side effects of medications, chest pain, shortness of breath, nausea, vomiting, fevers or chills and seek immediate medical attention or call 911. Greater than 30 minutes spent on case on day of discharge.      Lilliana Roth MD [address]

## 2019-06-03 NOTE — PROGRESS NOTES
Patient discharged from 5T. Patient left with belongings on stretcher by ambulance to [facility]. Report called to RN.

## 2019-06-10 LAB
FUNGUS (MYCOLOGY) CULTURE: NORMAL
FUNGUS STAIN: NORMAL

## 2019-06-14 ENCOUNTER — TELEPHONE (OUTPATIENT)
Dept: INFECTIOUS DISEASES | Age: 69
End: 2019-06-14

## 2019-06-14 NOTE — TELEPHONE ENCOUNTER
Called and spoke with Veronica Duenas at [facility]. Nurse reported that no labs had been done. Gave v.o for CBC w diff, CMP, ESR, and CRP every Monday or Tuesday. Confirmed current ATB order and term date. This writer made Veronica Duenas aware that Dr. Kenrick Boyer will be following labs and make a decision before the 7th on whether to continue or d/c antibiotic.

## 2019-06-17 LAB
ALBUMIN SERPL-MCNC: 3.3 G/DL
ALP BLD-CCNC: 98 U/L
ALT SERPL-CCNC: 14 U/L
ANION GAP SERPL CALCULATED.3IONS-SCNC: NORMAL MMOL/L
AST SERPL-CCNC: 13 U/L
BILIRUB SERPL-MCNC: 0.3 MG/DL (ref 0.1–1.4)
BUN BLDV-MCNC: 17 MG/DL
C-REACTIVE PROTEIN: 20.4
CALCIUM SERPL-MCNC: 9 MG/DL
CHLORIDE BLD-SCNC: 97 MMOL/L
CO2: 26 MMOL/L
CREAT SERPL-MCNC: 0.9 MG/DL
GFR CALCULATED: NORMAL
GLUCOSE BLD-MCNC: 111 MG/DL
HCT VFR BLD CALC: 35.2 % (ref 41–53)
HEMOGLOBIN: 11.5 G/DL (ref 13.5–17.5)
PLATELET # BLD: 349 K/ΜL
POTASSIUM SERPL-SCNC: 4.5 MMOL/L
SEDIMENTATION RATE, ERYTHROCYTE: 44
SODIUM BLD-SCNC: 134 MMOL/L
TOTAL PROTEIN: 6.5
WBC # BLD: 4.7 10^3/ML

## 2019-06-20 NOTE — DISCHARGE SUMMARY
Hospital Medicine Discharge Summary    Patient ID: Raya Wilson      Patient's PCP: Bridget Garcia Date: 5/8/2019     Discharge Date: 5/17/2019      Admitting Physician: Myrna Munoz MD     Discharge Physician: Indira Barragan MD     Discharge Diagnoses:    Severe sepsis  MSSA bacteremia  Lumbar epidural abscess status post washout 5/12/2019  General medical deconditioning  R leg DVT  HTN  Hyperlipidemia         The patient was seen and examined on day of discharge and this discharge summary is in conjunction with any daily progress note from day of discharge. Hospital Course: 66-year-old male to the hospital on 5/8/2018 with back pain. Patient was found to have soft tissue collection in previous laminectomy site. Patient was initially admitted to the intensive care unit. Patient underwent washout by neurosurgery on 5/12/2019 by Dr. Lillie Sloan. Infectious disease was consulted. Antibiotics were adjusted during the course of hospitalization was transitioned to Rocephin IV which patient will continue until 6/26/2018. Patient was found to have evidence of a DVT of the right leg and was started on Lovenox weight-based. Patient will continue on this and will be transition to other anticoagulants such as Eliquis or Coumadin          Physical Exam Performed:     BP (!) 144/69   Pulse 96   Temp 98.9 °F (37.2 °C)   Resp 18   Ht 6' 1\" (1.854 m)   Wt (!) 350 lb 1.5 oz (158.8 kg)   SpO2 92%   BMI 46.19 kg/m²       General appearance:  No apparent distress, appears stated age and cooperative. HEENT:  Normal cephalic, atraumatic without obvious deformity. Pupils equal, round, and reactive to light. Extra ocular muscles intact. Conjunctivae/corneas clear. Neck: Supple, with full range of motion. No jugular venous distention. Trachea midline. Respiratory:  Normal respiratory effort. Clear to auscultation, bilaterally without Rales/Wheezes/Rhonchi.   Cardiovascular:  Regular rate and rhythm with normal S1/S2 without murmurs, rubs or gallops. Abdomen: Soft, non-tender, non-distended with normal bowel sounds. Musculoskeletal:  No clubbing, cyanosis or edema bilaterally. Full range of motion without deformity. Skin: Skin color, texture, turgor normal.  No rashes or lesions. Neurologic:  Neurovascularly intact without any focal sensory/motor deficits. Cranial nerves: II-XII intact, grossly non-focal.  Psychiatric:  Alert and oriented, thought content appropriate, normal insight  Capillary Refill: Brisk,< 3 seconds   Peripheral Pulses: +2 palpable, equal bilaterally       Labs: For convenience and continuity at follow-up the following most recent labs are provided:      CBC:    Lab Results   Component Value Date    WBC 5.2 06/03/2019    HGB 11.7 06/03/2019    HCT 35.3 06/03/2019     06/03/2019       Renal:    Lab Results   Component Value Date     06/03/2019    K 4.2 06/03/2019    CL 97 06/03/2019    CO2 24 06/03/2019    BUN 19 06/03/2019    CREATININE 0.7 06/03/2019    CALCIUM 9.3 06/03/2019         Significant Diagnostic Studies    Radiology:   VL Extremity Venous Bilateral   Final Result      XR KNEE RIGHT (1-2 VIEWS)   Final Result      Small joint effusion, indeterminant. Sterile versus nonsterile fluid not excluded      XR HIP 2-3 VW W PELVIS RIGHT   Final Result      Moderate degenerative changes      XR ABDOMEN (KUB) (SINGLE AP VIEW)   Final Result      Nonspecific bowel gas pattern. Large calcification overlying the right mid abdomen, indeterminate      MRI LUMBAR SPINE W WO CONTRAST   Final Result   1. Posterior fluid collection along the laminectomy bed of unclear significance. The fluid collection exerts complex layering signal with gas bubbles and peripheral enhancement. This may or present a seroma or postoperative hematoma. An infected fluid    collection be difficult to exclude. A pseudomeningocele is not excluded.  The fluid collection extensively laminectomy bed without significant mass effect. 2. Multifactorial multilevel mild canal narrowing as described in part related to extensive facet arthropathy resulting in a narrowing of the canal from a lateral dimension. 3. Tortuous bunched nerve roots in the upper cauda equina surrounding the conus presumably related to chronic stenosis. 4. Multifactorial multilevel foraminal narrowing with moderate or severe foraminal narrowing from L1-L2 through L5-S1.   5. Diffuse circumferential epidural enhancement without discrete epidural collection extending throughout the laminectomy site which may be reactive in nature. Infection would be difficult to exclude. 6. No convincing evidence of osteomyelitis or discitis. 7. Large posterior central disc protrusion at L5-S1 resulting in a moderate canal narrowing and mild/moderate impingement upon the exiting S1 nerve roots bilaterally. XR Eye Foreign Body   Final Result      No foreign body large or opaque enough to be identified radiographically. XR CHEST PORTABLE   Final Result   Impression: No acute abnormality or significant change. XR CHEST PORTABLE   Final Result       Right jugular central venous catheter appears to be in place. No pneumothorax. Airspace opacity over right upper lung zone, may represent pneumonia,    artifact versus underlying mass lesion. Followup to resolution is    recommended.   Comparison with prior studies, especially recent chest CT    may help to clarify                 Consults:     IP CONSULT TO CRITICAL CARE  IP CONSULT TO NEUROSURGERY  IP CONSULT TO PHARMACY  IP CONSULT TO INFECTIOUS DISEASES  IP CONSULT TO PHYSICAL MEDICINE REHAB    Disposition:  Acute rehab     Condition at Discharge: good    Discharge Instructions/Follow-up:  Dr Erendira Coronado 1 week    Code Status:  full    Activity: activity as tolerated    Diet: cardiac diet      Discharge Medications:     Discharge Medication List as of 5/17/2019  7:14 PM           Details oxyCODONE-acetaminophen (PERCOCET) 5-325 MG per tablet Take 1 tablet by mouth every 4 hours as needed for Pain for up to 3 days. , Disp-12 tablet, R-0Print      methocarbamol (ROBAXIN) 750 MG tablet Take 2 tablets by mouth 3 times daily for 10 days, Disp-60 tablet, R-0Normal      famotidine (PEPCID) 20 MG tablet Take 1 tablet by mouth 2 times daily, Disp-60 tablet, R-3Normal      vitamin B-1 100 MG tablet Take 1 tablet by mouth daily, Disp-30 tablet, R-3Normal      enoxaparin (LOVENOX) 150 MG/ML injection Inject 1.06 mLs into the skin every 12 hours for 7 days, Disp-14 Syringe, R-3Normal              Details   amLODIPine (NORVASC) 2.5 MG tablet Take 2.5 mg by mouth dailyHistorical Med      atorvastatin (LIPITOR) 20 MG tablet Take 20 mg by mouth dailyHistorical Med      empagliflozin (JARDIANCE) 25 MG tablet Take 12.5 mg by mouth daily Historical Med      fenofibrate (TRICOR) 145 MG tablet Take 145 mg by mouth dailyHistorical Med      glimepiride (AMARYL) 4 MG tablet Take 8 mg by mouth every morning (before breakfast) Historical Med      insulin NPH (HUMULIN N;NOVOLIN N) 100 UNIT/ML injection vial Inject 20 Units into the skin 2 times daily (before meals)Historical Med      metFORMIN (GLUCOPHAGE) 1000 MG tablet Take 1,000 mg by mouth 2 times daily (with meals)Historical Med      tamsulosin (FLOMAX) 0.4 MG capsule Take 0.4 mg by mouth dailyHistorical Med      lisinopril (PRINIVIL;ZESTRIL) 20 MG tablet Take 20 mg by mouth dailyHistorical Med             Time Spent on discharge is more than 1 hour in the examination, evaluation, counseling and review of medications and discharge plan. Signed:    Jones Leonard MD   6/20/2019      Thank you EMIGDIO S PAGE for the opportunity to be involved in this patient's care. If you have any questions or concerns please feel free to contact me at [phone].

## 2019-06-25 ENCOUNTER — TELEPHONE (OUTPATIENT)
Dept: INFECTIOUS DISEASES | Age: 69
End: 2019-06-25

## 2019-06-25 LAB
AFB CULTURE (MYCOBACTERIA): NORMAL
AFB SMEAR: NORMAL

## 2019-06-25 NOTE — TELEPHONE ENCOUNTER
Spoke with Vijaya Gabriel, nurse at [facility]. Reports pt is doing good. Surgical site infection healing well. Stated pt being followed by the wound nurse. No f/u with Dr. Blank Crawford d/t transportation issues.

## 2019-07-01 LAB
ALBUMIN SERPL-MCNC: 3.5 G/DL
ALP BLD-CCNC: 57 U/L
ALT SERPL-CCNC: 6 U/L
ANION GAP SERPL CALCULATED.3IONS-SCNC: NORMAL MMOL/L
AST SERPL-CCNC: 12 U/L
BASOPHILS ABSOLUTE: 0.1 /ΜL
BASOPHILS RELATIVE PERCENT: 0.8 %
BILIRUB SERPL-MCNC: 0.3 MG/DL (ref 0.1–1.4)
BUN BLDV-MCNC: 14 MG/DL
C-REACTIVE PROTEIN: 19.4
CALCIUM SERPL-MCNC: 9.1 MG/DL
CHLORIDE BLD-SCNC: 98 MMOL/L
CO2: 25 MMOL/L
CREAT SERPL-MCNC: NORMAL MG/DL
EOSINOPHILS ABSOLUTE: 0.2 /ΜL
EOSINOPHILS RELATIVE PERCENT: 3.4 %
GFR CALCULATED: NORMAL
GLUCOSE BLD-MCNC: 50 MG/DL
HCT VFR BLD CALC: 37.1 % (ref 41–53)
HEMOGLOBIN: 11.8 G/DL (ref 13.5–17.5)
LYMPHOCYTES ABSOLUTE: 2.1 /ΜL
LYMPHOCYTES RELATIVE PERCENT: 33.7 %
MCH RBC QN AUTO: 27.9 PG
MCHC RBC AUTO-ENTMCNC: 31.7 G/DL
MCV RBC AUTO: 87.8 FL
MONOCYTES ABSOLUTE: 0.7 /ΜL
MONOCYTES RELATIVE PERCENT: 11.8 %
NEUTROPHILS ABSOLUTE: 3.2 /ΜL
NEUTROPHILS RELATIVE PERCENT: 50.3 %
PDW BLD-RTO: 15.8 %
PLATELET # BLD: ABNORMAL K/ΜL
PMV BLD AUTO: 7.4 FL
POTASSIUM SERPL-SCNC: 4.5 MMOL/L
RBC # BLD: 4.23 10^6/ΜL
SEDIMENTATION RATE, ERYTHROCYTE: 29
SODIUM BLD-SCNC: 134 MMOL/L
TOTAL PROTEIN: 6.7
WBC # BLD: 6.3 10^3/ML

## 2019-07-23 ENCOUNTER — TELEPHONE (OUTPATIENT)
Dept: INFECTIOUS DISEASES | Age: 69
End: 2019-07-23

## 2019-08-13 ENCOUNTER — TELEPHONE (OUTPATIENT)
Dept: INFECTIOUS DISEASES | Age: 69
End: 2019-08-13

## 2019-09-16 ENCOUNTER — OFFICE VISIT (OUTPATIENT)
Dept: INFECTIOUS DISEASES | Age: 69
End: 2019-09-16
Payer: MEDICARE

## 2019-09-16 VITALS
BODY MASS INDEX: 37.77 KG/M2 | TEMPERATURE: 98.4 F | WEIGHT: 285 LBS | DIASTOLIC BLOOD PRESSURE: 82 MMHG | HEIGHT: 73 IN | SYSTOLIC BLOOD PRESSURE: 158 MMHG

## 2019-09-16 DIAGNOSIS — M54.50 CHRONIC MIDLINE LOW BACK PAIN WITHOUT SCIATICA: ICD-10-CM

## 2019-09-16 DIAGNOSIS — R78.81 BACTEREMIA DUE TO METHICILLIN SUSCEPTIBLE STAPHYLOCOCCUS AUREUS (MSSA): ICD-10-CM

## 2019-09-16 DIAGNOSIS — T81.49XA SURGICAL SITE INFECTION: Primary | ICD-10-CM

## 2019-09-16 DIAGNOSIS — G89.29 CHRONIC MIDLINE LOW BACK PAIN WITHOUT SCIATICA: ICD-10-CM

## 2019-09-16 DIAGNOSIS — B95.61 BACTEREMIA DUE TO METHICILLIN SUSCEPTIBLE STAPHYLOCOCCUS AUREUS (MSSA): ICD-10-CM

## 2019-09-16 PROCEDURE — 99215 OFFICE O/P EST HI 40 MIN: CPT | Performed by: INTERNAL MEDICINE

## 2019-09-16 NOTE — PROGRESS NOTES
Infectious Diseases Oupatient Follow-up Note    Primary Care Physician:   EMIGDIO HARRELL  Last visit:    [facility] 6/2/19  History Obtained From:    Patient, EPIC    CHIEF COMPLAINT / ID problem:     Chief Complaint   Patient presents with    Follow-up     follow up from [address] / Interval history:      Jessica Jacobo is a 68 y.o. male who was seen today for MSSA bacteremia, L2/3 SSI, hospital f/u.    69 yo man with obesity, DM, HTN, lumbar stenosis    Prior lumbar surgeries ---    4/19/19 - L 3 & 4 lumbar laminectomy, L3/4 & L4/5 bilateral facetectomy , foraminotomies over L4 &5 nerve roots.    5/8/19 -  LBP. Presented to [facility] ED. CT with air and fluid in ST / laminectomy site     5/8/19 -  Transferred and admitted to [facility] 5/8. Wound with purulent drainage  MRI with fluid collection with gas and peripheral enhancement, see report  BC x 2  MSSA  Wound cult with mod growth S aureus      OR 5/12 - epidural abscess.  Per OR note, \"large amount of liquid pus', extended to deep surg space, epidural space. Surg cult + MSSA. Repeat MRI 5/29/19 -  - no new disk signal, seen before.  Vertebral edema new and mild.  There is some changes at inferior L2 endplate. Discharged 6/3/19 on iv ceftriaxone 2 gm q 12 to [facility]  Lab results 7/1/19 - ESR 29, CRP 19.4. Ended iv ceftriaxone on 7/15/19. Pt had f/u MRI on 7/19/19. \"The findings described above are consistent with discitis  And osteomyelitis L3-4 with intradisc abscess. \"There is proninent enhancing dura anteriorly and along the laminectomy posteriorly. ...'  \"Evidence of myositis affecting the left iliopsoas complex without abscess . Betha Lute Betha Lute \"  \"Evidence of arachnoiditis\"    Today 9/16/19 --   Walking with a walker. Pain has 'settled down'. No pain at rest.    Taking 1 pain pill at night. No fever.         Past Medical History:    Past Medical History:   Diagnosis Date    Arthritis      left knee    Back pain     DDD 6. No convincing evidence of osteomyelitis or discitis. 7. Large posterior central disc protrusion at L5-S1 resulting in a moderate canal narrowing and mild/moderate impingement upon the exiting S1 nerve roots bilaterally.        IMPRESSION:    Lumbar SSI   OR 5/12  I&D to deep space / epidural abscess, surg cult MSSA   S aureus / MSSA bacteremia, f/u BC no growth     Repeat MRI 5/29 - new edema of L 2/3/4, no new disk signal  Repeat MRI 7/19 - mult findings, no comparison    Pt with decrease pain off antibiotic - It is NOT clear this is worse or progressive infection.  May have some evolution of radiologic findings    RECOMMENDATIONS:      Cont off antibiotics  Check ESR / CRP  Will review images from Lumbar MRI 7/19 - on a disk, will have downloaded into [employer] and review / compare with previous images    - Spent over 40 minutes on visit (including history, physical exam, review of data, development and implementation of treatment plan and coordination of care. - Over 50% of time spent in pt counseling and education.     Hilario Padilla MD

## 2019-09-27 ENCOUNTER — TELEPHONE (OUTPATIENT)
Dept: INFECTIOUS DISEASES | Age: 69
End: 2019-09-27

## 2019-09-30 LAB
BASOPHILS ABSOLUTE: 0 /ΜL
BASOPHILS RELATIVE PERCENT: 0 %
BUN BLDV-MCNC: 20 MG/DL
C-REACTIVE PROTEIN: 5
CALCIUM SERPL-MCNC: 9.8 MG/DL
CHLORIDE BLD-SCNC: 100 MMOL/L
CO2: 19 MMOL/L
CREAT SERPL-MCNC: 0.98 MG/DL
EOSINOPHILS ABSOLUTE: 0.2 /ΜL
EOSINOPHILS RELATIVE PERCENT: 2 %
GFR CALCULATED: NORMAL
GLUCOSE BLD-MCNC: 147 MG/DL
HCT VFR BLD CALC: 37.6 % (ref 41–53)
HEMOGLOBIN: 12.9 G/DL (ref 13.5–17.5)
LYMPHOCYTES ABSOLUTE: 2 /ΜL
LYMPHOCYTES RELATIVE PERCENT: 31 %
MCH RBC QN AUTO: 29 PG
MCHC RBC AUTO-ENTMCNC: 34.3 G/DL
MCV RBC AUTO: 85 FL
MONOCYTES ABSOLUTE: 0.5 /ΜL
MONOCYTES RELATIVE PERCENT: 8 %
NEUTROPHILS ABSOLUTE: 3.8 /ΜL
NEUTROPHILS RELATIVE PERCENT: 59 %
PDW BLD-RTO: 14.2 %
PLATELET # BLD: 380 K/ΜL
PMV BLD AUTO: ABNORMAL FL
POTASSIUM SERPL-SCNC: 4.5 MMOL/L
RBC # BLD: 4.45 10^6/ΜL
SEDIMENTATION RATE, ERYTHROCYTE: 11
SODIUM BLD-SCNC: 139 MMOL/L
WBC # BLD: 6.5 10^3/ML

## 2021-09-08 NOTE — PROGRESS NOTES
ID Follow-up NOTE  Medical Student note - reviewed and modified, see Attending addendum at bottom    CC:   Lumbar SSI / infection, S aureus bacteremia  Antibiotics: vancomycin & ceftriaxone    Admit Date: 5/8/2019  Hospital Day: 7    Subjective:     POD#2 I&D    Patient denies fevers and chills. He is complaining of being foggy and burning pain in his right leg. Objective:     Patient Vitals for the past 8 hrs:   BP Temp Temp src Pulse Resp SpO2   05/14/19 0745 135/66 99 °F (37.2 °C) Oral 83 16 92 %   05/14/19 0401 134/62 98.7 °F (37.1 °C) Oral 70 17 92 %     I/O last 3 completed shifts: In: 1140 [P.O.:1140]  Out: 1870 [LVLIF:1426; Drains:95]  No intake/output data recorded. EXAM:  GENERAL:      No apparent distress. Obese.    HEENT:           Membranes moist, no oral lesion  NECK:             Supple  LUNGS:           Clear b/l, no rales, no dullness  CARDIAC:       RRR, no murmur appreciated  ABD:                + BS, soft / NT  EXT:                No rash, no edema, no lesions  Wound:           dressing in C/D/I with bloody serous drainage in the drain  NEURO:          moving all extremities without difficulty  PSYCH:           Orientation, sensorium, mood normal  LINES:             Peripheral iv      Data Review:  Lab Results   Component Value Date    WBC 13.2 (H) 05/14/2019    HGB 9.7 (L) 05/14/2019    HCT 30.3 (L) 05/14/2019    MCV 86.7 05/14/2019     05/14/2019     Lab Results   Component Value Date    CREATININE 0.6 (L) 05/14/2019    BUN 22 (H) 05/14/2019     (L) 05/14/2019    K 3.9 05/14/2019    CL 95 (L) 05/14/2019    CO2 26 05/14/2019       Hepatic Function Panel:   Lab Results   Component Value Date    ALKPHOS 110 05/14/2019    ALT 23 05/14/2019    AST 22 05/14/2019    PROT 6.0 05/14/2019    BILITOT 0.5 05/14/2019    LABALBU 2.8 05/14/2019       MICRO:  5/8/2019 - BC X 2 - PCR +Staph aureus, Staph Aureus    Staphylococcus aureus (2)   Antibiotic Interpretation KASSY Status    ceFAZolin Samir Combs is a 88 year old male presenting for a procedure for ED&C to left lateral neck.      Denies known Latex allergy or symptoms of Latex sensitivity.  PCP: Oseas Alexander MD  Tobacco--negative  Medications verified, no changes per patient.  Allergies verified, no changes per patient.  Pharmacy verified for this encounter 9/8/2021.   issues on 4/10/2019. He presented with septic shock on 5/8/2019, requiring resuscitation with fluids and levophed for blood pressure control. He has remained afebrile, with leukocytosis of 13.2 today. ESR of 61 and CRP of 190 on admission. CT showed fluid collection that self-drained with purulent to now serous drainage at the surgical site with wound dishesence. MRI shows fluid collection. He is on Vancomycin and Ceftriaxone. Wound Cultures/Blood Cultures are growing MRSA and MSSA. Patient is s/p wound washout w/ drain by NSY with purulent drainage per OP note. Plan:     - post surgical care per NSY  - continue vancomycin, ceftriaxone  - continue to follow wound cultures and blood cultures  - PICC line today or tomorrow     Discussed with Dr. Isai King MD.  Kiko Quintanilla     Addendum to Medical Student Progress note:  Pt seen,examined and evaluated. I have independently performed history, physical exam, lab and data review. I have determined assessment and plan as documented by student Austin Nick).    69 yo man with obesity, DM, HTN, lumbar stenosis     4/19/19 - L 3 & 4 lumbar laminectomy, L3/4 & L4/5bilateral facetectomy , foraminotomies over L4 &5 nerve roots.       Pt had fall and injured R knee   Pt developed LBP over few days, increased over time.  Associated chills, nausea. Presented to [facility] ED on 5/8 - afebrile, low BP, LA 3.  CT with air and fluid in ST / laminectomy site  ESR 61, Cr 1.7     Transferred and admitted to [facility] 5/8 - afebrile, WBC 11.4.  Started on vancomycin and zosyn.    MRI with fluid collection with gas and peripheral enhancement, see report  Seen by Neurosurg, may take to OR 5/11     Wound dehiscence on 5/9.  Wound cult with 3+GPC on GS and mod growth S aureus / MRSA  Wound with purulent drainage     OR 5/12 - epidural abscess. Per OR note, \"large amount of liquid pus', extended to deep surg space, epidural space.   GS 1+GPC.       IMP/  Lumbar SSI - POD#1 I&D to deep space, cult MRSA  S aureus / MSSA bacteremia     REC/  Cont vancomycin +ceftriaxone     Will check OR wound cult results   Will check results f/u BC - 5/12, no growth to date  If [facility] 5/12 neg on 5/15, can place PICC  (called Micro 5/13 to review cultures (BC MSSA / wound MRSA))    Postop care per Neurosurg     Discussed with pt, family  Ankit Wilcox MD

## 2024-09-07 NOTE — PLAN OF CARE
Problem: Pain:  Goal: Pain level will decrease  Description  Pain level will decrease  5/14/2019 0737 by Renae Santos RN  Outcome: Ongoing  Note:   Pain will continue to decrease and be managed per the [facility] Problem: Risk for Impaired Skin Integrity  Goal: Tissue integrity - skin and mucous membranes  Description  Structural intactness and normal physiological function of skin and  mucous membranes. Outcome: Ongoing  Note:   Patient will continue to change position to maintain skin integrity. Problem: Falls - Risk of:  Goal: Will remain free from falls  Description  Will remain free from falls  5/14/2019 0737 by Renae Santos RN  Outcome: Ongoing  Note:   Patient will continue to remain free from falls. Yes

## (undated) DEVICE — TURNOVER KIT RM INF CTRL TECH

## (undated) DEVICE — APPLICATOR PREP 26ML 0.7% IOD POVACRYLEX 74% ISO ALC ST

## (undated) DEVICE — PAD,NON-ADHERENT,3X8,STERILE,LF,1/PK: Brand: MEDLINE

## (undated) DEVICE — LAMINECTOMY PK

## (undated) DEVICE — 3M™ WARMING BLANKET, UPPER BODY, 10 PER CASE, 42268: Brand: BAIR HUGGER™

## (undated) DEVICE — CABLE BPLR L12FT FLYING LD DISPOSABLE

## (undated) DEVICE — GLOVE SURG SZ 8 L12IN FNGR THK94MIL TRNSLUC YEL LTX HYDRGEL

## (undated) DEVICE — SUTURE MCRYL SZ 4-0 L27IN ABSRB UD L19MM PS-2 1/2 CIR PRIM Y426H

## (undated) DEVICE — COVER LT HNDL CAM BLU DISP W/ SURG CTRL

## (undated) DEVICE — GLOVE SURG SZ 8 L12IN FNGR THK87MIL WHT LTX FREE

## (undated) DEVICE — CATHETER IV 14GA L5.25IN PERIPH ORNG FEP POLYMER 3 BVL

## (undated) DEVICE — BLADE ES L4IN INSUL EDGE

## (undated) DEVICE — WAX SURG 2.5GM HEMSTAT BNE BEESWAX PARAFFIN ISO PALMITATE

## (undated) DEVICE — GLOVE SURG SZ 85 L12IN FNGR THK94MIL TRNSLUC YEL LTX

## (undated) DEVICE — TOOL 14MH30 LEGEND 14CM 3MM: Brand: MIDAS REX ™

## (undated) DEVICE — SYSTEM SKIN CLSR 22CM DERMBND PRINEO

## (undated) DEVICE — ULTRA CMFRT CVR M

## (undated) DEVICE — SOLUTION IV 1000ML 0.9% SOD CHL

## (undated) DEVICE — GOWN,SIRUS,POLYRNF,BRTHSLV,XLN/XXL,18/CS: Brand: MEDLINE

## (undated) DEVICE — COVER,TABLE,HEAVY DUTY,77"X90",STRL: Brand: MEDLINE

## (undated) DEVICE — SUTURE VCRL SZ 0 L18IN ABSRB UD L36MM CT-1 1/2 CIR J840D

## (undated) DEVICE — AEGIS 1" DISK 4MM HOLE, PEEL OPEN: Brand: MEDLINE

## (undated) DEVICE — GARMENT,MEDLINE,DVT,INT,CALF,MED, GEN2: Brand: MEDLINE

## (undated) DEVICE — SUTURE PDS II SZ 2-0 L18IN ABSRB VLT SH L26MM 1/2 CIR TAPR Z775D

## (undated) DEVICE — KIT EVAC 0.13IN RECT TB DIA10FR 400CC PVC 3 SPR Y CONN DRN

## (undated) DEVICE — CODMAN® SURGICAL PATTIES 1/2" X 3" (1.27CM X 7.62CM): Brand: CODMAN®

## (undated) DEVICE — SURE SET-DOUBLE BASIN-LF: Brand: MEDLINE INDUSTRIES, INC.

## (undated) DEVICE — SUTURE PDS II SZ 0 L18IN ABSRB VLT L36MM CT-1 1/2 CIR Z740D

## (undated) DEVICE — SYRINGE IRRIG 60ML SFT PLIABLE BLB EZ TO GRP 1 HND USE W/

## (undated) DEVICE — GLOVE SURG SZ 75 L12IN FNGR THK94MIL TRNSLUC YEL LTX

## (undated) DEVICE — PLATE ES AD W 9FT CRD 2

## (undated) DEVICE — DRAPE MICSCP W132XL406CM LENS DIA68MM W VARI LENS2 FOR LEICA

## (undated) DEVICE — SUTURE VCRL + SZ 2-0 L27IN ABSRB CLR CT-1 1/2 CIR TAPERCUT VCP259H

## (undated) DEVICE — STAPLER SKIN H3.9MM WIRE DIA0.58MM CRWN 6.9MM 35 STPL ROT

## (undated) DEVICE — GAUZE,SPONGE,2"X2",8PLY,STERILE,LF,2'S: Brand: MEDLINE